# Patient Record
Sex: MALE | Race: WHITE | Employment: OTHER | URBAN - METROPOLITAN AREA
[De-identification: names, ages, dates, MRNs, and addresses within clinical notes are randomized per-mention and may not be internally consistent; named-entity substitution may affect disease eponyms.]

---

## 2017-11-06 ENCOUNTER — OFFICE VISIT (OUTPATIENT)
Dept: PODIATRY | Age: 77
End: 2017-11-06
Payer: COMMERCIAL

## 2017-11-06 VITALS
BODY MASS INDEX: 31.07 KG/M2 | WEIGHT: 205 LBS | HEIGHT: 68 IN | DIASTOLIC BLOOD PRESSURE: 76 MMHG | SYSTOLIC BLOOD PRESSURE: 129 MMHG | HEART RATE: 78 BPM

## 2017-11-06 DIAGNOSIS — L85.3 XEROSIS OF SKIN: ICD-10-CM

## 2017-11-06 DIAGNOSIS — B35.1 DERMATOPHYTOSIS OF NAIL: Primary | ICD-10-CM

## 2017-11-06 PROCEDURE — 11721 DEBRIDE NAIL 6 OR MORE: CPT | Performed by: PODIATRIST

## 2017-11-06 PROCEDURE — 99213 OFFICE O/P EST LOW 20 MIN: CPT | Performed by: PODIATRIST

## 2017-11-06 RX ORDER — MELOXICAM 15 MG/1
TABLET ORAL
COMMUNITY
Start: 2017-11-05 | End: 2019-09-11

## 2017-11-06 RX ORDER — GABAPENTIN 300 MG/1
CAPSULE ORAL
COMMUNITY
Start: 2017-08-17 | End: 2019-07-01

## 2017-11-06 RX ORDER — LEVOCETIRIZINE DIHYDROCHLORIDE 5 MG/1
TABLET, FILM COATED ORAL
COMMUNITY
Start: 2017-09-05 | End: 2019-09-11

## 2017-11-06 RX ORDER — AMMONIUM LACTATE 12 G/100G
CREAM TOPICAL
Qty: 1 BOTTLE | Refills: 3 | Status: SHIPPED | OUTPATIENT
Start: 2017-11-06

## 2017-11-06 NOTE — PROGRESS NOTES
[facility] Podiatry  Return Patient    Chief Complaint   Patient presents with    Nail Problem       Subjective: This Maxine Petersen comes to clinic for foot and nail care. Pt currently has complaint of thickened, painful, elongated nails that he/she cannot manage by themselves. Pt. Relates pain to nails with shoe gear. Pt's primary care physician is Angela Dos Santos MD. Last seen October 31 2017  Past Medical History:   Diagnosis Date    Anxiety     BPH (benign prostatic hypertrophy)     CAD (coronary artery disease)     Cancer (HCC)     face, ear, scalp and arms     Examination of participant in clinical trial 4/27/39    For the life of the medtronic device    H/O heart artery stent 2011    [employer] - 3 stents    Hypertension     Left ventricular dysfunction     Myocardial infarction 11/17/88, 1/2013    Pacemaker [id number]    4x    Vasovagal near syncope        Allergies   Allergen Reactions    Oxycontin [Oxycodone Hcl]     Percocet [Oxycodone-Acetaminophen]      Current Outpatient Prescriptions on File Prior to Visit   Medication Sig Dispense Refill    isosorbide mononitrate (IMDUR) 30 MG extended release tablet Take 0.5 tablets by mouth daily 30 tablet 3    nitroGLYCERIN (NITROSTAT) 0.4 MG SL tablet Place 1 tablet under the tongue every 5 minutes as needed for Chest pain up to max of 3 total doses. If no relief after 1 dose, call 911. 25 tablet 3    pyridostigmine (MESTINON) 60 MG tablet Take 0.5 tablets by mouth every 8 hours. 90 tablet 3    fluticasone (FLONASE) 50 MCG/ACT nasal spray 1 spray by Nasal route daily.  sertraline (ZOLOFT) 25 MG tablet Take 25 mg by mouth daily.  amLODIPine (NORVASC) 10 MG tablet Take 10 mg by mouth daily.  furosemide (LASIX) 40 MG tablet Take 40 mg by mouth daily.  aspirin EC 81 MG EC tablet Take 1 tablet by mouth daily. 30 tablet 6    zolpidem (AMBIEN) 5 MG tablet Take 5 mg by mouth nightly as needed.       simvastatin (ZOCOR) 40 MG Bilateral.  Edema present, Bilateral.  Varicosities absent, Bilateral. Erythema absent, Bilateral      Integument: Warm, dry, supple, Bilateral.  Open lesion absent, Bilateral.  Interdigital maceration absent to web spaces 4, Bilateral.  Nails 1-5 left and 1-5 right thickened > 3.0 mm, dystrophic and crumbly, discolored with subungual debris. Fissures absent, Bilateral.     Neurological:  Sensation present to light touch to level of digits, Bilateral.    Assessment:  65 y.o. male with:   1. Dermatophytosis of nail  36936 - [place] DEBRIDEMENT OF NAILS, 6 OR MORE   2. Xerosis of skin           Plan:   Pt was evaluated and examined. Patient was given personalized discharge instructions. Nails 1-10 were debrided in length and thickness sharply with a nail nipper and  without incident. rx givne for lac hydryin Pt will follow up in 9 weeks or sooner if any problems arise. Diagnosis was discussed with the pt and all of their questions were answered in detail. Proper foot hygiene and care was discussed with the pt. Patient to check feet daily and contact the office with any questions/problems/concerns. Other comorbidity noted and will be managed by PCP. Pain waiver discussed with patient and confirmed.    11/6/2017      Electronically signed by Dr. Leia Pearson DPM

## 2018-01-15 ENCOUNTER — OFFICE VISIT (OUTPATIENT)
Dept: PODIATRY | Age: 78
End: 2018-01-15
Payer: COMMERCIAL

## 2018-01-15 VITALS — HEIGHT: 68 IN | WEIGHT: 205 LBS | BODY MASS INDEX: 31.07 KG/M2

## 2018-01-15 DIAGNOSIS — B35.1 DERMATOPHYTOSIS OF NAIL: Primary | ICD-10-CM

## 2018-01-15 DIAGNOSIS — L85.3 XEROSIS OF SKIN: ICD-10-CM

## 2018-01-15 DIAGNOSIS — D23.71 BENIGN NEOPLASM OF SKIN OF RIGHT LOWER EXTREMITY, INCLUDING HIP: ICD-10-CM

## 2018-01-15 DIAGNOSIS — I73.9 PVD (PERIPHERAL VASCULAR DISEASE) (HCC): ICD-10-CM

## 2018-01-15 DIAGNOSIS — M79.604 BILATERAL LOWER EXTREMITY PAIN: ICD-10-CM

## 2018-01-15 DIAGNOSIS — D23.72 BENIGN NEOPLASM OF SKIN OF LEFT LOWER EXTREMITY, INCLUDING HIP: ICD-10-CM

## 2018-01-15 DIAGNOSIS — M79.605 BILATERAL LOWER EXTREMITY PAIN: ICD-10-CM

## 2018-01-15 PROBLEM — C44.629 SQUAMOUS CELL CARCINOMA OF SKIN OF LEFT UPPER ARM: Status: ACTIVE | Noted: 2018-01-08

## 2018-01-15 PROCEDURE — 17110 DESTRUCTION B9 LES UP TO 14: CPT | Performed by: PODIATRIST

## 2018-01-15 PROCEDURE — 99213 OFFICE O/P EST LOW 20 MIN: CPT | Performed by: PODIATRIST

## 2018-01-15 NOTE — PROGRESS NOTES
1st MPJ ROM decreased, Bilateral.  Muscle strength 5/5, Bilateral.  Pain present upon palpation of lesions on platar feet rupal. In 2 spots. Medial longitudinal arch, Bilateral WNL. Ankle ROM WNL,Bilateral.    Dorsally contracted digits absent digits 1-5 Bilateral.     Vascular: DP and PT pulses palpable 2/4, Bilateral.  CFT <3 seconds, Bilateral.  Hair growth present to the level of the digits, Bilateral.  Edema absent, Bilateral.  Varicosities absent, Bilateral. Erythema absent, Bilateral    Neurological: Sensation intact to light touch to level of digits, Bilateral.  Protective sensation intact 10/10 sites via 5.07/10g [place]-Ana Luisa Monofilament, Bilateral.  negative Tinel's, Bilateral.  negative Valleix sign, Bilateral.      Integument: Warm, dry, supple, Bilateral.  Open lesion absent, Bilateral.  Interdigital maceration absent to web spaces 1-4, Bilateral.  Nails are normal in length, thickness and color 1-5 bilateral.  Fissures absent, Bilateral.   Neurological:  Sensation present to light touch to level of digits, Bilateral.    Assessment:  65 y.o. male with:   1. Dermatophytosis of nail     2. Bilateral lower extremity pain  MN DESTRUCTION BENIGN LESIONS UP TO 14   3. Benign neoplasm of skin of right lower extremity, including hip  MN DESTRUCTION BENIGN LESIONS UP TO 14   4. Benign neoplasm of skin of left lower extremity, including hip  MN DESTRUCTION BENIGN LESIONS UP TO 14   5. PVD (peripheral vascular disease) (HCC)  MN DESTRUCTION BENIGN LESIONS UP TO 14   6. Xerosis of skin  MN DESTRUCTION BENIGN LESIONS UP TO 14         Plan:   Pt was evaluated and examined. Patient was given personalized discharge instructions. The lesion was partially excised and Pyrogallic acid was applied under occlusion. The patient will leave in place for 24-48 hours and than remove. The patient tolerated the procedure well and without complication. Pt will follow up in 9 weeks or sooner if any problems arise.

## 2018-01-25 RX ORDER — GABAPENTIN 300 MG/1
300 CAPSULE ORAL 3 TIMES DAILY
Qty: 90 CAPSULE | Refills: 2 | Status: SHIPPED | OUTPATIENT
Start: 2018-01-25 | End: 2022-08-01

## 2018-03-15 ENCOUNTER — HOSPITAL ENCOUNTER (OUTPATIENT)
Age: 78
Discharge: HOME | End: 2018-03-15
Payer: COMMERCIAL

## 2018-07-10 ENCOUNTER — OFFICE VISIT (OUTPATIENT)
Dept: PODIATRY | Age: 78
End: 2018-07-10
Payer: COMMERCIAL

## 2018-07-10 VITALS — WEIGHT: 205 LBS | HEART RATE: 68 BPM | HEIGHT: 68 IN | BODY MASS INDEX: 31.07 KG/M2 | RESPIRATION RATE: 16 BRPM

## 2018-07-10 DIAGNOSIS — I73.9 PVD (PERIPHERAL VASCULAR DISEASE) (HCC): Primary | ICD-10-CM

## 2018-07-10 DIAGNOSIS — M79.672 PAIN IN BOTH FEET: ICD-10-CM

## 2018-07-10 DIAGNOSIS — M79.671 PAIN IN BOTH FEET: ICD-10-CM

## 2018-07-10 DIAGNOSIS — B35.1 DERMATOPHYTOSIS OF NAIL: ICD-10-CM

## 2018-07-10 PROCEDURE — 11721 DEBRIDE NAIL 6 OR MORE: CPT | Performed by: PODIATRIST

## 2018-07-10 NOTE — PROGRESS NOTES
[facility] PHYSICIANS  [facility] [address]  [address] [facility]   Dept: [phone]  Dept Fax: [phone]    NAIL PAIN PROGRESS NOTE  Date of patient's visit: 7/10/2018  Patient's Name:  Michael Light YOB: 1940            Patient Care Team:  Latanya Pal MD as PCP - 8 Adrian Galeana DPM as Physician (Podiatry)      No chief complaint on file. Subjective: This Michael Light comes to clinic for foot and nail care. Pt currently has complaint of thickened, painful, elongated nails that he/she cannot manage by themselves. Pt. Relates pain to nails with shoe gear. Pt's primary care physician is Latanya Pal MD.  Past Medical History:   Diagnosis Date    Anxiety     BPH (benign prostatic hypertrophy)     CAD (coronary artery disease)     Cancer ([address]     face, ear, scalp and arms     Examination of participant in clinical trial 4/27/39    For the life of the medtronic device    H/O heart artery stent 2011    St[employer]V's - 3 stents    Hypertension     Left ventricular dysfunction     Myocardial infarction 11/17/88, 1/2013    Pacemaker [id number]    4x    Vasovagal near syncope        Allergies   Allergen Reactions    Oxycodone-Acetaminophen     Oxycontin [Oxycodone Hcl]     Percocet [Oxycodone-Acetaminophen]      Current Outpatient Prescriptions on File Prior to Visit   Medication Sig Dispense Refill    gabapentin (NEURONTIN) 300 MG capsule Take 1 capsule by mouth 3 times daily for 90 days. 90 capsule 2    gabapentin (NEURONTIN) 300 MG capsule       meloxicam (MOBIC) 15 MG tablet       levocetirizine (XYZAL) 5 MG tablet       ammonium lactate (LAC-HYDRIN) 12 % cream Apply topically as needed.  1 Bottle 3    isosorbide mononitrate (IMDUR) 30 MG extended release tablet Take 0.5 tablets by mouth daily 30 tablet 3    nitroGLYCERIN (NITROSTAT) 0.4 MG SL tablet Place 1 tablet under the tongue every 5 minutes as needed for Chest pain up Left    Thickness  [x] [x] [x] [x] [x] [x] [x] [x] [x] [x]  5 4 3 2 1 1 2 3 4 5                         Right                                        Left    Dystrophic Changes   [x] [x] [x] [x] [x] [x] [x] [x] [x] [x]  5 4 3 2 1 1 2 3 4 5                         Right                                        Left    Color  [x] [x] [x] [x] [x] [x] [x] [x] [x] [x]  5 4 3 2 1 1 2 3 4 5                          Right                                        Left    Incurvation/Ingrowin   [] [] [] [] [] [] [] [] [] []  5 4 3 2 1 1 2 3 4 5                         Right                                        Left    Inflammation/Pain   [x] [x] [x] [x] [x] [x] [x] [x] [x] [x]  5 4 3  2 1 1 2 3 4 5                         Right                                        Left       Nails are painful 1-10 with direct palpation. Dermatologic Exam:  Skin lesion/ulceration Absent . Skin No rashes or nodules noted. .       Musculoskeletal:     1st MPJ ROM decreased, Bilateral.  Muscle strength 5/5, Bilateral.  Pain present upon palpation of toenails 1-5, Bilateral. decreased medial longitudinal arch, Bilateral.  Ankle ROM decreased,Bilateral.    Dorsally contracted digits present digits 2, Bilateral.     Vascular: DP and PT pulses palpable 1/4, Bilateral.  CFT <5 seconds, Bilateral.  Hair growth absent to the level of the digits, Bilateral.  Edema present, Bilateral.  Varicosities absent, Bilateral. Erythema absent, Bilateral    Integument: Warm, dry, supple, Bilateral.  Open lesion absent, Bilateral.  Interdigital maceration absent to web spaces 4, Bilateral.  Nails 1-5 left and 1-5 right thickened > 3.0 mm, dystrophic and crumbly, discolored with subungual debris. Fissures absent, Bilateral.   Neurological:  Sensation present to light touch to level of digits, Bilateral.      Assessment:  77 y.o. male with:    Diagnosis Orders   1. PVD (peripheral vascular disease) ([facility]  11880 - [place] DEBRIDEMENT OF NAILS, 6 OR MORE   2.  Pain in both feet  69752 - [place] DEBRIDEMENT OF NAILS, 6 OR MORE   3. Dermatophytosis of nail  89640 - [place] DEBRIDEMENT OF NAILS, 6 OR MORE         Plan:   Pt was evaluated and examined. Patient was given personalized discharge instructions. Nails 1-10 were debrided in length and thickness sharply with a nail nipper and  without incident. Pt will follow up in 9 weeks or sooner if any problems arise. Diagnosis was discussed with the pt and all of their questions were answered in detail. Proper foot hygiene and care was discussed with the pt. Patient to check feet daily and contact the office with any questions/problems/concerns. Other comorbidity noted and will be managed by PCP. Pain waiver discussed with patient and confirmed.    7/10/2018      Electronically signed by Eliza Khan DPM on 7/10/2018 at 10:47 AM  7/10/2018

## 2018-10-02 ENCOUNTER — OFFICE VISIT (OUTPATIENT)
Dept: PODIATRY | Age: 78
End: 2018-10-02
Payer: COMMERCIAL

## 2018-10-02 VITALS — HEIGHT: 67 IN | WEIGHT: 196 LBS | BODY MASS INDEX: 30.76 KG/M2

## 2018-10-02 DIAGNOSIS — M79.672 PAIN IN BOTH FEET: ICD-10-CM

## 2018-10-02 DIAGNOSIS — I73.9 PERIPHERAL VASCULAR DISEASE (HCC): ICD-10-CM

## 2018-10-02 DIAGNOSIS — B35.1 DERMATOPHYTOSIS OF NAIL: Primary | ICD-10-CM

## 2018-10-02 DIAGNOSIS — R26.2 TROUBLE WALKING: ICD-10-CM

## 2018-10-02 DIAGNOSIS — M79.671 PAIN IN BOTH FEET: ICD-10-CM

## 2018-10-02 PROCEDURE — 11721 DEBRIDE NAIL 6 OR MORE: CPT | Performed by: PODIATRIST

## 2018-10-02 RX ORDER — FUROSEMIDE 40 MG/1
TABLET ORAL
COMMUNITY
End: 2019-09-11

## 2018-10-02 RX ORDER — FLUTICASONE PROPIONATE 50 MCG
SPRAY, SUSPENSION (ML) NASAL
COMMUNITY
End: 2019-09-11

## 2018-10-02 RX ORDER — GABAPENTIN 250 MG/5ML
SOLUTION ORAL
COMMUNITY
End: 2019-07-01

## 2018-10-02 RX ORDER — ASPIRIN 81 MG/1
TABLET, CHEWABLE ORAL
COMMUNITY
Start: 2013-05-23 | End: 2019-09-11

## 2018-10-02 RX ORDER — LISINOPRIL 40 MG/1
TABLET ORAL
COMMUNITY
Start: 2013-05-23 | End: 2019-09-11

## 2018-10-02 NOTE — PROGRESS NOTES
[facility]YSICIANS  [facility] [address]  [address]  [address]  Dept: [phone]  Dept Fax: [phone]    NAIL PAIN PROGRESS NOTE  Date of patient's visit: 10/2/2018  Patient's Name:  Sondra Marx YOB: 1940            Patient Care Team:  Helen Dillon MD as PCP - General  Rain Patel DPM as Physician (Podiatry)      Chief Complaint   Patient presents with    Peripheral Neuropathy    Nail Problem       Subjective: This Sondra Araseli comes to clinic for foot and nail care. Pt currently has complaint of thickened, painful, elongated nails that he/she cannot manage by themselves. Pt. Relates pain to nails with shoe gear. Pt's primary care physician is Helen Dillon MD last seen about 1 month ago and states he only see's her every  3 months. Past Medical History:   Diagnosis Date    Anxiety     BPH (benign prostatic hypertrophy)     CAD (coronary artery disease)     Cancer (HCC)     face, ear, scalp and arms     Examination of participant in clinical trial 4/27/39    For the life of the medtronic device    H/O heart artery stent 2011    [employer]V's - 3 stents    Hypertension     Left ventricular dysfunction     Myocardial infarction ([facility] 11/17/88, 1/2013    Pacemaker [id number]    4x    Vasovagal near syncope        Allergies   Allergen Reactions    Oxycodone-Acetaminophen     Oxycontin [Oxycodone Hcl]     Percocet [Oxycodone-Acetaminophen]      Current Outpatient Prescriptions on File Prior to Visit   Medication Sig Dispense Refill    gabapentin (NEURONTIN) 300 MG capsule       meloxicam (MOBIC) 15 MG tablet       levocetirizine (XYZAL) 5 MG tablet       ammonium lactate (LAC-HYDRIN) 12 % cream Apply topically as needed.  1 Bottle 3    isosorbide mononitrate (IMDUR) 30 MG extended release tablet Take 0.5 tablets by mouth daily 30 tablet 3    nitroGLYCERIN (NITROSTAT) 0.4 MG SL tablet Place 1 tablet under the tongue every 5 minutes

## 2018-12-04 ENCOUNTER — OFFICE VISIT (OUTPATIENT)
Dept: PODIATRY | Age: 78
End: 2018-12-04
Payer: COMMERCIAL

## 2018-12-04 VITALS — HEIGHT: 67 IN | WEIGHT: 190 LBS | BODY MASS INDEX: 29.82 KG/M2

## 2018-12-04 DIAGNOSIS — D23.72 BENIGN NEOPLASM OF SKIN OF LOWER LIMB, INCLUDING HIP, LEFT: ICD-10-CM

## 2018-12-04 DIAGNOSIS — B35.1 DERMATOPHYTOSIS OF NAIL: ICD-10-CM

## 2018-12-04 DIAGNOSIS — I73.9 PERIPHERAL VASCULAR DISEASE (HCC): ICD-10-CM

## 2018-12-04 DIAGNOSIS — M79.671 PAIN IN BOTH FEET: Primary | ICD-10-CM

## 2018-12-04 DIAGNOSIS — M79.672 PAIN IN BOTH FEET: Primary | ICD-10-CM

## 2018-12-04 DIAGNOSIS — D23.71 BENIGN NEOPLASM OF SKIN OF LOWER LIMB, INCLUDING HIP, RIGHT: ICD-10-CM

## 2018-12-04 DIAGNOSIS — R26.2 TROUBLE WALKING: ICD-10-CM

## 2018-12-04 PROCEDURE — 99213 OFFICE O/P EST LOW 20 MIN: CPT | Performed by: PODIATRIST

## 2018-12-04 PROCEDURE — 17110 DESTRUCTION B9 LES UP TO 14: CPT | Performed by: PODIATRIST

## 2018-12-04 PROCEDURE — 11721 DEBRIDE NAIL 6 OR MORE: CPT | Performed by: PODIATRIST

## 2018-12-04 NOTE — PROGRESS NOTES
[facility] PHYSICIANS  [facility] [address]  [place] [address]  Dept: [phone]  Dept Fax: [phone]     NAIL PAIN & BENIGN NEOPLASM PROGRESS NOTE  Date of patient's visit: 12/4/2018  Patient's Name:  Rashaun Hernandez YOB: 1940            Patient Care Team:  Jazmyn Negro MD as PCP - General Sanam Romo DPM as Physician (Podiatry)          Chief Complaint   Patient presents with    Benign Neoplasm    Nail Problem       Subjective: This Rashaun Hernandez comes to clinic for foot and nail care. Pt currently has complaint of thickened, painful, elongated nails that he/she cannot manage by themselves. Pt. Relates pain to nails with shoe gear. Pt's primary care physician is Jazmyn Negro MDlast seen 3 months ago and will be seen again 11/13/2018 . Past Medical History:   Diagnosis Date    Anxiety     BPH (benign prostatic hypertrophy)     CAD (coronary artery disease)     Cancer (HCC)     face, ear, scalp and arms     Examination of participant in clinical trial 4/27/39    For the life of the medtronic device    H/O heart artery stent 2011    St[employer]V's - 3 stents    Hypertension     Left ventricular dysfunction     Myocardial infarction ([facility] 11/17/88, 1/2013    Pacemaker [id number]    4x    Vasovagal near syncope    Rashaun Hernandez is a 77 y.o. male. Painful lesions his   feet. . They have been present for 2 month(s) duration. The lesions are present on the bilateral foot. The patient has 2 lesion that is deep seated and has a painful core.  Treatment has included padding and using lotion to the feet      Allergies   Allergen Reactions    Oxycodone-Acetaminophen     Oxycontin [Oxycodone Hcl]     Percocet [Oxycodone-Acetaminophen]      Current Outpatient Prescriptions on File Prior to Visit   Medication Sig Dispense Refill    aspirin 81 MG chewable tablet       fluticasone (FLONASE) 50 MCG/ACT nasal spray PLACE TWO SPRAYS IN EACH NOSTRIL DAILY      Gabapentin 300 MG/6ML SOLN 1 tablet      furosemide (LASIX) 40 MG tablet 1 tablet      lisinopril (PRINIVIL;ZESTRIL) 40 MG tablet 1 tablet      gabapentin (NEURONTIN) 300 MG capsule       meloxicam (MOBIC) 15 MG tablet       levocetirizine (XYZAL) 5 MG tablet       ammonium lactate (LAC-HYDRIN) 12 % cream Apply topically as needed. 1 Bottle 3    isosorbide mononitrate (IMDUR) 30 MG extended release tablet Take 0.5 tablets by mouth daily 30 tablet 3    nitroGLYCERIN (NITROSTAT) 0.4 MG SL tablet Place 1 tablet under the tongue every 5 minutes as needed for Chest pain up to max of 3 total doses. If no relief after 1 dose, call 911. 25 tablet 3    pyridostigmine (MESTINON) 60 MG tablet Take 0.5 tablets by mouth every 8 hours. 90 tablet 3    fluticasone (FLONASE) 50 MCG/ACT nasal spray 1 spray by Nasal route daily.  sertraline (ZOLOFT) 25 MG tablet Take 25 mg by mouth daily.  amLODIPine (NORVASC) 10 MG tablet Take 10 mg by mouth daily.  furosemide (LASIX) 40 MG tablet Take 40 mg by mouth daily.  aspirin EC 81 MG EC tablet Take 1 tablet by mouth daily. 30 tablet 6    zolpidem (AMBIEN) 5 MG tablet Take 5 mg by mouth nightly as needed.  simvastatin (ZOCOR) 40 MG tablet Take 20 mg by mouth nightly       alfuzosin (UROXATRAL) 10 MG SR tablet Take 10 mg by mouth daily.  lisinopril (PRINIVIL;ZESTRIL) 40 MG tablet Take 40 mg by mouth daily.  metoprolol (TOPROL-XL) 100 MG XL tablet Take 100 mg by mouth daily.  gabapentin (NEURONTIN) 300 MG capsule Take 1 capsule by mouth 3 times daily for 90 days. 90 capsule 2     No current facility-administered medications on file prior to visit.       Review of Systems    Review of Systems:   History obtained from chart review and the patient  General ROS: negative for - chills, fatigue, fever, night sweats or weight gain  Constitutional: Negative for chills, diaphoresis, fatigue, fever and unexpected weight

## 2019-03-05 ENCOUNTER — OFFICE VISIT (OUTPATIENT)
Dept: PODIATRY | Age: 79
End: 2019-03-05
Payer: COMMERCIAL

## 2019-03-05 VITALS — WEIGHT: 203 LBS | BODY MASS INDEX: 31.86 KG/M2 | HEIGHT: 67 IN

## 2019-03-05 DIAGNOSIS — D23.71 BENIGN NEOPLASM OF SKIN OF RIGHT LOWER EXTREMITY, INCLUDING HIP: ICD-10-CM

## 2019-03-05 DIAGNOSIS — R26.2 TROUBLE WALKING: ICD-10-CM

## 2019-03-05 DIAGNOSIS — M79.672 PAIN IN BOTH FEET: Primary | ICD-10-CM

## 2019-03-05 DIAGNOSIS — M79.671 PAIN IN BOTH FEET: Primary | ICD-10-CM

## 2019-03-05 DIAGNOSIS — I73.9 PVD (PERIPHERAL VASCULAR DISEASE) (HCC): ICD-10-CM

## 2019-03-05 PROCEDURE — 99213 OFFICE O/P EST LOW 20 MIN: CPT | Performed by: PODIATRIST

## 2019-03-05 PROCEDURE — 17110 DESTRUCTION B9 LES UP TO 14: CPT | Performed by: PODIATRIST

## 2019-07-01 ENCOUNTER — HOSPITAL ENCOUNTER (OUTPATIENT)
Dept: CARDIAC CATH/INVASIVE PROCEDURES | Age: 79
Discharge: HOME | End: 2019-07-01
Payer: MEDICARE

## 2019-07-01 VITALS
WEIGHT: 203 LBS | SYSTOLIC BLOOD PRESSURE: 124 MMHG | TEMPERATURE: 97 F | HEIGHT: 67 IN | BODY MASS INDEX: 31.86 KG/M2 | RESPIRATION RATE: 13 BRPM | HEART RATE: 72 BPM | DIASTOLIC BLOOD PRESSURE: 71 MMHG | OXYGEN SATURATION: 98 %

## 2019-07-01 DIAGNOSIS — Z45.02 ENCOUNTER DUE TO AICD AT END OF BATTERY LIFE: ICD-10-CM

## 2019-07-01 LAB
GFR NON-AFRICAN AMERICAN: >60 ML/MIN
GFR SERPL CREATININE-BSD FRML MDRD: >60 ML/MIN
GFR SERPL CREATININE-BSD FRML MDRD: NORMAL ML/MIN/{1.73_M2}
GLUCOSE BLD-MCNC: 86 MG/DL (ref 74–100)
PLATELET # BLD: 208 K/UL (ref 138–453)
POC CHLORIDE: 100 MMOL/L (ref 98–107)
POC CREATININE: 0.97 MG/DL (ref 0.51–1.19)
POC HEMATOCRIT: 44 % (ref 41–53)
POC HEMOGLOBIN: 14.8 G/DL (ref 13.5–17.5)
POC POTASSIUM: 3.7 MMOL/L (ref 3.5–4.5)
POC SODIUM: 138 MMOL/L (ref 138–146)

## 2019-07-01 PROCEDURE — 85014 HEMATOCRIT: CPT

## 2019-07-01 PROCEDURE — 82947 ASSAY GLUCOSE BLOOD QUANT: CPT

## 2019-07-01 PROCEDURE — 82565 ASSAY OF CREATININE: CPT

## 2019-07-01 PROCEDURE — 33263 RMVL & RPLCMT DFB GEN 2 LEAD: CPT | Performed by: INTERNAL MEDICINE

## 2019-07-01 PROCEDURE — 85049 AUTOMATED PLATELET COUNT: CPT

## 2019-07-01 PROCEDURE — 2500000003 HC RX 250 WO HCPCS

## 2019-07-01 PROCEDURE — 2709999900 HC NON-CHARGEABLE SUPPLY

## 2019-07-01 PROCEDURE — 84132 ASSAY OF SERUM POTASSIUM: CPT

## 2019-07-01 PROCEDURE — 84295 ASSAY OF SERUM SODIUM: CPT

## 2019-07-01 PROCEDURE — 6360000002 HC RX W HCPCS

## 2019-07-01 PROCEDURE — 2580000003 HC RX 258

## 2019-07-01 PROCEDURE — 7100000011 HC PHASE II RECOVERY - ADDTL 15 MIN

## 2019-07-01 PROCEDURE — 82435 ASSAY OF BLOOD CHLORIDE: CPT

## 2019-07-01 PROCEDURE — 7100000010 HC PHASE II RECOVERY - FIRST 15 MIN

## 2019-07-01 PROCEDURE — C1721 AICD, DUAL CHAMBER: HCPCS

## 2019-07-01 PROCEDURE — 2720000010 HC SURG SUPPLY STERILE

## 2019-07-01 RX ORDER — CEFAZOLIN SODIUM 1 G/50ML
1 INJECTION, SOLUTION INTRAVENOUS ONCE
Status: DISCONTINUED | OUTPATIENT
Start: 2019-07-01 | End: 2019-07-02 | Stop reason: HOSPADM

## 2019-07-01 RX ORDER — SODIUM CHLORIDE 9 MG/ML
INJECTION, SOLUTION INTRAVENOUS CONTINUOUS
Status: DISCONTINUED | OUTPATIENT
Start: 2019-07-01 | End: 2019-07-02 | Stop reason: HOSPADM

## 2019-07-01 RX ADMIN — SODIUM CHLORIDE: 9 INJECTION, SOLUTION INTRAVENOUS at 08:08

## 2019-07-01 NOTE — PROGRESS NOTES
Received post procedure to [facility] to room 7. Assessment obtained. Restrictions reviewed with patient. Post procedure pathway initiated. Left upper chest site soft ,  dry and intact. No hematoma noted. Family at side. Patient without complaints.

## 2019-07-01 NOTE — H&P
MR  4. Sick sinus syndrome    Plan:  1. Proceed with planned ICD battery change out. 2. Further orders to follow. The risks, benefits, and alternatives of the prcoedure were discussed in detail with the patient. The patient agrees to proceed with the procedure, verbalizes understanding and signed consent.        Swetha Emerson MD  Fellow, [address] [place] [address]

## 2019-07-01 NOTE — OP NOTE
7days  Discharge if patient remains stable        Electronically signed by Shantel Phan MD on 7/1/2019 at 12:02 PM  [facility] Cardiology Consultant  [phone]

## 2019-09-24 ENCOUNTER — HOSPITAL ENCOUNTER (OUTPATIENT)
Age: 79
Setting detail: OUTPATIENT SURGERY
Discharge: HOME | End: 2019-09-24
Attending: UROLOGY | Admitting: UROLOGY
Payer: MEDICARE

## 2019-09-24 ENCOUNTER — ANESTHESIA (OUTPATIENT)
Dept: OPERATING ROOM | Age: 79
End: 2019-09-24

## 2019-09-24 ENCOUNTER — ANESTHESIA EVENT (OUTPATIENT)
Dept: OPERATING ROOM | Age: 79
End: 2019-09-24

## 2019-09-24 ENCOUNTER — HOSPITAL ENCOUNTER (OUTPATIENT)
Dept: ULTRASOUND IMAGING | Age: 79
Setting detail: OUTPATIENT SURGERY
Discharge: HOME | End: 2019-09-26
Attending: UROLOGY
Payer: MEDICARE

## 2019-09-24 VITALS
DIASTOLIC BLOOD PRESSURE: 68 MMHG | TEMPERATURE: 97.3 F | WEIGHT: 200 LBS | HEART RATE: 73 BPM | OXYGEN SATURATION: 96 % | RESPIRATION RATE: 20 BRPM | BODY MASS INDEX: 31.39 KG/M2 | HEIGHT: 67 IN | SYSTOLIC BLOOD PRESSURE: 109 MMHG

## 2019-09-24 PROCEDURE — 2709999900 HC NON-CHARGEABLE SUPPLY: Performed by: UROLOGY

## 2019-09-24 PROCEDURE — 3600000002 HC SURGERY LEVEL 2 BASE: Performed by: UROLOGY

## 2019-09-24 PROCEDURE — 88305 TISSUE EXAM BY PATHOLOGIST: CPT

## 2019-09-24 PROCEDURE — 76942 ECHO GUIDE FOR BIOPSY: CPT

## 2019-09-24 PROCEDURE — 7100000010 HC PHASE II RECOVERY - FIRST 15 MIN: Performed by: UROLOGY

## 2019-09-24 PROCEDURE — 2500000003 HC RX 250 WO HCPCS: Performed by: UROLOGY

## 2019-09-24 PROCEDURE — 7100000011 HC PHASE II RECOVERY - ADDTL 15 MIN: Performed by: UROLOGY

## 2019-09-24 PROCEDURE — 6370000000 HC RX 637 (ALT 250 FOR IP): Performed by: UROLOGY

## 2019-09-24 RX ORDER — CIPROFLOXACIN 500 MG/1
500 TABLET, FILM COATED ORAL 2 TIMES DAILY
Status: ON HOLD | COMMUNITY
End: 2020-09-02 | Stop reason: ALTCHOICE

## 2019-09-24 RX ORDER — M-VIT,TX,IRON,MINS/CALC/FOLIC 27MG-0.4MG
1 TABLET ORAL DAILY
COMMUNITY

## 2019-09-24 RX ORDER — LIDOCAINE HYDROCHLORIDE 10 MG/ML
INJECTION, SOLUTION EPIDURAL; INFILTRATION; INTRACAUDAL; PERINEURAL PRN
Status: DISCONTINUED | OUTPATIENT
Start: 2019-09-24 | End: 2019-09-24 | Stop reason: ALTCHOICE

## 2019-09-24 ASSESSMENT — PAIN DESCRIPTION - LOCATION: LOCATION: RECTUM

## 2019-09-24 ASSESSMENT — LIFESTYLE VARIABLES: SMOKING_STATUS: 0

## 2019-09-24 ASSESSMENT — PAIN SCALES - GENERAL: PAINLEVEL_OUTOF10: 2

## 2019-09-24 ASSESSMENT — PAIN - FUNCTIONAL ASSESSMENT: PAIN_FUNCTIONAL_ASSESSMENT: 0-10

## 2019-09-24 NOTE — PROGRESS NOTES
Discharge instructions given to pt and wife per [facility] RN after patient dressed and sitting up.

## 2019-09-25 LAB — SURGICAL PATHOLOGY REPORT: NORMAL

## 2019-10-11 ENCOUNTER — HOSPITAL ENCOUNTER (OUTPATIENT)
Dept: CT IMAGING | Age: 79
Discharge: HOME | End: 2019-10-13
Payer: MEDICARE

## 2019-10-11 DIAGNOSIS — C61 MALIGNANT NEOPLASM OF PROSTATE (HCC): ICD-10-CM

## 2019-10-11 PROCEDURE — 2580000003 HC RX 258: Performed by: UROLOGY

## 2019-10-11 PROCEDURE — 6360000004 HC RX CONTRAST MEDICATION: Performed by: UROLOGY

## 2019-10-11 PROCEDURE — 74177 CT ABD & PELVIS W/CONTRAST: CPT

## 2019-10-11 RX ORDER — SODIUM CHLORIDE 0.9 % (FLUSH) 0.9 %
10 SYRINGE (ML) INJECTION
Status: COMPLETED | OUTPATIENT
Start: 2019-10-11 | End: 2019-10-11

## 2019-10-11 RX ORDER — 0.9 % SODIUM CHLORIDE 0.9 %
80 INTRAVENOUS SOLUTION INTRAVENOUS ONCE
Status: COMPLETED | OUTPATIENT
Start: 2019-10-11 | End: 2019-10-11

## 2019-10-11 RX ADMIN — IOPAMIDOL 75 ML: 755 INJECTION, SOLUTION INTRAVENOUS at 15:38

## 2019-10-11 RX ADMIN — SODIUM CHLORIDE, PRESERVATIVE FREE 10 ML: 5 INJECTION INTRAVENOUS at 15:38

## 2019-10-11 RX ADMIN — SODIUM CHLORIDE 80 ML: 0.9 INJECTION, SOLUTION INTRAVENOUS at 15:38

## 2019-10-24 ENCOUNTER — HOSPITAL ENCOUNTER (OUTPATIENT)
Dept: NUCLEAR MEDICINE | Age: 79
Discharge: HOME | End: 2019-10-26
Payer: MEDICARE

## 2019-10-24 DIAGNOSIS — C61 PROSTATE CANCER (HCC): ICD-10-CM

## 2019-10-24 PROCEDURE — 78306 BONE IMAGING WHOLE BODY: CPT

## 2019-10-24 PROCEDURE — 3430000000 HC RX DIAGNOSTIC RADIOPHARMACEUTICAL: Performed by: UROLOGY

## 2019-10-24 PROCEDURE — A9503 TC99M MEDRONATE: HCPCS | Performed by: UROLOGY

## 2019-10-24 RX ORDER — TC 99M MEDRONATE 20 MG/10ML
25 INJECTION, POWDER, LYOPHILIZED, FOR SOLUTION INTRAVENOUS
Status: COMPLETED | OUTPATIENT
Start: 2019-10-24 | End: 2019-10-24

## 2019-10-24 RX ADMIN — TC 99M MEDRONATE 25 MILLICURIE: 20 INJECTION, POWDER, LYOPHILIZED, FOR SOLUTION INTRAVENOUS at 11:25

## 2019-11-25 RX ORDER — BICALUTAMIDE 50 MG/1
TABLET, FILM COATED ORAL
Qty: 30 TABLET | Refills: 0 | Status: SHIPPED | OUTPATIENT
Start: 2019-11-25 | End: 2019-12-30

## 2019-11-27 ENCOUNTER — HOSPITAL ENCOUNTER (OUTPATIENT)
Dept: RADIATION ONCOLOGY | Facility: MEDICAL CENTER | Age: 79
Discharge: HOME | End: 2019-11-27
Payer: MEDICARE

## 2019-11-27 VITALS
OXYGEN SATURATION: 95 % | RESPIRATION RATE: 15 BRPM | SYSTOLIC BLOOD PRESSURE: 135 MMHG | TEMPERATURE: 97.3 F | HEIGHT: 67 IN | WEIGHT: 200.25 LBS | DIASTOLIC BLOOD PRESSURE: 84 MMHG | HEART RATE: 89 BPM | BODY MASS INDEX: 31.43 KG/M2

## 2019-11-27 DIAGNOSIS — C61 MALIGNANT NEOPLASM OF PROSTATE (HCC): ICD-10-CM

## 2019-11-27 PROCEDURE — 99213 OFFICE O/P EST LOW 20 MIN: CPT | Performed by: RADIOLOGY

## 2019-11-27 ASSESSMENT — ENCOUNTER SYMPTOMS
BACK PAIN: 1
VOMITING: 0
NAUSEA: 0
DIARRHEA: 0
SHORTNESS OF BREATH: 0
CONSTIPATION: 0
WHEEZING: 0
BLOOD IN STOOL: 0
TROUBLE SWALLOWING: 0
COUGH: 0
SORE THROAT: 0

## 2020-01-16 RX ORDER — BICALUTAMIDE 50 MG/1
TABLET, FILM COATED ORAL
Qty: 30 TABLET | Refills: 3 | Status: SHIPPED | OUTPATIENT
Start: 2020-01-16 | End: 2020-05-18

## 2020-01-21 ENCOUNTER — OFFICE VISIT (OUTPATIENT)
Dept: PODIATRY | Age: 80
End: 2020-01-21
Payer: MEDICARE

## 2020-01-21 VITALS — WEIGHT: 200 LBS | HEIGHT: 67 IN | RESPIRATION RATE: 16 BRPM | BODY MASS INDEX: 31.39 KG/M2

## 2020-01-21 PROCEDURE — 99213 OFFICE O/P EST LOW 20 MIN: CPT | Performed by: PODIATRIST

## 2020-01-21 PROCEDURE — 17110 DESTRUCTION B9 LES UP TO 14: CPT | Performed by: PODIATRIST

## 2020-01-21 PROCEDURE — 11721 DEBRIDE NAIL 6 OR MORE: CPT | Performed by: PODIATRIST

## 2020-01-21 NOTE — PROGRESS NOTES
[facility] PHYSICIANS  [facility]  [address] [place] [address]  Dept: [phone]  Dept Fax: [phone]     PAIN PROGRESS NOTE  Date of patient's visit: 1/21/2020  Patient's Name:  Omid Alvarado YOB: 1940            Patient Care Team:  Jennifer Crouch MD as PCP - [address] Galeana DPM as Physician (Podiatry)  Alyse Frias MD as Surgeon (Radiation Oncology)  Ryan Landon MD as Consulting Physician (Urology)      Chief Complaint   Patient presents with    Foot Pain    Nail Problem    Benign Neoplasm       Subjective: This Omid Alvarado comes to clinic for foot and nail care. Pt currently has complaint of thickened, painful, elongated nails that he/she cannot manage by themselves. Pt. Relates pain to nails with shoe gear. Pt's primary care physician is Jennifer Crouch MD last seen 01/15/2019. Pt also relates to painful skin spots to the bottom of both feet. Pt has tried pads and changing shoes but it has note helped the pain    Pt has a new complaint of dry scaly skin to the bottom of both of the feet. Pt states they have tried OTC cream but it isnt applied regularly.   Pt has tried changing shoes but it has not helped the pain       Past Medical History:   Diagnosis Date    Acid reflux     resolved since s/p shona    Anxiety     Arthritis     back/ fingers    BPH (benign prostatic hypertrophy)     CAD (coronary artery disease)     [facility] Willam hernandez/ [facility]    Cancer [facility])     face, ear, scalp and arms     Carotid stenosis     bilat    Cataracts, both eyes     CHF (congestive heart failure) (HCC)     Dry skin     Elevated PSA     Examination of participant in clinical trial 4/27/39    For the life of the medtronic device    Hyperlipidemia     pt denies 11-27-19    Hypertension     PCP Dr. Aaron Fuentes/ last seen 9-2019    Left ventricular dysfunction     Macular degeneration     left    Myocardial infarction

## 2020-01-29 ENCOUNTER — HOSPITAL ENCOUNTER (OUTPATIENT)
Dept: RADIATION ONCOLOGY | Facility: MEDICAL CENTER | Age: 80
Discharge: HOME | End: 2020-01-29
Payer: MEDICARE

## 2020-01-29 VITALS
OXYGEN SATURATION: 96 % | HEART RATE: 90 BPM | TEMPERATURE: 97.3 F | DIASTOLIC BLOOD PRESSURE: 92 MMHG | RESPIRATION RATE: 16 BRPM | SYSTOLIC BLOOD PRESSURE: 151 MMHG | BODY MASS INDEX: 31.99 KG/M2 | WEIGHT: 204.25 LBS

## 2020-01-29 PROCEDURE — 99212 OFFICE O/P EST SF 10 MIN: CPT | Performed by: RADIOLOGY

## 2020-01-29 ASSESSMENT — ENCOUNTER SYMPTOMS
VOMITING: 0
NAUSEA: 0
COUGH: 0
BACK PAIN: 1
CONSTIPATION: 0
SHORTNESS OF BREATH: 0
SORE THROAT: 0
TROUBLE SWALLOWING: 0
BLOOD IN STOOL: 0
DIARRHEA: 0

## 2020-01-29 ASSESSMENT — PAIN DESCRIPTION - FREQUENCY: FREQUENCY: INTERMITTENT

## 2020-01-29 ASSESSMENT — PAIN DESCRIPTION - ONSET: ONSET: ON-GOING

## 2020-01-29 ASSESSMENT — PAIN DESCRIPTION - LOCATION: LOCATION: BACK

## 2020-01-29 ASSESSMENT — PAIN DESCRIPTION - PAIN TYPE: TYPE: ACUTE PAIN

## 2020-01-29 ASSESSMENT — PAIN SCALES - GENERAL: PAINLEVEL_OUTOF10: 6

## 2020-01-29 ASSESSMENT — PAIN DESCRIPTION - DESCRIPTORS: DESCRIPTORS: THROBBING

## 2020-01-29 NOTE — PROGRESS NOTES
[facility] Radiation Oncology      Date of Service: 2020     Location: [facility] Radiation Oncology,   [address], [place], [address]   [address] [facility] FOLLOW UP    Patient ID:   Jessica Benitez  : 1940   MRN: [id number]    DIAGNOSIS:  Cancer Staging  Malignant neoplasm of prostate [facility])  Staging form: Prostate, AJCC 8th Edition  - Clinical stage from 2019: Stage IIC (cT1c, cN0, cM0, PSA: 8.4, Grade Group: 3) - Signed by Jovana Perez MD on 2019      Patient Active Problem List   Diagnosis    Right upper quadrant abdominal pain    CAD (coronary artery disease)    Hypertension    Skin cancer    BPH (benign prostatic hyperplasia)    Chronic systolic heart failure ([address]    Hyperlipemia    Leukocytosis    Cholelithiasis with acute cholecystitis    Cholelithiasis with acute cholecystitis    Pre-syncope    Claudication in peripheral vascular disease ([address]    Carotid stenosis, asymptomatic    Squamous cell carcinoma of skin of left upper arm    Encounter due to AICD at end of battery life-Change OUT 19    Malignant neoplasm of prostate [facility])         INTERVAL HISTORY:   Mr. Alhaji Horne is a very pleasant 77-year-old gentleman with newly diagnosed prostate cancer. He has an initial PSA of 8.39 and a prostate biopsy demonstrating [place] score +3 equals 7 adenocarcinoma. Bone scan and CT scan were negative, and I saw him for consideration of radiation therapy on 19. At that time, the plan was to treat with definitive external beam radiation therapy in combination with hormonal therapy. He was started on bicalutamide and instructed to have an Eligard injection administered by Dr. Caden Mcdowell. I would then see him back in approximately 2 months to discuss the initiation of the radiation planning process. He presents after that visit. At this time, the patient is feeling very well.   He recently had a skin cancer removed from his right neck, and this demonstrated only a early nonmelanoma tumor. He also has had a flareup of his chronic low back pain, and an x-ray of the lumbar spine performed on 1/28/2020 10 and Strates only degenerative changes with no evidence of metastatic disease. The patient's other symptoms continue unchanged. He continues to have a profoundly weak stream, but denies urinary retention. He is tolerating the hormonal therapy well with no hot flashes. He had his Eligard injection on December 5. MEDICATIONS:    Current Outpatient Medications:     MELOXICAM PO, Take by mouth, Disp: , Rfl:     bicalutamide (CASODEX) 50 MG chemo tablet, TAKE ONE TABLET BY MOUTH DAILY, Disp: 30 tablet, Rfl: 3    Multiple Vitamins-Minerals (THERAPEUTIC MULTIVITAMIN-MINERALS) tablet, Take 1 tablet by mouth daily, Disp: , Rfl:     ciprofloxacin (CIPRO) 500 MG tablet, Take 500 mg by mouth 2 times daily Started 9/23 for prostate biopsy, Disp: , Rfl:     OXYBUTYNIN CHLORIDE PO, Take 1 tablet by mouth nightly , Disp: , Rfl:     gabapentin (NEURONTIN) 300 MG capsule, Take 1 capsule by mouth 3 times daily for 90 days. (Patient taking differently: Take 300 mg by mouth 2 times daily. ), Disp: 90 capsule, Rfl: 2    ammonium lactate (LAC-HYDRIN) 12 % cream, Apply topically as needed. , Disp: 1 Bottle, Rfl: 3    nitroGLYCERIN (NITROSTAT) 0.4 MG SL tablet, Place 1 tablet under the tongue every 5 minutes as needed for Chest pain up to max of 3 total doses. If no relief after 1 dose, call 911., Disp: 25 tablet, Rfl: 3    fluticasone (FLONASE) 50 MCG/ACT nasal spray, 1 spray by Nasal route daily as needed , Disp: , Rfl:     sertraline (ZOLOFT) 25 MG tablet, Take 25 mg by mouth daily. , Disp: , Rfl:     amLODIPine (NORVASC) 10 MG tablet, Take 10 mg by mouth daily. , Disp: , Rfl:     furosemide (LASIX) 40 MG tablet, Take 40 mg by mouth daily. , Disp: , Rfl:     aspirin EC 81 MG EC tablet, Take 1 tablet by mouth daily.  (Patient taking differently: Take 81 mg by mouth daily Pt. Instructed to stop 7-10 days pre-op/ He did not do this), Disp: 30 tablet, Rfl: 6    simvastatin (ZOCOR) 40 MG tablet, Take 20 mg by mouth nightly , Disp: , Rfl:     alfuzosin (UROXATRAL) 10 MG SR tablet, Take 10 mg by mouth daily. , Disp: , Rfl:     lisinopril (PRINIVIL;ZESTRIL) 40 MG tablet, Take 40 mg by mouth daily. , Disp: , Rfl:     metoprolol (TOPROL-XL) 100 MG XL tablet, Take 100 mg by mouth daily. , Disp: , Rfl:     ALLERGIES:  Allergies   Allergen Reactions    Percocet [Oxycodone-Acetaminophen] Other (See Comments)     Panic attack       IMMUNIZATIONS:    There is no immunization history on file for this patient. SMOKING:  Social History     Tobacco Use   Smoking Status Never Smoker   Smokeless Tobacco Never Used     Counseling given: Not Answered      WBC   Date Value Ref Range Status   2017 7.4 3.5 - 11.0 k/uL Final     Hemoglobin   Date Value Ref Range Status   2017 14.0 13.5 - 17.5 g/dL Final     Platelets   Date Value Ref Range Status   2019 208 138 - 453 k/uL Final    No results found for: PSA    REVIEW OF SYSTEMS:    Review of Systems   Constitutional: Negative for chills, fatigue and fever. HENT: Negative for sore throat and trouble swallowing. Respiratory: Negative for cough and shortness of breath. Cardiovascular: Negative for chest pain and palpitations. Gastrointestinal: Negative for blood in stool, constipation, diarrhea, nausea and vomiting. Genitourinary: Positive for difficulty urinating. Negative for dysuria and hematuria. Musculoskeletal: Positive for arthralgias and back pain. Negative for myalgias. Neurological: Negative for dizziness and headaches. Hematological: Negative for adenopathy. Does not bruise/bleed easily. Psychiatric/Behavioral: Negative for behavioral problems and confusion.          PHYSICAL EXAMINATION:    CHAPERONE: Family/friend/companieon Present    ECO Symptomatic, <50% in bed during the day    VITAL SIGNS: BP (!) 151/92   Pulse 90   Temp 97.3 °F (36.3 °C) (Oral)   Resp 16   Wt 204 lb 4 oz (92.6 kg)   SpO2 96%   BMI 31.99 kg/m²   Wt Readings from Last 3 Encounters:   01/29/20 204 lb 4 oz (92.6 kg)   01/21/20 200 lb (90.7 kg)   11/27/19 200 lb 4 oz (90.8 kg)       Patient is a well-developed gentleman in no acute distress. He is awake alert and oriented ×3. Neurologic exam grossly intact. Pupils equally round and reactive to light and accommodation. Extraocular movements intact. Negative oral lesions. Neck supple with trachea midline. Negative cervical, supraclavicular, infraclavicular, or axillary adenopathy bilaterally. Heart regular rate, lungs clear to auscultation bilaterally. Abdomen is soft nontender with no hepatosplenomegaly. Negative inguinal adenopathy. Extremities are without edema. ASSESSMENT AND PLAN:  The patient is doing well at this time and is now ready to begin external beam radiation therapy. I reviewed with him once again the rationale behind radiation therapy in the method with which radiation therapy was delivered. We discussed the potential short-term and long-term side effects of treatment. We discussed the use of goal seed markers to increase the accuracy of treatment and how these would be placed. All of his questions were answered and informed consent was obtained. I plan now is to schedule him for a gold seed fiducial marker placement in interventional radiology. Once these markers are in place, I will have him come in for simulation. After simulation, I plan on treating him to dose of 7740 C to the prostate using intensity modulated radiation therapy with daily image guidance. This technique is both medically appropriate and necessary for the patient given his extent of disease and given the fact that the prostate can move significantly on a daily basis. This treatment is consistent with the NCCN guidelines.     Thank

## 2020-01-29 NOTE — PROGRESS NOTES
Olga Lidia Dunn  1/29/2020  12:54 PM    Pt here for follow up visit. Patient states he had a back x-ray 1-28-20 at [facility]. Patient has to now see a back specialist. Pain states back pain for a long time but worse last 2-3 weeks. Dr. Qiana Chavez to [facility]. Signed consent for radiation. Educated about gold seeds and diet, prep instructions. Given date for gold seeds at [facility] 2-11-20 at 1215pm. Pt given sim 2-17-20 at 915am. Will call antibiotics into [facility] at [facility] per patient request. Patient's pcp will be contacted to get the okay to hold asa 5 days prior to seed implant. Vitals:    01/29/20 1250   BP: (!) 151/92   Pulse: 90   Resp: 16   Temp: 97.3 °F (36.3 °C)   SpO2: 96%    :  Patient Currently in Pain: Yes  Pain Assessment: 0-10  Pain Level: 6       Wt Readings from Last 1 Encounters:   01/29/20 204 lb 4 oz (92.6 kg)                Current Outpatient Medications:     MELOXICAM PO, Take by mouth, Disp: , Rfl:     bicalutamide (CASODEX) 50 MG chemo tablet, TAKE ONE TABLET BY MOUTH DAILY, Disp: 30 tablet, Rfl: 3    Multiple Vitamins-Minerals (THERAPEUTIC MULTIVITAMIN-MINERALS) tablet, Take 1 tablet by mouth daily, Disp: , Rfl:     ciprofloxacin (CIPRO) 500 MG tablet, Take 500 mg by mouth 2 times daily Started 9/23 for prostate biopsy, Disp: , Rfl:     OXYBUTYNIN CHLORIDE PO, Take 1 tablet by mouth nightly , Disp: , Rfl:     gabapentin (NEURONTIN) 300 MG capsule, Take 1 capsule by mouth 3 times daily for 90 days. (Patient taking differently: Take 300 mg by mouth 2 times daily. ), Disp: 90 capsule, Rfl: 2    ammonium lactate (LAC-HYDRIN) 12 % cream, Apply topically as needed. , Disp: 1 Bottle, Rfl: 3    nitroGLYCERIN (NITROSTAT) 0.4 MG SL tablet, Place 1 tablet under the tongue every 5 minutes as needed for Chest pain up to max of 3 total doses.  If no relief after 1 dose, call 911., Disp: 25 tablet, Rfl: 3    fluticasone (FLONASE) 50 MCG/ACT nasal spray, 1 spray by Nasal route daily as needed , at a minimum every 12 weeks or with status change. Assessment   Date  1/29/2020     1. Mental Ability: confusion/cognitively impaired 0     2. Elimination Issues: incontinence, frequency 0       3. Ambulatory: use of assistive devices (walker, cane, off-loading devices),        attached to equipment (IV pole, oxygen) 0     4. Sensory Limitations: dizziness, vertigo, impaired vision 0     5. Age less than 65        0     6. Age 72 or greater 1     7. Medication: diuretics, strong analgesics, hypnotics, sedatives,        antihypertensive agents 0   8. Falls:  recent history of falls within the last 3 months (not to include slipping or        tripping) 0   TOTAL 1    If score of 4 or greater was education given? No           TABLE 2   Risk Score Risk Level Plan of Care   0-3 Little or  No Risk 1. Provide assistance as indicated for ambulation activities  2. Reorient confused/cognitively impaired patient  3. Chair/bed in low position, stretcher/bed with siderails up except when performing patient care activities  5. Educate patient/family/caregiver on falls prevention  6.  Reassess in 12 weeks or with any noted change in patient condition which places them at a risk for a fall   4-6 Moderate Risk 1. Provide assistance as indicated for ambulation activities  2. Reorient confused/cognitively impaired patient  3. Chair/bed in low position, stretcher/bed with siderails up except when performing patient care activities  4. Educate patient/family/caregiver on falls prevention     7 or   Higher High Risk 1. Place patient in easily observable treatment room  2. Patient attended at all times by family member or staff  3. Provide assistance as indicated for ambulation activities  4. Reorient confused/cognitively impaired patient  5. Chair/bed in low position, stretcher/bed with siderails up except when performing patient care activities  6.   Educate patient/family/caregiver on falls prevention PLAN: Patient is seen today in follow up        Helen Herrera

## 2020-02-11 ENCOUNTER — HOSPITAL ENCOUNTER (OUTPATIENT)
Dept: GENERAL RADIOLOGY | Age: 80
Discharge: HOME | End: 2020-02-13
Payer: MEDICARE

## 2020-02-11 ENCOUNTER — HOSPITAL ENCOUNTER (OUTPATIENT)
Dept: ULTRASOUND IMAGING | Age: 80
Discharge: HOME | End: 2020-02-13
Payer: MEDICARE

## 2020-02-11 PROCEDURE — 55876 PLACE RT DEVICE/MARKER PROS: CPT

## 2020-02-11 PROCEDURE — 72170 X-RAY EXAM OF PELVIS: CPT

## 2020-02-17 ENCOUNTER — HOSPITAL ENCOUNTER (OUTPATIENT)
Dept: RADIATION ONCOLOGY | Facility: MEDICAL CENTER | Age: 80
Discharge: HOME | End: 2020-02-17
Attending: RADIOLOGY
Payer: MEDICARE

## 2020-02-17 ENCOUNTER — TELEPHONE (OUTPATIENT)
Dept: ONCOLOGY | Age: 80
End: 2020-02-17

## 2020-02-17 PROCEDURE — 77290 THER RAD SIMULAJ FIELD CPLX: CPT

## 2020-02-17 PROCEDURE — 77334 RADIATION TREATMENT AID(S): CPT

## 2020-02-17 NOTE — PROGRESS NOTES
x-rays of  your teeth or broken bones. 2.  How is radiation therapy given? Radiation therapy can be external beam or internal. External beam involves a  machine outside your body that aims radiaition of cancer cells. Internal radiation  therapy involves placing radiation inside your body, in or near the cancer. Sometimes ppeople get both forms of radiation therapy. To learn more about  external beam radiation therapy. 3.  What does radiation therapy do to cancer cells? Given in high doses, radiation kills or slows the growth of cancer cells. Radiaton  therapy is used to:  · Treat Cancer. Radiation can be used to cure cancer, to prevent it from returning, or to stop or slow its growth  · Reduce symptoms. When a cure is not possible, radiation may be used to treat pain and other problems caused by the cancer tumor. Or, it can prevent problems that may be caused by a growing tumor, such as blindness or loss of bowel and bladder control. 4.  How long does radiation therapy take to work? Radiation therapy does not kill cancer cells right away. It takes days or weeks of  treatments before cancer cells start to die. Then, cancer cells keep dying for  weeks or months after radiation therapy ends. 5.  What does radiation therapy do to healthy cells? Radiation not only dills or slows the growth of cancer cells, it can also affect  nearby healthy cells. The healthy cells almost always recover after treatment is  over. But sometimes people may have side effects that are severe or do not get  better. Other side effects may how up months or years after radiation therapy is  over. These are called late side effects. Doctors try to protect healthy cells during treatment by:  · Using as low a does of radiation as possible. The radiation dose is balanced between being high enough to kill cancer cells, yet low enough to limit damage to healthy cells. · Spreading out treatment over time.  You may get radiation therapy is a warm-water bath taken in a sitting position that covers only the hips and buttocks. Be sure to tell your doctor or nurse if you rectal area gets sore. · Avoid:  · Beer, wine, and other types of alcohol  · Milk and dairy foods, such as ice cream, sour cream, and cheese  · Spicy foods, such as hot sauce, salsa, chili, and contreras dishes  · Foods or dinks with caffeine, such as regular coffee, black tea, soda and chocolate  · Foods or drinks that cause gas, such as cooked dried beans, cabbage, broccoli, soy milk, and other soy products   · Foods that are high in fiber, such as raw fruits and vegetables, cooked dried beans, and whole wheat breads and cereals  · Fried or greasy foods  · Food from [employer]  · Talk with your doctor or nurse. Tell him or her if you are having diarrhea. He or she will suggest ways to manage it. He or she may also suggest taking medicine, such as imodium. Fatigue  How long it lasts  When you will first feel fatigue depends on a few factors, such as your age, health, how active you are, and how you felt before radiation therapy started. Fatigue can last from 6 weeks to a year after your last radiation therapy session. Some people may always feel fatigue and not have as much energy as they did before radiation therapy. Ways to Manage Fatigue  · Try to sleep at least 8 hours each night. This may be more sleep than you needed before radiation therapy. One way to sleep better at night is to be active during the day. Another way is to relax right before going to bed. Do calming activities before bedtime, such as reading, working on a jigsaw puzzle, or listening to music. · Plan time to rest. Take short naps or rest breaks between activities. · Try not to do too much. With fatigue, you ay not have enough energy to do all the things you want to do. Stay active, but choose the activities that are most important to you. Try to let go of things that don't matter as much now.  For treatment area. Use creams that your doctor or nurse suggests. Acquphor / Sherol Blades / Miaderm   · Apply recommended lotion to treatment area 2 times a day. This should begin when you start radiation treatments. · Do not put anything on your skin that is very hot or cold. Do not use heating pads, ice packs or other hot or cold items on the treatment area  · Be gentle when you shower or take a bath. You can take a lukewarm shower every day. If you prefer to take a lukewarm bath, so do only every other day and don't soak  For too long. Whether you take a shower or bath, make sure to use a mild soap. Dry yourself with a soft towel by patting, not rubbing, your skin. Be careful not to wash off the ink markings that you need for radiation therapy. · Use only those lotions and skin products that your doctor or nurse suggests. If you are using a prescribed cream for a skin problem or acne, tell your doctor or nurse before you begin radiation treatment. Check with your doctor or nurse before using any of the following skin products:  · Bubble bath     · Cornstarch  · Cream  · Deodorant  · Hair removers  · Makeup  · Oil   · Ointment  · Perfume  · Powder  · Soap   · Sunscreen  · Cool, humid places. Your skin may feel much better when you are in a cool, humid places. You can make rooms more humid by putting a bowl of water on the radiator or using a humidifier. If you use a humidifier, be sure to follow the directions about cleaning it to prevent bacteria. · Soft fabrics. Wear clothes and use bed sheets that are made of very soft fabrics. · Do not wear clothes in your treatment area that are tight and do not breathe, such as girdles, body shapers, and pantyhose  · Protect your skin from the sun every day. The sun can burn you even on cloudy days or when you are outside for just a few minutes. Do not go to the beach or sunbathe. Wear a broad-brimmed hat, long-sleeved shirt, and long pants when you are outside.  Talk with your doctor or nurse about sunscreen lotions. He or she may suggest that you use a sunscreen with an SPF of 30 or higher. You will need to protect your skin form the sun even after radiation therapy is over. · Do not use tanning beds. Tanning beds expose you to the same harmful effects as the sun. · Adhesive tape. Do not put adhesive bandages or other types of sticky tape on your skin in the treatment area. Talk with your doctor or nurse about ways to bandage without tape. · Shaving. Ask your doctor or nurse if you can shave the treatment area. If you can shave, use an electric razor, but do not use a pre-shave liquid. · Rectal area. If you have radiation therapy to the rectal area, you are likely to have skin problems. These problems are often worse after a bowel movement. Clean yourself with a baby wipe or squirt of water from a spray bottle. Ask your nurse if sitz baths might help you. Sitz baths are warm-water baths taken in a sitting position that covers only the hips and buttocks. · Talk with your doctor or nurse. Some skin changes can be very serious. Your treatment team will check for skin changes each time you have radiation therapy. Make sure to report any skin changes that you notice. · Medicine. Medicines can help with some skin changes. These include lotions for dry or itchy skin, antibiotics to treat infection, and drug to reduce swelling or itching.     Urinary and Bladder Changes  What they are:Radiation therapy can cause urinary and bladder problems, which can include:  · Burning or pain when you begin to urinate or after you urinate (empty your bladder)  · Trouble starting to urinate  · Trouble emptying your bladder completely  · Frequent, urgent need to urinate  · Cystitis, a swelling (inflammation) in your urinary tract  · Incontinence, when you cannot control the flow of urine from your bladder, especially when coughing or sneezing  · Waking frequently to urinate  · Blood in your urine  · Bladder spasms, which are like painful muscle cramps    Ways to Manage Urinary and Bladder Changes  · Drink lots of fluids. Drink 6 to 8 cups of fluids each day, enough so that your urine is clear to light yellow in color  · Avoid coffee, black tea, alcohol, spices, and all tobacco products  · Talk with your doctor or nurse if you think you have urinary or bladder problems. You may need to provide a urine sample to check whether you have an infection  · Talk with your doctor or nurse if you have incontinence. He or she may refer you to a physical therapist who will assess your problem. The therapist can give you exercises to improve bladder control. · Medicine. Your doctor may prescribe antibiotics if your problems are caused by an infection.  Other medicines can help you urinate, reduce burning or pain, and ease bladder spasm  [employer]

## 2020-02-21 ENCOUNTER — TELEPHONE (OUTPATIENT)
Dept: RADIATION ONCOLOGY | Facility: MEDICAL CENTER | Age: 80
End: 2020-02-21

## 2020-02-21 NOTE — TELEPHONE ENCOUNTER
Patient called to see when he is starting radiation? Patient informed that we have to get some things set up for his cardiac device and he should be hearing form us soon.

## 2020-03-02 ENCOUNTER — TELEPHONE (OUTPATIENT)
Dept: RADIATION ONCOLOGY | Facility: MEDICAL CENTER | Age: 80
End: 2020-03-02

## 2020-03-02 NOTE — TELEPHONE ENCOUNTER
Patient called to see what is going on with his treatment plan? I called Ashkan Madera in [place] and plan is not ready yet. I was told it should be done this week. I relayed that to patient and explained the process.

## 2020-03-03 ENCOUNTER — HOSPITAL ENCOUNTER (OUTPATIENT)
Dept: RADIATION ONCOLOGY | Facility: MEDICAL CENTER | Age: 80
Discharge: HOME | End: 2020-03-03
Attending: RADIOLOGY
Payer: MEDICARE

## 2020-03-03 PROCEDURE — 77300 RADIATION THERAPY DOSE PLAN: CPT | Performed by: RADIOLOGY

## 2020-03-03 PROCEDURE — 77338 DESIGN MLC DEVICE FOR IMRT: CPT | Performed by: RADIOLOGY

## 2020-03-03 PROCEDURE — 77301 RADIOTHERAPY DOSE PLAN IMRT: CPT | Performed by: RADIOLOGY

## 2020-03-04 ENCOUNTER — TELEPHONE (OUTPATIENT)
Dept: RADIATION ONCOLOGY | Facility: MEDICAL CENTER | Age: 80
End: 2020-03-04

## 2020-03-04 NOTE — TELEPHONE ENCOUNTER
Informed by Divine Crump [place] tech that plan is ready for patient. I called [employer] and had local rep Akin Burnett paged to call me back to see if we can start tomorrow with treatments.

## 2020-03-06 PROCEDURE — 77336 RADIATION PHYSICS CONSULT: CPT

## 2020-03-09 ENCOUNTER — HOSPITAL ENCOUNTER (OUTPATIENT)
Dept: RADIATION ONCOLOGY | Facility: MEDICAL CENTER | Age: 80
Discharge: HOME | End: 2020-03-09
Attending: RADIOLOGY
Payer: MEDICARE

## 2020-03-09 ENCOUNTER — APPOINTMENT (OUTPATIENT)
Dept: RADIATION ONCOLOGY | Facility: MEDICAL CENTER | Age: 80
End: 2020-03-09
Attending: RADIOLOGY
Payer: MEDICARE

## 2020-03-09 VITALS
DIASTOLIC BLOOD PRESSURE: 82 MMHG | HEART RATE: 77 BPM | OXYGEN SATURATION: 96 % | BODY MASS INDEX: 31.72 KG/M2 | SYSTOLIC BLOOD PRESSURE: 139 MMHG | WEIGHT: 202.5 LBS | TEMPERATURE: 97.3 F | RESPIRATION RATE: 16 BRPM

## 2020-03-09 PROCEDURE — 77385 HC NTSTY MODUL RAD TX DLVR SMPL: CPT

## 2020-03-09 RX ORDER — LEUPROLIDE ACETATE 45 MG
45 KIT SUBCUTANEOUS ONCE
Status: ON HOLD | COMMUNITY
Start: 2019-12-05 | End: 2020-09-02 | Stop reason: ALTCHOICE

## 2020-03-10 ENCOUNTER — HOSPITAL ENCOUNTER (OUTPATIENT)
Dept: RADIATION ONCOLOGY | Facility: MEDICAL CENTER | Age: 80
Discharge: HOME | End: 2020-03-10
Attending: RADIOLOGY
Payer: MEDICARE

## 2020-03-10 PROCEDURE — 77385 HC NTSTY MODUL RAD TX DLVR SMPL: CPT

## 2020-03-11 ENCOUNTER — HOSPITAL ENCOUNTER (OUTPATIENT)
Dept: RADIATION ONCOLOGY | Facility: MEDICAL CENTER | Age: 80
Discharge: HOME | End: 2020-03-11
Attending: RADIOLOGY
Payer: MEDICARE

## 2020-03-11 PROCEDURE — 77385 HC NTSTY MODUL RAD TX DLVR SMPL: CPT

## 2020-03-11 PROCEDURE — 77336 RADIATION PHYSICS CONSULT: CPT | Performed by: RADIOLOGY

## 2020-03-12 ENCOUNTER — HOSPITAL ENCOUNTER (OUTPATIENT)
Dept: RADIATION ONCOLOGY | Facility: MEDICAL CENTER | Age: 80
Discharge: HOME | End: 2020-03-12
Attending: RADIOLOGY
Payer: MEDICARE

## 2020-03-12 PROCEDURE — 77385 HC NTSTY MODUL RAD TX DLVR SMPL: CPT

## 2020-03-12 PROCEDURE — 77370 RADIATION PHYSICS CONSULT: CPT | Performed by: RADIOLOGY

## 2020-03-13 ENCOUNTER — HOSPITAL ENCOUNTER (OUTPATIENT)
Dept: RADIATION ONCOLOGY | Facility: MEDICAL CENTER | Age: 80
Discharge: HOME | End: 2020-03-13
Attending: RADIOLOGY
Payer: MEDICARE

## 2020-03-13 PROCEDURE — 77385 HC NTSTY MODUL RAD TX DLVR SMPL: CPT

## 2020-03-16 ENCOUNTER — HOSPITAL ENCOUNTER (OUTPATIENT)
Dept: RADIATION ONCOLOGY | Facility: MEDICAL CENTER | Age: 80
Discharge: HOME | End: 2020-03-16
Attending: RADIOLOGY
Payer: MEDICARE

## 2020-03-16 VITALS
OXYGEN SATURATION: 97 % | RESPIRATION RATE: 16 BRPM | TEMPERATURE: 97.7 F | BODY MASS INDEX: 31.64 KG/M2 | WEIGHT: 202 LBS | HEART RATE: 71 BPM | SYSTOLIC BLOOD PRESSURE: 129 MMHG | DIASTOLIC BLOOD PRESSURE: 79 MMHG

## 2020-03-16 PROCEDURE — 77385 HC NTSTY MODUL RAD TX DLVR SMPL: CPT | Performed by: RADIOLOGY

## 2020-03-17 ENCOUNTER — HOSPITAL ENCOUNTER (OUTPATIENT)
Dept: RADIATION ONCOLOGY | Facility: MEDICAL CENTER | Age: 80
Discharge: HOME | End: 2020-03-17
Attending: RADIOLOGY
Payer: MEDICARE

## 2020-03-17 PROCEDURE — 77385 HC NTSTY MODUL RAD TX DLVR SMPL: CPT | Performed by: RADIOLOGY

## 2020-03-18 ENCOUNTER — HOSPITAL ENCOUNTER (OUTPATIENT)
Dept: RADIATION ONCOLOGY | Facility: MEDICAL CENTER | Age: 80
Discharge: HOME | End: 2020-03-18
Attending: RADIOLOGY
Payer: MEDICARE

## 2020-03-18 PROCEDURE — 77385 HC NTSTY MODUL RAD TX DLVR SMPL: CPT | Performed by: RADIOLOGY

## 2020-03-18 PROCEDURE — 77336 RADIATION PHYSICS CONSULT: CPT | Performed by: RADIOLOGY

## 2020-03-19 ENCOUNTER — HOSPITAL ENCOUNTER (OUTPATIENT)
Dept: RADIATION ONCOLOGY | Facility: MEDICAL CENTER | Age: 80
Discharge: HOME | End: 2020-03-19
Attending: RADIOLOGY
Payer: MEDICARE

## 2020-03-19 PROCEDURE — 77385 HC NTSTY MODUL RAD TX DLVR SMPL: CPT | Performed by: RADIOLOGY

## 2020-03-20 ENCOUNTER — HOSPITAL ENCOUNTER (OUTPATIENT)
Dept: RADIATION ONCOLOGY | Facility: MEDICAL CENTER | Age: 80
Discharge: HOME | End: 2020-03-20
Attending: RADIOLOGY
Payer: MEDICARE

## 2020-03-20 PROCEDURE — 77385 HC NTSTY MODUL RAD TX DLVR SMPL: CPT | Performed by: RADIOLOGY

## 2020-03-23 ENCOUNTER — HOSPITAL ENCOUNTER (OUTPATIENT)
Dept: RADIATION ONCOLOGY | Facility: MEDICAL CENTER | Age: 80
Discharge: HOME | End: 2020-03-23
Attending: RADIOLOGY
Payer: MEDICARE

## 2020-03-23 VITALS
DIASTOLIC BLOOD PRESSURE: 81 MMHG | SYSTOLIC BLOOD PRESSURE: 140 MMHG | HEART RATE: 89 BPM | OXYGEN SATURATION: 96 % | BODY MASS INDEX: 31.48 KG/M2 | RESPIRATION RATE: 16 BRPM | TEMPERATURE: 97.4 F | WEIGHT: 201 LBS

## 2020-03-23 PROCEDURE — 77385 HC NTSTY MODUL RAD TX DLVR SMPL: CPT | Performed by: RADIOLOGY

## 2020-03-23 NOTE — PROGRESS NOTES
Nancy Foil  3/23/2020  Wt Readings from Last 3 Encounters:   03/23/20 201 lb (91.2 kg)   03/16/20 202 lb (91.6 kg)   03/09/20 202 lb 8 oz (91.9 kg)     Body mass index is 31.48 kg/m². Pt here for OTV. Patient states a couple bouts of diarrhea, but nothing consistent. Dr. Radha Araujo to [facility].    Treatment Area:prostate    Patient was seen today for weekly visit. Comfort Alteration    Fatigue: Mild    Nutritional Alteration  Anorexia: No  Nausea: No  Vomiting: No     Elimination Alterations  Constipation: no  Diarrhea:  yes  Urinary Frequency/Urgency: Yes  Urinary Retention: No  Dysuria: No  Urinary Incontinence: No  Proctitis: No  Nocturia: Yes #/night: 1     Emotional  Coping: effective    Sexuality Alteration  absent    Skin Alteration   Sensation:none    Radiation Dermatitis:  Intact [x]     Erythema  []     Discoloration  []     Rash []     Dry desquamation  []     Moist desquamation []           Injury, potential bleeding or infection: none    Lab Results   Component Value Date    WBC 7.4 01/27/2017     07/01/2019         BP (!) 140/81   Pulse 89   Temp 97.4 °F (36.3 °C) (Oral)   Resp 16   Wt 201 lb (91.2 kg)   SpO2 96%   BMI 31.48 kg/m²   Patient Currently in Pain: No               Assessment/Plan: Patient was seen today for weekly visit.       Karl Marx

## 2020-03-24 ENCOUNTER — HOSPITAL ENCOUNTER (OUTPATIENT)
Dept: RADIATION ONCOLOGY | Facility: MEDICAL CENTER | Age: 80
Discharge: HOME | End: 2020-03-24
Attending: RADIOLOGY
Payer: MEDICARE

## 2020-03-24 PROCEDURE — 77385 HC NTSTY MODUL RAD TX DLVR SMPL: CPT | Performed by: RADIOLOGY

## 2020-03-25 ENCOUNTER — HOSPITAL ENCOUNTER (OUTPATIENT)
Dept: RADIATION ONCOLOGY | Facility: MEDICAL CENTER | Age: 80
Discharge: HOME | End: 2020-03-25
Attending: RADIOLOGY
Payer: MEDICARE

## 2020-03-25 PROCEDURE — 77385 HC NTSTY MODUL RAD TX DLVR SMPL: CPT | Performed by: RADIOLOGY

## 2020-03-25 PROCEDURE — 77336 RADIATION PHYSICS CONSULT: CPT | Performed by: RADIOLOGY

## 2020-03-26 ENCOUNTER — HOSPITAL ENCOUNTER (OUTPATIENT)
Dept: RADIATION ONCOLOGY | Facility: MEDICAL CENTER | Age: 80
Discharge: HOME | End: 2020-03-26
Attending: RADIOLOGY
Payer: MEDICARE

## 2020-03-26 PROCEDURE — 77385 HC NTSTY MODUL RAD TX DLVR SMPL: CPT | Performed by: RADIOLOGY

## 2020-03-27 ENCOUNTER — HOSPITAL ENCOUNTER (OUTPATIENT)
Dept: RADIATION ONCOLOGY | Facility: MEDICAL CENTER | Age: 80
Discharge: HOME | End: 2020-03-27
Attending: RADIOLOGY
Payer: MEDICARE

## 2020-03-27 PROCEDURE — 77385 HC NTSTY MODUL RAD TX DLVR SMPL: CPT | Performed by: RADIOLOGY

## 2020-03-30 ENCOUNTER — HOSPITAL ENCOUNTER (OUTPATIENT)
Dept: RADIATION ONCOLOGY | Facility: MEDICAL CENTER | Age: 80
Discharge: HOME | End: 2020-03-30
Attending: RADIOLOGY
Payer: MEDICARE

## 2020-03-30 VITALS
WEIGHT: 200.13 LBS | HEART RATE: 70 BPM | TEMPERATURE: 97.2 F | RESPIRATION RATE: 16 BRPM | BODY MASS INDEX: 31.34 KG/M2 | SYSTOLIC BLOOD PRESSURE: 116 MMHG | DIASTOLIC BLOOD PRESSURE: 68 MMHG | OXYGEN SATURATION: 95 %

## 2020-03-30 PROCEDURE — 77385 HC NTSTY MODUL RAD TX DLVR SMPL: CPT | Performed by: RADIOLOGY

## 2020-03-30 NOTE — PROGRESS NOTES
Patient: Romain Stevens     : 1940      Date of Service: 3/30/2020    [address] [place] [address] Oncology  On Treatment Visit (OTV) Note    Diagnosis: Cancer Staging  Malignant neoplasm of prostate [facility])  Staging form: Prostate, AJCC 8th Edition  - Clinical stage from 2019: Stage IIC (cT1c, cN0, cM0, PSA: 8.4, Grade Group: 3) - Signed by Cheryl Gaffney MD on 2019      Dose Accrued: 1534/9609 cGy    S: Continues to tolerate treatment delivery and set up without issues. Urinary frequency during daytime is q. 1.5 hours, attributed to Lasix. Nocturia x1-2. Energy level is stable, denies new or unusual symptoms. O: /68   Pulse 70   Temp 97.2 °F (36.2 °C) (Oral)   Resp 16   Wt 200 lb 2 oz (90.8 kg)   SpO2 95%   BMI 31.34 kg/m² . Well-appearing, in no apparent distress, alert and fully oriented, answers questions appropriately. No signs of acute radiation-induced toxicity on physical exam.    IGRT: Set-up images acquired to ensure daily treatment accuracy and precision were reviewed by the physician. A&P: Continue radiotherapy as planned.       Electronically signed by Pauline Starks MD on 3/30/2020 at 2:48 PM

## 2020-03-31 ENCOUNTER — HOSPITAL ENCOUNTER (OUTPATIENT)
Dept: RADIATION ONCOLOGY | Facility: MEDICAL CENTER | Age: 80
Discharge: HOME | End: 2020-03-31
Attending: RADIOLOGY
Payer: MEDICARE

## 2020-03-31 PROCEDURE — 77385 HC NTSTY MODUL RAD TX DLVR SMPL: CPT | Performed by: RADIOLOGY

## 2020-04-01 ENCOUNTER — HOSPITAL ENCOUNTER (OUTPATIENT)
Dept: RADIATION ONCOLOGY | Facility: MEDICAL CENTER | Age: 80
Discharge: HOME | End: 2020-04-01
Attending: RADIOLOGY
Payer: MEDICARE

## 2020-04-01 PROCEDURE — 77385 HC NTSTY MODUL RAD TX DLVR SMPL: CPT | Performed by: RADIOLOGY

## 2020-04-02 ENCOUNTER — HOSPITAL ENCOUNTER (OUTPATIENT)
Dept: RADIATION ONCOLOGY | Facility: MEDICAL CENTER | Age: 80
Discharge: HOME | End: 2020-04-02
Attending: RADIOLOGY
Payer: MEDICARE

## 2020-04-02 PROCEDURE — 77385 HC NTSTY MODUL RAD TX DLVR SMPL: CPT | Performed by: RADIOLOGY

## 2020-04-03 ENCOUNTER — HOSPITAL ENCOUNTER (OUTPATIENT)
Dept: RADIATION ONCOLOGY | Facility: MEDICAL CENTER | Age: 80
Discharge: HOME | End: 2020-04-03
Attending: RADIOLOGY
Payer: MEDICARE

## 2020-04-03 PROCEDURE — 77338 DESIGN MLC DEVICE FOR IMRT: CPT | Performed by: RADIOLOGY

## 2020-04-03 PROCEDURE — 77385 HC NTSTY MODUL RAD TX DLVR SMPL: CPT | Performed by: RADIOLOGY

## 2020-04-03 PROCEDURE — 77300 RADIATION THERAPY DOSE PLAN: CPT | Performed by: RADIOLOGY

## 2020-04-06 ENCOUNTER — HOSPITAL ENCOUNTER (OUTPATIENT)
Dept: RADIATION ONCOLOGY | Facility: MEDICAL CENTER | Age: 80
Discharge: HOME | End: 2020-04-06
Attending: RADIOLOGY
Payer: MEDICARE

## 2020-04-06 VITALS
BODY MASS INDEX: 31.25 KG/M2 | RESPIRATION RATE: 16 BRPM | OXYGEN SATURATION: 94 % | DIASTOLIC BLOOD PRESSURE: 77 MMHG | WEIGHT: 199.5 LBS | HEART RATE: 92 BPM | TEMPERATURE: 97.7 F | SYSTOLIC BLOOD PRESSURE: 135 MMHG

## 2020-04-06 PROCEDURE — 77385 HC NTSTY MODUL RAD TX DLVR SMPL: CPT | Performed by: RADIOLOGY

## 2020-04-07 ENCOUNTER — HOSPITAL ENCOUNTER (OUTPATIENT)
Dept: RADIATION ONCOLOGY | Facility: MEDICAL CENTER | Age: 80
Discharge: HOME | End: 2020-04-07
Attending: RADIOLOGY
Payer: MEDICARE

## 2020-04-07 PROCEDURE — 77385 HC NTSTY MODUL RAD TX DLVR SMPL: CPT | Performed by: RADIOLOGY

## 2020-04-08 ENCOUNTER — HOSPITAL ENCOUNTER (OUTPATIENT)
Dept: RADIATION ONCOLOGY | Facility: MEDICAL CENTER | Age: 80
Discharge: HOME | End: 2020-04-08
Attending: RADIOLOGY
Payer: MEDICARE

## 2020-04-08 PROCEDURE — 77385 HC NTSTY MODUL RAD TX DLVR SMPL: CPT | Performed by: RADIOLOGY

## 2020-04-08 PROCEDURE — 77336 RADIATION PHYSICS CONSULT: CPT | Performed by: RADIOLOGY

## 2020-04-09 ENCOUNTER — HOSPITAL ENCOUNTER (OUTPATIENT)
Dept: RADIATION ONCOLOGY | Facility: MEDICAL CENTER | Age: 80
Discharge: HOME | End: 2020-04-09
Attending: RADIOLOGY
Payer: MEDICARE

## 2020-04-09 PROCEDURE — 77385 HC NTSTY MODUL RAD TX DLVR SMPL: CPT | Performed by: RADIOLOGY

## 2020-04-10 ENCOUNTER — HOSPITAL ENCOUNTER (OUTPATIENT)
Dept: RADIATION ONCOLOGY | Facility: MEDICAL CENTER | Age: 80
Discharge: HOME | End: 2020-04-10
Attending: RADIOLOGY
Payer: MEDICARE

## 2020-04-10 PROCEDURE — 77385 HC NTSTY MODUL RAD TX DLVR SMPL: CPT | Performed by: RADIOLOGY

## 2020-04-13 ENCOUNTER — HOSPITAL ENCOUNTER (OUTPATIENT)
Dept: RADIATION ONCOLOGY | Facility: MEDICAL CENTER | Age: 80
Discharge: HOME | End: 2020-04-13
Attending: RADIOLOGY
Payer: MEDICARE

## 2020-04-13 ENCOUNTER — APPOINTMENT (OUTPATIENT)
Dept: RADIATION ONCOLOGY | Facility: MEDICAL CENTER | Age: 80
End: 2020-04-13
Attending: RADIOLOGY
Payer: MEDICARE

## 2020-04-13 VITALS
HEART RATE: 85 BPM | WEIGHT: 196.5 LBS | TEMPERATURE: 97.2 F | DIASTOLIC BLOOD PRESSURE: 73 MMHG | OXYGEN SATURATION: 96 % | SYSTOLIC BLOOD PRESSURE: 158 MMHG | RESPIRATION RATE: 16 BRPM | BODY MASS INDEX: 30.78 KG/M2

## 2020-04-13 PROCEDURE — 77385 HC NTSTY MODUL RAD TX DLVR SMPL: CPT | Performed by: RADIOLOGY

## 2020-04-13 NOTE — PROGRESS NOTES
Patient: Galo ENAMORADO LXMAI                                  : 1940                                                Date of [address] [address] Radiation Oncology  On Treatment Visit (OTV) Note     Diagnosis: Cancer Staging  Malignant neoplasm of prostate [facility])  Staging form: Prostate, AJCC 8th Edition  - Clinical stage from 2019: Stage IIC (cT1c, cN0, cM0, PSA: 8.4, Grade Group: 3) - Signed by Anat Gracia MD on 2019     Dose Accrued: 4680/7740 cGy     S: Same as last week for the most part. Continues to tolerate treatment delivery and set up without issues.  Urinary frequency during daytime is now q.1-1.5 hours, mostly attributed to Lasix.  Nocturia x1-2.  Energy level is stable, he doesn't do much, especially nowadays with social distancing and not going places. Bowel movements formed and regular. Appetite normal.     O: Well-appearing, in no apparent distress, alert and fully oriented, answers questions appropriately.  No signs of acute radiation-induced toxicity on physical exam.     IGRT: Set-up images acquired to ensure daily treatment accuracy and precision were reviewed by the physician.     A&P: Continue radiotherapy as planned.     Electronically signed by Danii Kidd MD on 2020 at 2:52 PM

## 2020-04-13 NOTE — PROGRESS NOTES
Bob Franz  4/13/2020  Wt Readings from Last 3 Encounters:   04/13/20 196 lb 8 oz (89.1 kg)   04/06/20 199 lb 8 oz (90.5 kg)   03/30/20 200 lb 2 oz (90.8 kg)     Body mass index is 30.78 kg/m². Pt here for OTV. Patient states urinating more often, but no dysuria. Patient is holding urine at times. Patient denies any more dizziness episodes. Dr. Kirk Lenz to [facility].    Treatment Area:prostate    Patient was seen today for weekly visit. Comfort Alteration    Fatigue: Mild    Nutritional Alteration  Anorexia: No  Nausea: No  Vomiting: No     Elimination Alterations  Constipation: no  Diarrhea:  no  Urinary Frequency/Urgency: Yes  Urinary Retention: Yes  Dysuria: No  Urinary Incontinence: No  Proctitis: No  Nocturia: Yes #/night: 2     Emotional  Coping: effective    Sexuality Alteration  absent    Skin Alteration   Sensation:none    Radiation Dermatitis:  Intact [x]     Erythema  []     Discoloration  []     Rash []     Dry desquamation  []     Moist desquamation []           Injury, potential bleeding or infection: none    Lab Results   Component Value Date    WBC 7.4 01/27/2017     07/01/2019         BP (!) 158/73   Pulse 85   Temp 97.2 °F (36.2 °C) (Oral)   Resp 16   Wt 196 lb 8 oz (89.1 kg)   SpO2 96%   BMI 30.78 kg/m²   Patient Currently in Pain: No               Assessment/Plan: Patient was seen today for weekly visit.       Helen Herrera

## 2020-04-14 ENCOUNTER — HOSPITAL ENCOUNTER (OUTPATIENT)
Dept: RADIATION ONCOLOGY | Facility: MEDICAL CENTER | Age: 80
Discharge: HOME | End: 2020-04-14
Attending: RADIOLOGY
Payer: MEDICARE

## 2020-04-14 PROCEDURE — 77385 HC NTSTY MODUL RAD TX DLVR SMPL: CPT | Performed by: RADIOLOGY

## 2020-04-15 ENCOUNTER — HOSPITAL ENCOUNTER (OUTPATIENT)
Dept: RADIATION ONCOLOGY | Facility: MEDICAL CENTER | Age: 80
Discharge: HOME | End: 2020-04-15
Attending: RADIOLOGY
Payer: MEDICARE

## 2020-04-15 PROCEDURE — 77336 RADIATION PHYSICS CONSULT: CPT | Performed by: RADIOLOGY

## 2020-04-15 PROCEDURE — 77385 HC NTSTY MODUL RAD TX DLVR SMPL: CPT | Performed by: RADIOLOGY

## 2020-04-16 ENCOUNTER — HOSPITAL ENCOUNTER (OUTPATIENT)
Dept: RADIATION ONCOLOGY | Facility: MEDICAL CENTER | Age: 80
Discharge: HOME | End: 2020-04-16
Attending: RADIOLOGY
Payer: MEDICARE

## 2020-04-16 PROCEDURE — 77385 HC NTSTY MODUL RAD TX DLVR SMPL: CPT | Performed by: RADIOLOGY

## 2020-04-16 NOTE — PROGRESS NOTES
Nutrition Brief: Wt loss trigger    Wt Readings from Last 5 Encounters:   04/13/20 196 lb 8 oz (89.1 kg)   04/06/20 199 lb 8 oz (90.5 kg)   03/30/20 200 lb 2 oz (90.8 kg)   03/23/20 201 lb (91.2 kg)   03/16/20 202 lb (91.6 kg)        Evaluation:   +wt loss of 1.4 kg (1.5%) x past week. This may be r/t fluctuation in fluid retention. Patient reports normal appetite and PO intakes. Recommendations:  1.  Will continue to monitor weight change and side effects to treatment or the CA itself      Tara Villavicencio RDN, TERESA  RD Office Phone: [phone]

## 2020-04-17 ENCOUNTER — HOSPITAL ENCOUNTER (OUTPATIENT)
Dept: RADIATION ONCOLOGY | Facility: MEDICAL CENTER | Age: 80
Discharge: HOME | End: 2020-04-17
Attending: RADIOLOGY
Payer: MEDICARE

## 2020-04-17 PROCEDURE — 77385 HC NTSTY MODUL RAD TX DLVR SMPL: CPT | Performed by: RADIOLOGY

## 2020-04-20 ENCOUNTER — HOSPITAL ENCOUNTER (OUTPATIENT)
Dept: RADIATION ONCOLOGY | Facility: MEDICAL CENTER | Age: 80
Discharge: HOME | End: 2020-04-20
Attending: RADIOLOGY
Payer: MEDICARE

## 2020-04-20 VITALS
SYSTOLIC BLOOD PRESSURE: 107 MMHG | WEIGHT: 197.5 LBS | BODY MASS INDEX: 30.93 KG/M2 | OXYGEN SATURATION: 96 % | DIASTOLIC BLOOD PRESSURE: 63 MMHG | TEMPERATURE: 97.5 F | RESPIRATION RATE: 16 BRPM | HEART RATE: 72 BPM

## 2020-04-20 PROCEDURE — 77385 HC NTSTY MODUL RAD TX DLVR SMPL: CPT | Performed by: RADIOLOGY

## 2020-04-20 ASSESSMENT — PAIN DESCRIPTION - PAIN TYPE: TYPE: ACUTE PAIN

## 2020-04-20 ASSESSMENT — PAIN DESCRIPTION - FREQUENCY: FREQUENCY: INTERMITTENT

## 2020-04-20 ASSESSMENT — PAIN DESCRIPTION - ONSET: ONSET: ON-GOING

## 2020-04-21 ENCOUNTER — HOSPITAL ENCOUNTER (OUTPATIENT)
Dept: RADIATION ONCOLOGY | Facility: MEDICAL CENTER | Age: 80
Discharge: HOME | End: 2020-04-21
Attending: RADIOLOGY
Payer: MEDICARE

## 2020-04-21 PROCEDURE — 77385 HC NTSTY MODUL RAD TX DLVR SMPL: CPT | Performed by: RADIOLOGY

## 2020-04-22 ENCOUNTER — HOSPITAL ENCOUNTER (OUTPATIENT)
Dept: RADIATION ONCOLOGY | Facility: MEDICAL CENTER | Age: 80
Discharge: HOME | End: 2020-04-22
Attending: RADIOLOGY
Payer: MEDICARE

## 2020-04-22 PROCEDURE — 77385 HC NTSTY MODUL RAD TX DLVR SMPL: CPT | Performed by: RADIOLOGY

## 2020-04-22 PROCEDURE — 77336 RADIATION PHYSICS CONSULT: CPT | Performed by: RADIOLOGY

## 2020-04-23 ENCOUNTER — HOSPITAL ENCOUNTER (OUTPATIENT)
Dept: RADIATION ONCOLOGY | Facility: MEDICAL CENTER | Age: 80
Discharge: HOME | End: 2020-04-23
Attending: RADIOLOGY
Payer: MEDICARE

## 2020-04-23 PROCEDURE — 77385 HC NTSTY MODUL RAD TX DLVR SMPL: CPT | Performed by: RADIOLOGY

## 2020-04-24 ENCOUNTER — HOSPITAL ENCOUNTER (OUTPATIENT)
Dept: RADIATION ONCOLOGY | Facility: MEDICAL CENTER | Age: 80
Discharge: HOME | End: 2020-04-24
Attending: RADIOLOGY
Payer: MEDICARE

## 2020-04-24 PROCEDURE — 77427 RADIATION TX MANAGEMENT X5: CPT | Performed by: RADIOLOGY

## 2020-04-24 PROCEDURE — 77385 HC NTSTY MODUL RAD TX DLVR SMPL: CPT | Performed by: RADIOLOGY

## 2020-04-27 ENCOUNTER — HOSPITAL ENCOUNTER (OUTPATIENT)
Dept: RADIATION ONCOLOGY | Facility: MEDICAL CENTER | Age: 80
Discharge: HOME | End: 2020-04-27
Attending: RADIOLOGY
Payer: MEDICARE

## 2020-04-27 VITALS
DIASTOLIC BLOOD PRESSURE: 57 MMHG | SYSTOLIC BLOOD PRESSURE: 148 MMHG | OXYGEN SATURATION: 98 % | HEART RATE: 85 BPM | TEMPERATURE: 98 F | WEIGHT: 198 LBS | RESPIRATION RATE: 14 BRPM | BODY MASS INDEX: 31.01 KG/M2

## 2020-04-27 PROCEDURE — G6002 STEREOSCOPIC X-RAY GUIDANCE: HCPCS | Performed by: RADIOLOGY

## 2020-04-27 PROCEDURE — 77385 HC NTSTY MODUL RAD TX DLVR SMPL: CPT | Performed by: RADIOLOGY

## 2020-04-27 NOTE — PROGRESS NOTES
Jose Lenz  4/27/2020  Wt Readings from Last 3 Encounters:   04/20/20 197 lb 8 oz (89.6 kg)   04/13/20 196 lb 8 oz (89.1 kg)   04/06/20 199 lb 8 oz (90.5 kg)     There is no height or weight on file to calculate BMI. Treatment Area:prostate     Patient was seen today for weekly visit. Comfort Alteration    Fatigue: Mild    Nutritional Alteration  Anorexia: No  Nausea: No  Vomiting: No     Elimination Alterations  Constipation: no  Diarrhea:  no  Urinary Frequency/Urgency: No  Urinary Retention: No  Dysuria: No  Urinary Incontinence: No  Proctitis: No  Nocturia: Yes #/night: 2     Emotional  Coping: effective    Sexuality Alteration  absent    Skin Alteration   Sensation:intact     Radiation Dermatitis:  Intact [x]     Erythema  []     Discoloration  []     Rash []     Dry desquamation  []     Moist desquamation []           Injury, potential bleeding or infection: n/a     Lab Results   Component Value Date    WBC 7.4 01/27/2017     07/01/2019         There were no vitals taken for this visit. Assessment/Plan: Patient was seen today for weekly visit. Dr Kristopher Soulier notified. No new orders received.        Julián Horowitz

## 2020-04-28 ENCOUNTER — HOSPITAL ENCOUNTER (OUTPATIENT)
Dept: RADIATION ONCOLOGY | Facility: MEDICAL CENTER | Age: 80
Discharge: HOME | End: 2020-04-28
Attending: RADIOLOGY
Payer: MEDICARE

## 2020-04-28 PROCEDURE — 77385 HC NTSTY MODUL RAD TX DLVR SMPL: CPT | Performed by: RADIOLOGY

## 2020-04-28 PROCEDURE — G6002 STEREOSCOPIC X-RAY GUIDANCE: HCPCS | Performed by: RADIOLOGY

## 2020-04-29 ENCOUNTER — HOSPITAL ENCOUNTER (OUTPATIENT)
Dept: RADIATION ONCOLOGY | Facility: MEDICAL CENTER | Age: 80
Discharge: HOME | End: 2020-04-29
Attending: RADIOLOGY
Payer: MEDICARE

## 2020-04-29 PROCEDURE — 77336 RADIATION PHYSICS CONSULT: CPT | Performed by: RADIOLOGY

## 2020-04-29 PROCEDURE — 77385 HC NTSTY MODUL RAD TX DLVR SMPL: CPT | Performed by: RADIOLOGY

## 2020-04-29 PROCEDURE — G6002 STEREOSCOPIC X-RAY GUIDANCE: HCPCS | Performed by: RADIOLOGY

## 2020-04-30 ENCOUNTER — HOSPITAL ENCOUNTER (OUTPATIENT)
Dept: RADIATION ONCOLOGY | Facility: MEDICAL CENTER | Age: 80
Discharge: HOME | End: 2020-04-30
Attending: RADIOLOGY
Payer: MEDICARE

## 2020-04-30 PROCEDURE — G6002 STEREOSCOPIC X-RAY GUIDANCE: HCPCS | Performed by: RADIOLOGY

## 2020-04-30 PROCEDURE — 77385 HC NTSTY MODUL RAD TX DLVR SMPL: CPT | Performed by: RADIOLOGY

## 2020-05-01 ENCOUNTER — HOSPITAL ENCOUNTER (OUTPATIENT)
Dept: RADIATION ONCOLOGY | Facility: MEDICAL CENTER | Age: 80
Discharge: HOME | End: 2020-05-01
Attending: RADIOLOGY
Payer: MEDICARE

## 2020-05-01 PROCEDURE — 77427 RADIATION TX MANAGEMENT X5: CPT | Performed by: RADIOLOGY

## 2020-05-01 PROCEDURE — 77385 HC NTSTY MODUL RAD TX DLVR SMPL: CPT | Performed by: RADIOLOGY

## 2020-05-01 PROCEDURE — G6002 STEREOSCOPIC X-RAY GUIDANCE: HCPCS | Performed by: RADIOLOGY

## 2020-05-04 ENCOUNTER — HOSPITAL ENCOUNTER (OUTPATIENT)
Dept: RADIATION ONCOLOGY | Facility: MEDICAL CENTER | Age: 80
Discharge: HOME | End: 2020-05-04
Attending: RADIOLOGY
Payer: MEDICARE

## 2020-05-04 VITALS
WEIGHT: 195.8 LBS | DIASTOLIC BLOOD PRESSURE: 85 MMHG | HEART RATE: 84 BPM | BODY MASS INDEX: 30.67 KG/M2 | TEMPERATURE: 97.3 F | SYSTOLIC BLOOD PRESSURE: 147 MMHG

## 2020-05-04 PROCEDURE — G6002 STEREOSCOPIC X-RAY GUIDANCE: HCPCS | Performed by: RADIOLOGY

## 2020-05-04 PROCEDURE — 77385 HC NTSTY MODUL RAD TX DLVR SMPL: CPT | Performed by: RADIOLOGY

## 2020-05-05 ENCOUNTER — HOSPITAL ENCOUNTER (OUTPATIENT)
Dept: RADIATION ONCOLOGY | Facility: MEDICAL CENTER | Age: 80
Discharge: HOME | End: 2020-05-05
Attending: RADIOLOGY
Payer: MEDICARE

## 2020-05-05 PROCEDURE — G6002 STEREOSCOPIC X-RAY GUIDANCE: HCPCS | Performed by: RADIOLOGY

## 2020-05-05 PROCEDURE — 77385 HC NTSTY MODUL RAD TX DLVR SMPL: CPT | Performed by: RADIOLOGY

## 2020-05-06 ENCOUNTER — HOSPITAL ENCOUNTER (OUTPATIENT)
Dept: RADIATION ONCOLOGY | Facility: MEDICAL CENTER | Age: 80
Discharge: HOME | End: 2020-05-06
Attending: RADIOLOGY
Payer: MEDICARE

## 2020-05-06 PROCEDURE — G6002 STEREOSCOPIC X-RAY GUIDANCE: HCPCS | Performed by: RADIOLOGY

## 2020-05-06 PROCEDURE — 77385 HC NTSTY MODUL RAD TX DLVR SMPL: CPT | Performed by: RADIOLOGY

## 2020-05-06 PROCEDURE — 77336 RADIATION PHYSICS CONSULT: CPT | Performed by: RADIOLOGY

## 2020-05-18 RX ORDER — BICALUTAMIDE 50 MG/1
TABLET, FILM COATED ORAL
Qty: 30 TABLET | Refills: 2 | Status: SHIPPED | OUTPATIENT
Start: 2020-05-18 | End: 2020-08-17

## 2020-05-27 ENCOUNTER — OFFICE VISIT (OUTPATIENT)
Dept: PODIATRY | Age: 80
End: 2020-05-27
Payer: MEDICARE

## 2020-05-27 VITALS — TEMPERATURE: 97.7 F | WEIGHT: 195 LBS | BODY MASS INDEX: 30.61 KG/M2 | RESPIRATION RATE: 16 BRPM | HEIGHT: 67 IN

## 2020-05-27 PROCEDURE — 11721 DEBRIDE NAIL 6 OR MORE: CPT | Performed by: PODIATRIST

## 2020-05-27 PROCEDURE — 99213 OFFICE O/P EST LOW 20 MIN: CPT | Performed by: PODIATRIST

## 2020-05-27 NOTE — PROGRESS NOTES
[facility] PHYSICIANS  [facility]  [address] [place] [address]  Dept: [phone]  Dept Fax: [phone]     PAIN PROGRESS NOTE  Date of patient's visit: 5/27/2020  Patient's Name:  Alfa Loo YOB: 1940            Patient Care Team:  Saranya Robledo MD as PCP - [address] DPM as Physician (Podiatry)  Ale Santiago MD as Surgeon (Radiation Oncology)  Bessie Combs MD as Consulting Physician (Urology)  Tonio Chavez DPM as Physician (Podiatry)      Chief Complaint   Patient presents with    Foot Pain    Nail Problem    Benign Neoplasm        Subjective: This Alfa Loo comes to clinic for foot and nail care. Pt currently has complaint of thickened, painful, elongated nails that he/she cannot manage by themselves. Pt. Relates pain to nails with shoe gear. Pt's primary care physician is Saranya Robledo MD last seen 11/14/19. Pt has  new complaint of increased numbness to rupal feet.      Past Medical History:   Diagnosis Date    Acid reflux     resolved since s/p shona    Anxiety     Arthritis     back/ fingers    BPH (benign prostatic hypertrophy)     CAD (coronary artery disease)     Dr. Arndt Och Dr. hernandez/ [facility]    Cancer [facility])     face, ear, scalp and arms     Carotid stenosis     bilat    Cataracts, both eyes     CHF (congestive heart failure) (HCC)     Dry skin     Elevated PSA     Examination of participant in clinical trial 4/27/39    For the life of the medtronic device    Hyperlipidemia     pt denies 11-27-19    Hypertension     PCP Dr. Riley Fuentes/ last seen 9-2019    Left ventricular dysfunction     Macular degeneration     left    Myocardial infarction ([address] 11/17/88, 1/2013    Neuropathy     Pacemaker 1191-9259    X 5    Panic attacks     PVD (peripheral vascular disease) ([address]     Sinus problem     Snores     Vasovagal near syncope     Wears glasses        Allergies   Allergen Reactions    Review of Systems. Review of Systems:   History obtained from chart review and the patient  General ROS: negative for - chills, fatigue, fever, night sweats or weight gain  Constitutional: Negative for chills, diaphoresis, fatigue, fever and unexpected weight change. Musculoskeletal: Positive for arthralgias, gait problem and joint swelling. Neurological ROS: negative for - behavioral changes, confusion, headaches or seizures. Negative for weakness and numbness. Dermatological ROS: negative for - mole changes, rash  Cardiovascular: Negative for leg swelling. Gastrointestinal: Negative for constipation, diarrhea, nausea and vomiting. Objective:  Dermatologic Exam:  Skin lesion/ulceration Absent . Skin No rashes or nodules noted. .   Skin is thin, with flaky sloughing skin as well as decreased hair growth to the lower leg  Small red hemosiderin deposits seen dorsal foot   Musculoskeletal:     1st MPJ ROM decreased, Bilateral.  Muscle strength 5/5, Bilateral.  Pain present upon palpation of toenails 1-5, Bilateral. decreased medial longitudinal arch, Bilateral.  Ankle ROM decreased,Bilateral.    Dorsally contracted digits present digits 2, Bilateral.     Vascular: DP pulses 1/4 bilateral.  PT pulses 0/4 bilateral.   CFT <5 seconds, Bilateral.  Hair growth absent to the level of the digits, Bilateral.  Edema present, Bilateral.  Varicosities absent, Bilateral. Erythema absent, Bilateral    Neurological: Sensation diminshed to light touch to level of digits, Bilateral.  Protective sensation intact 6/10 sites via 5.07/10g [place]-Ana Luisa Monofilament, Bilateral.  negative Tinel's, Bilateral.  negative Valleix sign, Bilateral.      Integument: Warm, dry, supple, Bilateral.  Open lesion absent, Bilateral.  Interdigital maceration absent to web spaces 4, Bilateral.  Nails 1-5 left and 1-5 right thickened > 3.0 mm, dystrophic and crumbly, discolored with subungual debris.   Fissures absent, Bilateral.

## 2020-06-03 ENCOUNTER — TELEPHONE (OUTPATIENT)
Dept: RADIATION ONCOLOGY | Facility: MEDICAL CENTER | Age: 80
End: 2020-06-03

## 2020-08-06 ENCOUNTER — OFFICE VISIT (OUTPATIENT)
Dept: PODIATRY | Age: 80
End: 2020-08-06
Payer: MEDICARE

## 2020-08-06 VITALS — HEIGHT: 67 IN | BODY MASS INDEX: 30.45 KG/M2 | RESPIRATION RATE: 16 BRPM | TEMPERATURE: 97.3 F | WEIGHT: 194 LBS

## 2020-08-06 PROCEDURE — 99213 OFFICE O/P EST LOW 20 MIN: CPT | Performed by: PODIATRIST

## 2020-08-06 PROCEDURE — 17110 DESTRUCTION B9 LES UP TO 14: CPT | Performed by: PODIATRIST

## 2020-08-06 PROCEDURE — 11721 DEBRIDE NAIL 6 OR MORE: CPT | Performed by: PODIATRIST

## 2020-08-06 NOTE — PROGRESS NOTES
[facility] PHYSICIANS  [facility]  [address] [place] [address]  Dept: [phone]  Dept Fax: [phone]     PAIN PROGRESS NOTE  Date of patient's visit: 8/6/2020  Patient's Name:  Madhavi Berry YOB: 1940            Patient Care Team:  Ariel Kolb MD as PCP - [address] DPM as Physician (Podiatry)  Maurizio Church MD as Surgeon (Radiation Oncology)  Tiesha Wall MD as Consulting Physician (Urology)  Martin Ritchie DPM as Physician (Podiatry)      Chief Complaint   Patient presents with    Foot Pain    Nail Problem       Subjective: This Madhavi Berry comes to clinic for foot and nail care. Pt currently has complaint of thickened, painful, elongated nails that he/she cannot manage by themselves. Pt. Relates pain to nails with shoe gear. Pt's primary care physician is Ariel Kolb MD last seen 7/18/20. Pt has a new complaint of  Lesions. to right  And left great toe. Pt has tried changing shoes but it has not helped the pain  Patient is taking over the counter medications with no relief.    Past Medical History:   Diagnosis Date    Acid reflux     resolved since s/p shona    Anxiety     Arthritis     back/ fingers    BPH (benign prostatic hypertrophy)     CAD (coronary artery disease)     Dr. Erasmo hernandez/ [facility]    Cancer [facility])     face, ear, scalp and arms     Carotid stenosis     bilat    Cataracts, both eyes     CHF (congestive heart failure) (HCC)     Dry skin     Elevated PSA     Examination of participant in clinical trial 4/27/39    For the life of the medtronic device    Hyperlipidemia     pt denies 11-27-19    Hypertension     PCP Dr. Miguelangel Fuetnes/ last seen 9-2019    Left ventricular dysfunction     Macular degeneration     left    Myocardial infarction [address] 11/17/88, 1/2013    Neuropathy     Pacemaker [id number]    X 5    Panic attacks     PVD (peripheral vascular disease) ([address]     Sinus problem     Snores     Vasovagal near syncope     Wears glasses        Allergies   Allergen Reactions    Percocet [Oxycodone-Acetaminophen] Other (See Comments)     Panic attack     Current Outpatient Medications on File Prior to Visit   Medication Sig Dispense Refill    bicalutamide (CASODEX) 50 MG chemo tablet TAKE ONE TABLET BY MOUTH DAILY 30 tablet 2    leuprolide acetate, 6 Month, (ELIGARD) 45 MG injection Inject 45 mg into the skin once      MELOXICAM PO Take by mouth      Multiple Vitamins-Minerals (THERAPEUTIC MULTIVITAMIN-MINERALS) tablet Take 1 tablet by mouth daily      ciprofloxacin (CIPRO) 500 MG tablet Take 500 mg by mouth 2 times daily Started 9/23 for prostate biopsy      OXYBUTYNIN CHLORIDE PO Take 1 tablet by mouth nightly       gabapentin (NEURONTIN) 300 MG capsule Take 1 capsule by mouth 3 times daily for 90 days. (Patient taking differently: Take 300 mg by mouth 2 times daily. ) 90 capsule 2    ammonium lactate (LAC-HYDRIN) 12 % cream Apply topically as needed. 1 Bottle 3    nitroGLYCERIN (NITROSTAT) 0.4 MG SL tablet Place 1 tablet under the tongue every 5 minutes as needed for Chest pain up to max of 3 total doses. If no relief after 1 dose, call 911. 25 tablet 3    fluticasone (FLONASE) 50 MCG/ACT nasal spray 1 spray by Nasal route daily as needed       sertraline (ZOLOFT) 25 MG tablet Take 25 mg by mouth daily.  amLODIPine (NORVASC) 10 MG tablet Take 10 mg by mouth daily.  furosemide (LASIX) 40 MG tablet Take 40 mg by mouth daily.  aspirin EC 81 MG EC tablet Take 1 tablet by mouth daily. (Patient taking differently: Take 81 mg by mouth daily Pt. Instructed to stop 7-10 days pre-op/ He did not do this) 30 tablet 6    simvastatin (ZOCOR) 40 MG tablet Take 20 mg by mouth nightly       alfuzosin (UROXATRAL) 10 MG SR tablet Take 10 mg by mouth daily.  lisinopril (PRINIVIL;ZESTRIL) 40 MG tablet Take 40 mg by mouth daily.       metoprolol (TOPROL-XL) 100 MG XL tablet Take 100 mg by mouth daily. No current facility-administered medications on file prior to visit. Review of Systems. Review of Systems:   History obtained from chart review and the patient  General ROS: negative for - chills, fatigue, fever, night sweats or weight gain  Constitutional: Negative for chills, diaphoresis, fatigue, fever and unexpected weight change. Musculoskeletal: Positive for arthralgias, gait problem and joint swelling. Neurological ROS: negative for - behavioral changes, confusion, headaches or seizures. Negative for weakness and numbness. Dermatological ROS: negative for - mole changes, rash  Cardiovascular: Negative for leg swelling. Gastrointestinal: Negative for constipation, diarrhea, nausea and vomiting. Objective:  Dermatologic Exam:  Skin lesion present to the right and left great toes with a central core and petchaie noted to the lesions periphery.   Pain on palpation of the lesion    Skin is thin, with flaky sloughing skin as well as decreased hair growth to the lower leg  Small red hemosiderin deposits seen dorsal foot   Musculoskeletal:     1st MPJ ROM decreased, Bilateral.  Muscle strength 5/5, Bilateral.  Pain present upon palpation of toenails 1-5, Bilateral. decreased medial longitudinal arch, Bilateral.  Ankle ROM decreased,Bilateral.    Dorsally contracted digits present digits 2, Bilateral.     Vascular: DP pulses 1/4 bilateral.  PT pulses 0/4 bilateral.   CFT <5 seconds, Bilateral.  Hair growth absent to the level of the digits, Bilateral.  Edema present, Bilateral.  Varicosities absent, Bilateral. Erythema absent, Bilateral    Neurological: Sensation diminshed to light touch to level of digits, Bilateral.  Protective sensation intact 6/10 sites via 5.07/10g [place]-Ana Luisa Monofilament, Bilateral.  negative Tinel's, Bilateral.  negative Valleix sign, Bilateral.      Integument: Warm, dry, supple, Bilateral.  Open lesion absent, Bilateral. Interdigital maceration absent to web spaces 4, Bilateral.  Nails 1-5 left and 1-5 right thickened > 3.0 mm, dystrophic and crumbly, discolored with subungual debris. Fissures absent, Bilateral.   General: AAO x 3 in NAD. Derm  Toenail Description  Sites of Onychomycosis Involvement (Check affected area)  [x] [x] [x] [x] [x] [x] [x] [x] [x] [x]  5 4 3 2 1 1 2 3 4 5                          Right                                        Left    Thickness  [x] [x] [x] [x] [x] [x] [x] [x] [x] [x]  5 4 3 2 1 1 2 3 4 5                         Right                                        Left    Dystrophic Changes   [x] [x] [x] [x] [x] [x] [x] [x] [x] [x]  5 4 3 2 1 1 2 3 4 5                         Right                                        Left    Color  [x] [x] [x] [x] [x] [x] [x] [x] [x] [x]  5 4 3 2 1 1 2 3 4 5                          Right                                        Left    Incurvation/Ingrowin   [] [] [] [] [] [] [] [] [] []  5 4 3 2 1 1 2 3 4 5                         Right                                        Left    Inflammation/Pain   [x] [x] [x] [x] [x] [x] [x] [x] [x] [x]  5 4 3  2 1 1 2 3 4 5                         Right                                        Left       Nails are painful 1-10 with direct palpation. Q7   []Yes  []No                Q8   [x]Yes  []No                     Q9   []Yes    []No  Assessment:  79 y.o. male with:    Diagnosis Orders   1. Dermatophytosis of nail  01814 - MI DEBRIDEMENT OF NAILS, 6 OR MORE   2. Peripheral vascular disorder (HCC)  26198 - [place] DEBRIDEMENT OF NAILS, 6 OR MORE    65525 - MI DESTRUCTION BENIGN LESIONS UP TO 14   3. Hammer toes of both feet  98390 - [place] DEBRIDEMENT OF NAILS, 6 OR MORE   4. Pain in both feet  10951 - MI DEBRIDEMENT OF NAILS, 6 OR MORE    66323 - [place] DESTRUCTION BENIGN LESIONS UP TO 14   5. Benign neoplasm of skin of right lower extremity, including hip  18349 - [place] DESTRUCTION BENIGN LESIONS UP TO 14   6.  Benign neoplasm of skin of lower limb, including hip, left  65407 - [place] DESTRUCTION BENIGN LESIONS UP TO 14       Plan:   Pt was evaluated and examined. Patient was given personalized discharge instructions. For the NEW lesions   The lesions was partially excised and Pyrogallic acid was applied under occlusion. The patient will leave in place for 24-48 hours and than remove. The patient tolerated the procedure well and without complication. Nails 1-10 were debrided in length and thickness sharply with a nail nipper and  without incident. Pt will follow up in 9 weeks or sooner if any problems arise. Diagnosis was discussed with the pt and all of their questions were answered in detail. Proper foot hygiene and care was discussed with the pt. Patient to check feet daily and contact the office with any questions/problems/concerns. Other comorbidity noted and will be managed by PCP. Pain waiver discussed with patient and confirmed.    8/6/2020      Electronically signed by Melissa Sow DPM on 8/6/2020 at 9:36 AM  8/6/2020

## 2020-08-07 ENCOUNTER — HOSPITAL ENCOUNTER (OUTPATIENT)
Dept: RADIATION ONCOLOGY | Facility: MEDICAL CENTER | Age: 80
Discharge: HOME | End: 2020-08-07
Attending: RADIOLOGY
Payer: MEDICARE

## 2020-08-07 VITALS
BODY MASS INDEX: 30.7 KG/M2 | WEIGHT: 196 LBS | SYSTOLIC BLOOD PRESSURE: 146 MMHG | TEMPERATURE: 97.2 F | DIASTOLIC BLOOD PRESSURE: 84 MMHG | OXYGEN SATURATION: 97 % | HEART RATE: 88 BPM | RESPIRATION RATE: 16 BRPM

## 2020-08-07 PROCEDURE — 99212 OFFICE O/P EST SF 10 MIN: CPT | Performed by: RADIOLOGY

## 2020-08-07 PROCEDURE — 99212 OFFICE O/P EST SF 10 MIN: CPT

## 2020-08-07 NOTE — PROGRESS NOTES
[facility] Radiation Oncology  Follow-Up note    Date of Service: 2020    Location: [facility]      Patient ID:   Kathleen Mejia  : 1940   MRN: [id number]    DIAGNOSIS:   Cancer Staging  Malignant neoplasm of prostate [facility])  Staging form: Prostate, AJCC 8th Edition  - Clinical stage from 2019: Stage IIC (cT1c, cN0, cM0, PSA: 8.4, Grade Group: 3) - Signed by Aamir Massey MD on 2019    Treatment:  77.4 Gy completed 2020. He was on Cassodex and was on Eligard, last injection , since then, discontinued. Chief Complaint: Jaylene Maldonado stopped giving me the injection. \"    INTERVAL HISTORY:   Kathleen Mejia is an 69-year-old  male patient who returns for follow-up with the above diagnosis and treatment history. May 26, 2020 PSA was undetectable, being less than 0.06. He was recently seen by podiatry because of thickened painful nails. With respect to his prostate cancer treatment, he has no urinary symptoms and is taking alfuzocin. I closely reviewed the medical, surgical, social and family history as per the detailed physician notes from our department. Interim changes as noted above. MEDICATIONS:    Current Outpatient Medications:     bicalutamide (CASODEX) 50 MG chemo tablet, TAKE ONE TABLET BY MOUTH DAILY, Disp: 30 tablet, Rfl: 2    leuprolide acetate, 6 Month, (ELIGARD) 45 MG injection, Inject 45 mg into the skin once, Disp: , Rfl:     MELOXICAM PO, Take by mouth, Disp: , Rfl:     Multiple Vitamins-Minerals (THERAPEUTIC MULTIVITAMIN-MINERALS) tablet, Take 1 tablet by mouth daily, Disp: , Rfl:     gabapentin (NEURONTIN) 300 MG capsule, Take 1 capsule by mouth 3 times daily for 90 days. (Patient taking differently: Take 300 mg by mouth 2 times daily. ), Disp: 90 capsule, Rfl: 2    ammonium lactate (LAC-HYDRIN) 12 % cream, Apply topically as needed. , Disp: 1 Bottle, Rfl: 3    nitroGLYCERIN (NITROSTAT) 0.4 MG SL tablet, Place 1 tablet under the tongue every 5 minutes as needed for Chest pain up to max of 3 total doses. If no relief after 1 dose, call 911., Disp: 25 tablet, Rfl: 3    fluticasone (FLONASE) 50 MCG/ACT nasal spray, 1 spray by Nasal route daily as needed , Disp: , Rfl:     sertraline (ZOLOFT) 25 MG tablet, Take 25 mg by mouth daily. , Disp: , Rfl:     amLODIPine (NORVASC) 10 MG tablet, Take 10 mg by mouth daily. , Disp: , Rfl:     furosemide (LASIX) 40 MG tablet, Take 40 mg by mouth daily. , Disp: , Rfl:     aspirin EC 81 MG EC tablet, Take 1 tablet by mouth daily. (Patient taking differently: Take 81 mg by mouth daily Pt. Instructed to stop 7-10 days pre-op/ He did not do this), Disp: 30 tablet, Rfl: 6    simvastatin (ZOCOR) 40 MG tablet, Take 20 mg by mouth nightly , Disp: , Rfl:     alfuzosin (UROXATRAL) 10 MG SR tablet, Take 10 mg by mouth daily. , Disp: , Rfl:     lisinopril (PRINIVIL;ZESTRIL) 40 MG tablet, Take 40 mg by mouth daily. , Disp: , Rfl:     metoprolol (TOPROL-XL) 100 MG XL tablet, Take 100 mg by mouth daily. , Disp: , Rfl:     ciprofloxacin (CIPRO) 500 MG tablet, Take 500 mg by mouth 2 times daily Started 9/23 for prostate biopsy, Disp: , Rfl:     OXYBUTYNIN CHLORIDE PO, Take 1 tablet by mouth nightly , Disp: , Rfl:     REVIEW OF SYSTEMS:Constitutional:  Good appetite, no weight loss  ECOG performance status  HEENT:  No headache or visual symptoms  Respiratory:  No cough or hemoptysis  Cardiovascular:  No chest pain, palpitation, orthopnea, or paroxysmal nocturnal dyspnea  GI:  No incontinence, diarrhea, or hematochezia  :  No incontinence, dysuria, or hematuria  Lymph:  No edema  Neuro: Arthralgias but no paresis or paresthesia  Psychiatric:  No psychiatric illness  All other categories of review of systems are negative. PHYSICAL EXAMINATION:  BP (!) 146/84   Pulse 88   Temp 97.2 °F (36.2 °C) (Oral)   Resp 16   Wt 196 lb (88.9 kg)   SpO2 97%   BMI 30.70 kg/m²   Pain 0/10, KPS  General:  The patient looks well.   HEENT:  No jaundice or icterus. Pupils reactive to light. Cranial Nerves II-XII grossly intact. Lymphatics:  No palpable adenopathy in the neck, supraclavicular, infraclavicular, axillary, or inguinal regions. Heart:  Regular rate and rhythm without murmur, rub, or gallop. Lungs:  Clear to auscultation. Abdomen:  Non-distended non-tender abdomen with positive bowel sounds, no palpable masses. Extremities:  No edema, clubbing, or cyanosis. Neuro:  Strength 5/5 proximally and distally in all four extremities. Sensory is grossly normal for light touch and deep pressure. Prostate Exam:  No external hemorrhoids, normal sphincter tone, a smooth, firm, small, non-tender prostate with no discretely palpable nodules. Labs: May 26, 2020 PSA less than 0.06. RADS: No recent diagnostic imaging    PATHOLOGY: No recent studies. ASSESSMENT: The patient is doing well approximately 3 months after completing radiation with no significantly adverse sequela from treatments. PSA is undetectable. PLAN: Follow-up in 3 to 4 months with . Time spent with patient 25 minutes. >50% of time allotted to education, answering questions, and coordinating care.     Electronically signed by Sarath Case MD on 8/7/2020 at 4:07 PM    Patient Care Team:  Sofi Ramirez MD as PCP - [address]ale[address] DPM as Physician (Podiatry)  Zoe Jordan MD as Surgeon (Radiation Oncology)  Viviane Vaughan MD as Consulting Physician (Urology)  Avila Schafer DPM as Physician (Podiatry)

## 2020-08-07 NOTE — PROGRESS NOTES
Beverly Preciado  8/7/2020  2:13 PM    Pt here for follow up visit. PSA went down. Patient not having any urinary issues, pt on eligard, but will stop eligard. Patient taking casodex and alfuzosin. Dr. Thuy Morocho to [facility]. Follow up in 3-4months. Vitals:    08/07/20 1410   BP: (!) 146/84   Pulse: 88   Resp: 16   Temp: 97.2 °F (36.2 °C)   SpO2: 97%    :  Patient Currently in Pain: No             Wt Readings from Last 1 Encounters:   08/07/20 196 lb (88.9 kg)                Current Outpatient Medications:     bicalutamide (CASODEX) 50 MG chemo tablet, TAKE ONE TABLET BY MOUTH DAILY, Disp: 30 tablet, Rfl: 2    leuprolide acetate, 6 Month, (ELIGARD) 45 MG injection, Inject 45 mg into the skin once, Disp: , Rfl:     MELOXICAM PO, Take by mouth, Disp: , Rfl:     Multiple Vitamins-Minerals (THERAPEUTIC MULTIVITAMIN-MINERALS) tablet, Take 1 tablet by mouth daily, Disp: , Rfl:     gabapentin (NEURONTIN) 300 MG capsule, Take 1 capsule by mouth 3 times daily for 90 days. (Patient taking differently: Take 300 mg by mouth 2 times daily. ), Disp: 90 capsule, Rfl: 2    ammonium lactate (LAC-HYDRIN) 12 % cream, Apply topically as needed. , Disp: 1 Bottle, Rfl: 3    nitroGLYCERIN (NITROSTAT) 0.4 MG SL tablet, Place 1 tablet under the tongue every 5 minutes as needed for Chest pain up to max of 3 total doses. If no relief after 1 dose, call 911., Disp: 25 tablet, Rfl: 3    fluticasone (FLONASE) 50 MCG/ACT nasal spray, 1 spray by Nasal route daily as needed , Disp: , Rfl:     sertraline (ZOLOFT) 25 MG tablet, Take 25 mg by mouth daily. , Disp: , Rfl:     amLODIPine (NORVASC) 10 MG tablet, Take 10 mg by mouth daily. , Disp: , Rfl:     furosemide (LASIX) 40 MG tablet, Take 40 mg by mouth daily. , Disp: , Rfl:     aspirin EC 81 MG EC tablet, Take 1 tablet by mouth daily. (Patient taking differently: Take 81 mg by mouth daily Pt.  Instructed to stop 7-10 days pre-op/ He did not do this), Disp: 30 tablet, Rfl: 6    simvastatin (ZOCOR) Elimination Issues: incontinence, frequency 0       3. Ambulatory: use of assistive devices (walker, cane, off-loading devices),        attached to equipment (IV pole, oxygen) 0     4. Sensory Limitations: dizziness, vertigo, impaired vision 0     5. Age less than 65        0     6. Age 72 or greater 1     7. Medication: diuretics, strong analgesics, hypnotics, sedatives,        antihypertensive agents 0   8. Falls:  recent history of falls within the last 3 months (not to include slipping or        tripping) 0   TOTAL 1    If score of 4 or greater was education given? No           TABLE 2   Risk Score Risk Level Plan of Care   0-3 Little or  No Risk 1. Provide assistance as indicated for ambulation activities  2. Reorient confused/cognitively impaired patient  3. Chair/bed in low position, stretcher/bed with siderails up except when performing patient care activities  5. Educate patient/family/caregiver on falls prevention  6.  Reassess in 12 weeks or with any noted change in patient condition which places them at a risk for a fall   4-6 Moderate Risk 1. Provide assistance as indicated for ambulation activities  2. Reorient confused/cognitively impaired patient  3. Chair/bed in low position, stretcher/bed with siderails up except when performing patient care activities  4. Educate patient/family/caregiver on falls prevention     7 or   Higher High Risk 1. Place patient in easily observable treatment room  2. Patient attended at all times by family member or staff  3. Provide assistance as indicated for ambulation activities  4. Reorient confused/cognitively impaired patient  5. Chair/bed in low position, stretcher/bed with siderails up except when performing patient care activities  6.   Educate patient/family/caregiver on falls prevention         PLAN: Patient is seen today in follow up        Helen Herrera

## 2020-08-17 RX ORDER — BICALUTAMIDE 50 MG/1
TABLET, FILM COATED ORAL
Qty: 90 TABLET | Refills: 1 | Status: SHIPPED | OUTPATIENT
Start: 2020-08-17 | End: 2021-02-26

## 2020-09-01 ENCOUNTER — APPOINTMENT (OUTPATIENT)
Dept: NUCLEAR MEDICINE | Age: 80
DRG: 247 | End: 2020-09-01
Payer: MEDICARE

## 2020-09-01 ENCOUNTER — HOSPITAL ENCOUNTER (INPATIENT)
Age: 80
LOS: 1 days | Discharge: HOME | DRG: 247 | End: 2020-09-03
Attending: EMERGENCY MEDICINE | Admitting: EMERGENCY MEDICINE
Payer: MEDICARE

## 2020-09-01 ENCOUNTER — APPOINTMENT (OUTPATIENT)
Dept: GENERAL RADIOLOGY | Age: 80
DRG: 247 | End: 2020-09-01
Payer: MEDICARE

## 2020-09-01 PROBLEM — R07.9 CHEST PAIN: Status: ACTIVE | Noted: 2020-09-01

## 2020-09-01 LAB
ABSOLUTE EOS #: 0.23 K/UL (ref 0–0.44)
ABSOLUTE IMMATURE GRANULOCYTE: 0.04 K/UL (ref 0–0.3)
ABSOLUTE LYMPH #: 0.74 K/UL (ref 1.1–3.7)
ABSOLUTE MONO #: 0.6 K/UL (ref 0.1–1.2)
ANION GAP SERPL CALCULATED.3IONS-SCNC: 10 MMOL/L (ref 9–17)
BASOPHILS # BLD: 1 % (ref 0–2)
BASOPHILS ABSOLUTE: 0.03 K/UL (ref 0–0.2)
BNP INTERPRETATION: ABNORMAL
BUN BLDV-MCNC: 17 MG/DL (ref 8–23)
BUN/CREAT BLD: ABNORMAL (ref 9–20)
CALCIUM SERPL-MCNC: 8.4 MG/DL (ref 8.6–10.4)
CHLORIDE BLD-SCNC: 102 MMOL/L (ref 98–107)
CO2: 27 MMOL/L (ref 20–31)
CREAT SERPL-MCNC: 0.66 MG/DL (ref 0.7–1.2)
DIFFERENTIAL TYPE: ABNORMAL
EOSINOPHILS RELATIVE PERCENT: 4 % (ref 1–4)
GFR AFRICAN AMERICAN: >60 ML/MIN
GFR NON-AFRICAN AMERICAN: >60 ML/MIN
GFR SERPL CREATININE-BSD FRML MDRD: ABNORMAL ML/MIN/{1.73_M2}
GFR SERPL CREATININE-BSD FRML MDRD: ABNORMAL ML/MIN/{1.73_M2}
GLUCOSE BLD-MCNC: 104 MG/DL (ref 70–99)
HCT VFR BLD CALC: 38.6 % (ref 40.7–50.3)
HEMOGLOBIN: 13.4 G/DL (ref 13–17)
IMMATURE GRANULOCYTES: 1 %
LYMPHOCYTES # BLD: 12 % (ref 24–43)
MCH RBC QN AUTO: 29.8 PG (ref 25.2–33.5)
MCHC RBC AUTO-ENTMCNC: 34.7 G/DL (ref 28.4–34.8)
MCV RBC AUTO: 85.8 FL (ref 82.6–102.9)
MONOCYTES # BLD: 10 % (ref 3–12)
NRBC AUTOMATED: 0 PER 100 WBC
PDW BLD-RTO: 13.1 % (ref 11.8–14.4)
PLATELET # BLD: 194 K/UL (ref 138–453)
PLATELET ESTIMATE: ABNORMAL
PMV BLD AUTO: 10.4 FL (ref 8.1–13.5)
POTASSIUM SERPL-SCNC: 3.8 MMOL/L (ref 3.7–5.3)
PRO-BNP: 351 PG/ML
RBC # BLD: 4.5 M/UL (ref 4.21–5.77)
RBC # BLD: ABNORMAL 10*6/UL
SEG NEUTROPHILS: 72 % (ref 36–65)
SEGMENTED NEUTROPHILS ABSOLUTE COUNT: 4.7 K/UL (ref 1.5–8.1)
SODIUM BLD-SCNC: 139 MMOL/L (ref 135–144)
TROPONIN INTERP: NORMAL
TROPONIN INTERP: NORMAL
TROPONIN T: NORMAL NG/ML
TROPONIN T: NORMAL NG/ML
TROPONIN, HIGH SENSITIVITY: 12 NG/L (ref 0–22)
TROPONIN, HIGH SENSITIVITY: 13 NG/L (ref 0–22)
WBC # BLD: 6.3 K/UL (ref 3.5–11.3)
WBC # BLD: ABNORMAL 10*3/UL

## 2020-09-01 PROCEDURE — 85025 COMPLETE CBC W/AUTO DIFF WBC: CPT

## 2020-09-01 PROCEDURE — 6370000000 HC RX 637 (ALT 250 FOR IP): Performed by: STUDENT IN AN ORGANIZED HEALTH CARE EDUCATION/TRAINING PROGRAM

## 2020-09-01 PROCEDURE — G0378 HOSPITAL OBSERVATION PER HR: HCPCS

## 2020-09-01 PROCEDURE — 93005 ELECTROCARDIOGRAM TRACING: CPT | Performed by: STUDENT IN AN ORGANIZED HEALTH CARE EDUCATION/TRAINING PROGRAM

## 2020-09-01 PROCEDURE — 2580000003 HC RX 258: Performed by: INTERNAL MEDICINE

## 2020-09-01 PROCEDURE — 80048 BASIC METABOLIC PNL TOTAL CA: CPT

## 2020-09-01 PROCEDURE — 99285 EMERGENCY DEPT VISIT HI MDM: CPT

## 2020-09-01 PROCEDURE — 78452 HT MUSCLE IMAGE SPECT MULT: CPT

## 2020-09-01 PROCEDURE — 3430000000 HC RX DIAGNOSTIC RADIOPHARMACEUTICAL: Performed by: INTERNAL MEDICINE

## 2020-09-01 PROCEDURE — 84484 ASSAY OF TROPONIN QUANT: CPT

## 2020-09-01 PROCEDURE — A9500 TC99M SESTAMIBI: HCPCS | Performed by: INTERNAL MEDICINE

## 2020-09-01 PROCEDURE — 83880 ASSAY OF NATRIURETIC PEPTIDE: CPT

## 2020-09-01 PROCEDURE — 71045 X-RAY EXAM CHEST 1 VIEW: CPT

## 2020-09-01 RX ORDER — UREA 10 %
3 LOTION (ML) TOPICAL ONCE
Status: COMPLETED | OUTPATIENT
Start: 2020-09-01 | End: 2020-09-02

## 2020-09-01 RX ORDER — BICALUTAMIDE 50 MG/1
50 TABLET, FILM COATED ORAL DAILY
Status: DISCONTINUED | OUTPATIENT
Start: 2020-09-01 | End: 2020-09-03 | Stop reason: HOSPADM

## 2020-09-01 RX ORDER — NITROGLYCERIN 0.4 MG/1
0.4 TABLET SUBLINGUAL EVERY 5 MIN PRN
Status: DISCONTINUED | OUTPATIENT
Start: 2020-09-01 | End: 2020-09-01

## 2020-09-01 RX ORDER — LISINOPRIL 10 MG/1
40 TABLET ORAL DAILY
Status: DISCONTINUED | OUTPATIENT
Start: 2020-09-01 | End: 2020-09-03 | Stop reason: HOSPADM

## 2020-09-01 RX ORDER — TAMSULOSIN HYDROCHLORIDE 0.4 MG/1
0.4 CAPSULE ORAL DAILY
Status: DISCONTINUED | OUTPATIENT
Start: 2020-09-01 | End: 2020-09-03 | Stop reason: HOSPADM

## 2020-09-01 RX ORDER — SERTRALINE HYDROCHLORIDE 25 MG/1
25 TABLET, FILM COATED ORAL DAILY
Status: DISCONTINUED | OUTPATIENT
Start: 2020-09-01 | End: 2020-09-03 | Stop reason: HOSPADM

## 2020-09-01 RX ORDER — ASPIRIN 81 MG/1
81 TABLET ORAL DAILY
Status: DISCONTINUED | OUTPATIENT
Start: 2020-09-01 | End: 2020-09-03 | Stop reason: HOSPADM

## 2020-09-01 RX ORDER — FUROSEMIDE 20 MG/1
40 TABLET ORAL DAILY
Status: DISCONTINUED | OUTPATIENT
Start: 2020-09-01 | End: 2020-09-03 | Stop reason: HOSPADM

## 2020-09-01 RX ORDER — AMLODIPINE BESYLATE 10 MG/1
10 TABLET ORAL DAILY
Status: DISCONTINUED | OUTPATIENT
Start: 2020-09-01 | End: 2020-09-03 | Stop reason: HOSPADM

## 2020-09-01 RX ORDER — NITROGLYCERIN 0.4 MG/1
0.4 TABLET SUBLINGUAL EVERY 5 MIN PRN
Status: DISCONTINUED | OUTPATIENT
Start: 2020-09-01 | End: 2020-09-03 | Stop reason: HOSPADM

## 2020-09-01 RX ORDER — GABAPENTIN 300 MG/1
300 CAPSULE ORAL 2 TIMES DAILY
Status: DISCONTINUED | OUTPATIENT
Start: 2020-09-01 | End: 2020-09-03 | Stop reason: HOSPADM

## 2020-09-01 RX ORDER — ATORVASTATIN CALCIUM 80 MG/1
40 TABLET, FILM COATED ORAL NIGHTLY
Status: DISCONTINUED | OUTPATIENT
Start: 2020-09-01 | End: 2020-09-03 | Stop reason: HOSPADM

## 2020-09-01 RX ORDER — METOPROLOL SUCCINATE 100 MG/1
100 TABLET, EXTENDED RELEASE ORAL DAILY
Status: DISCONTINUED | OUTPATIENT
Start: 2020-09-01 | End: 2020-09-03 | Stop reason: HOSPADM

## 2020-09-01 RX ADMIN — METOPROLOL SUCCINATE 100 MG: 100 TABLET, FILM COATED, EXTENDED RELEASE ORAL at 17:47

## 2020-09-01 RX ADMIN — NITROGLYCERIN 0.4 MG: 0.4 TABLET, ORALLY DISINTEGRATING SUBLINGUAL at 10:50

## 2020-09-01 RX ADMIN — BICALUTAMIDE 50 MG: 50 TABLET ORAL at 17:47

## 2020-09-01 RX ADMIN — SODIUM CHLORIDE, PRESERVATIVE FREE 10 ML: 5 INJECTION INTRAVENOUS at 14:15

## 2020-09-01 RX ADMIN — AMLODIPINE BESYLATE 10 MG: 10 TABLET ORAL at 17:47

## 2020-09-01 RX ADMIN — GABAPENTIN 300 MG: 300 CAPSULE ORAL at 17:47

## 2020-09-01 RX ADMIN — SERTRALINE 25 MG: 25 TABLET, FILM COATED ORAL at 17:48

## 2020-09-01 RX ADMIN — TAMSULOSIN HYDROCHLORIDE 0.4 MG: 0.4 CAPSULE ORAL at 17:48

## 2020-09-01 RX ADMIN — LISINOPRIL 40 MG: 10 TABLET ORAL at 17:47

## 2020-09-01 RX ADMIN — TETRAKIS(2-METHOXYISOBUTYLISOCYANIDE)COPPER(I) TETRAFLUOROBORATE 30 MILLICURIE: 1 INJECTION, POWDER, LYOPHILIZED, FOR SOLUTION INTRAVENOUS at 14:15

## 2020-09-01 ASSESSMENT — ENCOUNTER SYMPTOMS
SHORTNESS OF BREATH: 0
CONSTIPATION: 0
ABDOMINAL PAIN: 0
BACK PAIN: 0
NAUSEA: 0
CHEST TIGHTNESS: 1
COUGH: 0
DIARRHEA: 1
VOMITING: 0

## 2020-09-01 ASSESSMENT — PAIN DESCRIPTION - LOCATION: LOCATION: CHEST

## 2020-09-01 ASSESSMENT — PAIN SCALES - GENERAL: PAINLEVEL_OUTOF10: 5

## 2020-09-01 ASSESSMENT — PAIN DESCRIPTION - DESCRIPTORS: DESCRIPTORS: TIGHTNESS

## 2020-09-01 NOTE — PROGRESS NOTES
[facility]  CDU / OBSERVATION eNCOUnter  Attending NOte       I performed a history and physical examination of the patient and discussed management with the resident. I reviewed the residents note and agree with the documented findings and plan of care. Any areas of disagreement are noted on the chart. I was personally present for the key portions of any procedures. I have documented in the chart those procedures where I was not present during the key portions. I have reviewed the nurses notes. I agree with the chief complaint, past medical history, past surgical history, allergies, medications, social and family history as documented unless otherwise noted below. The Family history, social history, and ROS are effectively unchanged since admission unless noted elsewhere in the chart. Patient presents for chest pain evaluation. On evaluation in the ED patient was pain-free but patient had anginal type symptoms consistent with cardiac issues. Patient had clear enough symptomatology that I discussed with cardiology the potential for catheterization today. Cardiology has evaluated the patient directly and in decision-making the patient elected for stress testing tomorrow. Patient currently well. Awaiting bed upstairs.     Russ Hoffman MD  Attending Emergency  Physician

## 2020-09-01 NOTE — ED NOTES
Patient resting in bed with eyes closed, RR even and unlabored. Patient denies any needs at this time.      Robb Bergeron RN  09/01/20 9286

## 2020-09-01 NOTE — ED PROVIDER NOTES
[facility]  FACULTY HANDOFF       Handoff taken on the following patient from prior Attending Physician:  Pt Name: Cathy Castellanos  PCP:  Rylee Gillespie MD    Attestation  I was available and discussed any additional care issues that arose and coordinated the management plans with the resident(s) caring for the patient during my duty period. Any areas of disagreement with resident's documentation of care or procedures are noted on the chart. I was personally present for the key portions of any/all procedures during my duty period. I have documented in the chart those procedures where I was not present during the key portions.              Brittany Rendon MD  09/01/20 5906

## 2020-09-01 NOTE — CARE COORDINATION
Case Management Initial Discharge Plan  Veronica Rocha,             Met with:patient to discuss discharge plans. Information verified: address, contacts, phone number, , insurance Yes    Emergency Contact/Next of Kin name & number: Jonnathan Dumas (wife([phone])    PCP: Beck Gayle MD  Date of last visit: Aleisha  Net appointment is 20-  Insurance Provider: [employer]    Discharge Planning    Living Arrangements:  Spouse/Significant Other   Support Systems:  Children, Spouse/Significant Other    Home has 2 stories  4 stairs to climb to get into front door, 1 flight stairs to climb to reach second floor  Location of bedroom/bathroom in home 1/2 bath on the 1st floor, full bath and bedroom on the 2nd floor,stair lift    Patient able to perform ADL's:Independent    Current Services (outpatient & in home) none  DME equipment: none  DME provider:     Receiving oral anticoagulation therapy? Yes 81 mg asa    If indicated:   Physician managing anticoagulation treatment:   Where does patient obtain lab work for ATC treatment? Potential Assistance Needed:  N/A    Patient agreeable to home care: No  [place] of choice provided:  n/a    Prior SNF/Rehab Placement and Facility:   Agreeable to SNF/Rehab: No  [place] of choice provided: n/a     Evaluation: no    Expected Discharge date:  20    Patient expects to be discharged to:  retirm to  home  Follow Up Appointment: Best Day/ Time:      Transportation provider: self   Transportation arrangements needed for discharge: No    Readmission Risk              Risk of Unplanned Readmission:        0             Does patient have a readmission risk score greater than 14?: not calculated. If yes, follow-up appointment must be made within 7 days of discharge.      Goals of Care: decreased chest pain      Discharge Plan: return to home          Electronically signed by Monica Moraes RN on 20 at 1:50 PM EDT

## 2020-09-01 NOTE — ED NOTES
Patient returned from [facility] and placed back on continuous monitor. Patient given additional warm blankets. Await result of NM stress test, patient does not want to take his home meds until he has something to eat.      Katie Ramos RN  09/01/20 5107

## 2020-09-01 NOTE — ED PROVIDER NOTES
[facility]  Emergency Department  Emergency Medicine Resident Sign-out     Care of Mala Belcher was assumed from Dr. Hayden Sandhoff and is being seen for Chest Pain  . The patient's initial evaluation and plan have been discussed with the prior provider who initially evaluated the patient.      EMERGENCY DEPARTMENT COURSE / MEDICAL DECISION MAKING:       MEDICATIONS GIVEN:  Orders Placed This Encounter   Medications    tamsulosin (FLOMAX) capsule 0.4 mg    amLODIPine (NORVASC) tablet 10 mg    aspirin EC tablet 81 mg    bicalutamide (CASODEX) chemo tablet 50 mg    furosemide (LASIX) tablet 40 mg    gabapentin (NEURONTIN) capsule 300 mg    lisinopril (PRINIVIL;ZESTRIL) tablet 40 mg    metoprolol succinate (TOPROL XL) extended release tablet 100 mg    nitroGLYCERIN (NITROSTAT) SL tablet 0.4 mg    sertraline (ZOLOFT) tablet 25 mg    atorvastatin (LIPITOR) tablet 40 mg    DISCONTD: nitroGLYCERIN (NITROSTAT) SL tablet 0.4 mg    melatonin tablet 3 mg       LABS / RADIOLOGY:     Labs Reviewed   CBC WITH AUTO DIFFERENTIAL - Abnormal; Notable for the following components:       Result Value    Hematocrit 38.6 (*)     Seg Neutrophils 72 (*)     Lymphocytes 12 (*)     Immature Granulocytes 1 (*)     Absolute Lymph # 0.74 (*)     All other components within normal limits   BASIC METABOLIC PANEL W/ REFLEX TO MG FOR LOW K - Abnormal; Notable for the following components:    Glucose 104 (*)     CREATININE 0.66 (*)     Calcium 8.4 (*)     All other components within normal limits   BRAIN NATRIURETIC PEPTIDE - Abnormal; Notable for the following components:    Pro- (*)     All other components within normal limits   TROPONIN   TROPONIN   TROPONIN       Xr Chest Portable    Result Date: 9/1/2020  EXAMINATION: ONE XRAY VIEW OF THE CHEST 9/1/2020 9:25 am COMPARISON: January 27, 2017, chest examination HISTORY: ORDERING SYSTEM PROVIDED HISTORY: chest pain TECHNOLOGIST PROVIDED HISTORY: chest pain Reason for Exam: upright FINDINGS: Median sternotomy. Stable left subclavian pacemaker leads Stable mild cardiomegaly Expiratory exam with persistent mild streaky bibasilar density and mild chronic blunting of the left costophrenic angle     Stable chest Stable lead placement Expiratory exam with mild streaky bibasilar atelectasis versus fibrosis and blunting of the left costophrenic angle       RECENT VITALS:     Temp: 98.2 °F (36.8 °C),  Pulse: 72, Resp: 15, BP: (!) 160/77, SpO2: 96 %    This patient is a 79 y.o. Male with present with chest pain that began last night. Patient has a significant cardiac history including double bypass and history of 3 stents. Elevated heart score, troponins 12 and 13. Patient admitted to observation service for cardiac work-up. Patient has already received stress test which has been completed, and cardiology has already been consulted and seen patient since patient has been here for so long. Awaiting results of stress test.  If negative, patient may be able to be discharged by observation service. ED Course as of Sep 02 0431   Tue Sep 01, 2020   0918 Pacemaker interrogation was unremarkable. Spoke to [employer]. No events since earlier last month. Pacemaker functioning properly. [ZT]   0930 WBC: 6.3 [ZT]   0930 Hemoglobin Quant: 13.4 [ZT]   0940 CBC unremarkable. [ZT]   1018 Troponin, High Sensitivity: 12 [ZT]   1018 Pro-BNP(!): 351 [ZT]   1018 BNP slightly elevated but down from baseline. BMP unremarkable    [ZT]      ED Course User Index  [ZT] Henri Andrade, DO       OUTSTANDING TASKS / RECOMMENDATIONS:    1. Stress test results  2. FINAL IMPRESSION:     1.  Acute chest pain        DISPOSITION:         DISPOSITION:  []  Discharge   []  Transfer -    [x]  Admission -     []  Against Medical Advice   []  Eloped   FOLLOW-UP: Tu Duran MD  [address] [place] [address]  [phone]           DISCHARGE MEDICATIONS: Current Discharge Medication List

## 2020-09-01 NOTE — H&P
[facility]  CDU / OBSERVATION eNCOUnter  Resident Note     Pt Name: Rebeca Valdez  MRN: [id number]  [place] 1940  Date of evaluation: 9/1/20  Patient's PCP is :  Tu Duran MD    CHIEF COMPLAINT       Chief Complaint   Patient presents with    Chest Pain         HISTORY OF PRESENT ILLNESS    Rebeca Valdez is a 79 y.o. male who presents with chest pain. The patient states he awoke this morning at about 8 AM and after walking around his apartment he felt an onset of chest tightness with associated diaphoresis and some shortness of breath. The pain did not radiate, was located in the center of his lower chest region. The pain did not go away with rest.  He presented by ambulance and was given 1 tab of nitro and 4 baby aspirin and this did relieve the pain. Patient's EKG and troponin were normal in the ED. Patient does have significant cardiac history of AICD placement, CABG, bypass, cardiac stents. Patient's cardiologist is Dr. Ronit Anderson     Location/Symptom: Center of chest  Timing/Onset: This morning  Provocation: Walking  Quality: Squeezing  Radiation: None  Severity: 7/10, improved to 4/10 with nitro and aspirin  Timing/Duration: Constant, with intermittent worsening  Modifying Factors:  Worse with ambulation    REVIEW OF SYSTEMS       General ROS - No fevers, No malaise   Ophthalmic ROS - No discharge, No changes in vision  ENT ROS -  No sore throat, No rhinorrhea,   Respiratory ROS - SOB with chest pain but now improved, no cough, no  wheezing  Cardiovascular ROS - Chest pain as described above, no dyspnea on exertion  Gastrointestinal ROS - No abdominal pain, no nausea or vomiting, no change in bowel habits, no black or bloody stools  Genito-Urinary ROS - No dysuria, trouble voiding, or hematuria  Musculoskeletal ROS - No myalgias, No arthalgias  Neurological ROS - No headache, no dizziness/lightheadedness, No focal weakness, no loss of sensation  Dermatological ROS - No lesions, No rash     (PQRS) Advance directives on face sheet per hospital policy. No change unless specifically mentioned in chart    [address]    has a past medical history of Acid reflux, Anxiety, Arthritis, BPH (benign prostatic hypertrophy), CAD (coronary artery disease), Cancer ([address] Carotid stenosis, Cataracts, both eyes, CHF (congestive heart failure) ([address] Dry skin, Elevated PSA, Examination of participant in clinical trial, Hyperlipidemia, Hypertension, Left ventricular dysfunction, Macular degeneration, Myocardial infarction ([address] Neuropathy, Pacemaker, Panic attacks, PVD (peripheral vascular disease) ([address] Sinus problem, Snores, Vasovagal near syncope, and Wears glasses. I have reviewed the past medical history with the patient and it is pertinent to this complaint. SURGICAL HISTORY      has a past surgical history that includes Pacemaker insertion (, , , , 2019); Cholecystectomy, laparoscopic (2014); eye surgery (Bilateral); Cardiac defibrillator placement (); Cardiac surgery (); Cardiac catheterization; Colonoscopy; Prostate Biopsy (2019); Prostate biopsy (N/A, 2019); pacemaker placement; and Cataract removal.  I have reviewed and agree with Surgical History entered and it is pertinent to this complaint. CURRENT MEDICATIONS     nitroGLYCERIN (NITROSTAT) SL tablet 0.4 mg, Q5 Min PRN        All medication charted and reviewed. ALLERGIES     is allergic to percocet [oxycodone-acetaminophen] and acetaminophen. FAMILY HISTORY     He indicated that his mother is . He indicated that his father is . He indicated that his sister is . He indicated that the status of his brother is unknown.     family history includes Cancer in his mother and sister; Diabetes in his sister; Heart Disease in his brother; High Blood Pressure in his brother, father, mother, and sister.   The patient denies any pertinent family history. I have reviewed and agree with the family history entered. I have reviewed the Family History and it is not significant to the case    SOCIAL HISTORY      reports that he has never smoked. He has never used smokeless tobacco. He reports current alcohol use of about 2.0 standard drinks of alcohol per week. He reports that he does not use drugs. I have reviewed and agree with all Social.  There are no concerns for substance abuse/use. PHYSICAL EXAM     INITIAL VITALS:  temperature is 98.2 °F (36.8 °C). His blood pressure is 119/74 and his pulse is 70. His respiration is 17 and oxygen saturation is 94%.       CONSTITUTIONAL: AOx4, no apparent distress, appears stated age    HEAD: normocephalic, atraumatic   EYES: PERRLA, EOMI    ENT: moist mucous membranes, uvula midline   NECK: supple, symmetric   BACK: symmetric   LUNGS: clear to auscultation bilaterally   CARDIOVASCULAR: regular rate and rhythm, no murmurs, rubs or gallops   ABDOMEN: soft, non-tender, non-distended with normal active bowel sounds   NEUROLOGIC:  MAEx4, no focal sensory or motor deficits   MUSCULOSKELETAL: no clubbing, cyanosis or edema   SKIN: no rash or wounds         DIAGNOSTIC RESULTS     EKG: All EKG's are interpreted by the Observation Physician who either signs or Co-signs this chart in the absence of a cardiologist.    EKG Interpretation    Interpreted by observation physician    Rhythm: atrial paced rhythm with prolonged DE interval  Rate: normal  Axis: normal  Ectopy: none  Conduction: left bundle branch block (incomplete)  ST Segments: no acute change  T Waves: no acute change  Q Waves: none    Clinical Impression: no acute changes    Alfonso Oropeza DO        RADIOLOGY:   I directly visualized the following  images and reviewed the radiologist interpretations:    Xr Chest Portable    Result Date: 9/1/2020  EXAMINATION: ONE XRAY VIEW OF THE CHEST 9/1/2020 9:25 am COMPARISON: January 27, 2017, chest examination HISTORY: ORDERING SYSTEM PROVIDED HISTORY: chest pain TECHNOLOGIST PROVIDED HISTORY: chest pain Reason for Exam: upright FINDINGS: Median sternotomy. Stable left subclavian pacemaker leads Stable mild cardiomegaly Expiratory exam with persistent mild streaky bibasilar density and mild chronic blunting of the left costophrenic angle     Stable chest Stable lead placement Expiratory exam with mild streaky bibasilar atelectasis versus fibrosis and blunting of the left costophrenic angle       LABS:  I have reviewed and interpreted all available lab results.   Labs Reviewed   CBC WITH AUTO DIFFERENTIAL - Abnormal; Notable for the following components:       Result Value    Hematocrit 38.6 (*)     Seg Neutrophils 72 (*)     Lymphocytes 12 (*)     Immature Granulocytes 1 (*)     Absolute Lymph # 0.74 (*)     All other components within normal limits   BASIC METABOLIC PANEL W/ REFLEX TO MG FOR LOW K - Abnormal; Notable for the following components:    Glucose 104 (*)     CREATININE 0.66 (*)     Calcium 8.4 (*)     All other components within normal limits   BRAIN NATRIURETIC PEPTIDE - Abnormal; Notable for the following components:    Pro- (*)     All other components within normal limits   TROPONIN   TROPONIN       SCREENING TOOLS:    HEART Risk Score for Chest Pain Patients   History and Physical Exam Suspicion Level  (Nausea, Vomiting, Diaphoresis, Radiation, Exertion)   Slightly Suspicious (0 pts)   Moderately Suspicious (1 pt)   Highly Suspicious (2 pts)   EKG Interpretation   Normal (0 pts)   Non-Specific Repolarization Disturbance (1 pt)   Significant ST-Depression (2 pts)   Age of Patient (in years)   = 39 (0 pts)   46-64 (1 pt)   = 65 (2 pts)   Risk Factors   No Risk Factors (0 pts)   1-2 Risk Factors (1 pt)   = 3 Risk Factors (2 pts)   Risk Factors Include:   Hypercholesterolemia   Hypertension   Diabetes Mellitus   Cigarette smoking   Positive family history   Obesity   CAD   (SLE, CKDz, HIV, Cocaine

## 2020-09-01 NOTE — ED PROVIDER NOTES
[facility] ED  Emergency Department Encounter  EmergencyMedicine Resident     Pt Name:Galo Farris  MRN: [id number]  Rishigfhumphrey 1940  Date of evaluation: 9/1/20  PCP:  Yessica Trujillo MD    [address] [place] [address]       Chief Complaint   Patient presents with    Chest Pain       HISTORY OF PRESENT ILLNESS  (Location/Symptom, Timing/Onset, Context/Setting, Quality, Duration, Modifying Factors, Severity.)    This patient was evaluated in the Emergency Department for symptoms described in the history of present illness. He/she was evaluated in the context of the global COVID-19 pandemic, which necessitated consideration that the patient might be at risk for infection with the SARS-CoV-2 virus that causes COVID-19. Institutional protocols and algorithms that pertain to the evaluation of patients at risk for COVID-19 are in a state of rapid change based on information released by regulatory bodies including the CDC and federal and state organizations. These policies and algorithms were followed during the patient's care in the ED. Lizzette Hardin is a 79 y.o. male with a past medical history of double bypass, 3 stents, pacemaker, hypertension, hyperlipidemia and peripheral vascular disease present emergency department with complaints of chest tightness. Over the last night patient was restless. Got up multiple times because he could not sleep. Slept for the night in recliner. Early this morning just did not feel right and developed midsternal chest tightness that was nonradiating. Moderate in severity. Mild associated diaphoresis and generalized weakness but no nausea, vomiting, back pain, arm pain, shortness of breath, lower extremity swelling. Patient also denies any recent illness, fever, chills, cough. Pain is constant. Patient called for EMS. Was given 324 of aspirin and nitro in route. Mild improvement of pain after nitro to a 5/10 severity at this time.   Patient follows with  Ever's team.    PAST MEDICAL / SURGICAL / SOCIAL / FAMILY HISTORY      has a past medical history of Acid reflux, Anxiety, Arthritis, BPH (benign prostatic hypertrophy), CAD (coronary artery disease), Cancer ([facility] [address] Carotid stenosis, Cataracts, both eyes, CHF (congestive heart failure) ([address] Dry skin, Elevated PSA, Examination of participant in clinical trial, Hyperlipidemia, Hypertension, Left ventricular dysfunction, Macular degeneration, Myocardial infarction ([facility] [address] Neuropathy, Pacemaker, Panic attacks, PVD (peripheral vascular disease) ([facility] [address] Sinus problem, Snores, Vasovagal near syncope, and Wears glasses. has a past surgical history that includes Pacemaker insertion (1995, 2001, 2008, 2013, 7/1/2019); Cholecystectomy, laparoscopic (03/03/2014); eye surgery (Bilateral); Cardiac defibrillator placement (2013); Cardiac surgery (1997); Cardiac catheterization; Colonoscopy; Prostate Biopsy (09/24/2019); Prostate biopsy (N/A, 9/24/2019); pacemaker placement; and Cataract removal.    Social History     Socioeconomic History    Marital status:      Spouse name: Not on file    Number of children: Not on file    Years of education: Not on file    Highest education level: Not on file   Occupational History    Not on file   Social Needs    Financial resource strain: Not on file    Food insecurity     Worry: Not on file     Inability: Not on file    Transportation needs     Medical: Not on file     Non-medical: Not on file   Tobacco Use    Smoking status: Never Smoker    Smokeless tobacco: Never Used   Substance and Sexual Activity    Alcohol use:  Yes     Alcohol/week: 2.0 standard drinks     Types: 2 Cans of beer per week     Comment:  weekly    Drug use: No    Sexual activity: Not on file   Lifestyle    Physical activity     Days per week: Not on file     Minutes per session: Not on file    Stress: Not on file   Relationships    Social connections     Talks on phone: Not on file     Gets together: Not on file     Attends Congregational service: Not on file     Active member of club or organization: Not on file     Attends meetings of clubs or organizations: Not on file     Relationship status: Not on file    Intimate partner violence     Fear of current or ex partner: Not on file     Emotionally abused: Not on file     Physically abused: Not on file     Forced sexual activity: Not on file   Other Topics Concern    Not on file   Social History Narrative    Not on file       Family History   Problem Relation Age of Onset    High Blood Pressure Mother     Cancer Mother     High Blood Pressure Father     Diabetes Sister     Cancer Sister     High Blood Pressure Sister     Heart Disease Brother     High Blood Pressure Brother        Allergies:  Percocet [oxycodone-acetaminophen] and Acetaminophen    Home Medications:  Prior to Admission medications    Medication Sig Start Date End Date Taking? Authorizing Provider   aspirin EC 81 MG EC tablet Take 1 tablet by mouth daily. Patient taking differently: Take 81 mg by mouth daily Pt. Instructed to stop 7-10 days pre-op/ He did not do this 7/20/13  Yes Gayathri Rowe MD   bicalutamide (CASODEX) 50 MG chemo tablet TAKE ONE TABLET BY MOUTH DAILY 8/17/20   Shauna Cuba MD   leuprolide acetate, 6 Month, (ELIGARD) 45 MG injection Inject 45 mg into the skin once 12/5/19   Historical Provider, MD   MELOXICAM PO Take by mouth    Historical Provider, MD   Multiple Vitamins-Minerals (THERAPEUTIC MULTIVITAMIN-MINERALS) tablet Take 1 tablet by mouth daily    Historical Provider, MD   ciprofloxacin (CIPRO) 500 MG tablet Take 500 mg by mouth 2 times daily Started 9/23 for prostate biopsy    Historical Provider, MD   OXYBUTYNIN CHLORIDE PO Take 1 tablet by mouth nightly     Historical Provider, MD   gabapentin (NEURONTIN) 300 MG capsule Take 1 capsule by mouth 3 times daily for 90 days. Patient taking differently: Take 300 mg by mouth 2 times daily.   1/25/18 8/7/20 Tu Alemna DPM   ammonium lactate (LAC-HYDRIN) 12 % cream Apply topically as needed. 11/6/17   Tu Aleman DPM   nitroGLYCERIN (NITROSTAT) 0.4 MG SL tablet Place 1 tablet under the tongue every 5 minutes as needed for Chest pain up to max of 3 total doses. If no relief after 1 dose, call 911. 1/27/17   Sigrid Spurling, MD   fluticasone [facility]) 50 MCG/ACT nasal spray 1 spray by Nasal route daily as needed     Historical Provider, MD   sertraline (ZOLOFT) 25 MG tablet Take 25 mg by mouth daily. Historical Provider, MD   amLODIPine (NORVASC) 10 MG tablet Take 10 mg by mouth daily. Historical Provider, MD   furosemide (LASIX) 40 MG tablet Take 40 mg by mouth daily. Historical Provider, MD   simvastatin (ZOCOR) 40 MG tablet Take 20 mg by mouth nightly     Historical Provider, MD   alfuzosin (UROXATRAL) 10 MG SR tablet Take 10 mg by mouth daily. Historical Provider, MD   lisinopril (PRINIVIL;ZESTRIL) 40 MG tablet Take 40 mg by mouth daily. Historical Provider, MD   metoprolol (TOPROL-XL) 100 MG XL tablet Take 100 mg by mouth daily. Historical Provider, MD       REVIEW OF SYSTEMS    (2-9 systems for level 4, 10 or more for level 5)      Review of Systems   Constitutional: Positive for diaphoresis. Negative for chills and fever. HENT: Negative for congestion. Eyes: Negative for visual disturbance. Respiratory: Positive for chest tightness. Negative for cough and shortness of breath. Cardiovascular: Positive for chest pain. Negative for palpitations and leg swelling. Gastrointestinal: Positive for diarrhea (2 episodes last week). Negative for abdominal pain, constipation, nausea and vomiting. Genitourinary: Negative for dysuria and hematuria. Musculoskeletal: Negative for back pain and myalgias. Skin: Negative for rash. Neurological: Negative for dizziness, weakness, numbness and headaches. Hematological: Does not bruise/bleed easily.        PHYSICAL EXAM   (up to 7 for Absolute Immature Granulocyte 0.04 0.00 - 0.30 k/uL    WBC Morphology NOT REPORTED     RBC Morphology NOT REPORTED     Platelet Estimate NOT REPORTED    Basic Metabolic Panel w/ Reflex to MG   Result Value Ref Range    Glucose 104 (H) 70 - 99 mg/dL    BUN 17 8 - 23 mg/dL    CREATININE 0.66 (L) 0.70 - 1.20 mg/dL    Bun/Cre Ratio NOT REPORTED 9 - 20    Calcium 8.4 (L) 8.6 - 10.4 mg/dL    Sodium 139 135 - 144 mmol/L    Potassium 3.8 3.7 - 5.3 mmol/L    Chloride 102 98 - 107 mmol/L    CO2 27 20 - 31 mmol/L    Anion Gap 10 9 - 17 mmol/L    GFR Non-African American >60 >60 mL/min    GFR African American >60 >60 mL/min    GFR Comment          GFR Staging NOT REPORTED    Brain Natriuretic Peptide   Result Value Ref Range    Pro- (H) <300 pg/mL    BNP Interpretation Pro-BNP Reference Range:    EKG 12 Lead   Result Value Ref Range    Ventricular Rate 73 BPM    Atrial Rate 73 BPM    P-R Interval 232 ms    QRS Duration 106 ms    Q-T Interval 398 ms    QTc Calculation (Bazett) 438 ms    P Axis 3 degrees    R Axis -10 degrees    T Axis 117 degrees         RADIOLOGY:  XR CHEST PORTABLE   Final Result   Stable chest      Stable lead placement      Expiratory exam with mild streaky bibasilar atelectasis versus fibrosis and   blunting of the left costophrenic angle         NM Cardiac Stress Test Nuclear Imaging    (Results Pending)        EKG  Atrially paced rhythm, incomplete left bundle branch block, nonspecific ST changes in anterior leads, T wave inversion aVL,. Interpretation nonspecific EKG changes    All EKG's are interpreted by the Emergency Department Physician who either signs or Co-signs this chart in the absence of a cardiologist.    IMPRESSION/MDM/EMERGENCY DEPARTMENT COURSE:  Patient came to emergency department, HPI and physical exam were conducted. All nursing notes were reviewed.       24-year-old male with past medication includes double bypass, pacemaker, 3 cardiac stents presented emergency department emergency department. CONSULTS:  IP CONSULT TO CARDIOLOGY    FINAL IMPRESSION      1.  Acute chest pain          DISPOSITION / PLAN     DISPOSITION Admitted 09/01/2020 10:25:50 AM      PATIENT REFERRED TO:  Beck Gayle MD  [address] [place] [address]  [phone]            DISCHARGE MEDICATIONS:  New Prescriptions    No medications on file       Lory Hendricks DO  Emergency Medicine Resident    (Please note that portions of thisnote were completed with a voice recognition program.  Efforts were made to edit the dictations but occasionally words are mis-transcribed.)  carmenza Hendricks DO  Resident  09/01/20 Diomedes Travis

## 2020-09-01 NOTE — CONSULTS
Cardiology consulted for chest pressure with cardiac history. Past Medical History:   has a past medical history of Acid reflux, Anxiety, Arthritis, BPH (benign prostatic hypertrophy), CAD (coronary artery disease), Cancer ([facility] [address] Carotid stenosis, Cataracts, both eyes, CHF (congestive heart failure) ([address] Dry skin, Elevated PSA, Examination of participant in clinical trial, Hyperlipidemia, Hypertension, Left ventricular dysfunction, Macular degeneration, Myocardial infarction ([address] Neuropathy, Pacemaker, Panic attacks, PVD (peripheral vascular disease) ([facility] [address] Sinus problem, Snores, Vasovagal near syncope, and Wears glasses. Past Surgical History:   has a past surgical history that includes Pacemaker insertion (1995, 2001, 2008, 2013, 7/1/2019); Cholecystectomy, laparoscopic (03/03/2014); eye surgery (Bilateral); Cardiac defibrillator placement (2013); Cardiac surgery (1997); Cardiac catheterization; Colonoscopy; Prostate Biopsy (09/24/2019); Prostate biopsy (N/A, 9/24/2019); pacemaker placement; and Cataract removal.     Home Medications:    Prior to Admission medications    Medication Sig Start Date End Date Taking? Authorizing Provider   aspirin EC 81 MG EC tablet Take 1 tablet by mouth daily. Patient taking differently: Take 81 mg by mouth daily Pt.  Instructed to stop 7-10 days pre-op/ He did not do this 7/20/13  Yes Dave Ramirez MD   bicalutamide (CASODEX) 50 MG chemo tablet TAKE ONE TABLET BY MOUTH DAILY 8/17/20   Mychal Guevara MD   leuprolide acetate, 6 Month, (ELIGARD) 45 MG injection Inject 45 mg into the skin once 12/5/19   Historical Provider, MD   MELOXICAM PO Take by mouth    Historical Provider, MD   Multiple Vitamins-Minerals (THERAPEUTIC MULTIVITAMIN-MINERALS) tablet Take 1 tablet by mouth daily    Historical Provider, MD   ciprofloxacin (CIPRO) 500 MG tablet Take 500 mg by mouth 2 times daily Started 9/23 for prostate biopsy    Historical Provider, MD   OXYBUTYNIN CHLORIDE PO Take 1 tablet by mouth nightly     Historical Provider, MD   gabapentin (NEURONTIN) 300 MG capsule Take 1 capsule by mouth 3 times daily for 90 days. Patient taking differently: Take 300 mg by mouth 2 times daily. 1/25/18 8/7/20  Anju Hernadez DPM   ammonium lactate (LAC-HYDRIN) 12 % cream Apply topically as needed. 11/6/17   Anju Hernadez DPM   nitroGLYCERIN (NITROSTAT) 0.4 MG SL tablet Place 1 tablet under the tongue every 5 minutes as needed for Chest pain up to max of 3 total doses. If no relief after 1 dose, call 911. 1/27/17   Sarbjit Jesus MD   fluticasone [facility]) 50 MCG/ACT nasal spray 1 spray by Nasal route daily as needed     Historical Provider, MD   sertraline (ZOLOFT) 25 MG tablet Take 25 mg by mouth daily. Historical Provider, MD   amLODIPine (NORVASC) 10 MG tablet Take 10 mg by mouth daily. Historical Provider, MD   furosemide (LASIX) 40 MG tablet Take 40 mg by mouth daily. Historical Provider, MD   simvastatin (ZOCOR) 40 MG tablet Take 20 mg by mouth nightly     Historical Provider, MD   alfuzosin (UROXATRAL) 10 MG SR tablet Take 10 mg by mouth daily. Historical Provider, MD   lisinopril (PRINIVIL;ZESTRIL) 40 MG tablet Take 40 mg by mouth daily. Historical Provider, MD   metoprolol (TOPROL-XL) 100 MG XL tablet Take 100 mg by mouth daily.     Historical Provider, MD      Current Facility-Administered Medications: tamsulosin (FLOMAX) capsule 0.4 mg, 0.4 mg, Oral, Daily  amLODIPine (NORVASC) tablet 10 mg, 10 mg, Oral, Daily  aspirin EC tablet 81 mg, 81 mg, Oral, Daily  bicalutamide (CASODEX) chemo tablet 50 mg, 50 mg, Oral, Daily  furosemide (LASIX) tablet 40 mg, 40 mg, Oral, Daily  gabapentin (NEURONTIN) capsule 300 mg, 300 mg, Oral, BID  lisinopril (PRINIVIL;ZESTRIL) tablet 40 mg, 40 mg, Oral, Daily  metoprolol succinate (TOPROL XL) extended release tablet 100 mg, 100 mg, Oral, Daily  nitroGLYCERIN (NITROSTAT) SL tablet 0.4 mg, 0.4 mg, Sublingual, Q5 Min PRN  sertraline (ZOLOFT) tablet 25 mg, 25 mg, Oral, Daily  atorvastatin (LIPITOR) tablet 40 mg, 40 mg, Oral, Nightly    Allergies:  Percocet [oxycodone-acetaminophen] and Acetaminophen    Social History:   reports that he has never smoked. He has never used smokeless tobacco. He reports current alcohol use of about 2.0 standard drinks of alcohol per week. He reports that he does not use drugs. Family History: family history includes Cancer in his mother and sister; Diabetes in his sister; Heart Disease in his brother; High Blood Pressure in his brother, father, mother, and sister. No h/o sudden cardiac death. No for premature CAD    REVIEW OF SYSTEMS:    · Constitutional: there has been no unanticipated weight loss. There's been No change in energy level, No change in activity level. · Eyes: No visual changes or diplopia. No scleral icterus. · ENT: No Headaches  · Cardiovascular: No cardiac history  · Respiratory: No previous pulmonary problems, No cough  · Gastrointestinal: No abdominal pain. No change in bowel or bladder habits. · Genitourinary: No dysuria, trouble voiding, or hematuria. · Musculoskeletal:  No gait disturbance, No weakness or joint complaints. · Integumentary: No rash or pruritis. · Neurological: No headache, diplopia, change in muscle strength, numbness or tingling. No change in gait, balance, coordination, mood, affect, memory, mentation, behavior. · Psychiatric: No anxiety, or depression. · Endocrine: No temperature intolerance. No excessive thirst, fluid intake, or urination. No tremor. · Hematologic/Lymphatic: No abnormal bruising or bleeding, blood clots or swollen lymph nodes. · Allergic/Immunologic: No nasal congestion or hives. PHYSICAL EXAM:      /77   Pulse 91   Temp 98.2 °F (36.8 °C)   Resp 22   SpO2 97%    Constitutional and General Appearance: alert, cooperative, no distress and appears stated age  HEENT: PERRL, no cervical lymphadenopathy. No masses palpable.  Normal oral Hyperlipemia    Leukocytosis    Cholelithiasis with acute cholecystitis    Pre-syncope    Claudication in peripheral vascular disease (HCC)    Carotid stenosis, asymptomatic    Squamous cell carcinoma of skin of left upper arm    Encounter due to AICD at end of battery life-Change OUT 7-1-19    Malignant neoplasm of prostate ([facility] [address]    Chest pain     Patient had typical features of cardiac pain relieved with nitro and significant cardiac hx. Stress test done normally reports no abnormlaity per patient. Troponins negative, EKG only showing atrial paced T wave abnormalities making dx of MI highly unlikely. However, given anginal symptoms and last cath done far back, options for evaluation discussed with patient. Risk stratification with invasive and non-invasive methods discussed, patient opted for non invasive test with stress test to be done. RECOMMENDATIONS:  1. Will obtain stress test - if results are normal can discharge and follow up outpatient. If negative or inconclusive will require cardiac cath. Discussed with patient. 2. Continue aspirin, lasix, toprol XL, lisinopril when able      Discussed with patient and Nurse.     Electronically signed by Emily Joyner MD on 9/1/2020 at 2:30 PM    [facility]      [phone]

## 2020-09-01 NOTE — ED NOTES
Patient to ED with c/o chest pain since 0800 today. The patient states he felt restless all night, did not sleep well and when he woke up and walked downstairs he began feeling chest tightness 6/10. Patient had double bypass in 1990s and 3 subsequent cardiac stents, he also has a pacemaker/defibrillator. The patient received full dose of ASA and one nitro spray en route per EMS. He arrives alert and oriented x4, placed in gown and on full cardiac monitor upon arrival.   Patients pacemaker interrogated and Dr Marielos Bryant at bedside.      Jose Alcaraz RN  09/01/20 0156

## 2020-09-01 NOTE — ED NOTES
Bed: 27  Expected date:   Expected time:   Means of arrival:   Comments:  BARRIE Gama 430, RN  09/01/20 1165

## 2020-09-01 NOTE — ED PROVIDER NOTES
[facility]     Emergency Department     Faculty Note/ Attestation      Pt Name: Madhavi Berry                                       MRN: [id number]  Rishigfhumphrey 1940  Date of evaluation: 9/1/2020    Patients PCP:    Ariel Kolb MD      Attestation  I performed a history and physical examination of the patient and discussed management with the resident. I reviewed the residents note and agree with the documented findings and plan of care. Any areas of disagreement are noted on the chart. I was personally present for the key portions of any procedures. I have documented in the chart those procedures where I was not present during the key portions. I have reviewed the emergency nurses triage note. I agree with the chief complaint, past medical history, past surgical history, allergies, medications, social and family history as documented unless otherwise noted below. For Physician Assistant/ Nurse Practitioner cases/documentation I have personally evaluated this patient and have completed at least one if not all key elements of the E/M (history, physical exam, and MDM). Additional findings are as noted. Initial Screens:        [place] Coma Scale  Eye Opening: Spontaneous  Best Verbal Response: Oriented  Best Motor Response: Obeys commands  [place] Coma Scale Score: 15    Vitals: There were no vitals filed for this visit.     CHIEF COMPLAINT       Chief Complaint   Patient presents with    Chest Pain             DIAGNOSTIC RESULTS             RADIOLOGY:   XR CHEST PORTABLE    (Results Pending)         LABS:  Labs Reviewed   TROPONIN   TROPONIN   CBC WITH AUTO DIFFERENTIAL   BASIC METABOLIC PANEL W/ REFLEX TO MG FOR LOW K   BRAIN NATRIURETIC PEPTIDE         EMERGENCY DEPARTMENT COURSE:     -------------------------   ,  ,  ,        Comments    CP/tightness  follows with cards  Midsternal, nonradiating  BIBEMS  ASA/NTG PTA, some improvement    Cardiac w/u, likely obs for cards and serial trops, EKGs    (Please note that portions of this note were completed with a voice recognition program.  Efforts were made to edit the dictations but occasionally words are mis-transcribed.)      Lange MD  Attending Emergency Physician          Kimberly Lino MD  09/01/20 [id number]

## 2020-09-01 NOTE — ED NOTES
Patient ambulates to and from restroom with steady gait. Patient awaits cardiology consult at this time to evaluate for heart cath.      Humberto Gutiérrez RN  09/01/20 [id number]

## 2020-09-02 ENCOUNTER — APPOINTMENT (OUTPATIENT)
Dept: CARDIAC CATH/INVASIVE PROCEDURES | Age: 80
DRG: 247 | End: 2020-09-02
Payer: MEDICARE

## 2020-09-02 LAB
EKG ATRIAL RATE: 73 BPM
EKG ATRIAL RATE: 73 BPM
EKG ATRIAL RATE: 76 BPM
EKG P AXIS: 3 DEGREES
EKG P AXIS: 34 DEGREES
EKG P AXIS: 8 DEGREES
EKG P-R INTERVAL: 214 MS
EKG P-R INTERVAL: 232 MS
EKG P-R INTERVAL: 246 MS
EKG Q-T INTERVAL: 396 MS
EKG Q-T INTERVAL: 398 MS
EKG Q-T INTERVAL: 402 MS
EKG QRS DURATION: 106 MS
EKG QRS DURATION: 106 MS
EKG QRS DURATION: 108 MS
EKG QTC CALCULATION (BAZETT): 438 MS
EKG QTC CALCULATION (BAZETT): 442 MS
EKG QTC CALCULATION (BAZETT): 445 MS
EKG R AXIS: -10 DEGREES
EKG R AXIS: 13 DEGREES
EKG R AXIS: 15 DEGREES
EKG T AXIS: 112 DEGREES
EKG T AXIS: 117 DEGREES
EKG T AXIS: 132 DEGREES
EKG VENTRICULAR RATE: 73 BPM
EKG VENTRICULAR RATE: 73 BPM
EKG VENTRICULAR RATE: 76 BPM
LV EF: 49 %
LVEF MODALITY: NORMAL
TROPONIN INTERP: NORMAL
TROPONIN T: NORMAL NG/ML
TROPONIN, HIGH SENSITIVITY: 13 NG/L (ref 0–22)

## 2020-09-02 PROCEDURE — 36415 COLL VENOUS BLD VENIPUNCTURE: CPT

## 2020-09-02 PROCEDURE — 6370000000 HC RX 637 (ALT 250 FOR IP): Performed by: STUDENT IN AN ORGANIZED HEALTH CARE EDUCATION/TRAINING PROGRAM

## 2020-09-02 PROCEDURE — C1887 CATHETER, GUIDING: HCPCS

## 2020-09-02 PROCEDURE — 027135Z DILATION OF CORONARY ARTERY, TWO ARTERIES WITH TWO DRUG-ELUTING INTRALUMINAL DEVICES, PERCUTANEOUS APPROACH: ICD-10-PCS | Performed by: INTERNAL MEDICINE

## 2020-09-02 PROCEDURE — 93010 ELECTROCARDIOGRAM REPORT: CPT | Performed by: INTERNAL MEDICINE

## 2020-09-02 PROCEDURE — 7100000000 HC PACU RECOVERY - FIRST 15 MIN

## 2020-09-02 PROCEDURE — C1769 GUIDE WIRE: HCPCS

## 2020-09-02 PROCEDURE — C1725 CATH, TRANSLUMIN NON-LASER: HCPCS

## 2020-09-02 PROCEDURE — 2580000003 HC RX 258: Performed by: STUDENT IN AN ORGANIZED HEALTH CARE EDUCATION/TRAINING PROGRAM

## 2020-09-02 PROCEDURE — A9500 TC99M SESTAMIBI: HCPCS | Performed by: INTERNAL MEDICINE

## 2020-09-02 PROCEDURE — C1874 STENT, COATED/COV W/DEL SYS: HCPCS

## 2020-09-02 PROCEDURE — 2580000003 HC RX 258: Performed by: INTERNAL MEDICINE

## 2020-09-02 PROCEDURE — 6360000004 HC RX CONTRAST MEDICATION

## 2020-09-02 PROCEDURE — 2500000003 HC RX 250 WO HCPCS

## 2020-09-02 PROCEDURE — 84484 ASSAY OF TROPONIN QUANT: CPT

## 2020-09-02 PROCEDURE — B2181ZZ FLUOROSCOPY OF LEFT INTERNAL MAMMARY BYPASS GRAFT USING LOW OSMOLAR CONTRAST: ICD-10-PCS | Performed by: INTERNAL MEDICINE

## 2020-09-02 PROCEDURE — 2709999900 HC NON-CHARGEABLE SUPPLY

## 2020-09-02 PROCEDURE — 6360000002 HC RX W HCPCS

## 2020-09-02 PROCEDURE — C9600 PERC DRUG-EL COR STENT SING: HCPCS | Performed by: INTERNAL MEDICINE

## 2020-09-02 PROCEDURE — 7100000001 HC PACU RECOVERY - ADDTL 15 MIN

## 2020-09-02 PROCEDURE — 93017 CV STRESS TEST TRACING ONLY: CPT

## 2020-09-02 PROCEDURE — C1894 INTRO/SHEATH, NON-LASER: HCPCS

## 2020-09-02 PROCEDURE — 3430000000 HC RX DIAGNOSTIC RADIOPHARMACEUTICAL: Performed by: INTERNAL MEDICINE

## 2020-09-02 PROCEDURE — 6370000000 HC RX 637 (ALT 250 FOR IP)

## 2020-09-02 PROCEDURE — 93005 ELECTROCARDIOGRAM TRACING: CPT | Performed by: STUDENT IN AN ORGANIZED HEALTH CARE EDUCATION/TRAINING PROGRAM

## 2020-09-02 PROCEDURE — 93455 CORONARY ART/GRFT ANGIO S&I: CPT | Performed by: INTERNAL MEDICINE

## 2020-09-02 PROCEDURE — 1200000000 HC SEMI PRIVATE

## 2020-09-02 PROCEDURE — 6360000002 HC RX W HCPCS: Performed by: INTERNAL MEDICINE

## 2020-09-02 PROCEDURE — 4A023N7 MEASUREMENT OF CARDIAC SAMPLING AND PRESSURE, LEFT HEART, PERCUTANEOUS APPROACH: ICD-10-PCS | Performed by: INTERNAL MEDICINE

## 2020-09-02 PROCEDURE — B2111ZZ FLUOROSCOPY OF MULTIPLE CORONARY ARTERIES USING LOW OSMOLAR CONTRAST: ICD-10-PCS | Performed by: INTERNAL MEDICINE

## 2020-09-02 RX ORDER — SODIUM CHLORIDE 0.9 % (FLUSH) 0.9 %
10 SYRINGE (ML) INJECTION PRN
Status: DISCONTINUED | OUTPATIENT
Start: 2020-09-02 | End: 2020-09-03 | Stop reason: HOSPADM

## 2020-09-02 RX ORDER — NITROGLYCERIN 0.4 MG/1
0.4 TABLET SUBLINGUAL EVERY 5 MIN PRN
Status: DISCONTINUED | OUTPATIENT
Start: 2020-09-02 | End: 2020-09-02

## 2020-09-02 RX ORDER — ATROPINE SULFATE 0.1 MG/ML
0.5 INJECTION INTRAVENOUS EVERY 5 MIN PRN
Status: DISCONTINUED | OUTPATIENT
Start: 2020-09-02 | End: 2020-09-02

## 2020-09-02 RX ORDER — HYDRALAZINE HYDROCHLORIDE 20 MG/ML
10 INJECTION INTRAMUSCULAR; INTRAVENOUS EVERY 10 MIN PRN
Status: DISCONTINUED | OUTPATIENT
Start: 2020-09-02 | End: 2020-09-03

## 2020-09-02 RX ORDER — LABETALOL 20 MG/4 ML (5 MG/ML) INTRAVENOUS SYRINGE
10 EVERY 30 MIN PRN
Status: DISCONTINUED | OUTPATIENT
Start: 2020-09-02 | End: 2020-09-03 | Stop reason: HOSPADM

## 2020-09-02 RX ORDER — ECHINACEA PURPUREA EXTRACT 125 MG
1 TABLET ORAL PRN
Status: DISCONTINUED | OUTPATIENT
Start: 2020-09-02 | End: 2020-09-03 | Stop reason: HOSPADM

## 2020-09-02 RX ORDER — SODIUM CHLORIDE 9 MG/ML
500 INJECTION, SOLUTION INTRAVENOUS CONTINUOUS PRN
Status: DISCONTINUED | OUTPATIENT
Start: 2020-09-02 | End: 2020-09-02

## 2020-09-02 RX ORDER — AMINOPHYLLINE DIHYDRATE 25 MG/ML
50 INJECTION, SOLUTION INTRAVENOUS PRN
Status: DISCONTINUED | OUTPATIENT
Start: 2020-09-02 | End: 2020-09-02

## 2020-09-02 RX ORDER — DIPHENHYDRAMINE HCL 25 MG
25 TABLET ORAL ONCE
Status: COMPLETED | OUTPATIENT
Start: 2020-09-03 | End: 2020-09-03

## 2020-09-02 RX ORDER — SODIUM CHLORIDE 0.9 % (FLUSH) 0.9 %
10 SYRINGE (ML) INJECTION EVERY 12 HOURS SCHEDULED
Status: DISCONTINUED | OUTPATIENT
Start: 2020-09-02 | End: 2020-09-03 | Stop reason: HOSPADM

## 2020-09-02 RX ORDER — ONDANSETRON 2 MG/ML
4 INJECTION INTRAMUSCULAR; INTRAVENOUS EVERY 6 HOURS PRN
Status: DISCONTINUED | OUTPATIENT
Start: 2020-09-02 | End: 2020-09-03 | Stop reason: HOSPADM

## 2020-09-02 RX ORDER — METOPROLOL TARTRATE 5 MG/5ML
5 INJECTION INTRAVENOUS EVERY 5 MIN PRN
Status: DISCONTINUED | OUTPATIENT
Start: 2020-09-02 | End: 2020-09-02

## 2020-09-02 RX ORDER — SODIUM CHLORIDE 9 MG/ML
INJECTION, SOLUTION INTRAVENOUS CONTINUOUS
Status: CANCELLED | OUTPATIENT
Start: 2020-09-02

## 2020-09-02 RX ORDER — SODIUM CHLORIDE 0.9 % (FLUSH) 0.9 %
10 SYRINGE (ML) INJECTION PRN
Status: DISCONTINUED | OUTPATIENT
Start: 2020-09-02 | End: 2020-09-02

## 2020-09-02 RX ADMIN — GABAPENTIN 300 MG: 300 CAPSULE ORAL at 00:38

## 2020-09-02 RX ADMIN — REGADENOSON 0.4 MG: 0.08 INJECTION, SOLUTION INTRAVENOUS at 09:49

## 2020-09-02 RX ADMIN — ATORVASTATIN CALCIUM 40 MG: 80 TABLET, FILM COATED ORAL at 00:38

## 2020-09-02 RX ADMIN — Medication 10 ML: at 22:00

## 2020-09-02 RX ADMIN — SODIUM CHLORIDE, PRESERVATIVE FREE 10 ML: 5 INJECTION INTRAVENOUS at 21:46

## 2020-09-02 RX ADMIN — TETRAKIS(2-METHOXYISOBUTYLISOCYANIDE)COPPER(I) TETRAFLUOROBORATE 34 MILLICURIE: 1 INJECTION, POWDER, LYOPHILIZED, FOR SOLUTION INTRAVENOUS at 09:50

## 2020-09-02 RX ADMIN — Medication 3 MG: at 00:38

## 2020-09-02 RX ADMIN — Medication 10 ML: at 09:34

## 2020-09-02 RX ADMIN — SODIUM CHLORIDE, PRESERVATIVE FREE 10 ML: 5 INJECTION INTRAVENOUS at 09:50

## 2020-09-02 ASSESSMENT — PAIN DESCRIPTION - DESCRIPTORS: DESCRIPTORS: TIGHTNESS

## 2020-09-02 ASSESSMENT — PAIN SCALES - GENERAL
PAINLEVEL_OUTOF10: 0

## 2020-09-02 ASSESSMENT — PAIN DESCRIPTION - LOCATION: LOCATION: CHEST

## 2020-09-02 NOTE — PROCEDURES
[address] [facility] [address]                              CARDIAC STRESS TEST    PATIENT NAME: Anika Kramer                      :        1940  MED REC NO:   [id number]                             ROOM:       [id number]  ACCOUNT NO:   [de-identified]                           ADMIT DATE: 2020  PROVIDER:     Raphael Kwon    DATE OF STUDY:  2020    ORDERING PROVIDER:  Néstor Erickson MD  INTERPRETING PHYSICIAN:  Sherri Liang MD    LEXISCAN STRESS STUDY:  Indication:  chest pain    Medications:  Lexiscan, 0.4 mg  Resting heart rate:  70  Resting blood pressure:  164/79  Infusion heart rate:  73  Infusion blood pressure:  164/79  Resting EKG:  Normal  Stress heart response:  Normal response  Stress BP response:  Appropriate  Stress EKG(S):  No changes seen  Chest discomfort:  Chest pressure (4/10), which resolved in recovery  Ischemic EKG changes:  None    IMPRESSION:  Electrocardiographically negative Lexiscan stress study. Radio-isotope  results to follow from the department of Nuclear Medicine.           Jason Kay    D: 2020 13:18:16       T: 2020 13:20:23     MT/AMIRA  Job#: [id number]     Doc#: Unknown    CC:    ()

## 2020-09-02 NOTE — OP NOTE
Moderate Risk (B).       Devices used         - Runthrough NS. Number of passes: 1.         - Trek Balloon 2.5mm x 12mm. 1 inflation(s) to a max pressure of: 10     navarro.         - Balloon of Xience Angela 3.25 x 18 TED. 1 inflation(s) to a max     pressure of: 14 navarro.         Lesion on 2nd Ob Aracelis: Proximal subsection. 99% stenosis 15 mm length     reduced to 0%. Pre procedure VAN II flow was noted. Post Procedure VAN     III flow was present. Good runoff was present. The lesion was diagnosed     as Moderate Risk (B).       Devices used         - Mini Trek Balloon 2.0mm x 12mm. 1 inflation(s) to a max pressure of:     10 navarro.         - Mini Trek Balloon 2.0mm x 12mm. 1 inflation(s) to a max pressure of:     10 navarro.         - Balloon of Xience Angela 2.25 x 28 TED. 1 inflation(s) to a max     pressure of: 9 navarro.       RCA: 80% proximal stenosis, small nondominant       Conclusions:         OM2 99% proximal stenosis reduced to 0% using 2.25x28 mm Xience stent. OM3    large vessel with 80% stenosis reduced to 0% 3.25x18 mm Xience stent. Recommendations:  1. Post-cath/stent protocol  2. Continue optimal medical therapy  3. Risk factor modification      ____________________________________________________________________    History and Risk Factors    [x] Hypertension     [] Family history of CAD  [x] Hyperlipidemia     [] Cerebrovascular Disease   [] Prior MI       [] Peripheral Vascular disease   [] Prior PCI              [] Diabetes Mellitus    [] Left Main PCI. [] Currently on Dialysis. [x] Prior CABG. [] Currently smoker. [] Cardiac Arrest outside of healthcare facility. [] Yes    [x] No        Witnessed     [] Yes   [] No     Arrest after arrival of EMS  [] Yes   [] No     [] Cardiac Arrest at other Facility. [] Yes   [x] No    Pre-Procedure Information. Heart Failure       [x] Yes    [] No        Class  [] I      [] II  [x] III    [] IV.      New Diagnosis    [] Yes  [x] No    HF Type [x] Systolic   [] Diastolic          [] Unknown. Diagnostic Test:   EKG       [] Normal   [x] Abnormal    New antiarrhythmia medications:    [] Yes   [] No   New onset atrial fibrillation / Flutter     [] Yes   [] No   ECG Abnormalities:      [] V. Fib   [] Taylor V. Tach           [] NS V. T   [] New LBBB           [] T. Inv  []  ST dev > 0.5 mm         [] PVC's freq  [] PVC's infrequent    Stress Test Performed:      [x] Yes    [] No     Type:     [] Stress Echo   [] Exercise Stress Test (no imaging)      [] Stress Nuclear  [] Stress Imaging     Results   [] Negative   [] Positive        [] Indeterminate  [] Unavailable     If Positive/ Risk / Extent of Ischemia:       [] Low  [] Intermediate         [] High  [] Unavailable      Cardiac CTA Performed:     [] Yes    [x] No      Results   [] CAD   [] Non obstructive CAD      [] No CAD   [] Uncertain      [] Unknown   [] Structural Disease. Pre Procedure Medications:   [x] Yes    [] No         [x] ASA   [] Beta Blockers      [] Nitrate   [] Ca Channel Blockers      [] Ranolazine   [x] Statin       [] Plavix/Others antiplatelets      Electronically signed on 9/2/2020 at 3:15 PM by:    Arun Saucedo MD  Fellow, [address]  Libra Orellana MD. Attending physician  [facility]

## 2020-09-02 NOTE — PROGRESS NOTES
OBS/CDU   RESIDENT NOTE      Patients PCP is:  Judge Mandeep MD        SUBJECTIVE      Patient was n.p.o. at midnight due to upcoming stress test this morning. No acute events overnight. The patient is urinating on his own and is passing flatus. Denies fever, chills, nausea, vomiting, chest pain, shortness of breath, abdominal pain, focal weakness, numbness, tingling, urinary/bowel symptoms, vision changes, visual hallucinations, or headache. PHYSICAL EXAM      General: NAD, AO X 3  Heent: EMOI, PERRL  Neck: SUPPLE, NO JVD  Cardiovascular: RRR, S1S2  Pulmonary: CTAB, NO SOB  Abdomen: SOFT, NTTP, ND, +BS  Extremities: +2/4 PULSES DISTAL, NO SWELLING  Neuro / Psych: NO NUMBNESS OR TINGLING, MENTATION AT BASELINE    PERTINENT TEST /EXAMS      I have reviewed all available laboratory results. Troponin 13, (12 -> 13 -> 13)    MEDICATIONS CURRENT   sodium chloride flush 0.9 % injection 10 mL, PRN  sodium chloride flush 0.9 % injection 10 mL, PRN  tamsulosin (FLOMAX) capsule 0.4 mg, Daily  amLODIPine (NORVASC) tablet 10 mg, Daily  aspirin EC tablet 81 mg, Daily  bicalutamide (CASODEX) chemo tablet 50 mg, Daily  furosemide (LASIX) tablet 40 mg, Daily  gabapentin (NEURONTIN) capsule 300 mg, BID  lisinopril (PRINIVIL;ZESTRIL) tablet 40 mg, Daily  metoprolol succinate (TOPROL XL) extended release tablet 100 mg, Daily  nitroGLYCERIN (NITROSTAT) SL tablet 0.4 mg, Q5 Min PRN  sertraline (ZOLOFT) tablet 25 mg, Daily  atorvastatin (LIPITOR) tablet 40 mg, Nightly        All medication charted and reviewed. CONSULTS      IP CONSULT TO CARDIOLOGY    ASSESSMENT/PLAN       Mala Belcher is a 79 y.o. male who presents with substernal chest pain that began upon awakening this morning at 8 AM.  Pain worsened with exertion. Also had diaphoresis and dyspnea. Pain was nonradiating. Pain did not improve with rest.  Work-up in the emergency department was unremarkable.   Patient's past medical history significant for AICD placement, CABG, bypass, cardiac stents. Cardiology was consulted    · Stress test today-mild reversible ischemia of the lateral wall, left ventricular ejection fraction 49%. Infarct of anterior lateral apical wall. Hypokinesis of the anterior, apical, septal walls. · Cardiac diet after stress  · Due to positive stress test, cath tomorrow. · N.p.o. at midnight  · Continue home medications and pain control  · Monitor vitals, labs, and imaging  · DISPO: pending consults and clinical improvement    --  [facility]  Emergency Medicine Resident Physician     This dictation was generated by voice recognition computer software. Although all attempts are made to edit the dictation for accuracy, there may be errors in the transcription that are not intended.

## 2020-09-02 NOTE — PROGRESS NOTES
[facility]   Progress Note                   Date:   9/2/2020  Patient name: Rebeca Valdez  Date of admission:  9/1/2020  8:53 AM  MRN:   [id number]  YOB: 1940  PCP: Tu Duran MD    Reason for Admission: Chest pain [R07.9]    Subjective:       Clinical Changes / Abnormalities: Pt seen and examined in the stress lab. Pt denies any CP or SOB. Pt states he is feeling much better today. Medications:   Scheduled Meds:   tamsulosin  0.4 mg Oral Daily    amLODIPine  10 mg Oral Daily    aspirin EC  81 mg Oral Daily    bicalutamide  50 mg Oral Daily    furosemide  40 mg Oral Daily    gabapentin  300 mg Oral BID    lisinopril  40 mg Oral Daily    metoprolol succinate  100 mg Oral Daily    sertraline  25 mg Oral Daily    atorvastatin  40 mg Oral Nightly     Continuous Infusions:   sodium chloride       CBC:   Recent Labs     09/01/20 0912   WBC 6.3   HGB 13.4        BMP:    Recent Labs     09/01/20  0912      K 3.8      CO2 27   BUN 17   CREATININE 0.66*   GLUCOSE 104*     Hepatic: No results for input(s): AST, ALT, ALB, BILITOT, ALKPHOS in the last 72 hours. Troponin:   Recent Labs     09/01/20  0912 09/01/20  1007 09/02/20  0537   TROPHS 12 13 13     BNP: No results for input(s): BNP in the last 72 hours. Lipids: No results for input(s): CHOL, HDL in the last 72 hours. Invalid input(s): LDLCALCU  INR: No results for input(s): INR in the last 72 hours. EKG: atrially paced T wave abnormalities   ECHO: not obtained. Ejection fraction: 20% in 2014  Stress Test: ordered, but not yet obtained. Cardiac Angiography: previous in 2013  Objective:   Vitals: /78   Pulse 73   Temp 97.4 °F (36.3 °C) (Oral)   Resp 12   Ht 5' 7\" (1.702 m)   Wt 197 lb 6 oz (89.5 kg)   SpO2 92%   BMI 30.91 kg/m²   General appearance: alert and cooperative with exam  HEENT: Head: Normocephalic, no lesions, without obvious abnormality.   Neck: no JVD, trachea

## 2020-09-02 NOTE — PROGRESS NOTES
Verbally reviewed medication list with patient; patient verbalized understanding. Discussed 2000mg/day sodium restricted diet; patient verbalized understanding. Moderate daily exercise encouraged as tolerated. Discussed rest breaks as needed; patient verbalized understanding. Patient instructed to weigh self at the same time of each day, using same clothes and same scale; reinforced teaching to monitor for 3-5 lb weight increase over 1-2 days, and to notify the CHF clinic at [phone] or physician office if weight change noted. Patient verbalized understanding. Risks of smoking discussed with the patient if applicable; patient strongly discouraged to smoke. Patient verbalized understanding. Signs and symptoms of CHF discussed with patient, such as feeling more tired than normal, feeling short of breath, coughing that increases when you lie down, sudden weight gain, swelling of your feet, legs or belly. Patient verbalized understanding to notify the CHF clinic at [phone] or physician office if these symptoms occur. Compliance with plan of care and further disease process causes discussed with patient, patient encouraged to keep all follow up appointments. Patient verbalized understanding.

## 2020-09-02 NOTE — PROGRESS NOTES
Received post cath procedure to [facility] room 11. Assessment obtained. Restrictions reviewed with patient. Post procedure pathway initiated. groin site soft , clean dry and intact. No hematoma noted. Family at side. Patient without complaints. Head of bed flat with right leg straight.

## 2020-09-02 NOTE — ED NOTES
Pt resting comfortably, Denies any needs. Call light in reach.      Marzena Puentes RN  09/01/20 [id number]

## 2020-09-02 NOTE — PROGRESS NOTES
Patient admitted, consent signed and questions answered. Patient ready for procedure. Call light to reach with side rails up 2 of 2. groin clipped. wife at bedside with patient. History and physical needs updated.

## 2020-09-03 VITALS
DIASTOLIC BLOOD PRESSURE: 68 MMHG | RESPIRATION RATE: 18 BRPM | HEART RATE: 80 BPM | TEMPERATURE: 99 F | HEIGHT: 67 IN | WEIGHT: 197.38 LBS | BODY MASS INDEX: 30.98 KG/M2 | OXYGEN SATURATION: 95 % | SYSTOLIC BLOOD PRESSURE: 125 MMHG

## 2020-09-03 LAB
ALBUMIN SERPL-MCNC: 3.6 G/DL (ref 3.5–5.2)
ALBUMIN/GLOBULIN RATIO: 1.5 (ref 1–2.5)
ALP BLD-CCNC: 67 U/L (ref 40–129)
ALT SERPL-CCNC: 19 U/L (ref 5–41)
ANION GAP SERPL CALCULATED.3IONS-SCNC: 12 MMOL/L (ref 9–17)
AST SERPL-CCNC: 17 U/L
BILIRUB SERPL-MCNC: 0.56 MG/DL (ref 0.3–1.2)
BUN BLDV-MCNC: 17 MG/DL (ref 8–23)
BUN/CREAT BLD: ABNORMAL (ref 9–20)
CALCIUM SERPL-MCNC: 8.3 MG/DL (ref 8.6–10.4)
CHLORIDE BLD-SCNC: 100 MMOL/L (ref 98–107)
CO2: 24 MMOL/L (ref 20–31)
CREAT SERPL-MCNC: 0.83 MG/DL (ref 0.7–1.2)
GFR AFRICAN AMERICAN: >60 ML/MIN
GFR NON-AFRICAN AMERICAN: >60 ML/MIN
GFR SERPL CREATININE-BSD FRML MDRD: ABNORMAL ML/MIN/{1.73_M2}
GFR SERPL CREATININE-BSD FRML MDRD: ABNORMAL ML/MIN/{1.73_M2}
GLUCOSE BLD-MCNC: 122 MG/DL (ref 70–99)
HCT VFR BLD CALC: 35.8 % (ref 40.7–50.3)
HEMOGLOBIN: 12.5 G/DL (ref 13–17)
MCH RBC QN AUTO: 30 PG (ref 25.2–33.5)
MCHC RBC AUTO-ENTMCNC: 34.9 G/DL (ref 28.4–34.8)
MCV RBC AUTO: 85.9 FL (ref 82.6–102.9)
NRBC AUTOMATED: 0 PER 100 WBC
PDW BLD-RTO: 12.7 % (ref 11.8–14.4)
PLATELET # BLD: 208 K/UL (ref 138–453)
PMV BLD AUTO: 10.5 FL (ref 8.1–13.5)
POTASSIUM SERPL-SCNC: 4.1 MMOL/L (ref 3.7–5.3)
RBC # BLD: 4.17 M/UL (ref 4.21–5.77)
SODIUM BLD-SCNC: 136 MMOL/L (ref 135–144)
TOTAL PROTEIN: 6 G/DL (ref 6.4–8.3)
WBC # BLD: 10.3 K/UL (ref 3.5–11.3)

## 2020-09-03 PROCEDURE — 6370000000 HC RX 637 (ALT 250 FOR IP): Performed by: STUDENT IN AN ORGANIZED HEALTH CARE EDUCATION/TRAINING PROGRAM

## 2020-09-03 PROCEDURE — 85027 COMPLETE CBC AUTOMATED: CPT

## 2020-09-03 PROCEDURE — 36415 COLL VENOUS BLD VENIPUNCTURE: CPT

## 2020-09-03 PROCEDURE — 80053 COMPREHEN METABOLIC PANEL: CPT

## 2020-09-03 PROCEDURE — 2580000003 HC RX 258: Performed by: STUDENT IN AN ORGANIZED HEALTH CARE EDUCATION/TRAINING PROGRAM

## 2020-09-03 RX ORDER — LOPERAMIDE HYDROCHLORIDE 2 MG/1
2 CAPSULE ORAL 4 TIMES DAILY PRN
Qty: 30 CAPSULE | Refills: 0 | Status: SHIPPED | OUTPATIENT
Start: 2020-09-03 | End: 2020-09-13

## 2020-09-03 RX ADMIN — TICAGRELOR 90 MG: 90 TABLET ORAL at 10:54

## 2020-09-03 RX ADMIN — AMLODIPINE BESYLATE 10 MG: 10 TABLET ORAL at 10:55

## 2020-09-03 RX ADMIN — TAMSULOSIN HYDROCHLORIDE 0.4 MG: 0.4 CAPSULE ORAL at 10:54

## 2020-09-03 RX ADMIN — Medication 81 MG: at 10:55

## 2020-09-03 RX ADMIN — DIPHENHYDRAMINE HCL 25 MG: 25 TABLET ORAL at 01:37

## 2020-09-03 RX ADMIN — GABAPENTIN 300 MG: 300 CAPSULE ORAL at 10:55

## 2020-09-03 RX ADMIN — LISINOPRIL 40 MG: 10 TABLET ORAL at 10:55

## 2020-09-03 RX ADMIN — BICALUTAMIDE 50 MG: 50 TABLET ORAL at 10:55

## 2020-09-03 RX ADMIN — SERTRALINE 25 MG: 25 TABLET, FILM COATED ORAL at 10:54

## 2020-09-03 RX ADMIN — FUROSEMIDE 40 MG: 20 TABLET ORAL at 10:55

## 2020-09-03 RX ADMIN — Medication 10 ML: at 11:06

## 2020-09-03 RX ADMIN — METOPROLOL SUCCINATE 100 MG: 100 TABLET, FILM COATED, EXTENDED RELEASE ORAL at 10:55

## 2020-09-03 RX ADMIN — GABAPENTIN 300 MG: 300 CAPSULE ORAL at 01:36

## 2020-09-03 NOTE — PROGRESS NOTES
Patient on bed pan trying to move his bowels and pressure dropped. Groin firm fist size hematoma noted. 200 ns and pressure for 20 min.  Groin softens and vitals back to baseline

## 2020-09-03 NOTE — DISCHARGE INSTR - DIET
 Good nutrition is important when healing from an illness, injury, or surgery. Follow any nutrition recommendations given to you during your hospital stay.  If you were given an oral nutrition supplement while in the hospital, continue to take this supplement at home. You can take it with meals, in-between meals, and/or before bedtime. These supplements can be purchased at most local grocery stores, pharmacies, and chain super-stores.  If you have any questions about your diet or nutrition, call the hospital and ask for the dietitian. Heart-Healthy Diet: Care Instructions  Your Care Instructions     A heart-healthy diet has lots of vegetables, fruits, nuts, beans, and whole grains, and is low in salt. It limits foods that are high in saturated fat, such as meats, cheeses, and fried foods. It may be hard to change your diet, but even small changes can lower your risk of heart attack and heart disease. Follow-up care is a key part of your treatment and safety. Be sure to make and go to all appointments, and call your doctor if you are having problems. It's also a good idea to know your test results and keep a list of the medicines you take. How can you care for yourself at home? Watch your portions  · Learn what a serving is. A \"serving\" and a \"portion\" are not always the same thing. Make sure that you are not eating larger portions than are recommended. For example, a serving of pasta is ½ cup. A serving size of meat is 2 to 3 ounces. A 3-ounce serving is about the size of a deck of cards. Measure serving sizes until you are good at Hardy" them. Keep in mind that restaurants often serve portions that are 2 or 3 times the size of one serving. · To keep your energy level up and keep you from feeling hungry, eat often but in smaller portions. · Eat only the number of calories you need to stay at a healthy weight.  If you need to lose weight, eat fewer calories than your body burns (through exercise and other physical activity). Eat more fruits and vegetables  · Eat a variety of fruit and vegetables every day. Dark green, deep orange, red, or yellow fruits and vegetables are especially good for you. Examples include spinach, carrots, peaches, and berries. · Keep carrots, celery, and other veggies handy for snacks. Buy fruit that is in season and store it where you can see it so that you will be tempted to eat it. · Cook dishes that have a lot of veggies in them, such as stir-fries and soups. Limit saturated and trans fat  · Read food labels, and try to avoid saturated and trans fats. They increase your risk of heart disease. · Use olive or canola oil when you cook. · Bake, broil, grill, or steam foods instead of frying them. · Choose lean meats instead of high-fat meats such as hot dogs and sausages. Cut off all visible fat when you prepare meat. · Eat fish, skinless poultry, and meat alternatives such as soy products instead of high-fat meats. Soy products, such as tofu, may be especially good for your heart. · Choose low-fat or fat-free milk and dairy products. Eat foods high in fiber  · Eat a variety of grain products every day. Include whole-grain foods that have lots of fiber and nutrients. Examples of whole-grain foods include oats, whole wheat bread, and brown rice. · Buy whole-grain breads and cereals, instead of white bread or pastries. Limit salt and sodium  · Limit how much salt and sodium you eat to help lower your blood pressure. · Taste food before you salt it. Add only a little salt when you think you need it. With time, your taste buds will adjust to less salt. · Eat fewer snack items, fast foods, and other high-salt, processed foods. Check food labels for the amount of sodium in packaged foods. · Choose low-sodium versions of canned goods (such as soups, vegetables, and beans). Limit sugar  · Limit drinks and foods with added sugar.  These include candy, desserts, and soda pop. Limit alcohol  · Limit alcohol to no more than 2 drinks a day for men and 1 drink a day for women. Too much alcohol can cause health problems. When should you call for help? Watch closely for changes in your health, and be sure to contact your doctor if:  · You would like help planning heart-healthy meals. Where can you learn more? Go to [email or web address] and sign in to your [employer] account. Enter V137 in the [employer] box to learn more about \"Heart-Healthy Diet: Care Instructions. \"     If you do not have an account, please click on the \"Sign Up Now\" link. Current as of: August 22, 2019               Content Version: 12.5  © 6057-3484 Healthwise, Incorporated. Care instructions adapted under license by [facility] ([facility]). If you have questions about a medical condition or this instruction, always ask your healthcare professional. [address] any warranty or liability for your use of this information.

## 2020-09-03 NOTE — DISCHARGE SUMMARY
nitroGLYCERIN (NITROSTAT) 0.4 MG SL tablet  Place 1 tablet under the tongue every 5 minutes as needed for Chest pain up to max of 3 total doses. If no relief after 1 dose, call 911.             sertraline (ZOLOFT) 25 MG tablet  Take 25 mg by mouth daily.              simvastatin (ZOCOR) 40 MG tablet  Take 20 mg by mouth nightly              ticagrelor (BRILINTA) 90 MG TABS tablet  Take 1 tablet by mouth 2 times daily                 Diet:  DIET CARDIAC; , Advance as tolerated     Activity:  As tolerated    Consultants: IP CONSULT TO CARDIOLOGY  IP CONSULT TO CARDIAC REHAB  IP CONSULT TO SOCIAL WORK    Procedures:  Not indicated     Diagnostic Test:   Results for orders placed or performed during the hospital encounter of 09/01/20   Troponin   Result Value Ref Range    Troponin, High Sensitivity 12 0 - 22 ng/L    Troponin T NOT REPORTED <0.03 ng/mL    Troponin Interp NOT REPORTED    Troponin   Result Value Ref Range    Troponin, High Sensitivity 13 0 - 22 ng/L    Troponin T NOT REPORTED <0.03 ng/mL    Troponin Interp NOT REPORTED    CBC WITH AUTO DIFFERENTIAL   Result Value Ref Range    WBC 6.3 3.5 - 11.3 k/uL    RBC 4.50 4.21 - 5.77 m/uL    Hemoglobin 13.4 13.0 - 17.0 g/dL    Hematocrit 38.6 (L) 40.7 - 50.3 %    MCV 85.8 82.6 - 102.9 fL    MCH 29.8 25.2 - 33.5 pg    MCHC 34.7 28.4 - 34.8 g/dL    RDW 13.1 11.8 - 14.4 %    Platelets 439 664 - 428 k/uL    MPV 10.4 8.1 - 13.5 fL    NRBC Automated 0.0 0.0 per 100 WBC    Differential Type NOT REPORTED     Seg Neutrophils 72 (H) 36 - 65 %    Lymphocytes 12 (L) 24 - 43 %    Monocytes 10 3 - 12 %    Eosinophils % 4 1 - 4 %    Basophils 1 0 - 2 %    Immature Granulocytes 1 (H) 0 %    Segs Absolute 4.70 1.50 - 8.10 k/uL    Absolute Lymph # 0.74 (L) 1.10 - 3.70 k/uL    Absolute Mono # 0.60 0.10 - 1.20 k/uL    Absolute Eos # 0.23 0.00 - 0.44 k/uL    Basophils Absolute 0.03 0.00 - 0.20 k/uL    Absolute Immature Granulocyte 0.04 0.00 - 0.30 k/uL    WBC Morphology NOT REPORTED 12/18/2009 HISTORY: ORDERING SYSTEM PROVIDED HISTORY: Chest pain TECHNOLOGIST PROVIDED HISTORY: Reason for Exam: Chest pain Procedure Type->Rx chest pain Reason for Exam: Chest pain, CHF, CAD, abnormal EKG, echo LV EF 20% (2014), hyperlipedemia. 69-year-old male with chest pain FINDINGS: The patient achieved a maximum heart rate of 73 beats per minute, 52 % of the maximum age predicted heart rate of 139 beats per minute. Perfusion: Stable photopenia of the anterior, a portion of the lateral wall, and apical wall. Small area of photopenia of the lateral wall which improves on rest as compared with stress imaging. Function: The gated SPECT data demonstrates normal left ventricular size. Hypokinesis of the apical, anterior, and septal wall. Left ventricular ejection fraction:  49% TID score:  1.25 (threshold value of 1.39 is used for Lexiscan stress with Tc-99m). There is no stress-induced cavitary dilatation to suggest compensated triple vessel disease. End diastolic volume:  844IG Scores are visually adjusted to account for potential artifact. Summed stress score:  24 Summed rest score:  21 Summed reversibility score:  3     1. Findings raise concern for mild reversible ischemia of the lateral wall. 2. Infarct of the anterior, lateral, and apical wall. Hypokinesis of the apical, anterior, and septal walls. Left ventricular ejection fraction measuring 49%. 3.  Please see report for EKG portion of the examination which will be performed separately by physician from cardiology. Risk stratification:  Intermediate risk Note:  Risk stratification incorporates both clinical history and test results. Final risk determination is the responsibility of the ordering provider as history and other test results may increase or decrease the risk stratification reported for this examination. Risk stratification criteria are adapted from \"Noninvasive Risk Stratification\" criteria from [employer].   [place], ACC/AATS/AHA/ASE/ASNC/SCAI/SCCT/STS 2017 Appropriate Use Criteria For Coronary Revascularization in Patients With Stable Ischemic Heart Disease [facility] Volume 69, Issue 17, May 2017 High risk (>3% annual death or MI) 1. Severe resting LV dysfunction (LVEF >35%) not readily explained by non coronary causes 2. Resting perfusion abnormalities greater than 10% of the myocardium in patients without prior history or evidence of MI 3. Stress-induced perfusion abnormalities encumbering greater than or equal to 10% myocardium or stress segmental scores indicating multiple vascular territories with abnormalities 4. Stress-induced LV dilatation (TID ratio greater than 1.19 for exercise and greater than 1.39 for regadenoson) Intermediate risk (1% to 3% annual death or MI) 1. Mild/moderate resting LV dysfunction (LVEF 35% to 49%) not readily explained by non coronary causes. 2.  Resting perfusion abnormalities in 5%-9.9% of the myocardium in patients without a history or prior evidence of MI 3. Stress-induced perfusion abnormality encumbering 5%-9.9% of the myocardium or stress segmental scores indicating 1 vascular territory with abnormalities but without LV dilation 4. Small wall motion abnormality involving 1-2 segments and only 1 coronary bed. Low Risk (Less than 1% annual death or MI) 1. Normal or small myocardial perfusion defect at rest or with stress encumbering less than 5% of the myocardium. The findings were sent to the Radiology Results [address] at 12:32 pm on 9/2/2020to be communicated to a licensed caregiver.            Physical Exam:    General appearance - NAD, AOx 3   Lungs -CTAB, no R/R/R  Heart - RRR, no M/R/G  Abdomen - Soft, NT/ND  Neurological:  MAEx4, No focal motor deficit, sensory loss  Extremities - Cap refil <2 sec in all ext., no edema; mild hematoma to the site of the catheterization, however the incision is clean dry and intact with no discharge or bleeding  Skin -warm, dry      Hospital Course:  Clinical course has improved, labs and imaging reviewed. Kathleen Mejia originally presented to the hospital on 9/1/2020  8:53 AM. with acute chest pain. At that time it was determined that He required further observation and cardiology evaluation. He was admitted and labs and imaging were followed daily. Imaging results as above. He underwent stress test which was positive. Then underwent cardiac catheterization, which was positive for 99% stenosis of the OM2 left circumflex artery and 80% stenosis of the OM3 large vessel. Stents were placed, reducing the stenosis to 0% on both. He tolerated the procedure well, denies chest pain, dyspnea, nausea, vomiting at this time. He only endorses mild discomfort at the catheterization site in his right groin. He is medically stable to be discharged. Disposition: Home    Patient stated that they will not drive themselves home from the hospital if they have gotten pain killers/ narcotics earlier that day and that they will arrange for transportation on their own or work with the  for a ride. Patient counseled NOT to drive while under the influence of narcotics/ pain killers. Condition: Good    Patient stable and ready for discharge home. I have discussed plan of care with patient and they are in understanding. They were instructed to read discharge paperwork. All of their questions and concerns were addressed. Time Spent: 1 day      --  Marbella Younger MD  Emergency Medicine Resident Physician    This dictation was generated by voice recognition computer software. Although all attempts are made to edit the dictation for accuracy, there may be errors in the transcription that are not intended.

## 2020-09-03 NOTE — PROGRESS NOTES
[facility]   Progress Note                   Date:   9/3/2020  Patient name: Jasmin Yao  Date of admission:  9/1/2020  8:53 AM  MRN:   [id number]  YOB: 1940  PCP: Blaze Cruz MD    Reason for Admission: Chest pain [R07.9]  Chest pain [R07.9]    Subjective:       Clinical Changes / Abnormalities: Pt seen and examined in room after discussion with RN. Patient is s/p positive stress and cardiac cath with PCI. Denies chest pain or SOB. Paced rhythm on tele HR 73        Medications:   Scheduled Meds:   sodium chloride flush  10 mL Intravenous 2 times per day    ticagrelor  90 mg Oral BID    tamsulosin  0.4 mg Oral Daily    amLODIPine  10 mg Oral Daily    aspirin EC  81 mg Oral Daily    bicalutamide  50 mg Oral Daily    furosemide  40 mg Oral Daily    gabapentin  300 mg Oral BID    lisinopril  40 mg Oral Daily    metoprolol succinate  100 mg Oral Daily    sertraline  25 mg Oral Daily    atorvastatin  40 mg Oral Nightly     Continuous Infusions:    CBC:   Recent Labs     09/01/20  0912 09/03/20  0500   WBC 6.3 10.3   HGB 13.4 12.5*    208     BMP:    Recent Labs     09/01/20  0912 09/03/20  0500    136   K 3.8 4.1    100   CO2 27 24   BUN 17 17   CREATININE 0.66* 0.83   GLUCOSE 104* 122*     Hepatic:   Recent Labs     09/03/20  0500   AST 17   ALT 19   BILITOT 0.56   ALKPHOS 67     Troponin:   Recent Labs     09/01/20  0912 09/01/20  1007 09/02/20  0537   TROPHS 12 13 13     BNP: No results for input(s): BNP in the last 72 hours. Lipids: No results for input(s): CHOL, HDL in the last 72 hours. Invalid input(s): LDLCALCU  INR: No results for input(s): INR in the last 72 hours. EKG: atrially paced T wave abnormalities   ECHO: not obtained. Ejection fraction: 20% in 2014  Stress Test: ordered, but not yet obtained. Cardiac Angiography: previous in 2013  CATH 9/2/2020  Findings:     LMCA: Mild irregularities 10-20%.      LAD: 90% proximal stenosis LCx: OM2 99% proximal stenosis reduced to 0% using 2.25x28 mm Xience stent. OM3 large vessel with 80% stenosis reduced to 0% 3.25x18 mm Xience stent.         Lesion on 3rd Ob Aracelis: Distal subsection. 80% stenosis 12 mm length     reduced to 0%. Pre procedure VAN III flow was noted. Post Procedure VAN     III flow was present. Good runoff was present. The lesion was diagnosed     as Moderate Risk (B).       Devices used         - Runthrough NS. Number of passes: 1.         - Trek Balloon 2.5mm x 12mm. 1 inflation(s) to a max pressure of: 10     navarro.         - Balloon of Xience Angela 3.25 x 18 TED. 1 inflation(s) to a max     pressure of: 14 navarro.         Lesion on 2nd Ob Aracelis: Proximal subsection. 99% stenosis 15 mm length     reduced to 0%. Pre procedure VAN II flow was noted. Post Procedure VAN     III flow was present. Good runoff was present. The lesion was diagnosed     as Moderate Risk (B).       Devices used         - Mini Trek Balloon 2.0mm x 12mm. 1 inflation(s) to a max pressure of:     10 navarro.         - Mini Trek Balloon 2.0mm x 12mm. 1 inflation(s) to a max pressure of:     10 navarro.         - Balloon of Xience Angela 2.25 x 28 TED. 1 inflation(s) to a max     pressure of: 9 navarro.       RCA: 80% proximal stenosis, small nondominant         Conclusions:          OM2 99% proximal stenosis reduced to 0% using 2.25x28 mm Xience stent. OM3    large vessel with 80% stenosis reduced to 0% 3.25x18 mm Xience stent.         Recommendations:  1. Post-cath/stent protocol  2. Continue optimal medical therapy  3. Risk factor modification  Objective:   Vitals: /63   Pulse 72   Temp 98.3 °F (36.8 °C) (Oral)   Resp 15   Ht 5' 7\" (1.702 m)   Wt 197 lb 6 oz (89.5 kg)   SpO2 95%   BMI 30.91 kg/m²     General appearance: alert and cooperative with exam  HEENT: Head: Normocephalic, no lesions, without obvious abnormality.   Neck: no JVD, trachea midline, no adenopathy  Lungs: Clear to auscultation  Heart: Regular rate and rhythm, s1/s2 auscultated, no murmurs  Abdomen: soft, non-tender, bowel sounds active  Extremities: no edema  Neurologic: not done  Right Femoral Artery site:   CDI soft +pulse. no hematoma. Mild ecchymosis. Coronary Discharge Core Measure: Please indicate the medication given by X, and if not the reasons not given:    Not Given Reason  Given      Beta Blockers X      ACE-I X      Statins X      ASA X     Brilinta samples, Rx card, and prescriptions given. escript to pharmacy OAP (Plavix/Effient/Brilinta) X    SL Nitro X         Assessment / Acute Cardiac Problems:   1.  CP   2. HTN     Patient Active Problem List:     Right upper quadrant abdominal pain     CAD (coronary artery disease)     Hypertension     Skin cancer     BPH (benign prostatic hyperplasia)     Chronic systolic heart failure (HCC)     Hyperlipemia     Leukocytosis     Cholelithiasis with acute cholecystitis     Pre-syncope     Claudication in peripheral vascular disease (HCC)     Carotid stenosis, asymptomatic     Squamous cell carcinoma of skin of left upper arm     Encounter due to AICD at end of battery life-Change OUT 7-1-19     Malignant neoplasm of prostate ([facility] [address]     Chest pain      Plan of Treatment:   1. CP- s/p positive stress and Cath with PCI. Continue ASA, statin, BB, ACE, Brilinta, CCB, and SL NTG PRN. Discussed in detail with patient post cath POC including but not limited to medications, diet, exercise, right femoral artery site care, and follow-up. Questions and concerns addressed. 2. HTN- Stable. Continue CCB, ACE and BB     3. Okay to discharge per cardiology. F/u in office in 2 weeks as scheduled.      Electronically signed by RAVI Aaron NP on 9/3/2020 at 8:24 [facility].  [phone]

## 2020-09-03 NOTE — PROGRESS NOTES
Physician Progress Note      PATIENT:               Romain Jhaveri  [facility] #:                  [id number]  :                       1940  ADMIT DATE:       2020 8:53 AM  DISCH DATE:  RESPONDING  PROVIDER #:        Mónica Viera MD          QUERY TEXT:    Patient admitted with chest pain. If possible, please document in the   progress notes and discharge summary if you are evaluating and/or treating any   of the following: The medical record reflects the following:  Risk Factors: hx of cad . Clinical Indicators: documented chest pain concerning for cardiac etiology. also documented concerns for angina. noted stress test positive for mild   reversible ischemia and infarct of anterior lateral wall . noted cardiac cath   with stent insertion  Treatment: monitoring    Call if any questions Thank you Sol Wilber ELBERT  [facility] [phone]  Options provided:  -- NSTEMI  -- Type 2 MI  -- Demand Ischemia with MI  -- Demand Ischemia only, no MI  -- Unstable Angina  -- Other - I will add my own diagnosis  -- Disagree - Not applicable / Not valid  -- Disagree - Clinically unable to determine / Unknown  -- Refer to Clinical Documentation Reviewer    PROVIDER RESPONSE TEXT:    This patient has unstable angina. Query created by:  Italia Orozco on 9/3/2020 10:29 AM      Electronically signed by:  Mónica Viera MD 9/3/2020 11:12 AM

## 2020-09-03 NOTE — DISCHARGE INSTR - COC
artery disease) I25.10    Hypertension I10    Skin cancer C44.90    BPH (benign prostatic hyperplasia) N40.0    Chronic systolic heart failure (HCC) I50.22    Hyperlipemia E78.5    Leukocytosis D72.829    Cholelithiasis with acute cholecystitis K80.00    Pre-syncope R55    Claudication in peripheral vascular disease (HCC) I73.9    Carotid stenosis, asymptomatic I65.29    Squamous cell carcinoma of skin of left upper arm C44.629    Encounter due to AICD at end of battery life-Change OUT 7-1-19 Z45.02    Malignant neoplasm of prostate ([facility] [address] C61    Chest pain R07.9       Isolation/Infection:   Isolation          No Isolation        Patient Infection Status     None to display          Nurse Assessment:  Last Vital Signs: /68   Pulse 80   Temp 99 °F (37.2 °C) (Oral)   Resp 18   Ht 5' 7\" (1.702 m)   Wt 197 lb 6 oz (89.5 kg)   SpO2 95%   BMI 30.91 kg/m²     Last documented pain score (0-10 scale): Pain Level: 0  Last Weight:   Wt Readings from Last 1 Encounters:   09/02/20 197 lb 6 oz (89.5 kg)     Mental Status:  {IP PT MENTAL STATUS:76961}    IV Access:  { ABEBA IV ACCESS:680978971}    Nursing Mobility/ADLs:  Walking   {CHP DME QNJQ:235267396}  Transfer  {CHP DME BJFP:434806747}  Bathing  {CHP DME ZNUF:657175478}  Dressing  {CHP DME ROVW:462002962}  Toileting  {CHP DME GFQA:721714366}  Feeding  {CHP DME KGOV:347177219}  Med Admin  {CHP DME MZCW:593814918}  Med Delivery   { ABEBA MED Delivery:075583238}    Wound Care Documentation and Therapy:  Incision 03/03/14 Abdomen (Active)   Number of days: 0549        Elimination:  Continence:   · Bowel: {YES / ME:92194}  · Bladder: {YES / BB:41695}  Urinary Catheter: {Urinary Catheter:443576416}   Colostomy/Ileostomy/Ileal Conduit: {YES / PY:60887}       Date of Last BM: ***  No intake or output data in the 24 hours ending 09/03/20 1144  No intake/output data recorded.     Safety Concerns:     [address] [facility] Safety Concerns:613921950}    Impairments/Disabilities:      [address] Impairments/Disabilities:151196536}    Nutrition Therapy:  Current Nutrition Therapy:   [address] ABEBA Diet List:102943862}    Routes of Feeding: {CHP DME Other Feedings:496696569}  Liquids: {Slp liquid thickness:72934}  Daily Fluid Restriction: {CHP DME Yes amt example:706145013}  Last Modified Barium Swallow with Video (Video Swallowing Test): {Done Not Done WIFC:303717224}    Treatments at the Time of Hospital Discharge:   Respiratory Treatments: ***  Oxygen Therapy:  {Therapy; copd oxygen:60075}  Ventilator:    {[facility] Vent RXVB:498638495}    Rehab Therapies: {THERAPEUTIC INTERVENTION:5692640754}  Weight Bearing Status/Restrictions: { CC Weight Bearin}  Other Medical Equipment (for information only, NOT a DME order):  {EQUIPMENT:307331937}  Other Treatments: ***    Patient's personal belongings (please select all that are sent with patient):  {[facility] DME Belongings:834511670}    RN SIGNATURE:  {Esignature:467663128}    CASE MANAGEMENT/SOCIAL WORK SECTION    Inpatient Status Date: ***    Readmission Risk Assessment Score:  Readmission Risk              Risk of Unplanned Readmission:        12           Discharging to Facility/ Agency   · Name:   · Address:  · Phone:  · Fax:    Dialysis Facility (if applicable)   · Name:  · Address:  · Dialysis Schedule:  · Phone:  · Fax:    / signature: {Esignature:755640251}    PHYSICIAN SECTION    Prognosis: {Prognosis:7714010936}    Condition at Discharge: [address] Patient Condition:640049103}    Rehab Potential (if transferring to Rehab): {Prognosis:1842028236}    Recommended Labs or Other Treatments After Discharge: ***    Physician Certification: I certify the above information and transfer of Rebeca Valdez  is necessary for the continuing treatment of the diagnosis listed and that he requires {Admit to Appropriate Level of Care:16731} for {GREATER/LESS:607041062} 30 days.      Update Admission H&P: {CHP DME Changes in UPKWO:448167261}    PHYSICIAN SIGNATURE:  {Esignature:419822032}

## 2020-09-03 NOTE — PROGRESS NOTES
OBS/CDU   RESIDENT NOTE      Patients PCP is:  Sofi Ramirez MD        SUBJECTIVE      Patient underwent catheterization yesterday. Her cath report, patient had 99% proximal stenosis of the OM2 left circumflex artery and 80% stenosis of OM3 large vessel. Both were reduced to 0% with stents. He tolerated the procedure well, however, he did developed a hematoma at the site of the catheterization in his right groin while having a bowel movement yesterday. He reports some tenderness in the area, but denies chest pain, dyspnea, nausea, vomiting. Has been able to tolerate a full diet without nausea or vomiting. The patient is urinating on his own and is passing flatus. Denies fever, chills, nausea, vomiting, chest pain, shortness of breath, abdominal pain, focal weakness, numbness, tingling, urinary/bowel symptoms, vision changes, visual hallucinations, or headache. PHYSICAL EXAM      General: NAD, AO X 3  Heent: EMOI, PERRL  Neck: SUPPLE, NO JVD  Cardiovascular: RRR, S1S2  Pulmonary: CTAB, NO SOB  Abdomen: SOFT, NTTP, ND, +BS. 5 to 6 cm hematoma in the right groin, at the site of the catheterization. It is tender to palpation, but not actively bleeding. The catheterization site has no discharge and is clean dry and intact. Extremities: +2/4 PULSES DISTAL, NO SWELLING  Neuro / Psych: NO NUMBNESS OR TINGLING, MENTATION AT BASELINE    PERTINENT TEST /EXAMS      I have reviewed all available laboratory results.     MEDICATIONS CURRENT   sodium chloride flush 0.9 % injection 10 mL, PRN  sodium chloride flush 0.9 % injection 10 mL, PRN  sodium chloride flush 0.9 % injection 10 mL, 2 times per day  sodium chloride flush 0.9 % injection 10 mL, PRN  ondansetron (ZOFRAN) injection 4 mg, Q6H PRN  hydrALAZINE (APRESOLINE) injection 10 mg, Q10 Min PRN  labetalol (NORMODYNE;TRANDATE) injection syringe 10 mg, Q30 Min PRN  ticagrelor (BRILINTA) tablet 90 mg, BID  sodium chloride (OCEAN) 0.65 % nasal spray 1 spray, PRN  tamsulosin (FLOMAX) capsule 0.4 mg, Daily  amLODIPine (NORVASC) tablet 10 mg, Daily  aspirin EC tablet 81 mg, Daily  bicalutamide (CASODEX) chemo tablet 50 mg, Daily  furosemide (LASIX) tablet 40 mg, Daily  gabapentin (NEURONTIN) capsule 300 mg, BID  lisinopril (PRINIVIL;ZESTRIL) tablet 40 mg, Daily  metoprolol succinate (TOPROL XL) extended release tablet 100 mg, Daily  nitroGLYCERIN (NITROSTAT) SL tablet 0.4 mg, Q5 Min PRN  sertraline (ZOLOFT) tablet 25 mg, Daily  atorvastatin (LIPITOR) tablet 40 mg, Nightly        All medication charted and reviewed. CONSULTS      IP CONSULT TO CARDIOLOGY  IP CONSULT TO CARDIAC REHAB  IP CONSULT TO SOCIAL WORK    ASSESSMENT/PLAN       Silas Resendez is a 79 y.o. male who presents with chest pain, concerning for cardiac etiology. He had a positive stress test, so he underwent catheterization yesterday afternoon. He had 99% proximal stenosis of the OM2 left circumflex artery and 80% stenosis of OM3 large vessel. Both were reduced to 0% with stents. Patient does have a hematoma at the catheterization site, but no bleeding, discharge. It is clean dry and intact. · Anticipate discharge today  · Continue home medications and pain control  · Monitor vitals, labs, and imaging  · DISPO: pending consults and clinical improvement    --  Carlton England  Emergency Medicine Resident Physician     This dictation was generated by voice recognition computer software. Although all attempts are made to edit the dictation for accuracy, there may be errors in the transcription that are not intended.

## 2020-09-07 NOTE — PROGRESS NOTES
[facility]  CDU / OBSERVATION eNCOUnter  Attending NOte  Late note for September 2         I performed a history and physical examination of the patient and discussed management with the resident. I reviewed the residents note and agree with the documented findings and plan of care. Any areas of disagreement are noted on the chart. I was personally present for the key portions of any procedures. I have documented in the chart those procedures where I was not present during the key portions. I have reviewed the nurses notes. I agree with the chief complaint, past medical history, past surgical history, allergies, medications, social and family history as documented unless otherwise noted below. The Family history, social history, and ROS are effectively unchanged since admission unless noted elsewhere in the chart. Admitted yesterday but for stress testing today. Patient comfortable otherwise. N.p.o. overnight. Awaiting testing results.     Katie Jessica MD  Attending Emergency  Physician

## 2020-09-18 ENCOUNTER — HOSPITAL ENCOUNTER (OUTPATIENT)
Dept: CARDIAC REHAB | Age: 80
Setting detail: THERAPIES SERIES
Discharge: HOME | End: 2020-09-18
Payer: MEDICARE

## 2020-09-18 VITALS — HEIGHT: 67 IN | WEIGHT: 194.8 LBS | BODY MASS INDEX: 30.57 KG/M2

## 2020-09-18 PROCEDURE — 93798 PHYS/QHP OP CAR RHAB W/ECG: CPT

## 2020-09-18 ASSESSMENT — PATIENT HEALTH QUESTIONNAIRE - PHQ9: SUM OF ALL RESPONSES TO PHQ QUESTIONS 1-9: 0

## 2020-09-18 NOTE — PROGRESS NOTES
New pt orientation complete-equipment reviewed, medications reconciled, goal established and ITP initiated.

## 2020-09-21 ENCOUNTER — HOSPITAL ENCOUNTER (OUTPATIENT)
Dept: CARDIAC REHAB | Age: 80
Setting detail: THERAPIES SERIES
Discharge: HOME | End: 2020-09-21
Payer: MEDICARE

## 2020-09-21 VITALS — TEMPERATURE: 96.9 F | BODY MASS INDEX: 30.4 KG/M2 | WEIGHT: 194.1 LBS

## 2020-09-21 PROCEDURE — 93798 PHYS/QHP OP CAR RHAB W/ECG: CPT

## 2020-09-23 ENCOUNTER — HOSPITAL ENCOUNTER (OUTPATIENT)
Dept: CARDIAC REHAB | Age: 80
Setting detail: THERAPIES SERIES
Discharge: HOME | End: 2020-09-23
Payer: MEDICARE

## 2020-09-23 VITALS — WEIGHT: 193.8 LBS | TEMPERATURE: 95.7 F | BODY MASS INDEX: 30.35 KG/M2

## 2020-09-23 PROCEDURE — 93798 PHYS/QHP OP CAR RHAB W/ECG: CPT

## 2020-09-25 ENCOUNTER — HOSPITAL ENCOUNTER (OUTPATIENT)
Dept: CARDIAC REHAB | Age: 80
Setting detail: THERAPIES SERIES
Discharge: HOME | End: 2020-09-25
Payer: MEDICARE

## 2020-09-25 VITALS — BODY MASS INDEX: 30.34 KG/M2 | TEMPERATURE: 96.7 F | WEIGHT: 193.7 LBS

## 2020-09-25 PROCEDURE — 93798 PHYS/QHP OP CAR RHAB W/ECG: CPT

## 2020-09-28 ENCOUNTER — HOSPITAL ENCOUNTER (OUTPATIENT)
Dept: CARDIAC REHAB | Age: 80
Setting detail: THERAPIES SERIES
Discharge: HOME | End: 2020-09-28
Payer: MEDICARE

## 2020-09-28 VITALS — BODY MASS INDEX: 30.12 KG/M2 | WEIGHT: 192.3 LBS

## 2020-09-28 PROCEDURE — 93798 PHYS/QHP OP CAR RHAB W/ECG: CPT

## 2020-09-30 ENCOUNTER — HOSPITAL ENCOUNTER (OUTPATIENT)
Dept: CARDIAC REHAB | Age: 80
Setting detail: THERAPIES SERIES
Discharge: HOME | End: 2020-09-30
Payer: MEDICARE

## 2020-10-02 ENCOUNTER — HOSPITAL ENCOUNTER (OUTPATIENT)
Dept: CARDIAC REHAB | Age: 80
Setting detail: THERAPIES SERIES
Discharge: HOME | End: 2020-10-02
Payer: MEDICARE

## 2020-10-02 VITALS — WEIGHT: 193.8 LBS | BODY MASS INDEX: 30.35 KG/M2 | TEMPERATURE: 96.8 F

## 2020-10-02 PROCEDURE — 93798 PHYS/QHP OP CAR RHAB W/ECG: CPT

## 2020-10-05 ENCOUNTER — HOSPITAL ENCOUNTER (OUTPATIENT)
Dept: CARDIAC REHAB | Age: 80
Setting detail: THERAPIES SERIES
Discharge: HOME | End: 2020-10-05
Payer: MEDICARE

## 2020-10-05 VITALS — WEIGHT: 193.2 LBS | TEMPERATURE: 96.7 F | BODY MASS INDEX: 30.26 KG/M2

## 2020-10-05 PROCEDURE — 93798 PHYS/QHP OP CAR RHAB W/ECG: CPT

## 2020-10-05 RX ORDER — CLOPIDOGREL BISULFATE 75 MG/1
75 TABLET ORAL DAILY
COMMUNITY

## 2020-10-05 NOTE — PROGRESS NOTES
Goals reviewed, pt states he has a slight increase in endurance and decreased SOB since starting Cardiac Rehab, continues to work on these goals.

## 2020-10-07 ENCOUNTER — HOSPITAL ENCOUNTER (OUTPATIENT)
Dept: CARDIAC REHAB | Age: 80
Setting detail: THERAPIES SERIES
Discharge: HOME | End: 2020-10-07
Payer: MEDICARE

## 2020-10-07 VITALS — WEIGHT: 194.1 LBS | BODY MASS INDEX: 30.4 KG/M2 | TEMPERATURE: 97 F

## 2020-10-07 PROCEDURE — 93798 PHYS/QHP OP CAR RHAB W/ECG: CPT

## 2020-10-07 NOTE — PROGRESS NOTES
Pt educated on importance of getting enough sleep and tips on how to sleep better, handout provided.

## 2020-10-09 ENCOUNTER — HOSPITAL ENCOUNTER (OUTPATIENT)
Dept: CARDIAC REHAB | Age: 80
Setting detail: THERAPIES SERIES
Discharge: HOME | End: 2020-10-09
Payer: MEDICARE

## 2020-10-09 VITALS — TEMPERATURE: 97 F | WEIGHT: 193.3 LBS | BODY MASS INDEX: 30.28 KG/M2

## 2020-10-09 PROCEDURE — 93798 PHYS/QHP OP CAR RHAB W/ECG: CPT

## 2020-10-12 ENCOUNTER — HOSPITAL ENCOUNTER (OUTPATIENT)
Dept: CARDIAC REHAB | Age: 80
Setting detail: THERAPIES SERIES
Discharge: HOME | End: 2020-10-12
Payer: MEDICARE

## 2020-10-12 VITALS — WEIGHT: 192.9 LBS | TEMPERATURE: 96.9 F | BODY MASS INDEX: 30.21 KG/M2

## 2020-10-12 PROCEDURE — 93798 PHYS/QHP OP CAR RHAB W/ECG: CPT

## 2020-10-12 NOTE — PROGRESS NOTES
Goals reviewed, pt states has increased endurance and no more SOB since starting Cardiac Rehab, continues to work on these goals.

## 2020-10-14 ENCOUNTER — HOSPITAL ENCOUNTER (OUTPATIENT)
Dept: CARDIAC REHAB | Age: 80
Setting detail: THERAPIES SERIES
Discharge: HOME | End: 2020-10-14
Payer: MEDICARE

## 2020-10-14 VITALS — BODY MASS INDEX: 30.38 KG/M2 | WEIGHT: 194 LBS | TEMPERATURE: 97.2 F

## 2020-10-14 PROCEDURE — 93798 PHYS/QHP OP CAR RHAB W/ECG: CPT

## 2020-10-16 ENCOUNTER — HOSPITAL ENCOUNTER (OUTPATIENT)
Dept: CARDIAC REHAB | Age: 80
Setting detail: THERAPIES SERIES
Discharge: HOME | End: 2020-10-16
Payer: MEDICARE

## 2020-10-16 VITALS — BODY MASS INDEX: 30.32 KG/M2 | WEIGHT: 193.6 LBS | TEMPERATURE: 97 F

## 2020-10-16 PROCEDURE — 93798 PHYS/QHP OP CAR RHAB W/ECG: CPT

## 2020-10-19 ENCOUNTER — HOSPITAL ENCOUNTER (OUTPATIENT)
Dept: CARDIAC REHAB | Age: 80
Setting detail: THERAPIES SERIES
Discharge: HOME | End: 2020-10-19
Payer: MEDICARE

## 2020-10-19 VITALS — BODY MASS INDEX: 30.34 KG/M2 | TEMPERATURE: 96.4 F | HEIGHT: 67 IN | WEIGHT: 193.3 LBS

## 2020-10-19 PROCEDURE — 93798 PHYS/QHP OP CAR RHAB W/ECG: CPT

## 2020-10-19 NOTE — PROGRESS NOTES
ITP updated and reviewed with patient today. Goals reviewed as well. Patient is feeling stronger and improvement SOB. Will continue to work to improve health and reach goals.

## 2020-10-21 ENCOUNTER — HOSPITAL ENCOUNTER (OUTPATIENT)
Dept: CARDIAC REHAB | Age: 80
Setting detail: THERAPIES SERIES
Discharge: HOME | End: 2020-10-21
Payer: MEDICARE

## 2020-10-21 VITALS — TEMPERATURE: 98.1 F | WEIGHT: 195 LBS | BODY MASS INDEX: 30.54 KG/M2

## 2020-10-21 PROCEDURE — 93798 PHYS/QHP OP CAR RHAB W/ECG: CPT

## 2020-10-23 ENCOUNTER — HOSPITAL ENCOUNTER (OUTPATIENT)
Dept: CARDIAC REHAB | Age: 80
Setting detail: THERAPIES SERIES
Discharge: HOME | End: 2020-10-23
Payer: MEDICARE

## 2020-10-23 VITALS — WEIGHT: 193.4 LBS | TEMPERATURE: 97 F | BODY MASS INDEX: 30.29 KG/M2

## 2020-10-23 PROCEDURE — 93798 PHYS/QHP OP CAR RHAB W/ECG: CPT

## 2020-10-26 ENCOUNTER — HOSPITAL ENCOUNTER (OUTPATIENT)
Dept: CARDIAC REHAB | Age: 80
Setting detail: THERAPIES SERIES
Discharge: HOME | End: 2020-10-26
Payer: MEDICARE

## 2020-10-28 ENCOUNTER — HOSPITAL ENCOUNTER (OUTPATIENT)
Dept: CARDIAC REHAB | Age: 80
Setting detail: THERAPIES SERIES
Discharge: HOME | End: 2020-10-28
Payer: MEDICARE

## 2020-10-28 VITALS — BODY MASS INDEX: 30.74 KG/M2 | TEMPERATURE: 97 F | WEIGHT: 196.3 LBS

## 2020-10-28 PROCEDURE — 93798 PHYS/QHP OP CAR RHAB W/ECG: CPT

## 2020-10-28 NOTE — PROGRESS NOTES
Goals reviewed, pt states has more endurance and no more SOB since starting Cardiac Rehab, continues to work on these goals. Pt educated on having a healthy Halloween, educational handout provided.

## 2020-10-30 ENCOUNTER — HOSPITAL ENCOUNTER (OUTPATIENT)
Dept: CARDIAC REHAB | Age: 80
Setting detail: THERAPIES SERIES
Discharge: HOME | End: 2020-10-30
Payer: MEDICARE

## 2020-10-30 VITALS — BODY MASS INDEX: 30.85 KG/M2 | WEIGHT: 197 LBS | TEMPERATURE: 96.7 F

## 2020-10-30 PROCEDURE — 93798 PHYS/QHP OP CAR RHAB W/ECG: CPT

## 2020-11-02 ENCOUNTER — HOSPITAL ENCOUNTER (OUTPATIENT)
Dept: CARDIAC REHAB | Age: 80
Setting detail: THERAPIES SERIES
Discharge: HOME | End: 2020-11-02
Payer: MEDICARE

## 2020-11-02 VITALS — TEMPERATURE: 96.6 F | BODY MASS INDEX: 30.43 KG/M2 | WEIGHT: 194.3 LBS

## 2020-11-02 PROCEDURE — 93798 PHYS/QHP OP CAR RHAB W/ECG: CPT

## 2020-11-04 ENCOUNTER — HOSPITAL ENCOUNTER (OUTPATIENT)
Dept: CARDIAC REHAB | Age: 80
Setting detail: THERAPIES SERIES
Discharge: HOME | End: 2020-11-04
Payer: MEDICARE

## 2020-11-04 VITALS — BODY MASS INDEX: 30.59 KG/M2 | WEIGHT: 195.3 LBS | TEMPERATURE: 97 F

## 2020-11-04 PROCEDURE — 93798 PHYS/QHP OP CAR RHAB W/ECG: CPT

## 2020-11-06 ENCOUNTER — HOSPITAL ENCOUNTER (OUTPATIENT)
Dept: CARDIAC REHAB | Age: 80
Setting detail: THERAPIES SERIES
Discharge: HOME | End: 2020-11-06
Payer: MEDICARE

## 2020-11-06 VITALS — TEMPERATURE: 97.1 F | WEIGHT: 195.3 LBS | BODY MASS INDEX: 30.59 KG/M2

## 2020-11-06 PROCEDURE — 93798 PHYS/QHP OP CAR RHAB W/ECG: CPT

## 2020-11-09 ENCOUNTER — HOSPITAL ENCOUNTER (OUTPATIENT)
Dept: CARDIAC REHAB | Age: 80
Setting detail: THERAPIES SERIES
Discharge: HOME | End: 2020-11-09
Payer: MEDICARE

## 2020-11-11 ENCOUNTER — HOSPITAL ENCOUNTER (OUTPATIENT)
Dept: CARDIAC REHAB | Age: 80
Setting detail: THERAPIES SERIES
Discharge: HOME | End: 2020-11-11
Payer: MEDICARE

## 2020-11-11 VITALS — BODY MASS INDEX: 30.43 KG/M2 | TEMPERATURE: 96.7 F | WEIGHT: 194.3 LBS

## 2020-11-11 PROCEDURE — 93798 PHYS/QHP OP CAR RHAB W/ECG: CPT

## 2020-11-13 ENCOUNTER — HOSPITAL ENCOUNTER (OUTPATIENT)
Dept: CARDIAC REHAB | Age: 80
Setting detail: THERAPIES SERIES
Discharge: HOME | End: 2020-11-13
Payer: MEDICARE

## 2020-11-13 VITALS — BODY MASS INDEX: 30.59 KG/M2 | TEMPERATURE: 97.3 F | WEIGHT: 195.3 LBS

## 2020-11-13 PROCEDURE — 93798 PHYS/QHP OP CAR RHAB W/ECG: CPT

## 2020-11-16 ENCOUNTER — HOSPITAL ENCOUNTER (OUTPATIENT)
Dept: CARDIAC REHAB | Age: 80
Setting detail: THERAPIES SERIES
Discharge: HOME | End: 2020-11-16
Payer: MEDICARE

## 2020-11-16 VITALS — BODY MASS INDEX: 30.54 KG/M2 | TEMPERATURE: 97 F | WEIGHT: 195 LBS

## 2020-11-16 PROCEDURE — 93798 PHYS/QHP OP CAR RHAB W/ECG: CPT

## 2020-11-18 ENCOUNTER — HOSPITAL ENCOUNTER (OUTPATIENT)
Dept: CARDIAC REHAB | Age: 80
Setting detail: THERAPIES SERIES
Discharge: HOME | End: 2020-11-18
Payer: MEDICARE

## 2020-11-18 VITALS — BODY MASS INDEX: 30.62 KG/M2 | WEIGHT: 195.5 LBS

## 2020-11-18 PROCEDURE — 93798 PHYS/QHP OP CAR RHAB W/ECG: CPT

## 2020-11-19 ENCOUNTER — HOSPITAL ENCOUNTER (OUTPATIENT)
Dept: RADIATION ONCOLOGY | Facility: MEDICAL CENTER | Age: 80
Discharge: HOME | End: 2020-11-19
Attending: RADIOLOGY
Payer: MEDICARE

## 2020-11-19 VITALS
SYSTOLIC BLOOD PRESSURE: 102 MMHG | BODY MASS INDEX: 30.74 KG/M2 | HEART RATE: 71 BPM | RESPIRATION RATE: 16 BRPM | OXYGEN SATURATION: 94 % | WEIGHT: 196.25 LBS | TEMPERATURE: 97.1 F | DIASTOLIC BLOOD PRESSURE: 60 MMHG

## 2020-11-19 PROCEDURE — 99213 OFFICE O/P EST LOW 20 MIN: CPT | Performed by: RADIOLOGY

## 2020-11-19 PROCEDURE — 99212 OFFICE O/P EST SF 10 MIN: CPT

## 2020-11-19 ASSESSMENT — PAIN DESCRIPTION - ONSET: ONSET: ON-GOING

## 2020-11-19 ASSESSMENT — PAIN DESCRIPTION - FREQUENCY: FREQUENCY: INTERMITTENT

## 2020-11-19 NOTE — PROGRESS NOTES
[facility]  Follow-Up note    Date of Service: 2020    Location: [facility]      Patient ID:   Nedra Arana  : 1940   MRN: [id number]    DIAGNOSIS:   Cancer Staging  Malignant neoplasm of prostate [facility])  Staging form: Prostate, AJCC 8th Edition  - Clinical stage from 2019: Stage IIC (cT1c, cN0, cM0, PSA: 8.4, Grade Group: 3) - Signed by Brando Kenney MD on 2019      Chief Complaint: \" Feel well he is still have slow urinary stream.  He had coronary stents recently but he doing well    INTERVAL HISTORY:   Nedra Arana returns in a previously scheduled follow-up visit. Patient was last seen in our clinic . I closely reviewed the medical, surgical, social and family history as per the detailed physician notes from our department. Interim changes as noted above. MEDICATIONS:    Current Outpatient Medications:     clopidogrel (PLAVIX) 75 MG tablet, Take 75 mg by mouth daily, Disp: , Rfl:     ticagrelor (BRILINTA) 90 MG TABS tablet, Take 1 tablet by mouth 2 times daily, Disp: 180 tablet, Rfl: 4    bicalutamide (CASODEX) 50 MG chemo tablet, TAKE ONE TABLET BY MOUTH DAILY, Disp: 90 tablet, Rfl: 1    MELOXICAM PO, Take by mouth, Disp: , Rfl:     Multiple Vitamins-Minerals (THERAPEUTIC MULTIVITAMIN-MINERALS) tablet, Take 1 tablet by mouth daily, Disp: , Rfl:     gabapentin (NEURONTIN) 300 MG capsule, Take 1 capsule by mouth 3 times daily for 90 days. (Patient taking differently: Take 300 mg by mouth 2 times daily. ), Disp: 90 capsule, Rfl: 2    ammonium lactate (LAC-HYDRIN) 12 % cream, Apply topically as needed. , Disp: 1 Bottle, Rfl: 3    nitroGLYCERIN (NITROSTAT) 0.4 MG SL tablet, Place 1 tablet under the tongue every 5 minutes as needed for Chest pain up to max of 3 total doses.  If no relief after 1 dose, call 911., Disp: 25 tablet, Rfl: 3    fluticasone (FLONASE) 50 MCG/ACT nasal spray, 1 spray by Nasal route daily as needed , Disp: , Rfl:    sertraline (ZOLOFT) 25 MG tablet, Take 25 mg by mouth daily. , Disp: , Rfl:     amLODIPine (NORVASC) 10 MG tablet, Take 10 mg by mouth daily. , Disp: , Rfl:     furosemide (LASIX) 40 MG tablet, Take 40 mg by mouth daily. , Disp: , Rfl:     aspirin EC 81 MG EC tablet, Take 1 tablet by mouth daily. (Patient taking differently: Take 81 mg by mouth daily Pt. Instructed to stop 7-10 days pre-op/ He did not do this), Disp: 30 tablet, Rfl: 6    simvastatin (ZOCOR) 40 MG tablet, Take 20 mg by mouth nightly , Disp: , Rfl:     alfuzosin (UROXATRAL) 10 MG SR tablet, Take 10 mg by mouth daily. , Disp: , Rfl:     lisinopril (PRINIVIL;ZESTRIL) 40 MG tablet, Take 40 mg by mouth daily. , Disp: , Rfl:     metoprolol (TOPROL-XL) 100 MG XL tablet, Take 100 mg by mouth daily. , Disp: , Rfl:     REVIEW OF SYSTEMS: I reviewed the complete 14-Point ROS with the patient. Pertinent ones noted in the HPI. PHYSICAL EXAMINATION:  /60   Pulse 71   Temp 97.1 °F (36.2 °C) (Oral)   Resp 16   Wt 196 lb 4 oz (89 kg)   SpO2 94%   BMI 30.74 kg/m²   Pain 0/10, KPS  GENERAL:  Well-appearing, in no apparent distress, alert and fully oriented, answers questions appropriately. HEENT:  Normocephalic, atraumaticNECK:  No cervical or supraclavicular lymphadenopathy. ABD:  No visceromegaly or masses. EXTREMITIES:  Normal range of motion, very mild edema. LYMPH:  No appreciable adenopathy. NEURO, gait normal, muscle power in extremities normal.  PSYCH:  Appropriate affect for the clinical situation. Insight and judgment not impaired. Labs:    RADS:    PATHOLOGY: No recent studies. ASSESSMENT: Prostate cancer responded to radiation treatment most recent PSA is 0.06  PLAN: He is under the care of Dr. Rosie Mata the urologist.    He was given follow-up appointment and was advised to be seen by his physicians as well. Time spent with patient 25 minutes.  >50% of time allotted to education, answering questions, and coordinating care.    Electronically signed by Quin Willis MD on 11/19/2020 at 6:59 PM    Patient Care Team:  Trenton Gramajo MD as PCP - [address] [place], [place] [address]First Floor Mike Putnam DPM as Physician (Podiatry)  Dahiana Pa MD as Consulting Physician (Urology)  Francia Allan DPM as Physician (Podiatry)  Zoe Holland MD as Consulting Physician (Radiation Oncology)

## 2020-11-19 NOTE — PROGRESS NOTES
Latuan Love  11/19/2020  1:03 PM    Pt here for follow up. States had cardiac stents in Sept 2020. Had some diarrhea after tx, but has gone away. PSA 0.06. Dr. Sandy Morales to al.  Vitals:    11/19/20 1301   BP: 102/60   Pulse: 71   Resp: 16   Temp: 97.1 °F (36.2 °C)   SpO2: 94%    :  Patient Currently in Pain: No             Wt Readings from Last 1 Encounters:   11/19/20 196 lb 4 oz (89 kg)                Current Outpatient Medications:     clopidogrel (PLAVIX) 75 MG tablet, Take 75 mg by mouth daily, Disp: , Rfl:     ticagrelor (BRILINTA) 90 MG TABS tablet, Take 1 tablet by mouth 2 times daily, Disp: 180 tablet, Rfl: 4    bicalutamide (CASODEX) 50 MG chemo tablet, TAKE ONE TABLET BY MOUTH DAILY, Disp: 90 tablet, Rfl: 1    MELOXICAM PO, Take by mouth, Disp: , Rfl:     Multiple Vitamins-Minerals (THERAPEUTIC MULTIVITAMIN-MINERALS) tablet, Take 1 tablet by mouth daily, Disp: , Rfl:     gabapentin (NEURONTIN) 300 MG capsule, Take 1 capsule by mouth 3 times daily for 90 days. (Patient taking differently: Take 300 mg by mouth 2 times daily. ), Disp: 90 capsule, Rfl: 2    ammonium lactate (LAC-HYDRIN) 12 % cream, Apply topically as needed. , Disp: 1 Bottle, Rfl: 3    nitroGLYCERIN (NITROSTAT) 0.4 MG SL tablet, Place 1 tablet under the tongue every 5 minutes as needed for Chest pain up to max of 3 total doses. If no relief after 1 dose, call 911., Disp: 25 tablet, Rfl: 3    fluticasone (FLONASE) 50 MCG/ACT nasal spray, 1 spray by Nasal route daily as needed , Disp: , Rfl:     sertraline (ZOLOFT) 25 MG tablet, Take 25 mg by mouth daily. , Disp: , Rfl:     amLODIPine (NORVASC) 10 MG tablet, Take 10 mg by mouth daily. , Disp: , Rfl:     furosemide (LASIX) 40 MG tablet, Take 40 mg by mouth daily. , Disp: , Rfl:     aspirin EC 81 MG EC tablet, Take 1 tablet by mouth daily. (Patient taking differently: Take 81 mg by mouth daily Pt.  Instructed to stop 7-10 days pre-op/ He did not do this), Disp: 30 tablet, Rfl: 6   simvastatin (ZOCOR) 40 MG tablet, Take 20 mg by mouth nightly , Disp: , Rfl:     alfuzosin (UROXATRAL) 10 MG SR tablet, Take 10 mg by mouth daily. , Disp: , Rfl:     lisinopril (PRINIVIL;ZESTRIL) 40 MG tablet, Take 40 mg by mouth daily. , Disp: , Rfl:     metoprolol (TOPROL-XL) 100 MG XL tablet, Take 100 mg by mouth daily. , Disp: , Rfl:     Immunizations:    Influenza status:    [x]   Current   []   Patient declined    Pneumococcal status:  [x]   Current  []   Patient declined    Smoking Status:    [] Smoker - PPD:  Smoking cessation education: Provided []   Declined []    [] Nonsmoker - Quit Date:               [x] Never a smoker           Cancer Screening:  Colonoscopy [] Current [] Not current   [] Not current, but scheduled   [x] NA  Mammogram [] Current [] Not current   [] Not current, but scheduled   [x] NA  Prostate [] Current [] Not current   [] Not current, but scheduled   [x] NA  PAP/Pelvic [] Current [] Not current   [] Not current, but scheduled   [x] NA  Skin  [] Current  [] Not current   [] Not current, but scheduled   [x] NA    Hormone:  Lupron []   Last dose given:           Next dose due:   Eligard [x]   Last dose given: 6 months ago          Next dose due: Dr. Yuliana Valdes coming up  Aromatase Inhibitors []   Medication name:   N/A:  []              *BREAST Patient only:    Lymphedema Eval:   [] left arm      [] right arm  Location:     Measurement (cm)    Upper Bicep :    Lower Bicep :         FALLS RISK SCREEN  Instructions:  Assess the patient and enter the appropriate indicators that are present for fall risk identification. Total the numbers entered and assign a fall risk score from Table 2.  Reassess patient at a minimum every 12 weeks or with status change. Assessment   Date  11/19/2020     1. Mental Ability: confusion/cognitively impaired 0     2. Elimination Issues: incontinence, frequency 0       3.   Ambulatory: use of assistive devices (walker, cane, off-loading devices),        attached

## 2020-11-20 ENCOUNTER — HOSPITAL ENCOUNTER (OUTPATIENT)
Dept: CARDIAC REHAB | Age: 80
Setting detail: THERAPIES SERIES
Discharge: HOME | End: 2020-11-20
Payer: MEDICARE

## 2020-11-23 ENCOUNTER — HOSPITAL ENCOUNTER (OUTPATIENT)
Dept: CARDIAC REHAB | Age: 80
Setting detail: THERAPIES SERIES
Discharge: HOME | End: 2020-11-23
Payer: MEDICARE

## 2020-11-23 VITALS — WEIGHT: 193.4 LBS | TEMPERATURE: 97.7 F | HEIGHT: 67 IN | BODY MASS INDEX: 30.35 KG/M2

## 2020-11-23 PROCEDURE — 93798 PHYS/QHP OP CAR RHAB W/ECG: CPT

## 2020-11-25 ENCOUNTER — HOSPITAL ENCOUNTER (OUTPATIENT)
Dept: CARDIAC REHAB | Age: 80
Setting detail: THERAPIES SERIES
Discharge: HOME | End: 2020-11-25
Payer: MEDICARE

## 2020-11-25 VITALS — WEIGHT: 194.3 LBS | TEMPERATURE: 97.2 F | BODY MASS INDEX: 30.43 KG/M2

## 2020-11-25 PROCEDURE — 93798 PHYS/QHP OP CAR RHAB W/ECG: CPT

## 2020-11-30 ENCOUNTER — HOSPITAL ENCOUNTER (OUTPATIENT)
Dept: CARDIAC REHAB | Age: 80
Setting detail: THERAPIES SERIES
Discharge: HOME | End: 2020-11-30
Payer: MEDICARE

## 2020-12-02 ENCOUNTER — HOSPITAL ENCOUNTER (OUTPATIENT)
Dept: CARDIAC REHAB | Age: 80
Setting detail: THERAPIES SERIES
Discharge: HOME | End: 2020-12-02
Payer: MEDICARE

## 2020-12-02 VITALS — WEIGHT: 195.4 LBS | TEMPERATURE: 97 F | BODY MASS INDEX: 30.6 KG/M2

## 2020-12-02 PROCEDURE — 93798 PHYS/QHP OP CAR RHAB W/ECG: CPT

## 2020-12-04 ENCOUNTER — HOSPITAL ENCOUNTER (OUTPATIENT)
Dept: CARDIAC REHAB | Age: 80
Setting detail: THERAPIES SERIES
Discharge: HOME | End: 2020-12-04
Payer: MEDICARE

## 2020-12-04 VITALS — BODY MASS INDEX: 30.59 KG/M2 | WEIGHT: 195.3 LBS | TEMPERATURE: 96.8 F

## 2020-12-04 PROCEDURE — 93798 PHYS/QHP OP CAR RHAB W/ECG: CPT

## 2020-12-07 ENCOUNTER — HOSPITAL ENCOUNTER (OUTPATIENT)
Dept: CARDIAC REHAB | Age: 80
Setting detail: THERAPIES SERIES
Discharge: HOME | End: 2020-12-07
Payer: MEDICARE

## 2020-12-07 VITALS — WEIGHT: 193.4 LBS | TEMPERATURE: 96.5 F | BODY MASS INDEX: 30.29 KG/M2

## 2020-12-07 PROCEDURE — 93798 PHYS/QHP OP CAR RHAB W/ECG: CPT

## 2020-12-09 ENCOUNTER — HOSPITAL ENCOUNTER (OUTPATIENT)
Dept: CARDIAC REHAB | Age: 80
Setting detail: THERAPIES SERIES
Discharge: HOME | End: 2020-12-09
Payer: MEDICARE

## 2020-12-09 VITALS — WEIGHT: 193.9 LBS | BODY MASS INDEX: 30.37 KG/M2 | TEMPERATURE: 96.3 F

## 2020-12-09 PROCEDURE — 93798 PHYS/QHP OP CAR RHAB W/ECG: CPT

## 2020-12-11 ENCOUNTER — HOSPITAL ENCOUNTER (OUTPATIENT)
Dept: CARDIAC REHAB | Age: 80
Setting detail: THERAPIES SERIES
Discharge: HOME | End: 2020-12-11
Payer: MEDICARE

## 2020-12-11 VITALS — BODY MASS INDEX: 30.2 KG/M2 | TEMPERATURE: 97.2 F | WEIGHT: 192.8 LBS

## 2020-12-11 PROCEDURE — 93798 PHYS/QHP OP CAR RHAB W/ECG: CPT

## 2020-12-14 ENCOUNTER — HOSPITAL ENCOUNTER (OUTPATIENT)
Dept: CARDIAC REHAB | Age: 80
Setting detail: THERAPIES SERIES
Discharge: HOME | End: 2020-12-14
Payer: MEDICARE

## 2020-12-14 VITALS — WEIGHT: 191.5 LBS | TEMPERATURE: 96.9 F | BODY MASS INDEX: 29.99 KG/M2

## 2020-12-14 PROCEDURE — 93798 PHYS/QHP OP CAR RHAB W/ECG: CPT

## 2020-12-16 ENCOUNTER — HOSPITAL ENCOUNTER (OUTPATIENT)
Dept: CARDIAC REHAB | Age: 80
Setting detail: THERAPIES SERIES
Discharge: HOME | End: 2020-12-16
Payer: MEDICARE

## 2020-12-16 VITALS — BODY MASS INDEX: 29.91 KG/M2 | WEIGHT: 191 LBS | TEMPERATURE: 96.4 F

## 2020-12-16 PROCEDURE — 93798 PHYS/QHP OP CAR RHAB W/ECG: CPT

## 2020-12-18 ENCOUNTER — HOSPITAL ENCOUNTER (OUTPATIENT)
Dept: CARDIAC REHAB | Age: 80
Setting detail: THERAPIES SERIES
Discharge: HOME | End: 2020-12-18
Payer: MEDICARE

## 2020-12-18 VITALS — BODY MASS INDEX: 29.87 KG/M2 | WEIGHT: 190.7 LBS | TEMPERATURE: 96.1 F

## 2020-12-18 PROCEDURE — 93798 PHYS/QHP OP CAR RHAB W/ECG: CPT

## 2020-12-21 ENCOUNTER — HOSPITAL ENCOUNTER (OUTPATIENT)
Dept: CARDIAC REHAB | Age: 80
Setting detail: THERAPIES SERIES
Discharge: HOME | End: 2020-12-21
Payer: MEDICARE

## 2020-12-21 VITALS — TEMPERATURE: 96.2 F | WEIGHT: 190.3 LBS | BODY MASS INDEX: 29.81 KG/M2

## 2020-12-21 PROCEDURE — 93798 PHYS/QHP OP CAR RHAB W/ECG: CPT

## 2020-12-23 ENCOUNTER — HOSPITAL ENCOUNTER (OUTPATIENT)
Dept: CARDIAC REHAB | Age: 80
Setting detail: THERAPIES SERIES
Discharge: HOME | End: 2020-12-23
Payer: MEDICARE

## 2020-12-23 VITALS — BODY MASS INDEX: 30.1 KG/M2 | HEIGHT: 67 IN | TEMPERATURE: 96.4 F | WEIGHT: 191.8 LBS

## 2020-12-23 PROCEDURE — 93798 PHYS/QHP OP CAR RHAB W/ECG: CPT

## 2020-12-23 ASSESSMENT — PATIENT HEALTH QUESTIONNAIRE - PHQ9
SUM OF ALL RESPONSES TO PHQ QUESTIONS 1-9: 0
SUM OF ALL RESPONSES TO PHQ QUESTIONS 1-9: 0

## 2021-01-22 ENCOUNTER — OFFICE VISIT (OUTPATIENT)
Dept: PODIATRY | Age: 81
End: 2021-01-22
Payer: MEDICARE

## 2021-01-22 VITALS — BODY MASS INDEX: 29.98 KG/M2 | HEIGHT: 67 IN | TEMPERATURE: 97.1 F | WEIGHT: 191 LBS

## 2021-01-22 DIAGNOSIS — M79.671 PAIN IN BOTH FEET: ICD-10-CM

## 2021-01-22 DIAGNOSIS — M79.672 PAIN IN BOTH FEET: ICD-10-CM

## 2021-01-22 DIAGNOSIS — B35.1 DERMATOPHYTOSIS OF NAIL: Primary | ICD-10-CM

## 2021-01-22 DIAGNOSIS — I73.9 PERIPHERAL VASCULAR DISORDER (HCC): ICD-10-CM

## 2021-01-22 DIAGNOSIS — L60.0 INGROWN NAIL: ICD-10-CM

## 2021-01-22 PROCEDURE — 99213 OFFICE O/P EST LOW 20 MIN: CPT | Performed by: PODIATRIST

## 2021-01-22 PROCEDURE — 11721 DEBRIDE NAIL 6 OR MORE: CPT | Performed by: PODIATRIST

## 2021-01-22 NOTE — PROGRESS NOTES
[facility]  [facility]  [address] [place] [address]  Dept: [phone]  Dept Fax: [phone]     FOOT PAIN & BENIGN NEOPLASM PROGRESS NOTE  Date of patient's visit: 1/22/2021  Patient's Name:  Balaji Fuentes YOB: 1940            Patient Care Team:  Tex Patel MD as PCP - [address] [facility] Tony Rodriguez DPM as Physician (Podiatry)  Michelle Chew MD as Consulting Physician (Urology)  Santy Hi DPM as Physician (Podiatry)  Irina Valenzuela MD as Consulting Physician (Radiation Oncology)          Chief Complaint   Patient presents with    Nail Problem    Foot Pain    Peripheral Neuropathy    Benign Neoplasm       Subjective: This Balaji Fuentes comes to clinic for foot and nail care. Pt currently has complaint of thickened, painful, elongated nails that he/she cannot manage by themselves. Pt. Relates pain to nails with shoe gear. Pt's primary care physician is Tex Patel MDlast seen  October 22 2020. He also c/o painful ingrown nail to rupal great toes. States that it is difficult to walk.   Past Medical History:   Diagnosis Date    Acid reflux     resolved since s/p shona    Anxiety     Arthritis     back/ fingers    BPH (benign prostatic hypertrophy)     CAD (coronary artery disease)     [facility]. Natalie hernandez/ [facility]    Cancer [facility])     face, ear, scalp and arms     Carotid stenosis     bilat    Cataracts, both eyes     CHF (congestive heart failure) (HCC)     Dry skin     Elevated PSA     Examination of participant in clinical trial 4/27/39    For the life of the medtronic device    Hyperlipidemia     pt denies 11-27-19    Hypertension     PCP Dr. Severiano Pouch Durrani/ last seen 9-2019    Left ventricular dysfunction     Macular degeneration     left    Myocardial infarction ([facility] [address] 11/17/88, 1/2013    Neuropathy     Pacemaker [id number]    X 5    Panic attacks     PVD (peripheral vascular disease) ([facility] [address]     Sinus problem     Snores     Vasovagal near syncope     Wears glasses        Allergies   Allergen Reactions    Percocet [Oxycodone-Acetaminophen] Other (See Comments)     Panic attack    Acetaminophen      Current Outpatient Medications on File Prior to Visit   Medication Sig Dispense Refill    clopidogrel (PLAVIX) 75 MG tablet Take 75 mg by mouth daily      ticagrelor (BRILINTA) 90 MG TABS tablet Take 1 tablet by mouth 2 times daily 180 tablet 4    bicalutamide (CASODEX) 50 MG chemo tablet TAKE ONE TABLET BY MOUTH DAILY 90 tablet 1    MELOXICAM PO Take by mouth      Multiple Vitamins-Minerals (THERAPEUTIC MULTIVITAMIN-MINERALS) tablet Take 1 tablet by mouth daily      ammonium lactate (LAC-HYDRIN) 12 % cream Apply topically as needed. 1 Bottle 3    nitroGLYCERIN (NITROSTAT) 0.4 MG SL tablet Place 1 tablet under the tongue every 5 minutes as needed for Chest pain up to max of 3 total doses. If no relief after 1 dose, call 911. 25 tablet 3    fluticasone (FLONASE) 50 MCG/ACT nasal spray 1 spray by Nasal route daily as needed       sertraline (ZOLOFT) 25 MG tablet Take 25 mg by mouth daily.  amLODIPine (NORVASC) 10 MG tablet Take 10 mg by mouth daily.  furosemide (LASIX) 40 MG tablet Take 40 mg by mouth daily.  aspirin EC 81 MG EC tablet Take 1 tablet by mouth daily. (Patient taking differently: Take 81 mg by mouth daily Pt. Instructed to stop 7-10 days pre-op/ He did not do this) 30 tablet 6    simvastatin (ZOCOR) 40 MG tablet Take 20 mg by mouth nightly       alfuzosin (UROXATRAL) 10 MG SR tablet Take 10 mg by mouth daily.  lisinopril (PRINIVIL;ZESTRIL) 40 MG tablet Take 40 mg by mouth daily.  metoprolol (TOPROL-XL) 100 MG XL tablet Take 100 mg by mouth daily.  gabapentin (NEURONTIN) 300 MG capsule Take 1 capsule by mouth 3 times daily for 90 days.  (Patient taking differently: Take 300 mg by mouth 2 times daily. ) 90 capsule 2     No current facility-administered medications on file prior to visit. Review of Systems    Review of Systems:   History obtained from chart review and the patient  General ROS: negative for - chills, fatigue, fever, night sweats or weight gain  Constitutional: Negative for chills, diaphoresis, fatigue, fever and unexpected weight change. Musculoskeletal: Positive for arthralgias, gait problem and joint swelling. Neurological ROS: negative for - behavioral changes, confusion, headaches or seizures. Negative for weakness and numbness. Dermatological ROS: negative for - mole changes, rash  Cardiovascular: Negative for leg swelling. Gastrointestinal: Negative for constipation, diarrhea, nausea and vomiting. Objective:  Dermatologic Exam:  rupal hallux nail are incurvated with increased edema. Skin No rashes or nodules noted. .   Skin is thin, with flaky sloughing skin as well as decreased hair growth to the lower leg  Small red hemosiderin deposits seen dorsal foot   Musculoskeletal:     1st MPJ ROM decreased, Bilateral.  Muscle strength 5/5, Bilateral.  Pain present upon palpation of toenails 1-5, Bilateral. decreased medial longitudinal arch, Bilateral.  Ankle ROM decreased,Bilateral.    Dorsally contracted digits present digits 2, Bilateral.     Vascular: DP pulses 1/4 bilateral.  PT pulses 0/4 bilateral.   CFT <5 seconds, Bilateral.  Hair growth absent to the level of the digits, Bilateral.  Edema present, Bilateral.  Varicosities absent, Bilateral. Erythema absent, Bilateral    Neurological: Sensation diminshed to light touch to level of digits, Bilateral.  Protective sensation intact 6/10 sites via 5.07/10g [place] Monofilament, Bilateral.  negative Tinel's, Bilateral.  negative Valleix sign, Bilateral.      Integument: Warm, dry, supple, Bilateral.  Open lesion absent, Bilateral.  Interdigital maceration absent to web spaces 4, Bilateral.  Nails 1-5 left and 1-5 right thickened > 3.0 mm, dystrophic and crumbly, discolored with thickness sharply with a nail nipper and  without incident. Pt will follow up in 9 weeks or sooner if any problems arise. Diagnosis was discussed with the pt and all of their questions were answered in detail. Proper foot hygiene and care was discussed with the pt. Patient to check feet daily and contact the office with any questions/problems/concerns. Other comorbidity noted and will be managed by PCP. Pain waiver discussed with patient and confirmed.    1/22/2021      Electronically signed by Lianne Santoro DPM on 1/22/2021 at 9:48 AM  1/22/2021

## 2021-02-15 RX ORDER — BICALUTAMIDE 50 MG/1
TABLET, FILM COATED ORAL
Qty: 90 TABLET | Refills: 0 | OUTPATIENT
Start: 2021-02-15

## 2021-02-26 RX ORDER — BICALUTAMIDE 50 MG/1
TABLET, FILM COATED ORAL
Qty: 90 TABLET | Refills: 0 | Status: SHIPPED | OUTPATIENT
Start: 2021-02-26 | End: 2021-08-16

## 2021-07-26 ENCOUNTER — HOSPITAL ENCOUNTER (EMERGENCY)
Age: 81
Discharge: HOME | End: 2021-07-26
Attending: EMERGENCY MEDICINE
Payer: MEDICARE

## 2021-07-26 ENCOUNTER — APPOINTMENT (OUTPATIENT)
Dept: CT IMAGING | Age: 81
End: 2021-07-26
Payer: MEDICARE

## 2021-07-26 ENCOUNTER — APPOINTMENT (OUTPATIENT)
Dept: GENERAL RADIOLOGY | Age: 81
End: 2021-07-26
Payer: MEDICARE

## 2021-07-26 VITALS
HEIGHT: 67 IN | BODY MASS INDEX: 30.61 KG/M2 | SYSTOLIC BLOOD PRESSURE: 119 MMHG | OXYGEN SATURATION: 96 % | WEIGHT: 195 LBS | DIASTOLIC BLOOD PRESSURE: 89 MMHG | HEART RATE: 86 BPM | RESPIRATION RATE: 18 BRPM | TEMPERATURE: 98 F

## 2021-07-26 DIAGNOSIS — R42 DIZZINESS: Primary | ICD-10-CM

## 2021-07-26 LAB
ABSOLUTE EOS #: 0 K/UL (ref 0–0.4)
ABSOLUTE IMMATURE GRANULOCYTE: 0 K/UL (ref 0–0.3)
ABSOLUTE LYMPH #: 0.57 K/UL (ref 1–4.8)
ABSOLUTE MONO #: 0.43 K/UL (ref 0.2–0.8)
ANION GAP SERPL CALCULATED.3IONS-SCNC: 11 MMOL/L (ref 9–17)
BASOPHILS # BLD: 1 %
BASOPHILS ABSOLUTE: 0.07 K/UL (ref 0–0.2)
BUN BLDV-MCNC: 24 MG/DL (ref 8–23)
BUN/CREAT BLD: 22 (ref 9–20)
CALCIUM SERPL-MCNC: 8.5 MG/DL (ref 8.6–10.4)
CHLORIDE BLD-SCNC: 99 MMOL/L (ref 98–107)
CO2: 28 MMOL/L (ref 20–31)
CREAT SERPL-MCNC: 1.07 MG/DL (ref 0.7–1.2)
DIFFERENTIAL TYPE: ABNORMAL
EOSINOPHILS RELATIVE PERCENT: 0 % (ref 1–4)
GFR AFRICAN AMERICAN: >60 ML/MIN
GFR NON-AFRICAN AMERICAN: >60 ML/MIN
GFR SERPL CREATININE-BSD FRML MDRD: ABNORMAL ML/MIN/{1.73_M2}
GFR SERPL CREATININE-BSD FRML MDRD: ABNORMAL ML/MIN/{1.73_M2}
GLUCOSE BLD-MCNC: 129 MG/DL (ref 70–99)
HCT VFR BLD CALC: 38.8 % (ref 40.7–50.3)
HEMOGLOBIN: 13 G/DL (ref 13–17)
IMMATURE GRANULOCYTES: 0 %
LYMPHOCYTES # BLD: 8 % (ref 24–44)
MCH RBC QN AUTO: 29.4 PG (ref 25.2–33.5)
MCHC RBC AUTO-ENTMCNC: 33.5 G/DL (ref 28.4–34.8)
MCV RBC AUTO: 87.8 FL (ref 82.6–102.9)
MONOCYTES # BLD: 6 % (ref 1–7)
MYOGLOBIN: 36 NG/ML (ref 28–72)
MYOGLOBIN: 49 NG/ML (ref 28–72)
NRBC AUTOMATED: 0 PER 100 WBC
PDW BLD-RTO: 13.2 % (ref 11.8–14.4)
PLATELET # BLD: 204 K/UL (ref 138–453)
PLATELET ESTIMATE: ABNORMAL
PMV BLD AUTO: 10.2 FL (ref 8.1–13.5)
POTASSIUM SERPL-SCNC: 4.3 MMOL/L (ref 3.7–5.3)
RBC # BLD: 4.42 M/UL (ref 4.21–5.77)
RBC # BLD: ABNORMAL 10*6/UL
SEG NEUTROPHILS: 85 % (ref 36–66)
SEGMENTED NEUTROPHILS ABSOLUTE COUNT: 6.03 K/UL (ref 1.8–7.7)
SODIUM BLD-SCNC: 138 MMOL/L (ref 135–144)
TROPONIN INTERP: NORMAL
TROPONIN INTERP: NORMAL
TROPONIN T: NORMAL NG/ML
TROPONIN T: NORMAL NG/ML
TROPONIN, HIGH SENSITIVITY: 14 NG/L (ref 0–22)
TROPONIN, HIGH SENSITIVITY: 17 NG/L (ref 0–22)
WBC # BLD: 7.1 K/UL (ref 3.5–11.3)
WBC # BLD: ABNORMAL 10*3/UL

## 2021-07-26 PROCEDURE — 83874 ASSAY OF MYOGLOBIN: CPT

## 2021-07-26 PROCEDURE — 85025 COMPLETE CBC W/AUTO DIFF WBC: CPT

## 2021-07-26 PROCEDURE — 93005 ELECTROCARDIOGRAM TRACING: CPT | Performed by: PHYSICIAN ASSISTANT

## 2021-07-26 PROCEDURE — 99284 EMERGENCY DEPT VISIT MOD MDM: CPT

## 2021-07-26 PROCEDURE — 70450 CT HEAD/BRAIN W/O DYE: CPT

## 2021-07-26 PROCEDURE — 84484 ASSAY OF TROPONIN QUANT: CPT

## 2021-07-26 PROCEDURE — 71045 X-RAY EXAM CHEST 1 VIEW: CPT

## 2021-07-26 PROCEDURE — 80048 BASIC METABOLIC PNL TOTAL CA: CPT

## 2021-07-26 RX ORDER — METHYLPREDNISOLONE SODIUM SUCCINATE 125 MG/2ML
125 INJECTION, POWDER, LYOPHILIZED, FOR SOLUTION INTRAMUSCULAR; INTRAVENOUS ONCE
Status: DISCONTINUED | OUTPATIENT
Start: 2021-07-26 | End: 2021-07-26

## 2021-07-26 NOTE — ED PROVIDER NOTES
The patient was seen and examined by me in conjunction with the mid-level provider. I agree with his/her assessment and treatment plan. Patient is asymptomatic and his initial cardiac marker is negative.      Lorene Ware MD  07/26/21 8018

## 2021-07-26 NOTE — ED PROVIDER NOTES
[address] [place] [address] ED  [facility]      Pt Name: Diana Maldonado  MRN: [id number]  Rishigfhumphrey 1940  Date ofevaluation: 7/26/2021  Provider: Yuniel Ramirez Dr       Chief Complaint   Patient presents with    Dizziness    Chest Pain         HISTORY OF PRESENT ILLNESS  (Location/Symptom, Timing/Onset, Context/Setting, Quality, Duration, Modifying Factors, Severity.)   Diana Maldonado is a 79 y.o. male who presents to the emergency department with dizziness. Patient reports few episodes of dizziness over the last month. Usually associated with hot and feeling warm. Reports her ear pain and ringing at times. Denies any sense of movement with dizziness. States he just feels dizzy. Patient also reports a vague chest discomfort upper part of her chest over the last month as well. Diaphoresis. Nausea vomiting. Nursing Notes were reviewed. ALLERGIES     Percocet [oxycodone-acetaminophen]    CURRENT MEDICATIONS       Discharge Medication List as of 7/26/2021  5:07 PM      CONTINUE these medications which have NOT CHANGED    Details   bicalutamide (CASODEX) 50 MG chemo tablet TAKE ONE TABLET BY MOUTH DAILY, Disp-90 tablet, R-0Normal      clopidogrel (PLAVIX) 75 MG tablet Take 75 mg by mouth dailyHistorical Med      MELOXICAM PO Take by mouthHistorical Med      Multiple Vitamins-Minerals (THERAPEUTIC MULTIVITAMIN-MINERALS) tablet Take 1 tablet by mouth dailyHistorical Med      gabapentin (NEURONTIN) 300 MG capsule Take 1 capsule by mouth 3 times daily for 90 days. , Disp-90 capsule, R-2Normal      ammonium lactate (LAC-HYDRIN) 12 % cream Apply topically as needed. , Disp-1 Bottle, R-3, Normal      fluticasone (FLONASE) 50 MCG/ACT nasal spray 1 spray by Nasal route daily as needed Historical Med      sertraline (ZOLOFT) 25 MG tablet Take 25 mg by mouth daily. amLODIPine (NORVASC) 10 MG tablet Take 10 mg by mouth daily.       furosemide (LASIX) 40 MG tablet Take 40 mg by mouth daily. aspirin EC 81 MG EC tablet Take 1 tablet by mouth daily. , Disp-30 tablet, R-6      simvastatin (ZOCOR) 40 MG tablet Take 20 mg by mouth nightly       alfuzosin (UROXATRAL) 10 MG SR tablet Take 10 mg by mouth daily. lisinopril (PRINIVIL;ZESTRIL) 40 MG tablet Take 40 mg by mouth daily. metoprolol (TOPROL-XL) 100 MG XL tablet Take 100 mg by mouth daily. ticagrelor (BRILINTA) 90 MG TABS tablet Take 1 tablet by mouth 2 times daily, Disp-180 tablet,R-4Normal      nitroGLYCERIN (NITROSTAT) 0.4 MG SL tablet Place 1 tablet under the tongue every 5 minutes as needed for Chest pain up to max of 3 total doses.  If no relief after 1 dose, call 911., Disp-25 tablet, R-3             PAST MEDICAL HISTORY         Diagnosis Date    Acid reflux     resolved since s/p shona    Anxiety     Arthritis     back/ fingers    BPH (benign prostatic hypertrophy)     CAD (coronary artery disease)     Dr. Salgado Shadow Dr. hernandez/ [facility]    Cancer [facility])     face, ear, scalp and arms     Carotid stenosis     bilat    Cataracts, both eyes     CHF (congestive heart failure) (HCC)     Dry skin     Elevated PSA     Examination of participant in clinical trial 4/27/39    For the life of the medtronic device    Hyperlipidemia     pt denies 11-27-19    Hypertension     PCP Dr. Daya Fuentes/ last seen 9-2019    Left ventricular dysfunction     Macular degeneration     left    Myocardial infarction ([facility] [address] 11/17/88, 1/2013    Neuropathy     Pacemaker [id number]    X 5    Panic attacks     PVD (peripheral vascular disease) ([address]     Sinus problem     Snores     Vasovagal near syncope     Wears glasses        SURGICAL HISTORY           Procedure Laterality Date    CARDIAC CATHETERIZATION      several/ stents X 3/ angioplasty    CARDIAC DEFIBRILLATOR PLACEMENT  2013    Up graded PPM to ICD   [address]    double bypass [address], LAPAROSCOPIC 2014    .'s    COLONOSCOPY      EYE SURGERY Bilateral     cataracts    PACEMAKER INSERTION  , , , , 2019 is AICD    PACEMAKER PLACEMENT      pacer/ AICD/ Newest 19,medtronic aicd last check 19. pacemaker x5.    PROSTATE BIOPSY  2019    as local    PROSTATE BIOPSY N/A 2019    PROSTATE BIOPSY WITH ULTRASOUND (OFFICE NOTIFIED DEPT) performed by Vincenzo Ayala MD at [facility] HISTORY           Problem Relation Age of Onset    High Blood Pressure Mother     Cancer Mother     High Blood Pressure Father     Diabetes Sister     Cancer Sister     High Blood Pressure Sister     Heart Disease Brother     High Blood Pressure Brother      Family Status   Relation Name Status    Mother bladder,metastatic     Father      Sister breast     Brother  (Not Specified)        SOCIAL HISTORY      reports that he has never smoked. He has never used smokeless tobacco. He reports current alcohol use of about 2.0 standard drinks of alcohol per week. He reports that he does not use drugs. REVIEW OFSYSTEMS    (2-9 systems for level 4, 10 or more for level 5)   Review of Systems    Except as noted above the remainder of the review of systems was reviewed and negative. PHYSICAL EXAM    (up to 7 for level 4, 8 or more for level 5)     ED Triage Vitals [21 1407]   BP Temp Temp Source Pulse Resp SpO2 Height Weight   119/89 98 °F (36.7 °C) Oral 86 18 96 % 5' 7\" (1.702 m) 195 lb (88.5 kg)      Physical Exam  Constitutional:       Appearance: He is well-developed. HENT:      Head: Normocephalic and atraumatic. Cardiovascular:      Rate and Rhythm: Normal rate and regular rhythm. Pulmonary:      Effort: Pulmonary effort is normal.      Breath sounds: Normal breath sounds. Abdominal:      Palpations: Abdomen is soft. Musculoskeletal:         General: Normal range of motion.       Cervical back: Normal range of motion and neck supple. Skin:     General: Skin is warm. Neurological:      Mental Status: He is alert and oriented to person, place, and time. GCS: GCS eye subscore is 4. GCS verbal subscore is 5. GCS motor subscore is 6. Cranial Nerves: Cranial nerves are intact. Motor: Motor function is intact. Coordination: Coordination is intact. Psychiatric:         Behavior: Behavior normal.                 DIAGNOSTIC RESULTS     EKG: All EKG's are interpreted by the Emergency Department Physician who either signs or Co-signs this chart in the absence of a cardiologist.        RADIOLOGY:   Non-plain film images such as CT, Ultrasound and MRI are read by the radiologist. Plain radiographic images arevisualized and preliminarily interpreted by the emergency physician with the below findings:        Interpretation per the Radiologist below, if available at thetime of this note:          ED BEDSIDE ULTRASOUND:   Performed by ED Physician - none    LABS:  Labs Reviewed   BASIC METABOLIC PANEL - Abnormal; Notable for the following components:       Result Value    Glucose 129 (*)     BUN 24 (*)     Bun/Cre Ratio 22 (*)     Calcium 8.5 (*)     All other components within normal limits   CBC WITH AUTO DIFFERENTIAL - Abnormal; Notable for the following components:    Hematocrit 38.8 (*)     Seg Neutrophils 85 (*)     Lymphocytes 8 (*)     Eosinophils % 0 (*)     Absolute Lymph # 0.57 (*)     All other components within normal limits   TROP/MYOGLOBIN   TROP/MYOGLOBIN   TROP/MYOGLOBIN       All other labs were within normal range or not returned as of this dictation. EMERGENCY DEPARTMENT COURSE and DIFFERENTIAL DIAGNOSIS/MDM:   Vitals:    Vitals:    07/26/21 1407   BP: 119/89   Pulse: 86   Resp: 18   Temp: 98 °F (36.7 °C)   TempSrc: Oral   SpO2: 96%   Weight: 195 lb (88.5 kg)   Height: 5' 7\" (1.702 m)     2 sets of cardiac enzymes are negative. Patient will be discharged home. Work-up negative.   Patient asymptomatic today and throughout ER visit. Patient will plan of care. CONSULTS:  None    PROCEDURES:  Procedures        FINAL IMPRESSION      1.  Dizziness          DISPOSITION/PLAN   DISPOSITION        PATIENTREFERRED TO:   Haydee Anna MD  [address] [place] [address]  [phone]    In 3 days      Vianney Noonan MD  [address] [place] [id number]  [phone]    In 2 days        DISCHARGE MEDICATIONS:     Discharge Medication List as of 7/26/2021  5:07 PM              (Please note that portions of this note were completed with a voice recognition program.  Efforts were made to edit thedictations but occasionally words are mis-transcribed.)    CHALO Pulido PA-C  07/26/21 [address] [facility] Hospital[facility]bert Laguerre PA-C  07/26/21 1604

## 2021-07-28 LAB
EKG ATRIAL RATE: 74 BPM
EKG P AXIS: 12 DEGREES
EKG P-R INTERVAL: 216 MS
EKG Q-T INTERVAL: 434 MS
EKG QRS DURATION: 150 MS
EKG QTC CALCULATION (BAZETT): 481 MS
EKG R AXIS: 14 DEGREES
EKG T AXIS: 119 DEGREES
EKG VENTRICULAR RATE: 74 BPM

## 2021-07-28 PROCEDURE — 93010 ELECTROCARDIOGRAM REPORT: CPT | Performed by: INTERNAL MEDICINE

## 2021-08-16 RX ORDER — BICALUTAMIDE 50 MG/1
TABLET, FILM COATED ORAL
Qty: 90 TABLET | Refills: 0 | Status: SHIPPED | OUTPATIENT
Start: 2021-08-16

## 2021-11-19 ENCOUNTER — HOSPITAL ENCOUNTER (OUTPATIENT)
Dept: RADIATION ONCOLOGY | Facility: MEDICAL CENTER | Age: 81
Discharge: HOME | End: 2021-11-19
Attending: RADIOLOGY
Payer: MEDICARE

## 2021-11-19 VITALS
BODY MASS INDEX: 30.72 KG/M2 | WEIGHT: 196.13 LBS | SYSTOLIC BLOOD PRESSURE: 125 MMHG | OXYGEN SATURATION: 95 % | HEART RATE: 83 BPM | RESPIRATION RATE: 16 BRPM | TEMPERATURE: 97.3 F | DIASTOLIC BLOOD PRESSURE: 61 MMHG

## 2021-11-19 DIAGNOSIS — C61 MALIGNANT NEOPLASM OF PROSTATE (HCC): Primary | ICD-10-CM

## 2021-11-19 PROCEDURE — 99212 OFFICE O/P EST SF 10 MIN: CPT | Performed by: STUDENT IN AN ORGANIZED HEALTH CARE EDUCATION/TRAINING PROGRAM

## 2021-11-19 RX ORDER — DIPHENOXYLATE HYDROCHLORIDE AND ATROPINE SULFATE 2.5; .025 MG/1; MG/1
1 TABLET ORAL 4 TIMES DAILY PRN
Qty: 30 TABLET | Refills: 3 | Status: SHIPPED | OUTPATIENT
Start: 2021-11-19 | End: 2021-12-19

## 2021-11-19 NOTE — PROGRESS NOTES
Radiation Oncology Office Note    Diagnosis/Treatment History:   Cancer Staging  Malignant neoplasm of prostate [facility])  Staging form: Prostate, AJCC 8th Edition  - Clinical stage from 2019: Stage IIC (cT1c, cN0, cM0, PSA: 8.4, Grade Group: 3) - Signed by Rafael Culver MD on 2019    Treatment:  Definitive radiation - 77.4 Gy - completed 2020. He was on Cassodex and was on Eligard, last injection , since then, discontinued. Interval History:   I am seeing this patient in follow-up on behalf of prior Radiation Oncologists who are no longer part of the health system. Mr. Dennis Cooper returns for routine follow-up. Overall, he reports the has has intermittent loose bowel movements. Non bloody. He uses imodium with some relief of the diarrhea. Urinating normally. He follows with Urology. PSAs:  2021 Undetectable  2020 Undetetable     Physical Examination:   /61   Pulse 83   Temp 97.3 °F (36.3 °C) (Temporal)   Resp 16   Wt 196 lb 2 oz (89 kg)   SpO2 95%   BMI 30.72 kg/m²   ECO Asymptomatic  CONSTITUTIONAL: Well developed, well nourished male. Alert and oriented x3. No acute distress. HEENT: Head normocephalic and atraumatic. Extraocular movements intact. NEUROLOGIC EXAM: Cranial nerves II through XII grossly intact. No focal neurologic deficit. Speech is fluent. Gait and posture are steady. PSYCHIATRIC:  Appropriate mood and affect for his clinical situation. Assessment/Plan:  Mr. Dennis Cooper appears to be doing well from an oncologic standpoint with undetectable PSA (based on PSA in August) after definitive radiation to the prostate. In terms of the diarrhea, I prescribed Lomotil. I advised him to discuss this with his PCP if it persists, as it would be unusual to have this symptom this far out from his radiation. I will see the patient back in 6 months with a PSA prior. He should continue follow-up with Urology.     Total time spent on this case on the day of encounter is more than 15 minutes. This time includes combination of one or more of the following - review of necessary tests, review of pertinent medical records from the EMR, performing medically appropriate examination and evaluation, counseling and educating the patient/family/caregiver, ordering necessary medical tests, procedures etc., documenting the clinical information in the electronic medical record, care coordination, referring and communicating with other health care providers and interpretation of results independently.

## 2021-11-19 NOTE — PROGRESS NOTES
[place] Herald  11/19/2021  2:56 PM    Pt here for follow up appointment. PSA less than 0.06. Pt complains of diarrhea x3 weekly. Dr Cid Dust to eval and PSA and follow up visit ordered. Vitals:    11/19/21 1454   BP: 125/61   Pulse: 83   Resp: 16   Temp: 97.3 °F (36.3 °C)   SpO2: 95%    :  Patient Currently in Pain: No             Wt Readings from Last 1 Encounters:   11/19/21 196 lb 2 oz (89 kg)                Current Outpatient Medications:     bicalutamide (CASODEX) 50 MG chemo tablet, TAKE ONE TABLET BY MOUTH DAILY, Disp: 90 tablet, Rfl: 0    clopidogrel (PLAVIX) 75 MG tablet, Take 75 mg by mouth daily, Disp: , Rfl:     ticagrelor (BRILINTA) 90 MG TABS tablet, Take 1 tablet by mouth 2 times daily, Disp: 180 tablet, Rfl: 4    MELOXICAM PO, Take by mouth, Disp: , Rfl:     Multiple Vitamins-Minerals (THERAPEUTIC MULTIVITAMIN-MINERALS) tablet, Take 1 tablet by mouth daily, Disp: , Rfl:     gabapentin (NEURONTIN) 300 MG capsule, Take 1 capsule by mouth 3 times daily for 90 days. (Patient taking differently: Take 300 mg by mouth 2 times daily. ), Disp: 90 capsule, Rfl: 2    ammonium lactate (LAC-HYDRIN) 12 % cream, Apply topically as needed. , Disp: 1 Bottle, Rfl: 3    nitroGLYCERIN (NITROSTAT) 0.4 MG SL tablet, Place 1 tablet under the tongue every 5 minutes as needed for Chest pain up to max of 3 total doses. If no relief after 1 dose, call 911., Disp: 25 tablet, Rfl: 3    fluticasone (FLONASE) 50 MCG/ACT nasal spray, 1 spray by Nasal route daily as needed , Disp: , Rfl:     sertraline (ZOLOFT) 25 MG tablet, Take 25 mg by mouth daily. , Disp: , Rfl:     amLODIPine (NORVASC) 10 MG tablet, Take 10 mg by mouth daily. , Disp: , Rfl:     furosemide (LASIX) 40 MG tablet, Take 40 mg by mouth daily. , Disp: , Rfl:     aspirin EC 81 MG EC tablet, Take 1 tablet by mouth daily. (Patient taking differently: Take 81 mg by mouth daily Pt.  Instructed to stop 7-10 days pre-op/ He did not do this), Disp: 30 tablet, Rfl: 6   simvastatin (ZOCOR) 40 MG tablet, Take 20 mg by mouth nightly , Disp: , Rfl:     alfuzosin (UROXATRAL) 10 MG SR tablet, Take 10 mg by mouth daily. , Disp: , Rfl:     lisinopril (PRINIVIL;ZESTRIL) 40 MG tablet, Take 40 mg by mouth daily. , Disp: , Rfl:     metoprolol (TOPROL-XL) 100 MG XL tablet, Take 100 mg by mouth daily. , Disp: , Rfl:     Immunizations:    Influenza status:    [x]   Current   []   Patient declined    Pneumococcal status:  []   Current  [x]   Patient declined  Covid status:   [x]  Dose #1:                     [x]  Dose #2:               []   Patient declined    Smoking Status:    [] Smoker - PPD:   [] Nonsmoker - Quit Date:               [x] Never a smoker      Cancer Screening:  Colonoscopy   [] Current       [] Not current   [] Not current, but scheduled   [x] NA  Mammogram   [] Current       [] Not current   [] Not current, but scheduled   [x] NA  Prostate           [] Current       [] Not current   [] Not current, but scheduled   [x] NA  PAP/Pelvic      [] Current       [] Not current   [] Not current, but scheduled   [x] NA  Skin                 [] Current       [] Not current   [] Not current, but scheduled   [x] NA     Hormone:  Lupron []   Last dose given:           Next dose due:   Eligard []   Last dose given:           Next dose due:   Aromatase Inhibitors []   Medication name:   N/A:  [x]           FALLS RISK SCREEN  Instructions:  Assess the patient and enter the appropriate indicators that are present for fall risk identification. Total the numbers entered and assign a fall risk score from Table 2.  Reassess patient at a minimum every 12 weeks or with status change. Assessment   Date  11/19/2021     1. Mental Ability: confusion/cognitively impaired 0     2. Elimination Issues: incontinence, frequency 0       3. Ambulatory: use of assistive devices (walker, cane, off-loading devices),        attached to equipment (IV pole, oxygen) 0     4.   Sensory Limitations: dizziness, vertigo, impaired vision 0     5. Age less than 65        0     6. Age 72 or greater 1     7. Medication: diuretics, strong analgesics, hypnotics, sedatives,        antihypertensive agents 0   8. Falls:  recent history of falls within the last 3 months (not to include slipping or        tripping) 0   TOTAL 1    If score of 4 or greater was education given? No           TABLE 2   Risk Score Risk Level Plan of Care   0-3 Little or  No Risk 1. Provide assistance as indicated for ambulation activities  2. Reorient confused/cognitively impaired patient  3. Chair/bed in low position, stretcher/bed with siderails up except when performing patient care activities  5. Educate patient/family/caregiver on falls prevention  6.  Reassess in 12 weeks or with any noted change in patient condition which places them at a risk for a fall   4-6 Moderate Risk 1. Provide assistance as indicated for ambulation activities  2. Reorient confused/cognitively impaired patient  3. Chair/bed in low position, stretcher/bed with siderails up except when performing patient care activities  4. Educate patient/family/caregiver on falls prevention     7 or   Higher High Risk 1. Place patient in easily observable treatment room  2. Patient attended at all times by family member or staff  3. Provide assistance as indicated for ambulation activities  4. Reorient confused/cognitively impaired patient  5. Chair/bed in low position, stretcher/bed with siderails up except when performing patient care activities  6.   Educate patient/family/caregiver on falls prevention         PLAN: Patient is seen today in follow up        Max Johnson RN

## 2021-12-15 DIAGNOSIS — C61 MALIGNANT NEOPLASM OF PROSTATE (HCC): ICD-10-CM

## 2022-02-14 ENCOUNTER — OFFICE VISIT (OUTPATIENT)
Dept: PODIATRY | Age: 82
End: 2022-02-14
Payer: MEDICARE

## 2022-02-14 VITALS — BODY MASS INDEX: 30.76 KG/M2 | WEIGHT: 196 LBS | HEIGHT: 67 IN | RESPIRATION RATE: 18 BRPM

## 2022-02-14 DIAGNOSIS — B35.1 DERMATOPHYTOSIS OF NAIL: Primary | ICD-10-CM

## 2022-02-14 DIAGNOSIS — M79.672 PAIN IN BOTH FEET: ICD-10-CM

## 2022-02-14 DIAGNOSIS — L60.0 INGROWN NAIL: ICD-10-CM

## 2022-02-14 DIAGNOSIS — L85.3 XEROSIS CUTIS: ICD-10-CM

## 2022-02-14 DIAGNOSIS — M79.671 PAIN IN BOTH FEET: ICD-10-CM

## 2022-02-14 DIAGNOSIS — I73.9 PERIPHERAL VASCULAR DISORDER (HCC): ICD-10-CM

## 2022-02-14 PROCEDURE — 99213 OFFICE O/P EST LOW 20 MIN: CPT | Performed by: PODIATRIST

## 2022-02-14 PROCEDURE — 11721 DEBRIDE NAIL 6 OR MORE: CPT | Performed by: PODIATRIST

## 2022-02-14 RX ORDER — MELOXICAM 15 MG/1
TABLET ORAL
COMMUNITY
Start: 2021-12-03

## 2022-02-14 NOTE — PROGRESS NOTES
[address] [facility] PODIATRY [facility]  [address] [place] [address]  Dept: [phone]  Dept Fax: [phone]     PAIN PROGRESS NOTE  Date of patient's visit: 2/14/2022  Patient's Name:  Caio Russell YOB: 1940            Patient Care Team:  Minnie Adams MD as PCP - General Nancy Le DPM as Physician (Podiatry)  Holland Peters MD as Consulting Physician (Urology)  Dick Lawrence DPM as Physician (Podiatry)  Shelbi Emery MD as Consulting Physician (Radiation Oncology)      Chief Complaint   Patient presents with    Nail Problem       Subjective: This Caio Russell comes to clinic for foot and nail care. Pt currently has complaint of thickened, painful, elongated nails that he/she cannot manage by themselves. Pt. Relates pain to nails with shoe gear. Pt's primary care physician is Minnie Adams MD last seen 01/2022. Pt has a new complaint of increased dry skin to rupal feet.     Past Medical History:   Diagnosis Date    Acid reflux     resolved since s/p shona    Anxiety     Arthritis     back/ fingers    BPH (benign prostatic hypertrophy)     CAD (coronary artery disease)     Dr. Gabriela hernandez/ [facility]    Cancer [facility])     face, ear, scalp and arms     Carotid stenosis     bilat    Cataracts, both eyes     CHF (congestive heart failure) (HCC)     Dry skin     Elevated PSA     Examination of participant in clinical trial 4/27/39    For the life of the medtronic device    Hyperlipidemia     pt denies 11-27-19    Hypertension     PCP Dr. Anatoily Fuentes/ last seen 9-2019    Left ventricular dysfunction     Macular degeneration     left    Myocardial infarction ([facility] [address] 11/17/88, 1/2013    Neuropathy     Pacemaker [id number]    X 5    Panic attacks     PVD (peripheral vascular disease) ([facility] [address]     Sinus problem     Snores     Vasovagal near syncope     Wears glasses        Allergies   Allergen Reactions    Percocet [Oxycodone-Acetaminophen] Other (See Comments)     Panic attack     Current Outpatient Medications on File Prior to Visit   Medication Sig Dispense Refill    meloxicam (MOBIC) 15 MG tablet       bicalutamide (CASODEX) 50 MG chemo tablet TAKE ONE TABLET BY MOUTH DAILY 90 tablet 0    clopidogrel (PLAVIX) 75 MG tablet Take 75 mg by mouth daily      ticagrelor (BRILINTA) 90 MG TABS tablet Take 1 tablet by mouth 2 times daily 180 tablet 4    Multiple Vitamins-Minerals (THERAPEUTIC MULTIVITAMIN-MINERALS) tablet Take 1 tablet by mouth daily      ammonium lactate (LAC-HYDRIN) 12 % cream Apply topically as needed. 1 Bottle 3    nitroGLYCERIN (NITROSTAT) 0.4 MG SL tablet Place 1 tablet under the tongue every 5 minutes as needed for Chest pain up to max of 3 total doses. If no relief after 1 dose, call 911. 25 tablet 3    fluticasone (FLONASE) 50 MCG/ACT nasal spray 1 spray by Nasal route daily as needed       sertraline (ZOLOFT) 25 MG tablet Take 25 mg by mouth daily.  amLODIPine (NORVASC) 10 MG tablet Take 10 mg by mouth daily.  furosemide (LASIX) 40 MG tablet Take 40 mg by mouth daily.  aspirin EC 81 MG EC tablet Take 1 tablet by mouth daily. (Patient taking differently: Take 81 mg by mouth daily Pt. Instructed to stop 7-10 days pre-op/ He did not do this) 30 tablet 6    simvastatin (ZOCOR) 40 MG tablet Take 20 mg by mouth nightly       alfuzosin (UROXATRAL) 10 MG SR tablet Take 10 mg by mouth daily.  lisinopril (PRINIVIL;ZESTRIL) 40 MG tablet Take 40 mg by mouth daily.  metoprolol (TOPROL-XL) 100 MG XL tablet Take 100 mg by mouth daily.  gabapentin (NEURONTIN) 300 MG capsule Take 1 capsule by mouth 3 times daily for 90 days. (Patient taking differently: Take 300 mg by mouth 2 times daily. ) 90 capsule 2     No current facility-administered medications on file prior to visit. Review of Systems.     Review of Systems:   History obtained from chart review and the patient  General ROS: negative for - chills, fatigue, fever, night sweats or weight gain  Constitutional: Negative for chills, diaphoresis, fatigue, fever and unexpected weight change. Musculoskeletal: Positive for arthralgias, gait problem and joint swelling. Neurological ROS: negative for - behavioral changes, confusion, headaches or seizures. Negative for weakness and numbness. Dermatological ROS: negative for - mole changes, rash  Cardiovascular: Negative for leg swelling. Gastrointestinal: Negative for constipation, diarrhea, nausea and vomiting. Objective:  Dermatologic Exam:  Skin lesion/ulceration Absent . Skin No rashes or nodules noted. .   Skin is thin, with flaky sloughing skin as well as decreased hair growth to the lower leg  Small red hemosiderin deposits seen dorsal foot   Musculoskeletal:     1st MPJ ROM decreased, Bilateral.  Muscle strength 5/5, Bilateral.  Pain present upon palpation of toenails 1-5, Bilateral. decreased medial longitudinal arch, Bilateral.  Ankle ROM decreased,Bilateral.    Dorsally contracted digits present digits 2, Bilateral.     Vascular: DP pulses 1/4 bilateral.  PT pulses 0/4 bilateral.   CFT <5 seconds, Bilateral.  Hair growth absent to the level of the digits, Bilateral.  Edema present, Bilateral.  Varicosities absent, Bilateral. Erythema absent, Bilateral    Neurological: Sensation diminshed to light touch to level of digits, Bilateral.  Protective sensation intact 6/10 sites via 5.07/10g [place] Monofilament, Bilateral.  negative Tinel's, Bilateral.  negative Valleix sign, Bilateral.      Integument: Warm, dry, supple, Bilateral.  Open lesion absent, Bilateral.  Interdigital maceration absent to web spaces 4, Bilateral.  Nails 1-5 left and 1-5 right thickened > 3.0 mm, dystrophic and crumbly, discolored with subungual debris. Fissures absent, Bilateral.   General: AAO x 3 in NAD.     Derm  Toenail Description  Sites of Onychomycosis were answered in detail. Proper foot hygiene and care was discussed with the pt. Patient to check feet daily and contact the office with any questions/problems/concerns. Other comorbidity noted and will be managed by PCP. Pain waiver discussed with patient and confirmed.    2/14/2022      Electronically signed by Chente De La Garza DPM on 2/14/2022 at 11:19 AM  2/14/2022

## 2022-04-04 RX ORDER — ALFUZOSIN HYDROCHLORIDE 10 MG/1
10 TABLET, EXTENDED RELEASE ORAL DAILY
Qty: 30 TABLET | Refills: 3 | OUTPATIENT
Start: 2022-04-04

## 2022-05-23 ENCOUNTER — HOSPITAL ENCOUNTER (OUTPATIENT)
Age: 82
Setting detail: SPECIMEN
Discharge: HOME | End: 2022-05-23

## 2022-05-23 DIAGNOSIS — C61 MALIGNANT NEOPLASM OF PROSTATE (HCC): ICD-10-CM

## 2022-05-23 LAB — PROSTATE SPECIFIC ANTIGEN: <0.02 NG/ML

## 2022-05-26 ENCOUNTER — HOSPITAL ENCOUNTER (OUTPATIENT)
Dept: RADIATION ONCOLOGY | Facility: MEDICAL CENTER | Age: 82
Discharge: HOME | End: 2022-05-26
Attending: RADIOLOGY
Payer: MEDICARE

## 2022-05-26 VITALS
RESPIRATION RATE: 16 BRPM | BODY MASS INDEX: 30.64 KG/M2 | SYSTOLIC BLOOD PRESSURE: 128 MMHG | OXYGEN SATURATION: 98 % | HEART RATE: 89 BPM | DIASTOLIC BLOOD PRESSURE: 67 MMHG | TEMPERATURE: 97.4 F | WEIGHT: 195.6 LBS

## 2022-05-26 DIAGNOSIS — C61 MALIGNANT NEOPLASM OF PROSTATE (HCC): Primary | ICD-10-CM

## 2022-05-26 PROCEDURE — 99212 OFFICE O/P EST SF 10 MIN: CPT | Performed by: STUDENT IN AN ORGANIZED HEALTH CARE EDUCATION/TRAINING PROGRAM

## 2022-05-26 PROCEDURE — 99213 OFFICE O/P EST LOW 20 MIN: CPT | Performed by: STUDENT IN AN ORGANIZED HEALTH CARE EDUCATION/TRAINING PROGRAM

## 2022-05-26 NOTE — PROGRESS NOTES
Radiation Oncology Office Note    Diagnosis/Treatment History:   Cancer Staging  Malignant neoplasm of prostate [facility])  Staging form: Prostate, AJCC 8th Edition  - Clinical stage from 2019: Stage IIC (cT1c, cN0, cM0, PSA: 8.4, Grade Group: 3) - Signed by Ronni Romo MD on 2019    Treatment:  Definitive radiation - 77.4 Gy - completed 2020. He was on Cassodex and Eligard, last injection , since then, discontinued. Interval History:   I am seeing this patient in follow-up on behalf of prior Radiation Oncologists who are no longer part of the health system. Mr. Filippo Rogers returns for routine follow-up. Urinating normally, aside for a weak stream for which he takes alfuzosin. AUA score 7 He follows with Urology. PSAs:  2022 Undetectable  2021 Undetectable  2020 Undectetable     Physical Examination:   /67   Pulse 89   Temp 97.4 °F (36.3 °C) (Temporal)   Resp 16   Wt 195 lb 9.6 oz (88.7 kg)   SpO2 98%   BMI 30.64 kg/m²   ECO Asymptomatic  CONSTITUTIONAL: Well developed, well nourished male. Alert and oriented x3. No acute distress. HEENT: Head normocephalic and atraumatic. Extraocular movements intact. NEUROLOGIC EXAM: Cranial nerves II through XII grossly intact. No focal neurologic deficit. Speech is fluent. Gait and posture are steady. PSYCHIATRIC:  Appropriate mood and affect for his clinical situation. Assessment/Plan:  Mr. Filippo Rogers appears to be doing well from an oncologic standpoint with undetectable PSA after definitive radiation to the prostate. Patient has follow-up with Urology in August, so wel see the patient back in 6 months after this visit (~2022) with a PSA prior. Total time spent on this case on the day of encounter is more than 15 minutes.  This time includes combination of one or more of the following - review of necessary tests, review of pertinent medical records from the EMR, performing medically appropriate examination and evaluation, counseling and educating the patient/family/caregiver, ordering necessary medical tests, procedures etc., documenting the clinical information in the electronic medical record, care coordination, referring and communicating with other health care providers and interpretation of results independently.

## 2022-05-26 NOTE — PROGRESS NOTES
Gab Vivas  5/26/2022  1:14 PM      Vitals:    05/26/22 1312   BP: 128/67   Pulse: 89   Resp: 16   Temp: 97.4 °F (36.3 °C)   SpO2: 98%    :     Pain Assessment: None - Denies Pain          Wt Readings from Last 1 Encounters:   05/26/22 195 lb 9.6 oz (88.7 kg)                Current Outpatient Medications:     meloxicam (MOBIC) 15 MG tablet, , Disp: , Rfl:     bicalutamide (CASODEX) 50 MG chemo tablet, TAKE ONE TABLET BY MOUTH DAILY, Disp: 90 tablet, Rfl: 0    clopidogrel (PLAVIX) 75 MG tablet, Take 75 mg by mouth daily, Disp: , Rfl:     ticagrelor (BRILINTA) 90 MG TABS tablet, Take 1 tablet by mouth 2 times daily, Disp: 180 tablet, Rfl: 4    Multiple Vitamins-Minerals (THERAPEUTIC MULTIVITAMIN-MINERALS) tablet, Take 1 tablet by mouth daily, Disp: , Rfl:     gabapentin (NEURONTIN) 300 MG capsule, Take 1 capsule by mouth 3 times daily for 90 days. (Patient taking differently: Take 300 mg by mouth 2 times daily. ), Disp: 90 capsule, Rfl: 2    ammonium lactate (LAC-HYDRIN) 12 % cream, Apply topically as needed. , Disp: 1 Bottle, Rfl: 3    nitroGLYCERIN (NITROSTAT) 0.4 MG SL tablet, Place 1 tablet under the tongue every 5 minutes as needed for Chest pain up to max of 3 total doses. If no relief after 1 dose, call 911., Disp: 25 tablet, Rfl: 3    fluticasone (FLONASE) 50 MCG/ACT nasal spray, 1 spray by Nasal route daily as needed , Disp: , Rfl:     sertraline (ZOLOFT) 25 MG tablet, Take 25 mg by mouth daily. , Disp: , Rfl:     amLODIPine (NORVASC) 10 MG tablet, Take 10 mg by mouth daily. , Disp: , Rfl:     furosemide (LASIX) 40 MG tablet, Take 40 mg by mouth daily. , Disp: , Rfl:     aspirin EC 81 MG EC tablet, Take 1 tablet by mouth daily. (Patient taking differently: Take 81 mg by mouth daily Pt.  Instructed to stop 7-10 days pre-op/ He did not do this), Disp: 30 tablet, Rfl: 6    simvastatin (ZOCOR) 40 MG tablet, Take 20 mg by mouth nightly , Disp: , Rfl:     alfuzosin (UROXATRAL) 10 MG SR tablet, Take 10 mg by mouth daily., Disp: , Rfl:     lisinopril (PRINIVIL;ZESTRIL) 40 MG tablet, Take 40 mg by mouth daily. , Disp: , Rfl:     metoprolol (TOPROL-XL) 100 MG XL tablet, Take 100 mg by mouth daily. , Disp: , Rfl:     Immunizations:    Influenza status:    [x]   Current   []   Patient declined    Pneumococcal status:  []   Current  [x]   Patient declined  Covid status:   [x]  Dose #1:                     []  Dose #2:               []   Patient declined    Smoking Status:    [] Smoker - PPD:   [] Nonsmoker - Quit Date:               [x] Never a smoker      Cancer Screening:  Colonoscopy   [] Current       [] Not current   [] Not current, but scheduled   [x] NA  Mammogram   [] Current       [] Not current   [] Not current, but scheduled   [x] NA  Prostate           [x] Current       [] Not current   [] Not current, but scheduled   [] NA  PAP/Pelvic      [] Current       [] Not current   [] Not current, but scheduled   [x] NA  Skin                 [] Current       [] Not current   [] Not current, but scheduled   [x] NA     Hormone: completed ADT therapy  Lupron []   Last dose given:           Next dose due:   Eligard []   Last dose given:           Next dose due:   Aromatase Inhibitors []   Medication name:   N/A:  [x]           FALLS RISK SCREEN  Instructions:  Assess the patient and enter the appropriate indicators that are present for fall risk identification. Total the numbers entered and assign a fall risk score from Table 2.  Reassess patient at a minimum every 12 weeks or with status change. Assessment   Date  5/26/2022     1. Mental Ability: confusion/cognitively impaired 0     2. Elimination Issues: incontinence, frequency 0       3. Ambulatory: use of assistive devices (walker, cane, off-loading devices),        attached to equipment (IV pole, oxygen) 0     4. Sensory Limitations: dizziness, vertigo, impaired vision 0     5. Age less than 65        0     6. Age 72 or greater 1     7.   Medication: diuretics, strong analgesics, hypnotics, sedatives,        antihypertensive agents 0   8. Falls:  recent history of falls within the last 3 months (not to include slipping or        tripping) 0   TOTAL 1    If score of 4 or greater was education given? No           TABLE 2   Risk Score Risk Level Plan of Care   0-3 Little or  No Risk 1. Provide assistance as indicated for ambulation activities  2. Reorient confused/cognitively impaired patient  3. Chair/bed in low position, stretcher/bed with siderails up except when performing patient care activities  5. Educate patient/family/caregiver on falls prevention  6.  Reassess in 12 weeks or with any noted change in patient condition which places them at a risk for a fall   4-6 Moderate Risk 1. Provide assistance as indicated for ambulation activities  2. Reorient confused/cognitively impaired patient  3. Chair/bed in low position, stretcher/bed with siderails up except when performing patient care activities  4. Educate patient/family/caregiver on falls prevention     7 or   Higher High Risk 1. Place patient in easily observable treatment room  2. Patient attended at all times by family member or staff  3. Provide assistance as indicated for ambulation activities  4. Reorient confused/cognitively impaired patient  5. Chair/bed in low position, stretcher/bed with siderails up except when performing patient care activities  6. Educate patient/family/caregiver on falls prevention         PLAN: Patient is seen today in follow up from Radiation Therapy for prostate cancer. Last PSA undetectable on 5/23/22. States feels well without complaints. AUA assessment form completed by patient. Dr. Dayanna Arboleda examined patient. He will follow up in February 2023 with PSA.         Elysia Anderson RN

## 2022-05-29 ENCOUNTER — HOSPITAL ENCOUNTER (EMERGENCY)
Age: 82
Discharge: HOME | End: 2022-05-29
Attending: EMERGENCY MEDICINE
Payer: MEDICARE

## 2022-05-29 ENCOUNTER — APPOINTMENT (OUTPATIENT)
Dept: GENERAL RADIOLOGY | Age: 82
End: 2022-05-29
Payer: MEDICARE

## 2022-05-29 VITALS
BODY MASS INDEX: 28.79 KG/M2 | DIASTOLIC BLOOD PRESSURE: 73 MMHG | OXYGEN SATURATION: 94 % | TEMPERATURE: 98.4 F | HEART RATE: 70 BPM | HEIGHT: 68 IN | SYSTOLIC BLOOD PRESSURE: 145 MMHG | WEIGHT: 190 LBS | RESPIRATION RATE: 15 BRPM

## 2022-05-29 DIAGNOSIS — J02.9 VIRAL PHARYNGITIS: ICD-10-CM

## 2022-05-29 DIAGNOSIS — U07.1 COVID-19: Primary | ICD-10-CM

## 2022-05-29 DIAGNOSIS — U07.1 COVID: ICD-10-CM

## 2022-05-29 LAB
ABSOLUTE EOS #: 0.12 K/UL (ref 0–0.4)
ABSOLUTE IMMATURE GRANULOCYTE: 0 K/UL (ref 0–0.3)
ABSOLUTE LYMPH #: 0.46 K/UL (ref 1–4.8)
ABSOLUTE MONO #: 0.38 K/UL (ref 0.2–0.8)
ANION GAP SERPL CALCULATED.3IONS-SCNC: 7 MMOL/L (ref 9–17)
BASOPHILS # BLD: 1 %
BASOPHILS ABSOLUTE: 0.03 K/UL (ref 0–0.2)
BUN BLDV-MCNC: 13 MG/DL (ref 8–23)
BUN/CREAT BLD: 15 (ref 9–20)
CALCIUM SERPL-MCNC: 8.1 MG/DL (ref 8.6–10.4)
CHLORIDE BLD-SCNC: 99 MMOL/L (ref 98–107)
CO2: 30 MMOL/L (ref 20–31)
CREAT SERPL-MCNC: 0.88 MG/DL (ref 0.7–1.2)
EOSINOPHILS RELATIVE PERCENT: 4 % (ref 1–4)
GFR AFRICAN AMERICAN: >60 ML/MIN
GFR NON-AFRICAN AMERICAN: >60 ML/MIN
GFR SERPL CREATININE-BSD FRML MDRD: ABNORMAL ML/MIN/{1.73_M2}
GLUCOSE BLD-MCNC: 116 MG/DL (ref 70–99)
HCT VFR BLD CALC: 38.7 % (ref 40.7–50.3)
HEMOGLOBIN: 12.8 G/DL (ref 13–17)
IMMATURE GRANULOCYTES: 0 %
LYMPHOCYTES # BLD: 16 % (ref 24–44)
MAGNESIUM: 2.2 MG/DL (ref 1.6–2.6)
MCH RBC QN AUTO: 29.6 PG (ref 25.2–33.5)
MCHC RBC AUTO-ENTMCNC: 33.1 G/DL (ref 28–38)
MCV RBC AUTO: 89.4 FL (ref 82.6–102.9)
MONOCYTES # BLD: 13 % (ref 1–7)
PDW BLD-RTO: 13.5 % (ref 11.8–14.4)
PLATELET # BLD: 155 K/UL (ref 138–453)
PMV BLD AUTO: 10 FL (ref 8.1–13.5)
POTASSIUM SERPL-SCNC: 4.1 MMOL/L (ref 3.7–5.3)
PRO-BNP: 2075 PG/ML
RBC # BLD: 4.33 M/UL (ref 4.21–5.77)
S PYO AG THROAT QL: NEGATIVE
SARS-COV-2, RAPID: DETECTED
SEG NEUTROPHILS: 66 % (ref 36–66)
SEGMENTED NEUTROPHILS ABSOLUTE COUNT: 1.91 K/UL (ref 1.8–7.7)
SODIUM BLD-SCNC: 136 MMOL/L (ref 135–144)
SOURCE: NORMAL
SPECIMEN DESCRIPTION: ABNORMAL
TROPONIN, HIGH SENSITIVITY: 15 NG/L (ref 0–22)
WBC # BLD: 2.9 K/UL (ref 3.5–11.3)

## 2022-05-29 PROCEDURE — 84484 ASSAY OF TROPONIN QUANT: CPT

## 2022-05-29 PROCEDURE — 6360000002 HC RX W HCPCS: Performed by: NURSE PRACTITIONER

## 2022-05-29 PROCEDURE — 85025 COMPLETE CBC W/AUTO DIFF WBC: CPT

## 2022-05-29 PROCEDURE — 99285 EMERGENCY DEPT VISIT HI MDM: CPT

## 2022-05-29 PROCEDURE — 93005 ELECTROCARDIOGRAM TRACING: CPT | Performed by: NURSE PRACTITIONER

## 2022-05-29 PROCEDURE — 80048 BASIC METABOLIC PNL TOTAL CA: CPT

## 2022-05-29 PROCEDURE — 83735 ASSAY OF MAGNESIUM: CPT

## 2022-05-29 PROCEDURE — 83880 ASSAY OF NATRIURETIC PEPTIDE: CPT

## 2022-05-29 PROCEDURE — 87635 SARS-COV-2 COVID-19 AMP PRB: CPT

## 2022-05-29 PROCEDURE — 71045 X-RAY EXAM CHEST 1 VIEW: CPT

## 2022-05-29 PROCEDURE — 87880 STREP A ASSAY W/OPTIC: CPT

## 2022-05-29 RX ORDER — SODIUM CHLORIDE 9 MG/ML
5-250 INJECTION, SOLUTION INTRAVENOUS PRN
OUTPATIENT
Start: 2022-05-29

## 2022-05-29 RX ORDER — BEBTELOVIMAB 87.5 MG/ML
175 INJECTION, SOLUTION INTRAVENOUS ONCE
Status: CANCELLED | OUTPATIENT
Start: 2022-05-29 | End: 2022-05-29

## 2022-05-29 RX ORDER — DIPHENHYDRAMINE HYDROCHLORIDE 50 MG/ML
50 INJECTION INTRAMUSCULAR; INTRAVENOUS
OUTPATIENT
Start: 2022-05-29

## 2022-05-29 RX ORDER — DEXAMETHASONE SODIUM PHOSPHATE 10 MG/ML
6 INJECTION, SOLUTION INTRAMUSCULAR; INTRAVENOUS ONCE
Status: COMPLETED | OUTPATIENT
Start: 2022-05-29 | End: 2022-05-29

## 2022-05-29 RX ORDER — SODIUM CHLORIDE 9 MG/ML
INJECTION, SOLUTION INTRAVENOUS CONTINUOUS
OUTPATIENT
Start: 2022-05-29

## 2022-05-29 RX ORDER — ACETAMINOPHEN 325 MG/1
650 TABLET ORAL
OUTPATIENT
Start: 2022-05-29

## 2022-05-29 RX ORDER — HEPARIN SODIUM (PORCINE) LOCK FLUSH IV SOLN 100 UNIT/ML 100 UNIT/ML
500 SOLUTION INTRAVENOUS PRN
OUTPATIENT
Start: 2022-05-29

## 2022-05-29 RX ORDER — ONDANSETRON 2 MG/ML
8 INJECTION INTRAMUSCULAR; INTRAVENOUS
OUTPATIENT
Start: 2022-05-29

## 2022-05-29 RX ORDER — BENZOCAINE/MENTHOL 6 MG-10 MG
1 LOZENGE MUCOUS MEMBRANE
Qty: 36 LOZENGE | Refills: 0 | Status: SHIPPED | OUTPATIENT
Start: 2022-05-29

## 2022-05-29 RX ORDER — SODIUM CHLORIDE 0.9 % (FLUSH) 0.9 %
5-40 SYRINGE (ML) INJECTION PRN
Status: CANCELLED | OUTPATIENT
Start: 2022-05-29

## 2022-05-29 RX ORDER — ALBUTEROL SULFATE 90 UG/1
4 AEROSOL, METERED RESPIRATORY (INHALATION) PRN
OUTPATIENT
Start: 2022-05-29

## 2022-05-29 RX ORDER — EPINEPHRINE 1 MG/ML
0.3 INJECTION, SOLUTION, CONCENTRATE INTRAVENOUS PRN
OUTPATIENT
Start: 2022-05-29

## 2022-05-29 RX ADMIN — DEXAMETHASONE SODIUM PHOSPHATE 6 MG: 10 INJECTION, SOLUTION INTRAMUSCULAR; INTRAVENOUS at 19:24

## 2022-05-29 ASSESSMENT — ENCOUNTER SYMPTOMS
SHORTNESS OF BREATH: 1
WHEEZING: 0
NAUSEA: 0
VOMITING: 0
COUGH: 0
ABDOMINAL PAIN: 0
TROUBLE SWALLOWING: 0
RHINORRHEA: 1
SORE THROAT: 1

## 2022-05-29 ASSESSMENT — PAIN - FUNCTIONAL ASSESSMENT: PAIN_FUNCTIONAL_ASSESSMENT: NONE - DENIES PAIN

## 2022-05-29 NOTE — ED PROVIDER NOTES
[address] [place] [address] ED  EMERGENCY DEPARTMENT ENCOUNTER      Pt Name: Ashvin Tubbs  MRN: [id number]  [place] 1940  Date of evaluation: 5/29/2022  Provider: RAVI Marr CNP    CHIEF COMPLAINT       Chief Complaint   Patient presents with    Pharyngitis     wife tested positive Covid 23 yesterday and admitted         HISTORY OFPRESENT ILLNESS  (Location/Symptom, Timing/Onset, Context/Setting, Quality, Duration, Modifying Factors, Severity.)   Ashvin Tubbs is a 79 y.o. male who presents to the emergency department private auto for evaluation of sore throat, congestion, and dyspnea with exertion that started yesterday. Patient states that his wife tested positive for COVID yesterday and was admitted here to the hospital.  He states him and his wife received their 2 COVID vaccines have not had the boosters. Throat is sore but he has no difficulty swallowing. Denies chest pain cough, abdominal pain nausea or vomiting. No headache or dizziness. History of CAD with 5 stents, on Brilinta, CHF, AICD, BPH. Nursing Notes were reviewed.     PASTMEDICAL HISTORY     Past Medical History:   Diagnosis Date    Acid reflux     resolved since s/p shona    Anxiety     Arthritis     back/ fingers    BPH (benign prostatic hypertrophy)     CAD (coronary artery disease)     Dr. Jhonny hernandez/ [facility]    Cancer [facility])     face, ear, scalp and arms     Carotid stenosis     bilat    Cataracts, both eyes     CHF (congestive heart failure) (HCC)     Dry skin     Elevated PSA     Examination of participant in clinical trial 4/27/39    For the life of the medtronic device    Hyperlipidemia     pt denies 11-27-19    Hypertension     PCP Dr. Sari Fuentes/ last seen 9-2019    Left ventricular dysfunction     Macular degeneration     left    Myocardial infarction ([facility] [address] 11/17/88, 1/2013    Neuropathy     Pacemaker [id number]    X 5    Panic attacks     PVD (peripheral vascular disease) ([facility] [address]     Sinus problem  Snores     Vasovagal near syncope     Wears glasses          SURGICAL HISTORY       Past Surgical History:   Procedure Laterality Date    CARDIAC CATHETERIZATION      several/ stents X 3/ angioplasty    CARDIAC DEFIBRILLATOR PLACEMENT  2013    Up graded PPM to ICD   [address]    double bypass [address], LAPAROSCOPIC  03/03/2014    [facility]    COLONOSCOPY      EYE SURGERY Bilateral     cataracts   [address], 2001, 2008, 2013, 7/1/2019 7/1/2019 is AICD    PACEMAKER PLACEMENT      pacer/ AICD/ Newest 7-1-19,medtronic aicd last check 8/20/19. pacemaker x5.    PROSTATE BIOPSY  09/24/2019    as local    PROSTATE BIOPSY N/A 9/24/2019    PROSTATE BIOPSY WITH ULTRASOUND (OFFICE NOTIFIED DEPT) performed by Vena Duane, MD at [facility]     Previous Medications    ALFUZOSIN (UROXATRAL) 10 MG SR TABLET    Take 10 mg by mouth daily. AMLODIPINE (NORVASC) 10 MG TABLET    Take 10 mg by mouth daily. AMMONIUM LACTATE (LAC-HYDRIN) 12 % CREAM    Apply topically as needed. ASPIRIN EC 81 MG EC TABLET    Take 1 tablet by mouth daily. BICALUTAMIDE (CASODEX) 50 MG CHEMO TABLET    TAKE ONE TABLET BY MOUTH DAILY    CLOPIDOGREL (PLAVIX) 75 MG TABLET    Take 75 mg by mouth daily    FLUTICASONE (FLONASE) 50 MCG/ACT NASAL SPRAY    1 spray by Nasal route daily as needed     FUROSEMIDE (LASIX) 40 MG TABLET    Take 40 mg by mouth daily. GABAPENTIN (NEURONTIN) 300 MG CAPSULE    Take 1 capsule by mouth 3 times daily for 90 days. LISINOPRIL (PRINIVIL;ZESTRIL) 40 MG TABLET    Take 40 mg by mouth daily. MELOXICAM (MOBIC) 15 MG TABLET        METOPROLOL (TOPROL-XL) 100 MG XL TABLET    Take 100 mg by mouth daily.     MULTIPLE VITAMINS-MINERALS (THERAPEUTIC MULTIVITAMIN-MINERALS) TABLET    Take 1 tablet by mouth daily    NITROGLYCERIN (NITROSTAT) 0.4 MG SL TABLET    Place 1 tablet under the tongue every 5 minutes as needed for Chest pain up to max of 3 total doses. If no relief after 1 dose, call 911. SERTRALINE (ZOLOFT) 25 MG TABLET    Take 25 mg by mouth daily. SIMVASTATIN (ZOCOR) 40 MG TABLET    Take 20 mg by mouth nightly     TICAGRELOR (BRILINTA) 90 MG TABS TABLET    Take 1 tablet by mouth 2 times daily       ALLERGIES     Percocet [oxycodone-acetaminophen]    FAMILY HISTORY       Family History   Problem Relation Age of Onset    High Blood Pressure Mother     Cancer Mother     High Blood Pressure Father     Diabetes Sister     Cancer Sister     High Blood Pressure Sister     Heart Disease Brother     High Blood Pressure Brother           SOCIAL HISTORY       Social History     Socioeconomic History    Marital status:      Spouse name: Not on file    Number of children: Not on file    Years of education: Not on file    Highest education level: Not on file   Occupational History    Not on file   Tobacco Use    Smoking status: Never Smoker    Smokeless tobacco: Never Used   Vaping Use    Vaping Use: Never used   Substance and Sexual Activity    Alcohol use: Yes     Alcohol/week: 2.0 standard drinks     Types: 2 Cans of beer per week     Comment:  weekly    Drug use: No    Sexual activity: Not on file   Other Topics Concern    Not on file   Social History Narrative    Not on file     Social Determinants of Health     Financial Resource Strain:     Difficulty of Paying Living Expenses: Not on file   Food Insecurity:     Worried About Running Out of Food in the Last Year: Not on file    Ken of Food in the Last Year: Not on file   Transportation Needs:     Lack of Transportation (Medical): Not on file    Lack of Transportation (Non-Medical):  Not on file   Physical Activity:     Days of Exercise per Week: Not on file    Minutes of Exercise per Session: Not on file   Stress:     Feeling of Stress : Not on file   Social Connections:     Frequency of Communication with Friends and Family: Not on file  Frequency of Social Gatherings with Friends and Family: Not on file    Attends Restoration Services: Not on file    Active Member of Clubs or Organizations: Not on file    Attends Club or Organization Meetings: Not on file    Marital Status: Not on file   Intimate Partner Violence:     Fear of Current or Ex-Partner: Not on file    Emotionally Abused: Not on file    Physically Abused: Not on file    Sexually Abused: Not on file   Housing Stability:     Unable to Pay for Housing in the Last Year: Not on file    Number of [place] in the Last Year: Not on file    Unstable Housing in the Last Year: Not on file         REVIEW OF SYSTEMS    (2-9 systems for level 4, 10 or more for level 5)     Review of Systems   Constitutional: Positive for activity change and fatigue. Negative for fever. HENT: Positive for congestion, rhinorrhea and sore throat. Negative for trouble swallowing. Respiratory: Positive for shortness of breath. Negative for cough and wheezing. Cardiovascular: Negative for chest pain and leg swelling. Gastrointestinal: Negative for abdominal pain, nausea and vomiting. Neurological: Negative for dizziness, weakness, light-headedness and headaches. All other systems reviewed and are negative. Except as noted above the remainder of the review of systems was reviewed and negative. PHYSICAL EXAM    (up to 7 for level 4, 8 or more for level 5)     ED Triage Vitals [05/29/22 1639]   BP Temp Temp Source Heart Rate Resp SpO2 Height Weight   97/74 98.4 °F (36.9 °C) Oral 76 16 95 % 5' 7.5\" (1.715 m) 190 lb (86.2 kg)       Physical Exam  Constitutional:       Appearance: Normal appearance. He is well-developed, well-groomed and overweight. HENT:      Head: Normocephalic. Right Ear: Hearing and external ear normal.      Left Ear: Hearing and external ear normal.      Nose: Congestion present. Right Turbinates: Enlarged. Left Turbinates: Enlarged.       Mouth/Throat: Lips: Pink. Mouth: Mucous membranes are moist.      Pharynx: Uvula midline. Pharyngeal swelling and posterior oropharyngeal erythema present. No oropharyngeal exudate. Tonsils: No tonsillar exudate. 2+ on the right. 2+ on the left. Comments: Throat is erythematous with mild swelling. Uvula is midline. Tonsils 2+ symmetrically. Patient controlling his secretions. Eyes:      Extraocular Movements: Extraocular movements intact. Conjunctiva/sclera: Conjunctivae normal.      Pupils: Pupils are equal, round, and reactive to light. Cardiovascular:      Rate and Rhythm: Normal rate and regular rhythm. Heart sounds: Normal heart sounds. Pulmonary:      Effort: Tachypnea present. Breath sounds: Examination of the right-lower field reveals rhonchi. Examination of the left-lower field reveals rhonchi. Rhonchi present. No decreased breath sounds, wheezing or rales. Abdominal:      General: Bowel sounds are normal.      Palpations: Abdomen is soft. Tenderness: There is no abdominal tenderness. There is no guarding or rebound. Musculoskeletal:         General: Normal range of motion. Cervical back: Normal range of motion and neck supple. Right lower leg: No edema. Left lower leg: No edema. Skin:     General: Skin is warm and dry. Capillary Refill: Capillary refill takes less than 2 seconds. Neurological:      Mental Status: He is alert and oriented to person, place, and time. Psychiatric:         Mood and Affect: Mood normal.         Behavior: Behavior normal.         Thought Content:  Thought content normal.         Judgment: Judgment normal.           DIAGNOSTIC RESULTS     EKG:All EKG's are interpreted by the Emergency Department Physician who either signs or Co-signs this chart in the absence of a cardiologist.    EKG interpreted by attending physician    RADIOLOGY:   Non-plain film images such as CT, Ultrasound and MRI are read by theradiologist. Plain radiographic images are visualized and preliminarily interpreted by the emergency physician with the below findings:    XR CHEST PORTABLE    Result Date: 5/29/2022  EXAMINATION: ONE XRAY VIEW OF THE CHEST 5/29/2022 2:20 pm COMPARISON: 07/26/2021 HISTORY: ORDERING SYSTEM PROVIDED HISTORY: sob TECHNOLOGIST PROVIDED HISTORY: sob Reason for Exam: sore throat, runny nose x 3 days FINDINGS: Cardiac size is enlarged. No acute infiltrates are seen . The pulmonary vascularity is unremarkable. No pneumothorax. No pleural effusions identified . Postsurgical changes overlying the mediastinum. Stable ICD leads     No acute cardiopulmonary disease. Stable cardiomegaly     Interpretation per the Radiologist below, if available at the time of this note:    XR CHEST PORTABLE   Final Result   No acute cardiopulmonary disease. Stable cardiomegaly               EDBEDSIDE ULTRASOUND:   Performed by Mahesh Gomez - pedro    LABS:  Labs Reviewed   COVID-19, RAPID - Abnormal; Notable for the following components:       Result Value    SARS-CoV-2, Rapid DETECTED (*)     All other components within normal limits   CBC WITH AUTO DIFFERENTIAL - Abnormal; Notable for the following components:    WBC 2.9 (*)     Hemoglobin 12.8 (*)     Hematocrit 38.7 (*)     Lymphocytes 16 (*)     Monocytes 13 (*)     Absolute Lymph # 0.46 (*)     All other components within normal limits   BASIC METABOLIC PANEL W/ REFLEX TO MG FOR LOW K - Abnormal; Notable for the following components:    Glucose 116 (*)     Calcium 8.1 (*)     Anion Gap 7 (*)     All other components within normal limits   BRAIN NATRIURETIC PEPTIDE - Abnormal; Notable for the following components:    Pro-BNP 2,075 (*)     All other components within normal limits   STREP SCREEN GROUP A THROAT   TROPONIN   MAGNESIUM       All other labs were within normal range or not returned as of this dictation.     EMERGENCY DEPARTMENT COURSE andDIFFERENTIAL DIAGNOSIS/MDM:   Patient evaluated conjunction attending physician. Strep screen negative. Patient is controlling his secretions. He was given oral Decadron for throat pain. Rapid COVID positive. Chest x-ray no acute cardiopulmonary disease. Stable cardiomegaly. Labs reviewed. No chest pain. Afebrile. SPO2 95% on room air. Blood pressure stable. He is not tachycardic. I spoke with pharmacist at [facility] and patient will be set up to have antibody therapy for COVID. He will be called on Tuesday to scheduled. I discussed test results and plan with the patient. Return precautions provided. Prescription written for lozenges. Vitals:    Vitals:    05/29/22 1639 05/29/22 1813   BP: 97/74 (!) 145/73   Pulse: 76 70   Resp: 16 15   Temp: 98.4 °F (36.9 °C)    TempSrc: Oral    SpO2: 95% 94%   Weight: 190 lb (86.2 kg)    Height: 5' 7.5\" (1.715 m)          CONSULTS:  None    RES:  Procedures    FINAL IMPRESSION      1. COVID-19    2.  Viral pharyngitis          DISPOSITION/PLAN   DISPOSITION Decision To Discharge 05/29/2022 06:51:39 PM      PATIENT REFERRED TO:   Consuelo Villarreal MD  [address] [place], [place] [id number]  [phone]    In 1 week      [facility] ED  [address] [facility]  [phone]    If symptoms worsen      DISCHARGE MEDICATIONS:     New Prescriptions    BENZOCAINE-MENTHOL (SORE THROAT LOZENGES) 6-10 MG LOZG LOZENGE    Take 1 lozenge by mouth every 2 hours as needed for Sore Throat     Electronically signed by RAVI Weaver 5/29/2022 at 7:42 PM            RAVI Weaver CNP  05/29/22 1944

## 2022-05-29 NOTE — ED PROVIDER NOTES
eMERGENCY dEPARTMENT eNCOUnter   Independent Attestation     Pt Name: Louis Francois  MRN: [id number]  [place] 1940  Date of evaluation: 5/29/22     Louis Francois is a 79 y.o. male with CC: Pharyngitis (wife tested positive Covid 19 yesterday and admitted)    Sore throat, cough, mild dyspnea on exertion. Family known COVID-positive. Patient COVID-positive here. Hemodynamically stable with no hypoxia. EKG stable compared to prior. Troponin not elevated. BNP elevated, however chest x-ray unremarkable. Per physical exam done by midlevel no swelling or crackles. Other labs are unremarkable. Leukopenia with lymphocytopenia characteristic of COVID infection and this is not concerning at this time. I do not believe that he meets any admission criteria for the COVID-19 infection. However I do believe that he would benefit from monoclonal antibodies and the inpatient pharmacy at [facility] was contacted. Patient discharged home with pulse oximeter and encouraged return for any new or worsening symptoms. This visit was performed by both a physician and an APC. I performed all aspects of the MDM as documented. The care is provided during an unprecedented national emergency due to the novel coronavirus, COVID 19.     Elvira Fuentes DO  Attending Emergency Physician                    Lorie Durbin DO  05/29/22 4826

## 2022-05-31 ENCOUNTER — HOSPITAL ENCOUNTER (OUTPATIENT)
Dept: INFUSION THERAPY | Age: 82
Setting detail: INFUSION SERIES
Discharge: HOME | End: 2022-05-31
Attending: INTERNAL MEDICINE | Admitting: INTERNAL MEDICINE
Payer: MEDICARE

## 2022-05-31 ENCOUNTER — CARE COORDINATION (OUTPATIENT)
Dept: CARE COORDINATION | Age: 82
End: 2022-05-31

## 2022-05-31 VITALS
OXYGEN SATURATION: 95 % | HEART RATE: 71 BPM | DIASTOLIC BLOOD PRESSURE: 68 MMHG | TEMPERATURE: 97.7 F | RESPIRATION RATE: 16 BRPM | SYSTOLIC BLOOD PRESSURE: 121 MMHG

## 2022-05-31 DIAGNOSIS — U07.1 COVID: Primary | ICD-10-CM

## 2022-05-31 DIAGNOSIS — U07.1 COVID-19: ICD-10-CM

## 2022-05-31 LAB
EKG ATRIAL RATE: 71 BPM
EKG P AXIS: 27 DEGREES
EKG P-R INTERVAL: 208 MS
EKG Q-T INTERVAL: 442 MS
EKG QRS DURATION: 152 MS
EKG QTC CALCULATION (BAZETT): 480 MS
EKG R AXIS: 6 DEGREES
EKG T AXIS: 138 DEGREES
EKG VENTRICULAR RATE: 71 BPM

## 2022-05-31 PROCEDURE — 6360000002 HC RX W HCPCS: Performed by: NURSE PRACTITIONER

## 2022-05-31 PROCEDURE — M0222 HC BEBTELOVIMAB INJECTION: HCPCS

## 2022-05-31 PROCEDURE — 96374 THER/PROPH/DIAG INJ IV PUSH: CPT

## 2022-05-31 PROCEDURE — 2580000003 HC RX 258: Performed by: NURSE PRACTITIONER

## 2022-05-31 RX ORDER — HEPARIN SODIUM (PORCINE) LOCK FLUSH IV SOLN 100 UNIT/ML 100 UNIT/ML
500 SOLUTION INTRAVENOUS PRN
OUTPATIENT
Start: 2022-05-31

## 2022-05-31 RX ORDER — SODIUM CHLORIDE 0.9 % (FLUSH) 0.9 %
5-40 SYRINGE (ML) INJECTION PRN
Status: DISCONTINUED | OUTPATIENT
Start: 2022-05-31 | End: 2022-06-01 | Stop reason: HOSPADM

## 2022-05-31 RX ORDER — FAMOTIDINE 10 MG/ML
20 INJECTION, SOLUTION INTRAVENOUS
OUTPATIENT
Start: 2022-05-31

## 2022-05-31 RX ORDER — BEBTELOVIMAB 87.5 MG/ML
175 INJECTION, SOLUTION INTRAVENOUS ONCE
Status: COMPLETED | OUTPATIENT
Start: 2022-05-31 | End: 2022-05-31

## 2022-05-31 RX ORDER — SODIUM CHLORIDE 9 MG/ML
5-250 INJECTION, SOLUTION INTRAVENOUS PRN
OUTPATIENT
Start: 2022-05-31

## 2022-05-31 RX ORDER — ACETAMINOPHEN 325 MG/1
650 TABLET ORAL
OUTPATIENT
Start: 2022-05-31

## 2022-05-31 RX ORDER — SODIUM CHLORIDE 0.9 % (FLUSH) 0.9 %
5-40 SYRINGE (ML) INJECTION PRN
Status: CANCELLED | OUTPATIENT
Start: 2022-05-31

## 2022-05-31 RX ORDER — DIPHENHYDRAMINE HYDROCHLORIDE 50 MG/ML
50 INJECTION INTRAMUSCULAR; INTRAVENOUS
OUTPATIENT
Start: 2022-05-31

## 2022-05-31 RX ORDER — ONDANSETRON 2 MG/ML
8 INJECTION INTRAMUSCULAR; INTRAVENOUS
OUTPATIENT
Start: 2022-05-31

## 2022-05-31 RX ORDER — SODIUM CHLORIDE 9 MG/ML
INJECTION, SOLUTION INTRAVENOUS CONTINUOUS
OUTPATIENT
Start: 2022-05-31

## 2022-05-31 RX ORDER — ALBUTEROL SULFATE 90 UG/1
4 AEROSOL, METERED RESPIRATORY (INHALATION) PRN
OUTPATIENT
Start: 2022-05-31

## 2022-05-31 RX ORDER — BEBTELOVIMAB 87.5 MG/ML
175 INJECTION, SOLUTION INTRAVENOUS ONCE
Status: CANCELLED | OUTPATIENT
Start: 2022-05-31 | End: 2022-05-31

## 2022-05-31 RX ORDER — EPINEPHRINE 1 MG/ML
0.3 INJECTION, SOLUTION, CONCENTRATE INTRAVENOUS PRN
OUTPATIENT
Start: 2022-05-31

## 2022-05-31 RX ADMIN — BEBTELOVIMAB 175 MG: 87.5 INJECTION, SOLUTION INTRAVENOUS at 16:30

## 2022-05-31 RX ADMIN — SODIUM CHLORIDE, PRESERVATIVE FREE 10 ML: 5 INJECTION INTRAVENOUS at 16:31

## 2022-05-31 NOTE — CARE COORDINATION
Patient contacted regarding recent visit for viral symptoms. Call within 2 business days of discharge: Yes    Medical Assistant contacted the patient by telephone to perform follow-up call. Verified name and  with patient as identifiers. Provided introduction to self, and reason for call due to viral symptoms of infection and/or exposure to COVID-19. Discussed COVID-19 related testing which was available at this time. Test results were positive. Patient informed of results, if available? Yes. Patient presented to emergency department/flu clinic with complaints of viral symptoms/exposure to COVID. Patient reports symptoms are improving. Due to no new or worsening symptoms the RN CTN/ACM was not notified for escalation. This author reviewed discharge instructions, medical action plan and red flags such as increased shortness of breath, increasing fever, worsening cough or chest pain with patient who verbalized understanding. Discussed exposure protocols and quarantine with CDC Guidelines What To Do If You Are Sick    Patient was given an opportunity for questions and concerns. The patient agrees to contact their healthcare provider for questions related to their healthcare. Author provided contact information for future reference. Pt will go today 22 at 4 pm for infusion treatment.

## 2022-05-31 NOTE — DISCHARGE SUMMARY
Patient here for Covid 19 Monoclonal antibody infusion. Patient observed for one hour post infusion. Patient tolerated well with vital signs as charted. Patient given discharge instructions and education. Patient received pulse ox with education on its use.

## 2022-08-01 ENCOUNTER — OFFICE VISIT (OUTPATIENT)
Dept: PODIATRY | Age: 82
End: 2022-08-01
Payer: MEDICARE

## 2022-08-01 VITALS — BODY MASS INDEX: 29.82 KG/M2 | WEIGHT: 190 LBS | HEIGHT: 67 IN

## 2022-08-01 DIAGNOSIS — L60.0 INGROWN NAIL: ICD-10-CM

## 2022-08-01 DIAGNOSIS — B35.1 DERMATOPHYTOSIS OF NAIL: Primary | ICD-10-CM

## 2022-08-01 DIAGNOSIS — L85.3 XEROSIS CUTIS: ICD-10-CM

## 2022-08-01 DIAGNOSIS — M79.671 PAIN IN BOTH FEET: ICD-10-CM

## 2022-08-01 DIAGNOSIS — G60.9 IDIOPATHIC PERIPHERAL NEUROPATHY: ICD-10-CM

## 2022-08-01 DIAGNOSIS — M79.672 PAIN IN BOTH FEET: ICD-10-CM

## 2022-08-01 DIAGNOSIS — I73.9 PERIPHERAL VASCULAR DISORDER (HCC): ICD-10-CM

## 2022-08-01 PROCEDURE — 11721 DEBRIDE NAIL 6 OR MORE: CPT | Performed by: PODIATRIST

## 2022-08-01 PROCEDURE — 1123F ACP DISCUSS/DSCN MKR DOCD: CPT | Performed by: PODIATRIST

## 2022-08-01 PROCEDURE — 99213 OFFICE O/P EST LOW 20 MIN: CPT | Performed by: PODIATRIST

## 2022-08-01 RX ORDER — GABAPENTIN 600 MG/1
600 TABLET ORAL 2 TIMES DAILY
Qty: 60 TABLET | Refills: 3 | Status: SHIPPED | OUTPATIENT
Start: 2022-08-01 | End: 2022-11-29

## 2022-08-01 NOTE — PROGRESS NOTES
[address] [facility] PODIATRY [facility]  [address] [place] [address]  Dept: [phone]  Dept Fax: [phone]     PAIN PROGRESS NOTE  Date of patient's visit: 8/1/2022  Patient's Name:  Raf Mendez YOB: 1940            Patient Care Team:  Geeta Evans MD as PCP - [address] Galeana DPM as Physician ([place])  Neehmiah Blood MD as Consulting Physician (Urology)  Josh Raza DPM as Physician (Podiatry)  Evelyn Alvarez MD as Consulting Physician (Radiation Oncology)      Chief Complaint   Patient presents with    Nail Problem     Toe nail trim    Benign Neoplasm     Bilateral feet    Foot Pain     Bilateral feet       Subjective: This Raf Mendez comes to clinic for foot and nail care. Pt currently has complaint of thickened, painful, elongated nails that he/she cannot manage by themselves. Pt. Relates pain to nails with shoe gear. Pt's primary care physician is Geeta Evans MD last seen 06/2022. Pt has a new complaint of increased neuropathy pain with increased dry skin.   Pt has tried changing shoes but it has not helped the pain    Past Medical History:   Diagnosis Date    Acid reflux     resolved since s/p shona    Anxiety     Arthritis     back/ fingers    BPH (benign prostatic hypertrophy)     CAD (coronary artery disease)     Dr. Artemio hernandez/ [facility]    Cancer [facility])     face, ear, scalp and arms     Carotid stenosis     bilat    Cataracts, both eyes     CHF (congestive heart failure) ([address]     Dry skin     Elevated PSA     Examination of participant in clinical trial 4/27/39    For the life of the medtronic device    Hyperlipidemia     pt denies 11-27-19    Hypertension     PCP Dr. Fermin Fuentes/ last seen 9-2019    Left ventricular dysfunction     Macular degeneration     left    Myocardial infarction ([address] 11/17/88, 1/2013    Neuropathy     Pacemaker 1995-2013    X 5    Panic attacks     PVD (peripheral vascular disease) ([facility]     Sinus problem     Snores     Vasovagal near syncope     Wears glasses        Allergies   Allergen Reactions    Percocet [Oxycodone-Acetaminophen] Other (See Comments)     Panic attack     Current Outpatient Medications on File Prior to Visit   Medication Sig Dispense Refill    Benzocaine-Menthol (SORE THROAT LOZENGES) 6-10 MG LOZG lozenge Take 1 lozenge by mouth every 2 hours as needed for Sore Throat 36 lozenge 0    meloxicam (MOBIC) 15 MG tablet       bicalutamide (CASODEX) 50 MG chemo tablet TAKE ONE TABLET BY MOUTH DAILY 90 tablet 0    clopidogrel (PLAVIX) 75 MG tablet Take 75 mg by mouth daily      ticagrelor (BRILINTA) 90 MG TABS tablet Take 1 tablet by mouth 2 times daily 180 tablet 4    Multiple Vitamins-Minerals (THERAPEUTIC MULTIVITAMIN-MINERALS) tablet Take 1 tablet by mouth daily      ammonium lactate (LAC-HYDRIN) 12 % cream Apply topically as needed. 1 Bottle 3    nitroGLYCERIN (NITROSTAT) 0.4 MG SL tablet Place 1 tablet under the tongue every 5 minutes as needed for Chest pain up to max of 3 total doses. If no relief after 1 dose, call 911. 25 tablet 3    fluticasone (FLONASE) 50 MCG/ACT nasal spray 1 spray by Nasal route daily as needed       sertraline (ZOLOFT) 25 MG tablet Take 25 mg by mouth daily. amLODIPine (NORVASC) 10 MG tablet Take 10 mg by mouth daily. furosemide (LASIX) 40 MG tablet Take 40 mg by mouth daily. aspirin EC 81 MG EC tablet Take 1 tablet by mouth daily. (Patient taking differently: Take 81 mg by mouth in the morning. Pt. Instructed to stop 7-10 days pre-op/ He did not do this.) 30 tablet 6    simvastatin (ZOCOR) 40 MG tablet Take 20 mg by mouth nightly       alfuzosin (UROXATRAL) 10 MG SR tablet Take 10 mg by mouth daily. lisinopril (PRINIVIL;ZESTRIL) 40 MG tablet Take 40 mg by mouth daily. metoprolol (TOPROL-XL) 100 MG XL tablet Take 100 mg by mouth daily.       gabapentin (NEURONTIN) 300 MG capsule Take 1 capsule by mouth 3 times daily for 90 days. (Patient taking differently: Take 300 mg by mouth 2 times daily. ) 90 capsule 2     No current facility-administered medications on file prior to visit. Review of Systems. Review of Systems:   History obtained from chart review and the patient  General ROS: negative for - chills, fatigue, fever, night sweats or weight gain  Constitutional: Negative for chills, diaphoresis, fatigue, fever and unexpected weight change. Musculoskeletal: Positive for arthralgias, gait problem and joint swelling. Neurological ROS: negative for - behavioral changes, confusion, headaches or seizures. Negative for weakness and numbness. Dermatological ROS: negative for - mole changes, rash  Cardiovascular: Negative for leg swelling. Gastrointestinal: Negative for constipation, diarrhea, nausea and vomiting. Objective:  Dermatologic Exam:  Skin lesion/ulceration Absent . Skin No rashes or nodules noted. .   Skin is thin, with flaky sloughing skin as well as decreased hair growth to the lower leg  Small red hemosiderin deposits seen dorsal foot   Musculoskeletal:     1st MPJ ROM decreased, Bilateral.  Muscle strength 5/5, Bilateral.  Pain present upon palpation of toenails 1-5, Bilateral. decreased medial longitudinal arch, Bilateral.  Ankle ROM decreased,Bilateral.    Dorsally contracted digits present digits 2, Bilateral.     Vascular: DP pulses 1/4 bilateral.  PT pulses 0/4 bilateral.   CFT <5 seconds, Bilateral.  Hair growth absent to the level of the digits, Bilateral.  Edema present, Bilateral.  Varicosities absent, Bilateral. Erythema absent, Bilateral    Neurological: Sensation diminshed to light touch to level of digits, Bilateral.  Protective sensation intact 6/10 sites via 5.07/10g Foss-Ana Luisa Monofilament, Bilateral.  negative Tinel's, Bilateral.  negative Valleix sign, Bilateral.      Integument: Warm, dry, supple, Bilateral.  Open lesion absent, Bilateral. Interdigital maceration absent to web spaces 4, Bilateral.  Nails 1-5 left and 1-5 right thickened > 3.0 mm, dystrophic and crumbly, discolored with subungual debris. Fissures absent, Bilateral.   General: AAO x 3 in NAD. Derm  Toenail Description  Sites of Onychomycosis Involvement (Check affected area)  [x] [x] [x] [x] [x] [x] [x] [x] [x] [x]  5 4 3 2 1 1 2 3 4 5                          Right                                        Left    Thickness  [x] [x] [x] [x] [x] [x] [x] [x] [x] [x]  5 4 3 2 1 1 2 3 4 5                         Right                                        Left    Dystrophic Changes   [x] [x] [x] [x] [x] [x] [x] [x] [x] [x]  5 4 3 2 1 1 2 3 4 5                         Right                                        Left    Color  [x] [x] [x] [x] [x] [x] [x] [x] [x] [x]  5 4 3 2 1 1 2 3 4 5                          Right                                        Left    Incurvation/Ingrowin   [] [] [] [] [] [] [] [] [] []  5 4 3 2 1 1 2 3 4 5                         Right                                        Left    Inflammation/Pain   [x] [x] [x] [x] [x] [x] [x] [x] [x] [x]  5 4 3  2 1 1 2 3 4 5                         Right                                        Left       Nails are painful 1-10 with direct palpation. Q7   []Yes  []No                Q8   [x]Yes  []No                     Q9   []Yes    []No  Assessment:  79 y.o. male with:    Diagnosis Orders   1. Dermatophytosis of nail  71961 - [place] DEBRIDEMENT OF NAILS, 6 OR MORE      2. Pain in both feet  05255 - [place] DEBRIDEMENT OF NAILS, 6 OR MORE      3. Peripheral vascular disorder (HCC)  32733 - [place] DEBRIDEMENT OF NAILS, 6 OR MORE      4. Ingrown nail  24010 - [place] DEBRIDEMENT OF NAILS, 6 OR MORE      5. Idiopathic peripheral neuropathy  92977 - [place] DEBRIDEMENT OF NAILS, 6 OR MORE      6. Xerosis cutis                Plan:   Pt was evaluated and examined. Patient was given personalized discharge instructions.   To address increased numbness and neuropathy pain, advised pt to closely monitor blood glucose. Recommend pt to discuss with PCP regarding oral medication treatment of neuropathy if needed. Recommend increased gabapentin. To address xerosis, patient to apply lachydrin cream to feet daily. Pt to monitor for fissures due to dryness. Advised pt to contact office is there are any open lesions. Nails 1-10 were debrided in length and thickness sharply with a nail nipper and  without incident. Pt will follow up in 9 weeks or sooner if any problems arise. Diagnosis was discussed with the pt and all of their questions were answered in detail. Proper foot hygiene and care was discussed with the pt. Patient to check feet daily and contact the office with any questions/problems/concerns. Other comorbidity noted and will be managed by PCP. Pain waiver discussed with patient and confirmed.    8/1/2022      Electronically signed by Robby Parish DPM on 8/1/2022 at 9:50 AM  8/1/2022

## 2022-10-03 ENCOUNTER — OFFICE VISIT (OUTPATIENT)
Dept: PODIATRY | Age: 82
End: 2022-10-03
Payer: MEDICARE

## 2022-10-03 VITALS — HEIGHT: 67 IN | WEIGHT: 196 LBS | BODY MASS INDEX: 30.76 KG/M2

## 2022-10-03 DIAGNOSIS — I73.9 PERIPHERAL VASCULAR DISORDER (HCC): ICD-10-CM

## 2022-10-03 DIAGNOSIS — M79.672 PAIN IN BOTH FEET: ICD-10-CM

## 2022-10-03 DIAGNOSIS — G60.9 IDIOPATHIC PERIPHERAL NEUROPATHY: ICD-10-CM

## 2022-10-03 DIAGNOSIS — L60.0 INGROWN NAIL: ICD-10-CM

## 2022-10-03 DIAGNOSIS — B35.1 DERMATOPHYTOSIS OF NAIL: Primary | ICD-10-CM

## 2022-10-03 DIAGNOSIS — M79.671 PAIN IN BOTH FEET: ICD-10-CM

## 2022-10-03 PROCEDURE — 99999 PR OFFICE/OUTPT VISIT,PROCEDURE ONLY: CPT | Performed by: PODIATRIST

## 2022-10-03 PROCEDURE — 11721 DEBRIDE NAIL 6 OR MORE: CPT | Performed by: PODIATRIST

## 2022-10-03 NOTE — PROGRESS NOTES
[address] [facility] PODIATRY [facility]  [address] [address] [place] [address]  Dept: [phone]  Dept Fax: [phone]     PAIN PROGRESS NOTE  Date of patient's visit: 10/3/2022  Patient's Name:  Sebastian Gonsales YOB: 1940            Patient Care Team:  Baldo Hdz MD as PCP - [address]ale[address] DPM as Physician (Podiatry)  Darlene Burks MD as Consulting Physician (Urology)  Charlotte Sifuentes DPM as Physician (Podiatry)  Sherri Cr MD as Consulting Physician (Radiation Oncology)      Chief Complaint   Patient presents with    Foot Pain     Bilateral foot     Nail Problem     Toenail trim       Subjective: This Sebastian Gonsales comes to clinic for foot and nail care. Pt currently has complaint of thickened, painful, elongated nails that he/she cannot manage by themselves. Pt. Relates pain to nails with shoe gear. Pt's primary care physician is Baldo Hdz MD last seen 1/3/22.       Past Medical History:   Diagnosis Date    Acid reflux     resolved since s/p shona    Anxiety     Arthritis     back/ fingers    BPH (benign prostatic hypertrophy)     CAD (coronary artery disease)     Dr. Douglas Read Dr. hernandez/ [facility]    Cancer [facility])     face, ear, scalp and arms     Carotid stenosis     bilat    Cataracts, both eyes     CHF (congestive heart failure) ([address]     Dry skin     Elevated PSA     Examination of participant in clinical trial 4/27/39    For the life of the medtronic device    Hyperlipidemia     pt denies 11-27-19    Hypertension     PCP Dr. Bina Fuentes/ last seen 9-2019    Left ventricular dysfunction     Macular degeneration     left    Myocardial infarction ([address] 11/17/88, 1/2013    Neuropathy     Pacemaker 1995-2013    X 5    Panic attacks     PVD (peripheral vascular disease) ([address]     Sinus problem     Snores     Vasovagal near syncope     Wears glasses        Allergies   Allergen Reactions    Percocet [Oxycodone-Acetaminophen] Other (See Comments)     Panic attack     Current Outpatient Medications on File Prior to Visit   Medication Sig Dispense Refill    gabapentin (NEURONTIN) 600 MG tablet Take 1 tablet by mouth in the morning and 1 tablet before bedtime. Do all this for 120 days. 60 tablet 3    Benzocaine-Menthol (SORE THROAT LOZENGES) 6-10 MG LOZG lozenge Take 1 lozenge by mouth every 2 hours as needed for Sore Throat 36 lozenge 0    meloxicam (MOBIC) 15 MG tablet       bicalutamide (CASODEX) 50 MG chemo tablet TAKE ONE TABLET BY MOUTH DAILY 90 tablet 0    clopidogrel (PLAVIX) 75 MG tablet Take 75 mg by mouth daily      ticagrelor (BRILINTA) 90 MG TABS tablet Take 1 tablet by mouth 2 times daily 180 tablet 4    Multiple Vitamins-Minerals (THERAPEUTIC MULTIVITAMIN-MINERALS) tablet Take 1 tablet by mouth daily      ammonium lactate (LAC-HYDRIN) 12 % cream Apply topically as needed. 1 Bottle 3    nitroGLYCERIN (NITROSTAT) 0.4 MG SL tablet Place 1 tablet under the tongue every 5 minutes as needed for Chest pain up to max of 3 total doses. If no relief after 1 dose, call 911. 25 tablet 3    fluticasone (FLONASE) 50 MCG/ACT nasal spray 1 spray by Nasal route daily as needed       sertraline (ZOLOFT) 25 MG tablet Take 25 mg by mouth daily. amLODIPine (NORVASC) 10 MG tablet Take 10 mg by mouth daily. furosemide (LASIX) 40 MG tablet Take 40 mg by mouth daily. aspirin EC 81 MG EC tablet Take 1 tablet by mouth daily. (Patient taking differently: Take 81 mg by mouth daily Pt. Instructed to stop 7-10 days pre-op/ He did not do this) 30 tablet 6    simvastatin (ZOCOR) 40 MG tablet Take 20 mg by mouth nightly       alfuzosin (UROXATRAL) 10 MG SR tablet Take 10 mg by mouth daily. lisinopril (PRINIVIL;ZESTRIL) 40 MG tablet Take 40 mg by mouth daily. metoprolol (TOPROL-XL) 100 MG XL tablet Take 100 mg by mouth daily. No current facility-administered medications on file prior to visit.      Review of Systems. Review of Systems:   History obtained from chart review and the patient  General ROS: negative for - chills, fatigue, fever, night sweats or weight gain  Constitutional: Negative for chills, diaphoresis, fatigue, fever and unexpected weight change. Musculoskeletal: Positive for arthralgias, gait problem and joint swelling. Neurological ROS: negative for - behavioral changes, confusion, headaches or seizures. Negative for weakness and numbness. Dermatological ROS: negative for - mole changes, rash  Cardiovascular: Negative for leg swelling. Gastrointestinal: Negative for constipation, diarrhea, nausea and vomiting. Objective:  Dermatologic Exam:  Skin lesion/ulceration Absent . Skin No rashes or nodules noted. .   Skin is thin, with flaky sloughing skin as well as decreased hair growth to the lower leg  Small red hemosiderin deposits seen dorsal foot   Musculoskeletal:     1st MPJ ROM decreased, Bilateral.  Muscle strength 5/5, Bilateral.  Pain present upon palpation of toenails 1-5, Bilateral. decreased medial longitudinal arch, Bilateral.  Ankle ROM decreased,Bilateral.    Dorsally contracted digits present digits 2, Bilateral.     Vascular: DP pulses 1/4 bilateral.  PT pulses 0/4 bilateral.   CFT <5 seconds, Bilateral.  Hair growth absent to the level of the digits, Bilateral.  Edema present, Bilateral.  Varicosities absent, Bilateral. Erythema absent, Bilateral    Neurological: Sensation diminshed to light touch to level of digits, Bilateral.  Protective sensation intact 6/10 sites via 5.07/10g [place]-Ana Luisa Monofilament, Bilateral.  negative Tinel's, Bilateral.  negative Valleix sign, Bilateral.      Integument: Warm, dry, supple, Bilateral.  Open lesion absent, Bilateral.  Interdigital maceration absent to web spaces 4, Bilateral.  Nails 1-5 left and 1-5 right thickened > 3.0 mm, dystrophic and crumbly, discolored with subungual debris.   Fissures absent, Bilateral.   General: AAO x 3 in NAD. Derm  Toenail Description  Sites of Onychomycosis Involvement (Check affected area)  [x] [x] [x] [x] [x] [x] [x] [x] [x] [x]  5 4 3 2 1 1 2 3 4 5                          Right                                        Left    Thickness  [x] [x] [x] [x] [x] [x] [x] [x] [x] [x]  5 4 3 2 1 1 2 3 4 5                         Right                                        Left    Dystrophic Changes   [x] [x] [x] [x] [x] [x] [x] [x] [x] [x]  5 4 3 2 1 1 2 3 4 5                         Right                                        Left    Color  [x] [x] [x] [x] [x] [x] [x] [x] [x] [x]  5 4 3 2 1 1 2 3 4 5                          Right                                        Left    Incurvation/Ingrowin   [] [] [] [] [] [] [] [] [] []  5 4 3 2 1 1 2 3 4 5                         Right                                        Left    Inflammation/Pain   [x] [x] [x] [x] [x] [x] [x] [x] [x] [x]  5 4 3  2 1 1 2 3 4 5                         Right                                        Left       Nails are painful 1-10 with direct palpation. Q7   []Yes  []No                Q8   [x]Yes  []No                     Q9   []Yes    []No  Assessment:  79 y.o. male with:    Diagnosis Orders   1. Dermatophytosis of nail  19737 - [place] DEBRIDEMENT OF NAILS, 6 OR MORE      2. Pain in both feet  03764 - [place] DEBRIDEMENT OF NAILS, 6 OR MORE      3. Peripheral vascular disorder (HCC)  84761 - [place] DEBRIDEMENT OF NAILS, 6 OR MORE      4. Ingrown nail  49988 - [place] DEBRIDEMENT OF NAILS, 6 OR MORE      5. Idiopathic peripheral neuropathy  63517 - [place] DEBRIDEMENT OF NAILS, 6 OR MORE              Plan:   Pt was evaluated and examined. Patient was given personalized discharge instructions. Nails 1-10 were debrided in length and thickness sharply with a nail nipper and  without incident. Pt will follow up in 9 weeks or sooner if any problems arise. Diagnosis was discussed with the pt and all of their questions were answered in detail. Proper foot hygiene and care was discussed with the pt. Patient to check feet daily and contact the office with any questions/problems/concerns. Other comorbidity noted and will be managed by PCP. Pain waiver discussed with patient and confirmed.    10/3/2022      Electronically signed by Constance Cosme DPM on 10/3/2022 at 10:19 AM  10/3/2022

## 2022-10-03 NOTE — PATIENT INSTRUCTIONS
Schedule a Vaccine  When you qualify to receive the vaccine, call the [facility]) COVID-19 Vaccination Hotline to schedule your appointment or to get additional information about the [facility]) locations which are offering the COVID-19 vaccine. To be 94% effective, it's important that you receive two doses of one of the COVID-19 vaccines. -If you are receiving the [employer] vaccine, your second shot will be scheduled as close to 21 days after the first shot as possible. -If you are receiving the Moderna vaccine, your second shot will be scheduled as close to 28 days after the first shot as possible. [facility]) COVID-19 Vaccination Hotline: [phone]    Links to [facility]) website and [facility] website:    [employer]/[facility]-[email or web address]    [email or web address]

## 2022-12-01 RX ORDER — GABAPENTIN 600 MG/1
TABLET ORAL
Qty: 60 TABLET | Refills: 3 | OUTPATIENT
Start: 2022-12-01

## 2022-12-07 ENCOUNTER — OFFICE VISIT (OUTPATIENT)
Dept: PODIATRY | Age: 82
End: 2022-12-07

## 2022-12-07 VITALS — WEIGHT: 196 LBS | BODY MASS INDEX: 30.76 KG/M2 | HEIGHT: 67 IN

## 2022-12-07 DIAGNOSIS — M79.671 PAIN IN BOTH FEET: ICD-10-CM

## 2022-12-07 DIAGNOSIS — G60.9 IDIOPATHIC PERIPHERAL NEUROPATHY: ICD-10-CM

## 2022-12-07 DIAGNOSIS — L85.3 XEROSIS CUTIS: ICD-10-CM

## 2022-12-07 DIAGNOSIS — L60.0 INGROWN NAIL: ICD-10-CM

## 2022-12-07 DIAGNOSIS — I73.9 PERIPHERAL VASCULAR DISORDER (HCC): ICD-10-CM

## 2022-12-07 DIAGNOSIS — B35.1 DERMATOPHYTOSIS OF NAIL: Primary | ICD-10-CM

## 2022-12-07 DIAGNOSIS — M79.672 PAIN IN BOTH FEET: ICD-10-CM

## 2022-12-07 NOTE — PATIENT INSTRUCTIONS
Schedule a Vaccine  When you qualify to receive the vaccine, call the [facility]) COVID-19 Vaccination Hotline to schedule your appointment or to get additional information about the [facility]) locations which are offering the COVID-19 vaccine. To be 94% effective, it's important that you receive two doses of one of the COVID-19 vaccines. -If you are receiving the [employer] vaccine, your second shot will be scheduled as close to 21 days after the first shot as possible. -If you are receiving the Moderna vaccine, your second shot will be scheduled as close to 28 days after the first shot as possible. [facility]) COVID-19 Vaccination Hotline: [phone]    Links to [facility]) website and [facility] website:    [employer]/[facility]-monitoring-[email or web address]-covid-[email or web address]/[email or web address]    [email or web address]

## 2022-12-07 NOTE — PROGRESS NOTES
[address] [facility] PODIATRY [facility]  [address] [facility] [address] [facility] [address]  Dept: [phone]  Dept Fax: [phone]     PAIN PROGRESS NOTE  Date of patient's visit: 12/7/2022  Patient's Name:  Codey Keith YOB: 1940            Patient Care Team:  Ellen Truong MD as PCP - [address] DPM as Physician ([facility])  Alva Dove MD as Consulting Physician (Urology)  Susann Alpers, DPM as Physician (Podiatry)  Piyush Lawrence MD as Consulting Physician (Radiation Oncology)      Chief Complaint   Patient presents with    Foot Pain     Bilateral foot     Nail Problem     Toenail trim       Subjective: This Codey Keith comes to clinic for foot and nail care. Pt currently has complaint of thickened, painful, elongated nails that he/she cannot manage by themselves. Pt. Relates pain to nails with shoe gear. Pt's primary care physician is Ellen Truong MD last seen 1/3/22. Pt has a new complaint of increased dry skin to rupal feet.    Past Medical History:   Diagnosis Date    Acid reflux     resolved since s/p shona    Anxiety     Arthritis     back/ fingers    BPH (benign prostatic hypertrophy)     CAD (coronary artery disease)     Dr. Richard hernandez/ [facility]    Cancer [facility])     face, ear, scalp and arms     Carotid stenosis     bilat    Cataracts, both eyes     CHF (congestive heart failure) ([address]     Dry skin     Elevated PSA     Examination of participant in clinical trial 4/27/39    For the life of the medtronic device    Hyperlipidemia     pt denies 11-27-19    Hypertension     PCP Dr. Beatriz Fuentes/ last seen 9-2019    Left ventricular dysfunction     Macular degeneration     left    Myocardial infarction ([address] 11/17/88, 1/2013    Neuropathy     Pacemaker 1995-2013    X 5    Panic attacks     PVD (peripheral vascular disease) ([address]     Sinus problem     Snores     Vasovagal near syncope     Wears glasses Allergies   Allergen Reactions    Percocet [Oxycodone-Acetaminophen] Other (See Comments)     Panic attack     Current Outpatient Medications on File Prior to Visit   Medication Sig Dispense Refill    Benzocaine-Menthol (SORE THROAT LOZENGES) 6-10 MG LOZG lozenge Take 1 lozenge by mouth every 2 hours as needed for Sore Throat 36 lozenge 0    meloxicam (MOBIC) 15 MG tablet       bicalutamide (CASODEX) 50 MG chemo tablet TAKE ONE TABLET BY MOUTH DAILY 90 tablet 0    clopidogrel (PLAVIX) 75 MG tablet Take 75 mg by mouth daily      ticagrelor (BRILINTA) 90 MG TABS tablet Take 1 tablet by mouth 2 times daily 180 tablet 4    Multiple Vitamins-Minerals (THERAPEUTIC MULTIVITAMIN-MINERALS) tablet Take 1 tablet by mouth daily      ammonium lactate (LAC-HYDRIN) 12 % cream Apply topically as needed. 1 Bottle 3    nitroGLYCERIN (NITROSTAT) 0.4 MG SL tablet Place 1 tablet under the tongue every 5 minutes as needed for Chest pain up to max of 3 total doses. If no relief after 1 dose, call 911. 25 tablet 3    fluticasone (FLONASE) 50 MCG/ACT nasal spray 1 spray by Nasal route daily as needed       sertraline (ZOLOFT) 25 MG tablet Take 25 mg by mouth daily. amLODIPine (NORVASC) 10 MG tablet Take 10 mg by mouth daily. furosemide (LASIX) 40 MG tablet Take 40 mg by mouth daily. aspirin EC 81 MG EC tablet Take 1 tablet by mouth daily. (Patient taking differently: Take 81 mg by mouth daily Pt. Instructed to stop 7-10 days pre-op/ He did not do this) 30 tablet 6    simvastatin (ZOCOR) 40 MG tablet Take 20 mg by mouth nightly       alfuzosin (UROXATRAL) 10 MG SR tablet Take 10 mg by mouth daily. lisinopril (PRINIVIL;ZESTRIL) 40 MG tablet Take 40 mg by mouth daily. metoprolol (TOPROL-XL) 100 MG XL tablet Take 100 mg by mouth daily. gabapentin (NEURONTIN) 600 MG tablet Take 1 tablet by mouth in the morning and 1 tablet before bedtime. Do all this for 120 days.  60 tablet 3     No current facility-administered medications on file prior to visit. Review of Systems. Review of Systems:   History obtained from chart review and the patient  General ROS: negative for - chills, fatigue, fever, night sweats or weight gain  Constitutional: Negative for chills, diaphoresis, fatigue, fever and unexpected weight change. Musculoskeletal: Positive for arthralgias, gait problem and joint swelling. Neurological ROS: negative for - behavioral changes, confusion, headaches or seizures. Negative for weakness and numbness. Dermatological ROS: negative for - mole changes, rash  Cardiovascular: Negative for leg swelling. Gastrointestinal: Negative for constipation, diarrhea, nausea and vomiting. Objective:  Dermatologic Exam:  Skin lesion/ulceration Absent . Skin No rashes or nodules noted. .   Skin is thin, with flaky sloughing skin as well as decreased hair growth to the lower leg  Small red hemosiderin deposits seen dorsal foot   Musculoskeletal:     1st MPJ ROM decreased, Bilateral.  Muscle strength 5/5, Bilateral.  Pain present upon palpation of toenails 1-5, Bilateral. decreased medial longitudinal arch, Bilateral.  Ankle ROM decreased,Bilateral.    Dorsally contracted digits present digits 2, Bilateral.     Vascular: DP pulses 1/4 bilateral.  PT pulses 0/4 bilateral.   CFT <5 seconds, Bilateral.  Hair growth absent to the level of the digits, Bilateral.  Edema present, Bilateral.  Varicosities absent, Bilateral. Erythema absent, Bilateral    Neurological: Sensation diminshed to light touch to level of digits, Bilateral.  Protective sensation intact 6/10 sites via 5.07/10g Calvin-Ana Luisa Monofilament, Bilateral.  negative Tinel's, Bilateral.  negative Valleix sign, Bilateral.      Integument: Warm, dry, supple, Bilateral.  Open lesion absent, Bilateral.  Interdigital maceration absent to web spaces 4, Bilateral.  Nails 1-5 left and 1-5 right thickened > 3.0 mm, dystrophic and crumbly, discolored with subungual debris. Fissures absent, Bilateral.   General: AAO x 3 in NAD. Derm  Toenail Description  Sites of Onychomycosis Involvement (Check affected area)  [x] [x] [x] [x] [x] [x] [x] [x] [x] [x]  5 4 3 2 1 1 2 3 4 5                          Right                                        Left    Thickness  [x] [x] [x] [x] [x] [x] [x] [x] [x] [x]  5 4 3 2 1 1 2 3 4 5                         Right                                        Left    Dystrophic Changes   [x] [x] [x] [x] [x] [x] [x] [x] [x] [x]  5 4 3 2 1 1 2 3 4 5                         Right                                        Left    Color  [x] [x] [x] [x] [x] [x] [x] [x] [x] [x]  5 4 3 2 1 1 2 3 4 5                          Right                                        Left    Incurvation/Ingrowin   [] [] [] [] [] [] [] [] [] []  5 4 3 2 1 1 2 3 4 5                         Right                                        Left    Inflammation/Pain   [x] [x] [x] [x] [x] [x] [x] [x] [x] [x]  5 4 3  2 1 1 2 3 4 5                         Right                                        Left       Nails are painful 1-10 with direct palpation. Q7   []Yes  []No                Q8   [x]Yes  []No                     Q9   []Yes    []No  Assessment:  79 y.o. male with:    Diagnosis Orders   1. Dermatophytosis of nail  20773 - CO DEBRIDEMENT OF NAILS, 6 OR MORE      2. Pain in both feet  26492 - CO DEBRIDEMENT OF NAILS, 6 OR MORE      3. Peripheral vascular disorder (HCC)  76973 - CO DEBRIDEMENT OF NAILS, 6 OR MORE      4. Ingrown nail  62590 - CO DEBRIDEMENT OF NAILS, 6 OR MORE      5. Idiopathic peripheral neuropathy  59325 - CO DEBRIDEMENT OF NAILS, 6 OR MORE      6. Xerosis cutis                  Plan:   Pt was evaluated and examined. Patient was given personalized discharge instructions. To address xerosis, patient to apply lachydrin cream to feet daily. Pt to monitor for fissures due to dryness.  Advised pt to contact office is there are any open lesions. Nails 1-10 were debrided in length and thickness sharply with a nail nipper and  without incident. Pt will follow up in 9 weeks or sooner if any problems arise. Diagnosis was discussed with the pt and all of their questions were answered in detail. Proper foot hygiene and care was discussed with the pt. Patient to check feet daily and contact the office with any questions/problems/concerns. Other comorbidity noted and will be managed by PCP. Pain waiver discussed with patient and confirmed.    12/7/2022      Electronically signed by Gregg Bates DPM on 12/7/2022 at 1:28 PM  12/7/2022

## 2022-12-20 RX ORDER — GABAPENTIN 600 MG/1
600 TABLET ORAL 2 TIMES DAILY
Qty: 180 TABLET | Refills: 0 | Status: SHIPPED | OUTPATIENT
Start: 2022-12-20 | End: 2023-03-20

## 2022-12-25 ENCOUNTER — APPOINTMENT (OUTPATIENT)
Dept: GENERAL RADIOLOGY | Age: 82
DRG: 521 | End: 2022-12-25
Payer: MEDICARE

## 2022-12-25 ENCOUNTER — HOSPITAL ENCOUNTER (INPATIENT)
Age: 82
LOS: 17 days | Discharge: NURSING, REHAB OR LONG-TERM CARE | DRG: 521 | End: 2023-01-11
Attending: EMERGENCY MEDICINE | Admitting: FAMILY MEDICINE
Payer: MEDICARE

## 2022-12-25 DIAGNOSIS — S72.002A CLOSED FRACTURE OF NECK OF LEFT FEMUR, INITIAL ENCOUNTER (HCC): Primary | ICD-10-CM

## 2022-12-25 LAB
ABO/RH: NORMAL
ALLEN TEST: ABNORMAL
ANION GAP SERPL CALCULATED.3IONS-SCNC: 10 MMOL/L (ref 9–17)
ANTIBODY SCREEN: NEGATIVE
ARM BAND NUMBER: NORMAL
BLOOD BANK SPECIMEN: ABNORMAL
BUN BLDV-MCNC: 14 MG/DL (ref 8–23)
CARBOXYHEMOGLOBIN: ABNORMAL %
CHLORIDE BLD-SCNC: 101 MMOL/L (ref 98–107)
CO2: 30 MMOL/L (ref 20–31)
CREAT SERPL-MCNC: 0.92 MG/DL (ref 0.7–1.2)
ETHANOL PERCENT: <0.01 %
ETHANOL: <10 MG/DL
EXPIRATION DATE: NORMAL
FIO2: ABNORMAL
GFR SERPL CREATININE-BSD FRML MDRD: >60 ML/MIN/1.73M2
GLUCOSE BLD-MCNC: 168 MG/DL (ref 70–99)
HCG QUALITATIVE: ABNORMAL
HCO3 VENOUS: ABNORMAL MMOL/L (ref 24–30)
HCT VFR BLD CALC: 41.6 % (ref 40.7–50.3)
HEMOGLOBIN: 14.2 G/DL (ref 13–17)
INR BLD: 1
MCH RBC QN AUTO: 29.6 PG (ref 25.2–33.5)
MCHC RBC AUTO-ENTMCNC: 34.1 G/DL (ref 28.4–34.8)
MCV RBC AUTO: 86.7 FL (ref 82.6–102.9)
METHEMOGLOBIN: ABNORMAL %
MODE: ABNORMAL
NEGATIVE BASE EXCESS, VEN: ABNORMAL MMOL/L (ref 0–2)
NOTIFICATION TIME: ABNORMAL
NOTIFICATION: ABNORMAL
NRBC AUTOMATED: 0 PER 100 WBC
O2 DEVICE/FLOW/%: ABNORMAL
O2 SAT, VEN: ABNORMAL %
OXYHEMOGLOBIN: ABNORMAL % (ref 95–98)
PARTIAL THROMBOPLASTIN TIME: 23.7 SEC (ref 20.5–30.5)
PATIENT TEMP: ABNORMAL
PCO2, VEN, TEMP ADJ: ABNORMAL MMHG (ref 39–55)
PCO2, VEN: ABNORMAL MM HG (ref 39–55)
PDW BLD-RTO: 13.5 % (ref 11.8–14.4)
PEEP/CPAP: ABNORMAL
PH VENOUS: ABNORMAL (ref 7.32–7.42)
PH, VEN, TEMP ADJ: ABNORMAL (ref 7.32–7.42)
PLATELET # BLD: 191 K/UL (ref 138–453)
PMV BLD AUTO: 10.2 FL (ref 8.1–13.5)
PO2, VEN, TEMP ADJ: ABNORMAL MMHG (ref 30–50)
PO2, VEN: ABNORMAL MM HG (ref 30–50)
POSITIVE BASE EXCESS, VEN: ABNORMAL MMOL/L (ref 0–2)
POTASSIUM SERPL-SCNC: 4 MMOL/L (ref 3.7–5.3)
PROTHROMBIN TIME: 10.6 SEC (ref 9.1–12.3)
PSV: ABNORMAL
PT. POSITION: ABNORMAL
RBC # BLD: 4.8 M/UL (ref 4.21–5.77)
RESPIRATORY RATE: ABNORMAL
SAMPLE SITE: ABNORMAL
SET RATE: ABNORMAL
SODIUM BLD-SCNC: 141 MMOL/L (ref 135–144)
TEXT FOR RESPIRATORY: ABNORMAL
TOTAL HB: ABNORMAL G/DL (ref 12–16)
TOTAL RATE: ABNORMAL
VITAMIN D 25-HYDROXY: 23.3 NG/ML
VT: ABNORMAL
WBC # BLD: 7.3 K/UL (ref 3.5–11.3)

## 2022-12-25 PROCEDURE — 96374 THER/PROPH/DIAG INJ IV PUSH: CPT

## 2022-12-25 PROCEDURE — 85027 COMPLETE CBC AUTOMATED: CPT

## 2022-12-25 PROCEDURE — 80051 ELECTROLYTE PANEL: CPT

## 2022-12-25 PROCEDURE — 85610 PROTHROMBIN TIME: CPT

## 2022-12-25 PROCEDURE — 84520 ASSAY OF UREA NITROGEN: CPT

## 2022-12-25 PROCEDURE — 82306 VITAMIN D 25 HYDROXY: CPT

## 2022-12-25 PROCEDURE — 73562 X-RAY EXAM OF KNEE 3: CPT

## 2022-12-25 PROCEDURE — 85730 THROMBOPLASTIN TIME PARTIAL: CPT

## 2022-12-25 PROCEDURE — 6370000000 HC RX 637 (ALT 250 FOR IP): Performed by: STUDENT IN AN ORGANIZED HEALTH CARE EDUCATION/TRAINING PROGRAM

## 2022-12-25 PROCEDURE — 82947 ASSAY GLUCOSE BLOOD QUANT: CPT

## 2022-12-25 PROCEDURE — 99285 EMERGENCY DEPT VISIT HI MDM: CPT

## 2022-12-25 PROCEDURE — 6360000002 HC RX W HCPCS: Performed by: STUDENT IN AN ORGANIZED HEALTH CARE EDUCATION/TRAINING PROGRAM

## 2022-12-25 PROCEDURE — 82805 BLOOD GASES W/O2 SATURATION: CPT

## 2022-12-25 PROCEDURE — 86901 BLOOD TYPING SEROLOGIC RH(D): CPT

## 2022-12-25 PROCEDURE — 86850 RBC ANTIBODY SCREEN: CPT

## 2022-12-25 PROCEDURE — 86900 BLOOD TYPING SEROLOGIC ABO: CPT

## 2022-12-25 PROCEDURE — 96376 TX/PRO/DX INJ SAME DRUG ADON: CPT

## 2022-12-25 PROCEDURE — 99221 1ST HOSP IP/OBS SF/LOW 40: CPT | Performed by: STUDENT IN AN ORGANIZED HEALTH CARE EDUCATION/TRAINING PROGRAM

## 2022-12-25 PROCEDURE — 93005 ELECTROCARDIOGRAM TRACING: CPT | Performed by: STUDENT IN AN ORGANIZED HEALTH CARE EDUCATION/TRAINING PROGRAM

## 2022-12-25 PROCEDURE — G0480 DRUG TEST DEF 1-7 CLASSES: HCPCS

## 2022-12-25 PROCEDURE — 73552 X-RAY EXAM OF FEMUR 2/>: CPT

## 2022-12-25 PROCEDURE — 84703 CHORIONIC GONADOTROPIN ASSAY: CPT

## 2022-12-25 PROCEDURE — 73502 X-RAY EXAM HIP UNI 2-3 VIEWS: CPT

## 2022-12-25 PROCEDURE — 82565 ASSAY OF CREATININE: CPT

## 2022-12-25 PROCEDURE — 1200000000 HC SEMI PRIVATE

## 2022-12-25 PROCEDURE — 71045 X-RAY EXAM CHEST 1 VIEW: CPT

## 2022-12-25 PROCEDURE — 73130 X-RAY EXAM OF HAND: CPT

## 2022-12-25 RX ORDER — POLYETHYLENE GLYCOL 3350 17 G/17G
17 POWDER, FOR SOLUTION ORAL DAILY
Status: DISCONTINUED | OUTPATIENT
Start: 2022-12-26 | End: 2022-12-29

## 2022-12-25 RX ORDER — GABAPENTIN 100 MG/1
100 CAPSULE ORAL 3 TIMES DAILY
Status: DISCONTINUED | OUTPATIENT
Start: 2022-12-25 | End: 2022-12-27

## 2022-12-25 RX ORDER — ACETAMINOPHEN 500 MG
1000 TABLET ORAL EVERY 8 HOURS SCHEDULED
Status: DISCONTINUED | OUTPATIENT
Start: 2022-12-25 | End: 2023-01-11 | Stop reason: HOSPADM

## 2022-12-25 RX ORDER — SODIUM CHLORIDE 0.9 % (FLUSH) 0.9 %
5-40 SYRINGE (ML) INJECTION PRN
Status: DISCONTINUED | OUTPATIENT
Start: 2022-12-25 | End: 2023-01-11 | Stop reason: HOSPADM

## 2022-12-25 RX ORDER — FENTANYL CITRATE 50 UG/ML
50 INJECTION, SOLUTION INTRAMUSCULAR; INTRAVENOUS ONCE
Status: COMPLETED | OUTPATIENT
Start: 2022-12-25 | End: 2022-12-25

## 2022-12-25 RX ORDER — SODIUM CHLORIDE 0.9 % (FLUSH) 0.9 %
5-40 SYRINGE (ML) INJECTION EVERY 12 HOURS SCHEDULED
Status: DISCONTINUED | OUTPATIENT
Start: 2022-12-25 | End: 2023-01-11 | Stop reason: HOSPADM

## 2022-12-25 RX ORDER — ONDANSETRON 2 MG/ML
4 INJECTION INTRAMUSCULAR; INTRAVENOUS EVERY 6 HOURS PRN
Status: DISCONTINUED | OUTPATIENT
Start: 2022-12-25 | End: 2023-01-11 | Stop reason: HOSPADM

## 2022-12-25 RX ORDER — SODIUM CHLORIDE 9 MG/ML
INJECTION, SOLUTION INTRAVENOUS PRN
Status: DISCONTINUED | OUTPATIENT
Start: 2022-12-25 | End: 2022-12-29

## 2022-12-25 RX ORDER — ONDANSETRON 4 MG/1
4 TABLET, ORALLY DISINTEGRATING ORAL EVERY 8 HOURS PRN
Status: DISCONTINUED | OUTPATIENT
Start: 2022-12-25 | End: 2023-01-11 | Stop reason: HOSPADM

## 2022-12-25 RX ORDER — METHOCARBAMOL 750 MG/1
750 TABLET, FILM COATED ORAL 3 TIMES DAILY
Status: DISCONTINUED | OUTPATIENT
Start: 2022-12-25 | End: 2022-12-31

## 2022-12-25 RX ADMIN — FENTANYL CITRATE 50 MCG: 50 INJECTION, SOLUTION INTRAMUSCULAR; INTRAVENOUS at 21:16

## 2022-12-25 RX ADMIN — METHOCARBAMOL TABLETS 750 MG: 750 TABLET, COATED ORAL at 22:18

## 2022-12-25 RX ADMIN — FENTANYL CITRATE 50 MCG: 50 INJECTION, SOLUTION INTRAMUSCULAR; INTRAVENOUS at 19:39

## 2022-12-25 RX ADMIN — ACETAMINOPHEN 1000 MG: 500 TABLET, FILM COATED ORAL at 22:19

## 2022-12-25 RX ADMIN — GABAPENTIN 100 MG: 100 CAPSULE ORAL at 22:18

## 2022-12-25 ASSESSMENT — ENCOUNTER SYMPTOMS
ABDOMINAL PAIN: 0
COLOR CHANGE: 0
NAUSEA: 0
DIARRHEA: 0
CONSTIPATION: 0
BACK PAIN: 0
CHEST TIGHTNESS: 0
TROUBLE SWALLOWING: 0
SHORTNESS OF BREATH: 0
COUGH: 0
VOMITING: 0
PHOTOPHOBIA: 0
VOICE CHANGE: 0

## 2022-12-25 ASSESSMENT — PAIN SCALES - GENERAL
PAINLEVEL_OUTOF10: 9
PAINLEVEL_OUTOF10: 5
PAINLEVEL_OUTOF10: 5

## 2022-12-25 ASSESSMENT — PAIN - FUNCTIONAL ASSESSMENT: PAIN_FUNCTIONAL_ASSESSMENT: 0-10

## 2022-12-26 ENCOUNTER — ANESTHESIA (OUTPATIENT)
Dept: OPERATING ROOM | Age: 82
End: 2022-12-26
Payer: MEDICARE

## 2022-12-26 ENCOUNTER — ANESTHESIA EVENT (OUTPATIENT)
Dept: OPERATING ROOM | Age: 82
End: 2022-12-26
Payer: MEDICARE

## 2022-12-26 ENCOUNTER — APPOINTMENT (OUTPATIENT)
Dept: GENERAL RADIOLOGY | Age: 82
DRG: 521 | End: 2022-12-26
Payer: MEDICARE

## 2022-12-26 LAB
ABSOLUTE EOS #: 0 K/UL (ref 0–0.4)
ABSOLUTE IMMATURE GRANULOCYTE: 0 K/UL (ref 0–0.3)
ABSOLUTE LYMPH #: 0.32 K/UL (ref 1–4.8)
ABSOLUTE MONO #: 0.32 K/UL (ref 0.1–0.8)
ANION GAP SERPL CALCULATED.3IONS-SCNC: 13 MMOL/L (ref 9–17)
BASOPHILS # BLD: 0 % (ref 0–2)
BASOPHILS ABSOLUTE: 0 K/UL (ref 0–0.2)
BUN BLDV-MCNC: 15 MG/DL (ref 8–23)
CALCIUM SERPL-MCNC: 8.1 MG/DL (ref 8.6–10.4)
CHLORIDE BLD-SCNC: 98 MMOL/L (ref 98–107)
CO2: 24 MMOL/L (ref 20–31)
CREAT SERPL-MCNC: 0.74 MG/DL (ref 0.7–1.2)
EOSINOPHILS RELATIVE PERCENT: 0 % (ref 1–4)
GFR SERPL CREATININE-BSD FRML MDRD: >60 ML/MIN/1.73M2
GLUCOSE BLD-MCNC: 178 MG/DL (ref 70–99)
HCT VFR BLD CALC: 39.1 % (ref 40.7–50.3)
HEMOGLOBIN: 13.5 G/DL (ref 13–17)
IMMATURE GRANULOCYTES: 0 %
LV EF: 23 %
LVEF MODALITY: NORMAL
LYMPHOCYTES # BLD: 2 % (ref 24–44)
MCH RBC QN AUTO: 29.5 PG (ref 25.2–33.5)
MCHC RBC AUTO-ENTMCNC: 34.5 G/DL (ref 28.4–34.8)
MCV RBC AUTO: 85.6 FL (ref 82.6–102.9)
MONOCYTES # BLD: 2 % (ref 1–7)
MORPHOLOGY: NORMAL
NRBC AUTOMATED: 0 PER 100 WBC
PDW BLD-RTO: 13.3 % (ref 11.8–14.4)
PLATELET # BLD: 187 K/UL (ref 138–453)
PMV BLD AUTO: 10.3 FL (ref 8.1–13.5)
POTASSIUM SERPL-SCNC: 4.5 MMOL/L (ref 3.7–5.3)
RBC # BLD: 4.57 M/UL (ref 4.21–5.77)
SEG NEUTROPHILS: 96 % (ref 36–66)
SEGMENTED NEUTROPHILS ABSOLUTE COUNT: 15.16 K/UL (ref 1.8–7.7)
SODIUM BLD-SCNC: 135 MMOL/L (ref 135–144)
WBC # BLD: 15.8 K/UL (ref 3.5–11.3)

## 2022-12-26 PROCEDURE — C1776 JOINT DEVICE (IMPLANTABLE): HCPCS | Performed by: STUDENT IN AN ORGANIZED HEALTH CARE EDUCATION/TRAINING PROGRAM

## 2022-12-26 PROCEDURE — 93306 TTE W/DOPPLER COMPLETE: CPT

## 2022-12-26 PROCEDURE — 6360000002 HC RX W HCPCS: Performed by: NURSE ANESTHETIST, CERTIFIED REGISTERED

## 2022-12-26 PROCEDURE — 6370000000 HC RX 637 (ALT 250 FOR IP): Performed by: STUDENT IN AN ORGANIZED HEALTH CARE EDUCATION/TRAINING PROGRAM

## 2022-12-26 PROCEDURE — 2580000003 HC RX 258: Performed by: STUDENT IN AN ORGANIZED HEALTH CARE EDUCATION/TRAINING PROGRAM

## 2022-12-26 PROCEDURE — 36415 COLL VENOUS BLD VENIPUNCTURE: CPT

## 2022-12-26 PROCEDURE — 6360000002 HC RX W HCPCS: Performed by: STUDENT IN AN ORGANIZED HEALTH CARE EDUCATION/TRAINING PROGRAM

## 2022-12-26 PROCEDURE — 7100000000 HC PACU RECOVERY - FIRST 15 MIN: Performed by: STUDENT IN AN ORGANIZED HEALTH CARE EDUCATION/TRAINING PROGRAM

## 2022-12-26 PROCEDURE — 2709999900 HC NON-CHARGEABLE SUPPLY: Performed by: STUDENT IN AN ORGANIZED HEALTH CARE EDUCATION/TRAINING PROGRAM

## 2022-12-26 PROCEDURE — A4216 STERILE WATER/SALINE, 10 ML: HCPCS | Performed by: NURSE ANESTHETIST, CERTIFIED REGISTERED

## 2022-12-26 PROCEDURE — 6360000002 HC RX W HCPCS: Performed by: ANESTHESIOLOGY

## 2022-12-26 PROCEDURE — 73501 X-RAY EXAM HIP UNI 1 VIEW: CPT

## 2022-12-26 PROCEDURE — 1200000000 HC SEMI PRIVATE

## 2022-12-26 PROCEDURE — 73502 X-RAY EXAM HIP UNI 2-3 VIEWS: CPT

## 2022-12-26 PROCEDURE — 7100000001 HC PACU RECOVERY - ADDTL 15 MIN: Performed by: STUDENT IN AN ORGANIZED HEALTH CARE EDUCATION/TRAINING PROGRAM

## 2022-12-26 PROCEDURE — 3600000014 HC SURGERY LEVEL 4 ADDTL 15MIN: Performed by: STUDENT IN AN ORGANIZED HEALTH CARE EDUCATION/TRAINING PROGRAM

## 2022-12-26 PROCEDURE — 2500000003 HC RX 250 WO HCPCS: Performed by: NURSE ANESTHETIST, CERTIFIED REGISTERED

## 2022-12-26 PROCEDURE — 3700000001 HC ADD 15 MINUTES (ANESTHESIA): Performed by: STUDENT IN AN ORGANIZED HEALTH CARE EDUCATION/TRAINING PROGRAM

## 2022-12-26 PROCEDURE — 3600000004 HC SURGERY LEVEL 4 BASE: Performed by: STUDENT IN AN ORGANIZED HEALTH CARE EDUCATION/TRAINING PROGRAM

## 2022-12-26 PROCEDURE — 27236 TREAT THIGH FRACTURE: CPT | Performed by: STUDENT IN AN ORGANIZED HEALTH CARE EDUCATION/TRAINING PROGRAM

## 2022-12-26 PROCEDURE — 80048 BASIC METABOLIC PNL TOTAL CA: CPT

## 2022-12-26 PROCEDURE — 85025 COMPLETE CBC W/AUTO DIFF WBC: CPT

## 2022-12-26 PROCEDURE — 0SRS0JZ REPLACEMENT OF LEFT HIP JOINT, FEMORAL SURFACE WITH SYNTHETIC SUBSTITUTE, OPEN APPROACH: ICD-10-PCS | Performed by: STUDENT IN AN ORGANIZED HEALTH CARE EDUCATION/TRAINING PROGRAM

## 2022-12-26 PROCEDURE — 2580000003 HC RX 258: Performed by: NURSE ANESTHETIST, CERTIFIED REGISTERED

## 2022-12-26 PROCEDURE — 3700000000 HC ANESTHESIA ATTENDED CARE: Performed by: STUDENT IN AN ORGANIZED HEALTH CARE EDUCATION/TRAINING PROGRAM

## 2022-12-26 DEVICE — STEM FEM SZ 12 L130MM NK L40.3MM 48MM HI OFFSET 125DEG HIP: Type: IMPLANTABLE DEVICE | Site: HIP | Status: FUNCTIONAL

## 2022-12-26 DEVICE — SPACER FEM +5MM OFFSET 12/14 TAPR HIP MOD CATHCART UPLR: Type: IMPLANTABLE DEVICE | Site: HIP | Status: FUNCTIONAL

## 2022-12-26 DEVICE — IMPL CAPPED H6 HEMI UNI/BIPOLAR DEPUYSYNTHES: Type: IMPLANTABLE DEVICE | Site: HIP | Status: FUNCTIONAL

## 2022-12-26 DEVICE — CABLE SURG L750MM DIA1.7MM CERCLAGE CO CHROM SMOOTH W/ CRMP: Type: IMPLANTABLE DEVICE | Site: HIP | Status: FUNCTIONAL

## 2022-12-26 DEVICE — IMPLANTABLE DEVICE: Type: IMPLANTABLE DEVICE | Site: HIP | Status: FUNCTIONAL

## 2022-12-26 RX ORDER — VANCOMYCIN HYDROCHLORIDE 1 G/20ML
INJECTION, POWDER, LYOPHILIZED, FOR SOLUTION INTRAVENOUS PRN
Status: DISCONTINUED | OUTPATIENT
Start: 2022-12-26 | End: 2022-12-26 | Stop reason: HOSPADM

## 2022-12-26 RX ORDER — PROPOFOL 10 MG/ML
INJECTION, EMULSION INTRAVENOUS PRN
Status: DISCONTINUED | OUTPATIENT
Start: 2022-12-26 | End: 2022-12-26 | Stop reason: SDUPTHER

## 2022-12-26 RX ORDER — GLYCOPYRROLATE 0.2 MG/ML
INJECTION INTRAMUSCULAR; INTRAVENOUS PRN
Status: DISCONTINUED | OUTPATIENT
Start: 2022-12-26 | End: 2022-12-26 | Stop reason: SDUPTHER

## 2022-12-26 RX ORDER — LIDOCAINE HYDROCHLORIDE 10 MG/ML
INJECTION, SOLUTION EPIDURAL; INFILTRATION; INTRACAUDAL; PERINEURAL PRN
Status: DISCONTINUED | OUTPATIENT
Start: 2022-12-26 | End: 2022-12-26 | Stop reason: SDUPTHER

## 2022-12-26 RX ORDER — TOBRAMYCIN 1.2 G/30ML
INJECTION, POWDER, LYOPHILIZED, FOR SOLUTION INTRAVENOUS PRN
Status: DISCONTINUED | OUTPATIENT
Start: 2022-12-26 | End: 2022-12-26 | Stop reason: HOSPADM

## 2022-12-26 RX ORDER — LANOLIN ALCOHOL/MO/W.PET/CERES
3 CREAM (GRAM) TOPICAL NIGHTLY PRN
Status: DISCONTINUED | OUTPATIENT
Start: 2022-12-26 | End: 2022-12-26

## 2022-12-26 RX ORDER — NEOSTIGMINE METHYLSULFATE 5 MG/5 ML
SYRINGE (ML) INTRAVENOUS PRN
Status: DISCONTINUED | OUTPATIENT
Start: 2022-12-26 | End: 2022-12-26 | Stop reason: SDUPTHER

## 2022-12-26 RX ORDER — MAGNESIUM HYDROXIDE 1200 MG/15ML
LIQUID ORAL CONTINUOUS PRN
Status: DISCONTINUED | OUTPATIENT
Start: 2022-12-26 | End: 2022-12-26 | Stop reason: HOSPADM

## 2022-12-26 RX ORDER — TRANEXAMIC ACID 10 MG/ML
INJECTION, SOLUTION INTRAVENOUS PRN
Status: DISCONTINUED | OUTPATIENT
Start: 2022-12-26 | End: 2022-12-26 | Stop reason: SDUPTHER

## 2022-12-26 RX ORDER — ONDANSETRON 2 MG/ML
INJECTION INTRAMUSCULAR; INTRAVENOUS PRN
Status: DISCONTINUED | OUTPATIENT
Start: 2022-12-26 | End: 2022-12-26 | Stop reason: SDUPTHER

## 2022-12-26 RX ORDER — ROCURONIUM BROMIDE 10 MG/ML
INJECTION, SOLUTION INTRAVENOUS PRN
Status: DISCONTINUED | OUTPATIENT
Start: 2022-12-26 | End: 2022-12-26 | Stop reason: SDUPTHER

## 2022-12-26 RX ORDER — FENTANYL CITRATE 50 UG/ML
INJECTION, SOLUTION INTRAMUSCULAR; INTRAVENOUS PRN
Status: DISCONTINUED | OUTPATIENT
Start: 2022-12-26 | End: 2022-12-26 | Stop reason: SDUPTHER

## 2022-12-26 RX ORDER — OXYCODONE HYDROCHLORIDE 5 MG/1
2.5 TABLET ORAL EVERY 6 HOURS PRN
Status: DISCONTINUED | OUTPATIENT
Start: 2022-12-26 | End: 2023-01-11 | Stop reason: HOSPADM

## 2022-12-26 RX ORDER — LANOLIN ALCOHOL/MO/W.PET/CERES
3 CREAM (GRAM) TOPICAL NIGHTLY PRN
Status: DISCONTINUED | OUTPATIENT
Start: 2022-12-26 | End: 2023-01-11 | Stop reason: HOSPADM

## 2022-12-26 RX ORDER — SODIUM CHLORIDE 0.9 % (FLUSH) 0.9 %
5-40 SYRINGE (ML) INJECTION PRN
Status: DISCONTINUED | OUTPATIENT
Start: 2022-12-26 | End: 2022-12-26 | Stop reason: HOSPADM

## 2022-12-26 RX ORDER — ERGOCALCIFEROL 1.25 MG/1
50000 CAPSULE ORAL WEEKLY
Qty: 8 CAPSULE | Refills: 0 | Status: SHIPPED | OUTPATIENT
Start: 2022-12-26 | End: 2023-02-14

## 2022-12-26 RX ORDER — SODIUM CHLORIDE 9 MG/ML
INJECTION INTRAVENOUS PRN
Status: DISCONTINUED | OUTPATIENT
Start: 2022-12-26 | End: 2022-12-26 | Stop reason: SDUPTHER

## 2022-12-26 RX ORDER — DEXAMETHASONE SODIUM PHOSPHATE 10 MG/ML
INJECTION INTRAMUSCULAR; INTRAVENOUS PRN
Status: DISCONTINUED | OUTPATIENT
Start: 2022-12-26 | End: 2022-12-26 | Stop reason: SDUPTHER

## 2022-12-26 RX ORDER — FENTANYL CITRATE 50 UG/ML
50 INJECTION, SOLUTION INTRAMUSCULAR; INTRAVENOUS EVERY 5 MIN PRN
Status: DISCONTINUED | OUTPATIENT
Start: 2022-12-26 | End: 2022-12-26 | Stop reason: HOSPADM

## 2022-12-26 RX ORDER — FENTANYL CITRATE 50 UG/ML
25 INJECTION, SOLUTION INTRAMUSCULAR; INTRAVENOUS EVERY 5 MIN PRN
Status: DISCONTINUED | OUTPATIENT
Start: 2022-12-26 | End: 2022-12-26 | Stop reason: HOSPADM

## 2022-12-26 RX ORDER — CEFAZOLIN SODIUM 1 G/3ML
INJECTION, POWDER, FOR SOLUTION INTRAMUSCULAR; INTRAVENOUS PRN
Status: DISCONTINUED | OUTPATIENT
Start: 2022-12-26 | End: 2022-12-26 | Stop reason: SDUPTHER

## 2022-12-26 RX ORDER — SODIUM CHLORIDE, SODIUM LACTATE, POTASSIUM CHLORIDE, CALCIUM CHLORIDE 600; 310; 30; 20 MG/100ML; MG/100ML; MG/100ML; MG/100ML
INJECTION, SOLUTION INTRAVENOUS CONTINUOUS PRN
Status: DISCONTINUED | OUTPATIENT
Start: 2022-12-26 | End: 2022-12-26 | Stop reason: SDUPTHER

## 2022-12-26 RX ORDER — SODIUM CHLORIDE 0.9 % (FLUSH) 0.9 %
5-40 SYRINGE (ML) INJECTION EVERY 12 HOURS SCHEDULED
Status: DISCONTINUED | OUTPATIENT
Start: 2022-12-26 | End: 2022-12-26 | Stop reason: HOSPADM

## 2022-12-26 RX ORDER — SODIUM CHLORIDE 9 MG/ML
INJECTION, SOLUTION INTRAVENOUS PRN
Status: DISCONTINUED | OUTPATIENT
Start: 2022-12-26 | End: 2022-12-26 | Stop reason: HOSPADM

## 2022-12-26 RX ORDER — SODIUM CHLORIDE, SODIUM LACTATE, POTASSIUM CHLORIDE, CALCIUM CHLORIDE 600; 310; 30; 20 MG/100ML; MG/100ML; MG/100ML; MG/100ML
INJECTION, SOLUTION INTRAVENOUS CONTINUOUS
Status: DISCONTINUED | OUTPATIENT
Start: 2022-12-26 | End: 2022-12-27

## 2022-12-26 RX ADMIN — TRANEXAMIC ACID 1 G: 10 INJECTION, SOLUTION INTRAVENOUS at 16:37

## 2022-12-26 RX ADMIN — FENTANYL CITRATE 50 MCG: 0.05 INJECTION, SOLUTION INTRAMUSCULAR; INTRAVENOUS at 17:03

## 2022-12-26 RX ADMIN — SODIUM CHLORIDE, PRESERVATIVE FREE 10 ML: 5 INJECTION INTRAVENOUS at 08:22

## 2022-12-26 RX ADMIN — OXYCODONE 2.5 MG: 5 TABLET ORAL at 02:23

## 2022-12-26 RX ADMIN — LIDOCAINE HYDROCHLORIDE 50 MG: 10 INJECTION, SOLUTION EPIDURAL; INFILTRATION; INTRACAUDAL; PERINEURAL at 14:32

## 2022-12-26 RX ADMIN — PHENYLEPHRINE HYDROCHLORIDE 100 MCG: 10 INJECTION INTRAVENOUS at 16:05

## 2022-12-26 RX ADMIN — FENTANYL CITRATE 100 MCG: 0.05 INJECTION, SOLUTION INTRAMUSCULAR; INTRAVENOUS at 14:32

## 2022-12-26 RX ADMIN — Medication 3 MG: at 16:41

## 2022-12-26 RX ADMIN — SODIUM CHLORIDE 10 ML: 9 INJECTION, SOLUTION INTRAMUSCULAR; INTRAVENOUS; SUBCUTANEOUS at 14:32

## 2022-12-26 RX ADMIN — SODIUM CHLORIDE, POTASSIUM CHLORIDE, SODIUM LACTATE AND CALCIUM CHLORIDE: 600; 310; 30; 20 INJECTION, SOLUTION INTRAVENOUS at 09:46

## 2022-12-26 RX ADMIN — ROCURONIUM BROMIDE 20 MG: 10 INJECTION, SOLUTION INTRAVENOUS at 15:29

## 2022-12-26 RX ADMIN — Medication 3 MG: at 02:23

## 2022-12-26 RX ADMIN — FENTANYL CITRATE 50 MCG: 50 INJECTION, SOLUTION INTRAMUSCULAR; INTRAVENOUS at 17:26

## 2022-12-26 RX ADMIN — OXYCODONE 2.5 MG: 5 TABLET ORAL at 08:26

## 2022-12-26 RX ADMIN — PHENYLEPHRINE HYDROCHLORIDE 100 MCG: 10 INJECTION INTRAVENOUS at 15:01

## 2022-12-26 RX ADMIN — CEFAZOLIN 2 G: 1 INJECTION, POWDER, FOR SOLUTION INTRAMUSCULAR; INTRAVENOUS at 15:00

## 2022-12-26 RX ADMIN — SODIUM CHLORIDE, POTASSIUM CHLORIDE, SODIUM LACTATE AND CALCIUM CHLORIDE: 600; 310; 30; 20 INJECTION, SOLUTION INTRAVENOUS at 14:34

## 2022-12-26 RX ADMIN — GLYCOPYRROLATE 0.6 MG: 0.2 INJECTION INTRAMUSCULAR; INTRAVENOUS at 16:41

## 2022-12-26 RX ADMIN — FENTANYL CITRATE 50 MCG: 0.05 INJECTION, SOLUTION INTRAMUSCULAR; INTRAVENOUS at 15:11

## 2022-12-26 RX ADMIN — DEXAMETHASONE SODIUM PHOSPHATE 10 MG: 10 INJECTION INTRAMUSCULAR; INTRAVENOUS at 14:40

## 2022-12-26 RX ADMIN — PHENYLEPHRINE HYDROCHLORIDE 100 MCG: 10 INJECTION INTRAVENOUS at 16:21

## 2022-12-26 RX ADMIN — OXYCODONE 2.5 MG: 5 TABLET ORAL at 18:24

## 2022-12-26 RX ADMIN — METHOCARBAMOL TABLETS 750 MG: 750 TABLET, COATED ORAL at 20:47

## 2022-12-26 RX ADMIN — PHENYLEPHRINE HYDROCHLORIDE 100 MCG: 10 INJECTION INTRAVENOUS at 14:32

## 2022-12-26 RX ADMIN — PROPOFOL 100 MG: 10 INJECTION, EMULSION INTRAVENOUS at 14:32

## 2022-12-26 RX ADMIN — ACETAMINOPHEN 1000 MG: 500 TABLET, FILM COATED ORAL at 06:37

## 2022-12-26 RX ADMIN — ONDANSETRON 4 MG: 2 INJECTION INTRAMUSCULAR; INTRAVENOUS at 16:34

## 2022-12-26 RX ADMIN — GABAPENTIN 100 MG: 100 CAPSULE ORAL at 08:26

## 2022-12-26 RX ADMIN — PHENYLEPHRINE HYDROCHLORIDE 100 MCG: 10 INJECTION INTRAVENOUS at 14:41

## 2022-12-26 RX ADMIN — Medication 3 MG: at 20:48

## 2022-12-26 RX ADMIN — METHOCARBAMOL TABLETS 750 MG: 750 TABLET, COATED ORAL at 08:22

## 2022-12-26 RX ADMIN — Medication 2000 MG: at 20:48

## 2022-12-26 RX ADMIN — ACETAMINOPHEN 1000 MG: 500 TABLET, FILM COATED ORAL at 20:48

## 2022-12-26 RX ADMIN — GABAPENTIN 100 MG: 100 CAPSULE ORAL at 20:47

## 2022-12-26 RX ADMIN — FENTANYL CITRATE 50 MCG: 0.05 INJECTION, SOLUTION INTRAMUSCULAR; INTRAVENOUS at 16:09

## 2022-12-26 RX ADMIN — PHENYLEPHRINE HYDROCHLORIDE 100 MCG: 10 INJECTION INTRAVENOUS at 14:58

## 2022-12-26 RX ADMIN — ROCURONIUM BROMIDE 50 MG: 10 INJECTION, SOLUTION INTRAVENOUS at 14:32

## 2022-12-26 RX ADMIN — TRANEXAMIC ACID 1 G: 10 INJECTION, SOLUTION INTRAVENOUS at 15:01

## 2022-12-26 ASSESSMENT — PAIN SCALES - GENERAL
PAINLEVEL_OUTOF10: 3
PAINLEVEL_OUTOF10: 5
PAINLEVEL_OUTOF10: 6
PAINLEVEL_OUTOF10: 6
PAINLEVEL_OUTOF10: 5
PAINLEVEL_OUTOF10: 7
PAINLEVEL_OUTOF10: 3
PAINLEVEL_OUTOF10: 6
PAINLEVEL_OUTOF10: 6

## 2022-12-26 ASSESSMENT — PAIN DESCRIPTION - LOCATION
LOCATION: HIP
LOCATION: LEG
LOCATION: LEG
LOCATION: HIP

## 2022-12-26 ASSESSMENT — PAIN DESCRIPTION - ORIENTATION
ORIENTATION: LEFT

## 2022-12-26 NOTE — ED TRIAGE NOTES
Pt presents to ER after a mechanical fall, pt states he slipped on ice, fell straight down, denies any syncope or LOC, states he takes thinners. Pt reports pain to left leg. Pt is alert, oriented, and ambulatory. Pt has outward rotation of left foot, no other obvious deformities. Pt has abrasion to left knee and left hand.

## 2022-12-26 NOTE — ED PROVIDER NOTES
[address] [facility]  ED  Emergency Department Encounter  EmergencyMedicine Resident     Pt Name:Galo Diane  MRN: [id number]  [place] 1940  Date of evaluation: 12/25/22  PCP:  Samia Jordan MD    [address] [place] [address]       Chief Complaint   Patient presents with    Fall       HISTORY OF PRESENT ILLNESS  (Location/Symptom, Timing/Onset, Context/Setting, Quality, Duration, Modifying Factors, Severity.)      Severino Rodriguez is a 79 y.o. male who presents with mechanical fall from standing height while pushing a garbage can. Patient slipped onto his left hip. He did not hit his head or lose consciousness. Patient does take aspirin and Plavix but no anticoagulation therapy. Patient denies prodromal symptoms such as chest pain shortness of breath or dizziness prior to fall. Patient is having pain in his left hip left leg and left knee. PAST MEDICAL / SURGICAL / SOCIAL / FAMILY HISTORY      has a past medical history of Acid reflux, Anxiety, Arthritis, BPH (benign prostatic hypertrophy), CAD (coronary artery disease), Cancer ([address] Carotid stenosis, Cataracts, both eyes, CHF (congestive heart failure) ([address] Dry skin, Elevated PSA, Examination of participant in clinical trial, Hyperlipidemia, Hypertension, Left ventricular dysfunction, Macular degeneration, Myocardial infarction ([address] Neuropathy, Pacemaker, Panic attacks, PVD (peripheral vascular disease) ([address] Sinus problem, Snores, Vasovagal near syncope, and Wears glasses. has a past surgical history that includes Pacemaker insertion (1995, 2001, 2008, 2013, 7/1/2019); Cholecystectomy, laparoscopic (03/03/2014); eye surgery (Bilateral); Cardiac defibrillator placement (2013); Cardiac surgery (1997); Cardiac catheterization; Colonoscopy; Prostate Biopsy (09/24/2019);  Prostate biopsy (N/A, 9/24/2019); pacemaker placement; and Cataract removal.      Social History     Socioeconomic History    Marital status:      Spouse name: Not on file Number of children: Not on file    Years of education: Not on file    Highest education level: Not on file   Occupational History    Not on file   Tobacco Use    Smoking status: Never    Smokeless tobacco: Never   Vaping Use    Vaping Use: Never used   Substance and Sexual Activity    Alcohol use: Yes     Alcohol/week: 2.0 standard drinks     Types: 2 Cans of beer per week     Comment:  weekly    Drug use: No    Sexual activity: Not on file   Other Topics Concern    Not on file   Social History Narrative    Not on file     Social Determinants of Health     Financial Resource Strain: Not on file   Food Insecurity: Not on file   Transportation Needs: Not on file   Physical Activity: Not on file   Stress: Not on file   Social Connections: Not on file   Intimate Partner Violence: Not on file   Housing Stability: Not on file       Family History   Problem Relation Age of Onset    High Blood Pressure Mother     Cancer Mother     High Blood Pressure Father     Diabetes Sister     Cancer Sister     High Blood Pressure Sister     Heart Disease Brother     High Blood Pressure Brother        Allergies:  Percocet [oxycodone-acetaminophen]    Home Medications:  Prior to Admission medications    Medication Sig Start Date End Date Taking? Authorizing Provider   gabapentin (NEURONTIN) 600 MG tablet Take 1 tablet by mouth 2 times daily for 90 days.  12/20/22 3/20/23  Arie Chandra DPM   Benzocaine-Menthol (SORE THROAT LOZENGES) 6-10 MG LOZG lozenge Take 1 lozenge by mouth every 2 hours as needed for Sore Throat 5/29/22   RAVI Sandoval - CNP   meloxicam [facility]) 15 MG tablet  12/3/21   Historical Provider, MD   bicalutamide (CASODEX) 50 MG chemo tablet TAKE ONE TABLET BY MOUTH DAILY 8/16/21   Tez Killian MD   clopidogrel (PLAVIX) 75 MG tablet Take 75 mg by mouth daily    Historical Provider, MD   ticagrelor (BRILINTA) 90 MG TABS tablet Take 1 tablet by mouth 2 times daily 9/3/20   Allen Pa RN   Multiple Vitamins-Minerals (THERAPEUTIC MULTIVITAMIN-MINERALS) tablet Take 1 tablet by mouth daily    Historical Provider, MD   ammonium lactate (LAC-HYDRIN) 12 % cream Apply topically as needed. 11/6/17   London Travis DPM   nitroGLYCERIN (NITROSTAT) 0.4 MG SL tablet Place 1 tablet under the tongue every 5 minutes as needed for Chest pain up to max of 3 total doses. If no relief after 1 dose, call 911. 1/27/17   Pb Webb MD   fluticasone [facility]) 50 MCG/ACT nasal spray 1 spray by Nasal route daily as needed     Historical Provider, MD   sertraline (ZOLOFT) 25 MG tablet Take 25 mg by mouth daily. Historical Provider, MD   amLODIPine (NORVASC) 10 MG tablet Take 10 mg by mouth daily. Historical Provider, MD   furosemide (LASIX) 40 MG tablet Take 40 mg by mouth daily. Historical Provider, MD   aspirin EC 81 MG EC tablet Take 1 tablet by mouth daily. Patient taking differently: Take 81 mg by mouth daily Pt. Instructed to stop 7-10 days pre-op/ He did not do this 7/20/13   Ray Ny MD   simvastatin (ZOCOR) 40 MG tablet Take 20 mg by mouth nightly     Historical Provider, MD   alfuzosin (UROXATRAL) 10 MG SR tablet Take 10 mg by mouth daily. Historical Provider, MD   lisinopril (PRINIVIL;ZESTRIL) 40 MG tablet Take 40 mg by mouth daily. Historical Provider, MD   metoprolol (TOPROL-XL) 100 MG XL tablet Take 100 mg by mouth daily. Historical Provider, MD       REVIEW OF SYSTEMS    (2-9 systems for level 4, 10 or more for level 5)      Review of Systems   Constitutional:  Negative for appetite change, fatigue and fever. HENT:  Negative for congestion and trouble swallowing. Respiratory:  Negative for cough, chest tightness and shortness of breath. Cardiovascular:  Negative for chest pain and leg swelling. Gastrointestinal:  Negative for abdominal pain, constipation, diarrhea, nausea and vomiting. Genitourinary:  Negative for dysuria. Musculoskeletal:  Negative for back pain and neck pain. Skin:  Negative for color change and rash. Neurological:  Negative for dizziness, weakness and headaches. PHYSICAL EXAM   (up to 7 for level 4, 8 or more for level 5)      INITIAL VITALS:   BP (!) 160/92   Pulse 73   Temp 97.5 °F (36.4 °C) (Oral)   Resp 17   Wt 182 lb (82.6 kg)   SpO2 92%   BMI 28.51 kg/m²     Physical Exam  Constitutional:       General: He is not in acute distress. HENT:      Head: Normocephalic and atraumatic. Nose: No congestion. Eyes:      General: No scleral icterus. Pupils: Pupils are equal, round, and reactive to light. Neck:      Comments: No CTL tenderness step-off or deformity  Cardiovascular:      Rate and Rhythm: Normal rate. Heart sounds: No murmur heard. No friction rub. No gallop. Pulmonary:      Breath sounds: No wheezing, rhonchi or rales. Abdominal:      General: Abdomen is flat. There is no distension. Palpations: Abdomen is soft. Tenderness: There is no abdominal tenderness. There is no guarding or rebound. Musculoskeletal:      Cervical back: Normal range of motion. Comments: Tenderness along the left hip left femur left knee. Left lower extremity externally rotated and shortened. Equal palpable pulses upper lower extremities bilaterally. Ecchymosis to left hand and left knee   Skin:     Capillary Refill: Capillary refill takes less than 2 seconds. Findings: No rash. Neurological:      General: No focal deficit present. Mental Status: He is oriented to person, place, and time.       Comments: GCS 15       DIFFERENTIAL  DIAGNOSIS     PLAN (LABS / IMAGING / EKG):  Orders Placed This Encounter   Procedures    XR HIP 2-3 VW W PELVIS LEFT    XR FEMUR LEFT (MIN 2 VIEWS)    XR KNEE LEFT (3 VIEWS)    XR HAND LEFT (MIN 3 VIEWS)    XR CHEST PORTABLE    TRAUMA PANEL    Inpatient consult to Trauma Surgery    Inpatient consult to Orthopedic Surgery    Type and Screen       MEDICATIONS ORDERED:  Orders Placed This Encounter   Medications    fentaNYL (SUBLIMAZE) injection 50 mcg       DIAGNOSTIC RESULTS / EMERGENCY DEPARTMENT COURSE / MDM   LAB RESULTS:  Results for orders placed or performed during the hospital encounter of 12/25/22   TRAUMA PANEL   Result Value Ref Range    Ethanol <10 <10 mg/dL    Ethanol percent <0.010 <0.010 %    Blood Bank Specimen BILL FOR SERVICES PERFORMED     BUN 14 8 - 23 mg/dL    WBC 7.3 3.5 - 11.3 k/uL    RBC 4.80 4.21 - 5.77 m/uL    Hemoglobin 14.2 13.0 - 17.0 g/dL    Hematocrit 41.6 40.7 - 50.3 %    MCV 86.7 82.6 - 102.9 fL    MCH 29.6 25.2 - 33.5 pg    MCHC 34.1 28.4 - 34.8 g/dL    RDW 13.5 11.8 - 14.4 %    Platelets 863 180 - 988 k/uL    MPV 10.2 8.1 - 13.5 fL    NRBC Automated 0.0 0.0 per 100 WBC    Creatinine 0.92 0.70 - 1.20 mg/dL    Est, Glom Filt Rate >60 >60 mL/min/1.73m2    Glucose 168 (H) 70 - 99 mg/dL    hCG Qual CANCEL PT MALE     Sodium 141 135 - 144 mmol/L    Potassium 4.0 3.7 - 5.3 mmol/L    Chloride 101 98 - 107 mmol/L    CO2 30 20 - 31 mmol/L    Anion Gap 10 9 - 17 mmol/L    Protime 10.6 9.1 - 12.3 sec    INR 1.0     PTT 23.7 20.5 - 30.5 sec    pH, Mario NO SAMPLE RECEIVED 7.320 - 7.420    pCO2, Mario NO SAMPLE RECEIVED 39.0 - 55.0 mm Hg    pO2, Mario NO SAMPLE RECEIVED 30.0 - 50.0 mm Hg    HCO3, Venous NO SAMPLE RECEIVED 24.0 - 30.0 mmol/L    Positive Base Excess, Mario NO SAMPLE RECEIVED 0.0 - 2.0 mmol/L    Negative Base Excess, Mario NO SAMPLE RECEIVED 0.0 - 2.0 mmol/L    O2 Sat, Mario NO SAMPLE RECEIVED %    Total Hb NO SAMPLE RECEIVED 12.0 - 16.0 g/dl    Oxyhemoglobin NO SAMPLE RECEIVED 95.0 - 98.0 %    Carboxyhemoglobin NO SAMPLE RECEIVED %    Methemoglobin NO SAMPLE RECEIVED %    Pt Temp NO SAMPLE RECEIVED     pH, Mario, Temp Adj NO SAMPLE RECEIVED 7.320 - 7.420    pCO2, Mario, Temp Adj NO SAMPLE RECEIVED 39.0 - 55.0 mmHg    pO2, Mario, Temp Adj NO SAMPLE RECEIVED 30.0 - 50.0 mmHg    O2 Device/Flow/% NO SAMPLE RECEIVED     Respiratory Rate NO SAMPLE RECEIVED     Leon Test NO SAMPLE RECEIVED     Sample Site NO SAMPLE RECEIVED     Pt. Position NO SAMPLE RECEIVED     Mode NO SAMPLE RECEIVED     Set Rate NO SAMPLE RECEIVED     Total Rate NO SAMPLE RECEIVED     VT NO SAMPLE RECEIVED     FIO2 NO SAMPLE RECEIVED     Peep/Cpap NO SAMPLE RECEIVED     PSV NO SAMPLE RECEIVED     Text for Respiratory NO SAMPLE RECEIVED     NOTIFICATION NO SAMPLE RECEIVED     NOTIFICATION TIME NO SAMPLE RECEIVED        RADIOLOGY:  XR HAND LEFT (MIN 3 VIEWS)    Result Date: 12/25/2022  1. No acute osseous abnormality. 2. Severe degenerative changes of the wrist and hand with small central erosions in the interphalangeal joints compatible with erosive osteoarthritis. XR FEMUR LEFT (MIN 2 VIEWS)    Result Date: 12/25/2022  1. Acute moderately displaced left femoral neck fracture. 2. No acute osseous abnormality of the left knee. XR KNEE LEFT (3 VIEWS)    Result Date: 12/25/2022  1. Acute moderately displaced left femoral neck fracture. 2. No acute osseous abnormality of the left knee. XR HIP 2-3 VW W PELVIS LEFT    Result Date: 12/25/2022  1. Acute moderately displaced left femoral neck fracture. 2. No acute osseous abnormality of the left knee. EKG Interpretation  None    MDM  Presents as a mechanical fall noted of left femoral neck fracture. Trauma service and orthopedic service consulted for surgical management and inpatient admission. No signs or symptoms of CTL injury or head injury. EMERGENCY DEPARTMENT COURSE:  ED Course as of 12/25/22 2045   Sun Dec 25, 2022   1928 Patient value bedside. Mechanical fall from standing height. No loss of consciousness no prodromal symptoms. Does take antiplatelet therapy no anticoagulation. Left lower extremity neurovascular intact externally rotated concern for intertrochanteric femoral neck fracture. Will get x-rays give fentanyl [ZE]   2036 Left-sided femur fracture likely femoral neck, trauma service and orthopedic surgery was consulted.   Patient's pain appears to be under control at this time with fentanyl. Patient to be admitted to trauma service Ortho and trauma evaluated patient at this time [ZE]      ED Course User Index  [ZE] Abilio Castro DO       PROCEDURES:  Procedures     CONSULTS:  IP CONSULT TO TRAUMA SURGERY  IP CONSULT TO ORTHOPEDIC SURGERY    CRITICAL CARE:  None    FINAL IMPRESSION      1. Closed fracture of neck of left femur, initial encounter ([facility]        DISPOSITION / PLAN     DISPOSITION Decision To Admit 12/25/2022 07:56:49 PM      PATIENT REFERRED TO:  No follow-up provider specified.     DISCHARGE MEDICATIONS:  New Prescriptions    No medications on file       Eden Olivas DO  Emergency Medicine Resident    (Please note that portions of thisnote were completed with a voice recognition program.  Efforts were made to edit the dictations but occasionally words are mis-transcribed.)  dipak Castro DO  Resident  12/25/22 2045

## 2022-12-26 NOTE — PROGRESS NOTES
Returned from [address] denies c/o pain alert answers slow but appropriate family at bedside

## 2022-12-26 NOTE — FLOWSHEET NOTE
Pt confused about procedure and doesn't know what was done. Wife says this is very common for him after surgery and takes about a day for him to return to normal. Also Dr Kavita Johnson, Anesthesia, contacted, she agrees that this is post op delirium. Very common for pts this age after surgery. Will continue to assess pts neurological status. Ok to return to floor.

## 2022-12-26 NOTE — ANESTHESIA POSTPROCEDURE EVALUATION
Department of Anesthesiology  Postprocedure Note    Patient: Debbie Solders  MRN: [id number]  YOB: 1940  Date of evaluation: 12/26/2022      Procedure Summary     Date: 12/26/22 Room / Location: [address]    Anesthesia Start: 4511 Anesthesia Stop: 4776    Procedure: HIP HEMIARTHROPLASTY,  DEPUY (Left) Diagnosis:       Closed avulsion fracture of left hip, initial encounter ([address]      (Left Femoral Neck Fracture)    Surgeons: Nicola Paz DO Responsible Provider: Cassandra Cordero MD    Anesthesia Type: general ASA Status: 3          Anesthesia Type: No value filed.     Ritu Phase I: Ritu Score: 9    Ritu Phase II:        Anesthesia Post Evaluation    Patient location during evaluation: PACU  Patient participation: complete - patient participated  Level of consciousness: awake and alert  Pain score: 1  Airway patency: patent  Nausea & Vomiting: no nausea and no vomiting  Complications: no  Cardiovascular status: hemodynamically stable  Respiratory status: acceptable  Hydration status: euvolemic

## 2022-12-26 NOTE — BRIEF OP NOTE
Brief Postoperative Note      Patient: Marisela Eldridge  YOB: 1940  MRN: [id number]    Date of Procedure: 12/26/2022    Pre-Op Diagnosis: Left displaced femoral neck fracture    Post-Op Diagnosis: Left displaced comminuted femoral neck fracture       Procedure(s): Left hip hemiarthroplasty    Surgeon(s): Sheila Roca DO    Assistant: Resident: Isaura Kerr DO; Verner Clan, DO    Anesthesia: General    Estimated Blood Loss (mL): 250 mL    Fluids: 3868 mL    Complications: None    Specimens: * No specimens in log *    Implants:  Implant Name Type Inv.  Item Serial No.  Lot No. LRB No. Used Action   CABLE SURG L750MM DIA1.7MM CERCLAGE CO CHROM SMOOTH W/ CRMP - [id number]  CABLE SURG L750MM DIA1.7MM CERCLAGE CO CHROM SMOOTH W/ CRMP  [employer] [id number] Left 1 Implanted   STEM FEM SZ 12 L130MM NK L40.3MM 48MM HI OFFSET 125DEG HIP - [id number]  STEM FEM SZ 12 L130MM NK L40.3MM 48MM HI OFFSET 125DEG HIP  [facility] [id number] Left 1 Implanted   SPACER FEM +5MM OFFSET 12/14 TAPR HIP MOD ALYSSA UPLR - [id number]  SPACER FEM +5MM OFFSET 12/14 TAPR HIP MOD ALYSSA UPLR  [facility] [employer] [facility] [id number] Left 1 Implanted   HEAD UPLR [id number] HIP BALL MOD ALYSSA SELF CNTR - [id number]  HEAD UPLR [id number] HIP BALL MOD ALYSSA SELF CNTR  [facility] [employer] [facility] [id number] Left 1 Implanted         Drains:   Urinary Catheter 12/26/22 (Active)       Findings: Left femoral neck fracture    Electronically signed by Sheila Roca DO on 12/26/2022 at 6:35 PM

## 2022-12-26 NOTE — CARE COORDINATION
12/26/22 0959   Service Assessment   Patient Orientation Alert and Oriented   Cognition Alert   History Provided By Patient   Primary Caregiver Self   Support Systems Spouse/Significant Other;Family Members   PCP Verified by CM Yes   Last Visit to PCP Within last 3 months   Prior Functional Level Independent in ADLs/IADLs   Current Functional Level Independent in ADLs/IADLs   Can patient return to prior living arrangement Yes   Ability to make needs known: Good   Family able to assist with home care needs: Yes   Social/Functional History   Lives With Spouse   Type of [address] Two level   Home Access Stairs to enter with rails   Entrance Stairs - Number of Steps 3   Entrance Stairs - Rails Both   Bathroom Shower/Tub Tub/Shower unit; Shower chair with back   [address] bars around toilet;Grab bars in shower   [address] bars   ADL Assistance Independent   Homemaking Assistance Independent   Homemaking Responsibilities Yes   Ambulation Assistance Independent   Transfer Assistance Independent   Active  Yes   Mode of Transportation Car;Family   Occupation Retired   Discharge Planning   Type of [address] Prior To Admission None   Potential Assistance Needed Outpatient PT/OT   DME Ordered? No   Potential Assistance Purchasing Medications No   Type of Home Care Services Housekeeping   Patient expects to be discharged to: House   One/Two Story Residence Two story   Lift Chair Available Yes   History of falls?  0   Services At/After Discharge   Transition of Care Consult (CM Consult) Discharge Planning   Mode of Transport at Discharge Self   Confirm Follow Up Transport Self   Condition of Participation: Discharge Planning   The Plan for Transition of Care is related to the following treatment goals: Healing of fracture pain control   The Patient and/or Patient Representative was provided with a Choice of Provider? Patient   The Patient and/Or Patient Representative agree with the Discharge Plan?  Yes

## 2022-12-26 NOTE — PROGRESS NOTES
[facility] CARE DEPARTMENT - [address]  PROGRESS NOTE    Shift date: 12.26.2022  Shift day: Monday   Shift # 2    Room # 0318/0318-01   Name: Augustine White                Jew: unknown   Place of Quaker: unknown    Referral: Routine Visit    Admit Date & Time: 12/25/2022  7:27 PM    Assessment:  Augustine White is a 79 y.o. male in the hospital and undergoing surgery. Daughter is waiting in the surgical waiting area and appears to be calm and coping. Daughter was given an update on the patient already and waiting for the patient to finish surgery. Other support with the daughter. Intervention:  Writer introduced self and title as  Writer offered space for the daughter  to express feelings, needs, and concerns and provided a ministry presence. Determined family support to be available. Outcome:  Daughter appears to be calm and coping    Plan:  Chaplains will remain available to offer spiritual and emotional support as needed.       Electronically signed by Sally Rodriguez on 12/26/2022 at 6:19 PM.  [facility]  [phone]       12/26/22 1715   Encounter Summary   Service Provided For: Family   Referral/Consult From: 6 [facility]   Last Encounter  12/26/22   Complexity of Encounter Moderate   Begin Time 1715   End Time  1730   Total Time Calculated 15 min   Encounter    Type Initial Screen/Assessment   Assessment/Intervention/Outcome   Assessment Calm;Coping   Intervention Active listening;Explored/Affirmed feelings, thoughts, concerns   Outcome Engaged in conversation;Expressed Gratitude     Electronically signed by Parish Mike on 12/26/2022 at 6:19 PM

## 2022-12-26 NOTE — PROGRESS NOTES
Trauma Tertiary Survey    Admit Date: 12/25/2022  Hospital day 1    [facility]       Past Medical History:   Diagnosis Date    Acid reflux     resolved since s/p shoan    Anxiety     Arthritis     back/ fingers    BPH (benign prostatic hypertrophy)     CAD (coronary artery disease)     Dr. Wes hernandez/ [facility]    Cancer [facility])     face, ear, scalp and arms     Carotid stenosis     bilat    Cataracts, both eyes     CHF (congestive heart failure) ([address]     Dry skin     Elevated PSA     Examination of participant in clinical trial 4/27/39    For the life of the medtronic device    Hyperlipidemia     pt denies 11-27-19    Hypertension     PCP Dr. Janeth Fuentes/ last seen 9-2019    Left ventricular dysfunction     Macular degeneration     left    Myocardial infarction ([address] 11/17/88, 1/2013    Neuropathy     Pacemaker 1995-2013    X 5    Panic attacks     PVD (peripheral vascular disease) ([address]     Sinus problem     Snores     Vasovagal near syncope     Wears glasses        Scheduled Meds:   [MAR Hold] sodium chloride flush  5-40 mL IntraVENous 2 times per day    [MAR Hold] polyethylene glycol  17 g Oral Daily    [MAR Hold] acetaminophen  1,000 mg Oral 3 times per day    [MAR Hold] gabapentin  100 mg Oral TID    [MAR Hold] methocarbamol  750 mg Oral TID     Continuous Infusions:   [MAR Hold] lactated ringers 75 mL/hr at 12/26/22 1427    [MAR Hold] sodium chloride       PRN Meds:[MAR Hold] melatonin, [MAR Hold] oxyCODONE, [MAR Hold] sodium chloride flush, [MAR Hold] sodium chloride, [MAR Hold] ondansetron **OR** [MAR Hold] ondansetron    Subjective:     Pain controlled. Ready for procedure. Objective:   Patient Vitals for the past 8 hrs:   Resp   12/26/22 0856 16       I/O last 3 completed shifts:  In: -   Out: 200 [Urine:200]  I/O this shift:  In: -   Out: 100 [Urine:100]    Radiology:  XR HAND LEFT (MIN 3 VIEWS)    Result Date: 12/25/2022  1. No acute osseous abnormality.  2. Severe degenerative changes of the wrist and hand with small central erosions in the interphalangeal joints compatible with erosive osteoarthritis. XR FEMUR LEFT (MIN 2 VIEWS)    Result Date: 12/25/2022  1. Acute moderately displaced left femoral neck fracture. 2. No acute osseous abnormality of the left knee. XR KNEE LEFT (3 VIEWS)    Result Date: 12/25/2022  1. Acute moderately displaced left femoral neck fracture. 2. No acute osseous abnormality of the left knee. XR CHEST PORTABLE    Result Date: 12/25/2022  Enlarged cardiac silhouette and mild pulmonary vascular congestion. XR HIP 2-3 VW W PELVIS LEFT    Result Date: 12/25/2022  1. Acute moderately displaced left femoral neck fracture. 2. No acute osseous abnormality of the left knee. PHYSICAL EXAM:    GCS:  4 - Opens eyes on own   6 - Follows simple motor commands  5 - Alert and oriented    Pupil size:  Left 3 mm Right 3 mm  Pupil reaction: Yes  Wiggles fingers: Left Yes Right Yes  Hand grasp:   Left normal   Right normal  Wiggles toes: Left Yes    Right Yes  Plantar flexion: Left decreased  Right normal    Physical Exam  Constitutional:       General: He is not in acute distress. Appearance: Normal appearance. He is normal weight. He is not ill-appearing or toxic-appearing. .   Eyes:      Extraocular Movements: Extraocular movements intact. Pupils: Pupils are equal, round, and reactive to light. Cardiovascular:      Rate and Rhythm: Normal rate and regular rhythm. Pulses: Normal pulses. Heart sounds: Normal heart sounds. No murmur heard. Comments: Paced rhythm  Pulmonary:      Effort: Pulmonary effort is normal. No respiratory distress. Abdominal:      General: Abdomen is flat. There is no distension. Palpations: Abdomen is soft. Tenderness: There is no abdominal tenderness. There is no guarding. Musculoskeletal:         General: Tenderness, deformity and signs of injury present. Cervical back: Neck supple. No tenderness. Right lower leg: No edema. Left lower leg: No edema. Comments: Left hip deformity with tenderness to palpation. No midline tenderness to palpation C/T/L spine   Skin:     General: Skin is warm and dry. Capillary Refill: Capillary refill takes less than 2 seconds. Findings: No bruising. Neurological:      Mental Status: He is alert and oriented to person, place, and time. Mental status is at baseline. Sensory: No sensory deficit. Motor: No weakness.        Spine:     Spine Tenderness ROM   Cervical 0 /10 Normal   Thoracic 0 /10 Normal   Lumbar 0 /10 Normal     Musculoskeletal    Joint Tenderness Swelling ROM   Right shoulder absent absent normal   Left shoulder absent absent normal   Right elbow absent absent normal   Left elbow absent absent normal   Right wrist absent absent normal   Left wrist absent absent normal   Right hand grasp absent absent normal   Left hand grasp absent absent normal   Right hip absent present decreased   Left hip present absent normal   Right knee absent absent normal   Left knee absent absent normal   Right ankle absent absent normal   Left ankle absent absent normal   Right foot absent absent normal   Left foot absent absent normal           CONSULTS: Cardiology, Orthopedic surgery    PROCEDURES: ECHO    INJURIES:    L femoral neck fx        Assessment/Plan:     POST OP check    Electronically signed by Mary Oscar DO  on 12/26/2022 at 3:52 PM

## 2022-12-26 NOTE — ED PROVIDER NOTES
[id number]     Emergency Department     Faculty Attestation    I performed a history and physical examination of the patient and discussed management with the resident. I reviewed the residents note and agree with the documented findings including all diagnostic interpretations and plan of care. Any areas of disagreement are noted on the chart. I was personally present for the key portions of any procedures. I have documented in the chart those procedures where I was not present during the key portions. I have reviewed the emergency nurses triage note. I agree with the chief complaint, past medical history, past surgical history, allergies, medications, social and family history as documented unless otherwise noted below. Documentation of the HPI, Physical Exam and Medical Decision Making performed by scribpranav is based on my personal performance of the HPI, PE and MDM. For Physician Assistant/ Nurse Practitioner cases/documentation I have personally evaluated this patient and have completed at least one if not all key elements of the E/M (history, physical exam, and MDM). Additional findings are as noted. Primary Care Physician: Marco Lira MD    History: This is a 79 y.o. male who presents to the Emergency Department with complaint of fall. Patient was moving the recycling outside when his leg slipped out in front of him and he fell forward. Significant pain in the hip reported on aspirin and Plavix due to coronary artery disease history. Did not strike head no loss consciousness. Physical:     weight is 182 lb (82.6 kg). His oral temperature is 97.5 °F (36.4 °C). His blood pressure is 160/92 (abnormal) and his pulse is 73. His respiration is 17 and oxygen saturation is 92%.    79 y.o. male no acute distress but appears uncomfortable, improved after pain medication.   Cardiac exam regular rate and rhythm no murmurs rubs gallops, pulmonary clear bilaterally abdomen soft nontender nondistended. The left leg is externally rotated and shortened compared with the right.   Abrasions noted on the extremities scattered    Impression: Fall, suspected hip fracture    Plan: X-rays, analgesia, likely orthopedics and trauma consultation pending imaging      Renetta Bartlett MD, [facility]  Attending Emergency Physician        Mauro Huerta MD  12/25/22 2002

## 2022-12-26 NOTE — PROGRESS NOTES
PROGRESS NOTE      PATIENT NAME: Jorge L Faulkner. RECORD NO. [id number]  DATE: 12/26/2022    HD: # 1      Patient Active Problem List   Diagnosis    Right upper quadrant abdominal pain    CAD (coronary artery disease)    Hypertension    Skin cancer    BPH (benign prostatic hyperplasia)    Chronic systolic heart failure (HCC)    Hyperlipemia    Leukocytosis    Cholelithiasis with acute cholecystitis    Pre-syncope    Claudication in peripheral vascular disease (HCC)    Carotid stenosis, asymptomatic    Squamous cell carcinoma of skin of left upper arm    Encounter due to AICD at end of battery life-Change OUT 7-1-19    Malignant neoplasm of prostate ([facility]    Chest pain    COVID    COVID-19    Closed left hip fracture, initial encounter ([facility]       DIAGNOSIS AND PLAN    Mechanical fall from standing with acute moderately displaced left femoral neck fracture  - orthopedic surgery planning operative intervention  - NPO  - strict bedrest and fall precautions   -Moderate to high risk per cardiology    CAD s/p CABG and multiple TED's  ICM with history of pacemaker (first placed in 1995). ICD 2013. AICD 7/1/2019, CHF  -Appreciate Cardiology assistance  -EKG and ECHO reviewed    DVT prophylaxis Post op day 1  Dispo: OR today      Chief Complaint: \"Left leg pain\"    SUBJECTIVE  Patient seen and examined at bedside this morning, no acute events overnight. Pain is controlled. Patient denies nausea or emesis. OBJECTIVE  VITALS:   Vitals:    12/26/22 0856   BP:    Pulse:    Resp: 16   Temp:    SpO2:      Physical Exam  Constitutional:       General: He is not in acute distress. Appearance: Normal appearance. He is normal weight. He is not ill-appearing or toxic-appearing. .   Eyes:      Extraocular Movements: Extraocular movements intact. Pupils: Pupils are equal, round, and reactive to light. Cardiovascular:      Rate and Rhythm: Normal rate and regular rhythm. Pulses: Normal pulses.       Heart sounds: Normal heart sounds. No murmur heard. Comments: Paced rhythm  Pulmonary:      Effort: Pulmonary effort is normal. No respiratory distress. Abdominal:      General: Abdomen is flat. There is no distension. Palpations: Abdomen is soft. Tenderness: There is no abdominal tenderness. There is no guarding. Musculoskeletal:         General: Tenderness, deformity and signs of injury present. Cervical back: Neck supple. No tenderness. Right lower leg: No edema. Left lower leg: No edema. Comments: Left hip deformity with tenderness to palpation. No midline tenderness to palpation C/T/L spine   Skin:     General: Skin is warm and dry. Capillary Refill: Capillary refill takes less than 2 seconds. Findings: No bruising. Neurological:      Mental Status: He is alert and oriented to person, place, and time. Mental status is at baseline. Sensory: No sensory deficit. Motor: No weakness. LAB:  CBC:   Recent Labs     12/25/22 2013   WBC 7.3   HGB 14.2   HCT 41.6   MCV 86.7        BMP:   Recent Labs     12/25/22 2013      K 4.0      CO2 30   BUN 14   CREATININE 0.92   GLUCOSE 168*       RADIOLOGY:  XR HAND LEFT (MIN 3 VIEWS)    Result Date: 12/25/2022  1. No acute osseous abnormality. 2. Severe degenerative changes of the wrist and hand with small central erosions in the interphalangeal joints compatible with erosive osteoarthritis. XR FEMUR LEFT (MIN 2 VIEWS)    Result Date: 12/25/2022  1. Acute moderately displaced left femoral neck fracture. 2. No acute osseous abnormality of the left knee. XR KNEE LEFT (3 VIEWS)    Result Date: 12/25/2022  1. Acute moderately displaced left femoral neck fracture. 2. No acute osseous abnormality of the left knee. XR CHEST PORTABLE    Result Date: 12/25/2022  Enlarged cardiac silhouette and mild pulmonary vascular congestion. XR HIP 2-3 VW W PELVIS LEFT    Result Date: 12/25/2022  1.  Acute moderately displaced left femoral neck fracture. 2. No acute osseous abnormality of the left knee.           Leandro Vizcarra DO  12/26/2022, 2:06 PM

## 2022-12-26 NOTE — CONSULTS
Orthopaedic Surgery Consult  (Dr. Zhou Donnelly)      CC/Reason for consult:  L hip pain s/p [facility]    HPI:      The patient is a 79 y.o. right hand-dominant male who currently presents to the emergency department after falling from standing height, and is now complaining of significant left hip pain. According to the patient, he states that he was lifting a recycling can outside, and he slipped on ice, fell straight down onto his left lower extremity, and was unable to ambulate or get up after the incident. Orthopedics was consulted due to x-rays demonstrating a left femoral neck fracture. Patient denies any loss of consciousness, GCS is 15, did not strike his head. Patient denies any numbness or tingling to his extremities. Patient states that his hip pain is most significant with movements. Patient denies any knee or ankle pain to the left lower extremity. He has no other orthopedic complaints other than his left hip pain. Patient denies any previous hip pain prior to the injury, and ambulates at home without any assistive devices. He currently lives at home with his wife, and is retired. Patient performs his daily living activities independently. Patient has a significant previous cardiac history, which includes 2 myocardial infarctions, 5 pacemaker placements (most recent ), double bypass (), CHF, HTN, PVD, and the patient states that he \"\" in  due to heart related complications. Patient takes aspirin and Plavix for anticoagulation. Patient denies any previous orthopedic history.     Past Medical History:    Past Medical History:   Diagnosis Date    Acid reflux     resolved since s/p shona    Anxiety     Arthritis     back/ fingers    BPH (benign prostatic hypertrophy)     CAD (coronary artery disease)     Dr. Lua Danger Dr. hernandez/ [facility]    Cancer [facility])     face, ear, scalp and arms     Carotid stenosis     bilat    Cataracts, both eyes     CHF (congestive heart failure) ([facility])     Dry skin Elevated PSA     Examination of participant in clinical trial 4/27/39    For the life of the medtronic device    Hyperlipidemia     pt denies 11-27-19    Hypertension     PCP Dr. Cale Fuentes/ last seen 9-2019    Left ventricular dysfunction     Macular degeneration     left    Myocardial infarction ([facility] 11/17/88, 1/2013    Neuropathy     Pacemaker 1995-2013    X 5    Panic attacks     PVD (peripheral vascular disease) ([facility] [facility]     Sinus problem     Snores     Vasovagal near syncope     Wears glasses      Past Surgical History:    Past Surgical History:   Procedure Laterality Date    CARDIAC CATHETERIZATION      several/ stents X 3/ angioplasty    CARDIAC DEFIBRILLATOR PLACEMENT  2013    Up graded PPM to ICD    [address]    double bypass [address]  03/03/2014    [facility]    COLONOSCOPY      EYE SURGERY Bilateral     cataracts    [address], 2001, 2008, 2013, 7/1/2019 7/1/2019 is AICD    PACEMAKER PLACEMENT      pacer/ AICD/ Newest 7-1-19,medtronic aicd last check 8/20/19. pacemaker x5. PROSTATE BIOPSY  09/24/2019    as local    PROSTATE BIOPSY N/A 9/24/2019    PROSTATE BIOPSY WITH ULTRASOUND (OFFICE NOTIFIED DEPT) performed by Arianna Law MD at [place] [address]     Medications Prior to Admission:   Prior to Admission medications    Medication Sig Start Date End Date Taking? Authorizing Provider   gabapentin (NEURONTIN) 600 MG tablet Take 1 tablet by mouth 2 times daily for 90 days.  12/20/22 3/20/23  Gregg Slot, DPM   Benzocaine-Menthol (SORE THROAT LOZENGES) 6-10 MG LOZG lozenge Take 1 lozenge by mouth every 2 hours as needed for Sore Throat 5/29/22   Clare Strong APRN - CNP   meloxicam [facility]) 15 MG tablet  12/3/21   Historical Provider, MD   bicalutamide (CASODEX) 50 MG chemo tablet TAKE ONE TABLET BY MOUTH DAILY 8/16/21   Laura Bermudez MD   clopidogrel (PLAVIX) 75 MG tablet Take 75 mg by mouth daily    Historical Provider, MD   ticagrelor (BRILINTA) 90 MG TABS tablet Take 1 tablet by mouth 2 times daily 9/3/20   Naima Swanson, RN   Multiple Vitamins-Minerals (THERAPEUTIC MULTIVITAMIN-MINERALS) tablet Take 1 tablet by mouth daily    Historical Provider, MD   ammonium lactate (LAC-HYDRIN) 12 % cream Apply topically as needed. 11/6/17   Stefani Mcintosh, DPM   nitroGLYCERIN (NITROSTAT) 0.4 MG SL tablet Place 1 tablet under the tongue every 5 minutes as needed for Chest pain up to max of 3 total doses. If no relief after 1 dose, call 911. 1/27/17   Vira Cope MD   fluticasone [facility]) 50 MCG/ACT nasal spray 1 spray by Nasal route daily as needed     Historical Provider, MD   sertraline (ZOLOFT) 25 MG tablet Take 25 mg by mouth daily. Historical Provider, MD   amLODIPine (NORVASC) 10 MG tablet Take 10 mg by mouth daily. Historical Provider, MD   furosemide (LASIX) 40 MG tablet Take 40 mg by mouth daily. Historical Provider, MD   aspirin EC 81 MG EC tablet Take 1 tablet by mouth daily. Patient taking differently: Take 81 mg by mouth daily Pt. Instructed to stop 7-10 days pre-op/ He did not do this 7/20/13   Dedrick Jalloh MD   simvastatin (ZOCOR) 40 MG tablet Take 20 mg by mouth nightly     Historical Provider, MD   alfuzosin (UROXATRAL) 10 MG SR tablet Take 10 mg by mouth daily. Historical Provider, MD   lisinopril (PRINIVIL;ZESTRIL) 40 MG tablet Take 40 mg by mouth daily. Historical Provider, MD   metoprolol (TOPROL-XL) 100 MG XL tablet Take 100 mg by mouth daily. Historical Provider, MD     Allergies:    Percocet [oxycodone-acetaminophen]  Social History:   Social History     Socioeconomic History    Marital status:    Tobacco Use    Smoking status: Never    Smokeless tobacco: Never   Vaping Use    Vaping Use: Never used   Substance and Sexual Activity    Alcohol use:  Yes     Alcohol/week: 2.0 standard drinks     Types: 2 Cans of beer per week     Comment:  weekly    Drug use: No     Family History:  Family History Problem Relation Age of Onset    High Blood Pressure Mother     Cancer Mother     High Blood Pressure Father     Diabetes Sister     Cancer Sister     High Blood Pressure Sister     Heart Disease Brother     High Blood Pressure Brother        ROS:   Constitutional: Negative for fever and chills. Respiratory: Negative for cough. Cardiovascular: Negative for chest pain. Musculoskeletal: Positive for left hip pain. Skin: Negative for itching and rash. Neurological: Negative for numbness, tingling, weakness. PE:  Blood pressure (!) 160/92, pulse 73, temperature 97.5 °F (36.4 °C), temperature source Oral, resp. rate 17, weight 182 lb (82.6 kg), SpO2 92 %. Gen: Alert and oriented, NAD, cooperative. Head: Normocephalic, atraumatic. Cardiovascular: Rate regular. Respiratory: Chest symmetric, no accessory muscle use. Pelvis: Stable to anterior and lateral compression. RUE: Skin intact. No ecchymoses, abrasion, deformity, or lacerations. Non tender to palpation. No bony crepitus. Compartments soft and easily compressible. Full ROM at shoulder without pain. Full ROM at elbow without pain. Ulnar/median/AIN/PIN/radial motor intact. Axillary/MCN/median/ulnar/radial nerves SILT. Radial pulse 2+ with BCR.    LUE: Skin intact, although mild abrasions noted at the radial aspect of the first MCPJ. Non tender to palpation. No bony crepitus. Compartments soft and easily compressible. Full ROM at shoulder without pain. Full ROM at elbow without pain. Ulnar/median/AIN/PIN/radial motor intact. Axillary/MCN/median/ulnar/radial nerves SILT. Radial pulse 2+ with BCR. RLE: Skin intact. No ecchymoses, abrasions, deformity, or lacerations. Non tender to palpation. No bony crepitus. Compartments soft and easily compressible. EHL/FHL/TA/GS complex motor intact. Sural/saphenous/SPN/DPN/plantar nerve distribution SILT. Patient has no groin pain with log roll maneuver.  Lachman 1a, knee appears stable to varus and valgus stress test at 0 and 30 degrees. Able to SLR. DP and PT pulses 2+ with BCR. LLE: Mild but notable external rotation at the hip. Significant tenderness palpation about the hip and groin. Compartments soft and easily compressible. Superficial abrasions noted at the knee. EHL/FHL/TA/GS complex motor intact. Sural/saphenous/SPN/DPN/plantar nerve distribution SILT. Deferred logroll examination, knee ligamentous examination, and straight leg raise assessment due to obvious injury. DP and PT pulses 2+ with BCR. Labs:  Recent Labs     22   WBC 7.3   HGB 14.2   HCT 41.6      INR 1.0      K 4.0   BUN 14   CREATININE 0.92   GLUCOSE 168*        Imagin views of the left hip demonstrating a transcervical, shortened femoral neck fracture    Assessment/Plan: 79 y.o. male who fell from standing height, being seen for:    -Left femoral neck fracture    -Plan for the OR tomorrow, 2022, with Dr. Betzy Bella for left hip hemiarthroplasty, pending risk stratification by primary team and clearances by cardiology/ancillary teams  -WB status: NWB LLE  -Hgb 12.8  -Ancef on-call to the OR  -N.p.o. at midnight for surgical intervention  -DVT ppx: Please hold all DVT prophylaxis for surgical intervention tomorrow  -Consent/marked  -F/u VitD level  -Pain control per primary  -Ice extremity for pain and swelling  -PT/OT after surgical intervention  -Please contact ortho with any questions        Rona Morales DO  Orthopedic Surgery Resident, PGY-2  [address]      This note is created with the assistance of a speech recognition program. While intending to generate a document that actually reflects the content of the visit, the document can still have some errors including those of syntax and sound a like substitutions which may escape proof reading. In such instances, actual meaning can be extrapolated by contextual diversion.

## 2022-12-26 NOTE — ANESTHESIA PRE PROCEDURE
Department of Anesthesiology  Preprocedure Note       Name:  Barney Blake   Age:  79 y.o.  :  1940                                          MRN:  [id number]         Date:  2022      Surgeon: Marta Skaggs):  Luisana Dixon DO    Procedure: Procedure(s):  HIP HEMIARTHROPLASTY, LATERAL, PEG BOARD, SYNTHES    Medications prior to admission:   Prior to Admission medications    Medication Sig Start Date End Date Taking? Authorizing Provider   vitamin D (ERGOCALCIFEROL) 1.25 MG (29372 UT) CAPS capsule Take 1 capsule by mouth once a week for 8 doses 22 Yes Jocy Tao DO   gabapentin (NEURONTIN) 600 MG tablet Take 1 tablet by mouth 2 times daily for 90 days. 12/20/22 3/20/23  oNra Kwan DPM   Benzocaine-Menthol (SORE THROAT LOZENGES) 6-10 MG LOZG lozenge Take 1 lozenge by mouth every 2 hours as needed for Sore Throat 22   RAVI Orlando - CNP   meloxicam [facility]) 15 MG tablet  12/3/21   Historical Provider, MD   bicalutamide (CASODEX) 50 MG chemo tablet TAKE ONE TABLET BY MOUTH DAILY 21   Drew Ortiz MD   clopidogrel (PLAVIX) 75 MG tablet Take 75 mg by mouth daily    Historical Provider, MD   ticagrelor (BRILINTA) 90 MG TABS tablet Take 1 tablet by mouth 2 times daily 9/3/20   Arleen Ramirez RN   Multiple Vitamins-Minerals (THERAPEUTIC MULTIVITAMIN-MINERALS) tablet Take 1 tablet by mouth daily    Historical Provider, MD   ammonium lactate (LAC-HYDRIN) 12 % cream Apply topically as needed. 17   Candy Salgado DPM   nitroGLYCERIN (NITROSTAT) 0.4 MG SL tablet Place 1 tablet under the tongue every 5 minutes as needed for Chest pain up to max of 3 total doses. If no relief after 1 dose, call 911. 17   Marce Huerta MD   fluticasone [facility]) 50 MCG/ACT nasal spray 1 spray by Nasal route daily as needed     Historical Provider, MD   sertraline (ZOLOFT) 25 MG tablet Take 25 mg by mouth daily.     Historical Provider, MD   amLODIPine (NORVASC) 10 MG tablet Take 10 mg by mouth daily. Historical Provider, MD   furosemide (LASIX) 40 MG tablet Take 40 mg by mouth daily. Historical Provider, MD   aspirin EC 81 MG EC tablet Take 1 tablet by mouth daily. Patient taking differently: Take 81 mg by mouth daily Pt. Instructed to stop 7-10 days pre-op/ He did not do this 7/20/13   Bishop Pang MD   simvastatin (ZOCOR) 40 MG tablet Take 20 mg by mouth nightly     Historical Provider, MD   alfuzosin (UROXATRAL) 10 MG SR tablet Take 10 mg by mouth daily. Historical Provider, MD   lisinopril (PRINIVIL;ZESTRIL) 40 MG tablet Take 40 mg by mouth daily. Historical Provider, MD   metoprolol (TOPROL-XL) 100 MG XL tablet Take 100 mg by mouth daily.     Historical Provider, MD       Current medications:    Current Facility-Administered Medications   Medication Dose Route Frequency Provider Last Rate Last Admin    melatonin tablet 3 mg  3 mg Oral Nightly PRN Seldon Mule, DO   3 mg at 12/26/22 0223    oxyCODONE (ROXICODONE) immediate release tablet 2.5 mg  2.5 mg Oral Q6H PRN Seldon Mule, DO   2.5 mg at 12/26/22 0511    lactated ringers infusion   IntraVENous Continuous Jenney Sat, DO 75 mL/hr at 12/26/22 0946 New Bag at 12/26/22 0946    sodium chloride flush 0.9 % injection 5-40 mL  5-40 mL IntraVENous 2 times per day Seldon Mule, DO   10 mL at 12/26/22 7629    sodium chloride flush 0.9 % injection 5-40 mL  5-40 mL IntraVENous PRN Seldon Mule, DO        0.9 % sodium chloride infusion   IntraVENous PRN Seldon Mule, DO        ondansetron (ZOFRAN-ODT) disintegrating tablet 4 mg  4 mg Oral Q8H PRN Seldon Mule, DO        Or    ondansetron (ZOFRAN) injection 4 mg  4 mg IntraVENous Q6H PRN Seldon Mule, DO        polyethylene glycol (GLYCOLAX) packet 17 g  17 g Oral Daily Seldon Mule, DO        acetaminophen (TYLENOL) tablet 1,000 mg  1,000 mg Oral 3 times per day Seldon Mule, DO   1,000 mg at 12/26/22 1870    gabapentin (NEURONTIN) capsule 100 mg  100 mg Oral TID Reatha Nelli, DO   100 mg at 12/26/22 8339    methocarbamol (ROBAXIN) tablet 750 mg  750 mg Oral TID Reatha Nelli, DO   750 mg at 12/26/22 7337       Allergies:     Allergies   Allergen Reactions    Percocet [Oxycodone-Acetaminophen] Other (See Comments)     Panic attack       Problem List:    Patient Active Problem List   Diagnosis Code    Right upper quadrant abdominal pain R10.11    CAD (coronary artery disease) I25.10    Hypertension I10    Skin cancer C44.90    BPH (benign prostatic hyperplasia) N40.0    Chronic systolic heart failure (HCC) I50.22    Hyperlipemia E78.5    Leukocytosis D72.829    Cholelithiasis with acute cholecystitis K80.00    Pre-syncope R55    Claudication in peripheral vascular disease ([facility] [address] I73.9    Carotid stenosis, asymptomatic I65.29    Squamous cell carcinoma of skin of left upper arm C44.629    Encounter due to AICD at end of battery life-Change OUT 7-1-19 Z45.02    Malignant ne[facility] ([facility] C61    Chest pain R07.9    COVID U07.1    COVID-19 U07.1    Closed left hip fracture, initial encounter ([facility] S72.002A       Past Medical History:        Diagnosis Date    Acid reflux     resolved since s/p shona    Anxiety     Arthritis     back/ fingers    BPH (benign prostatic hypertrophy)     CAD (coronary artery disease)     Dr. Carolina hernandez/ [facility]    Cancer [facility])     face, ear, scalp and arms     Carotid stenosis     bilat    Cataracts, both eyes     CH[facility]congest[facility] failure) ([facility])     Dry skin     Elevated PSA     Examination of participant in clinical trial 4/27/39    For the life of the medtronic device    Hyperlipidemia     pt denies 11-27-19    Hypertension     PCP Dr. Cale Fuentes/ last seen 9-2019    Left ventricular dysfunction     Macular degeneration     left    Myocardial infarction ([facility] 11/17/88, 1/2013    Neuropathy     Pacemaker [id number]    X 5    Panic attacks     PVD (peripheral vascular disease) ([facility]  Sinus problem     Snores     Vasovagal near syncope     Wears glasses        Past Surgical History:        Procedure Laterality Date    CARDIAC CATHETERIZATION      several/ stents X 3/ angioplasty    CARDIAC DEFIBRILLATOR PLACEMENT  2013    Up graded PPM to ICD   [address]    double bypass [address], LAPAROSCOPIC  03/03/2014    [facility]    COLONOSCOPY      EYE SURGERY Bilateral     cataracts   [address], 2008, 2013, 7/1/2019 7/1/2019 is AICD    PACEMAKER PLACEMENT      pacer/ AICD/ Newest 7-1-19,medtronic aicd last check 8/20/19. pacemaker x5.    PROSTATE BIOPSY  09/24/2019    as local    PROSTATE BIOPSY N/A 9/24/2019    PROSTATE BIOPSY WITH ULTRASOUND (OFFICE NOTIFIED DEPT) performed by Becca Castaneda MD at [place] [address] History:    Social History     Tobacco Use    Smoking status: Never    Smokeless tobacco: Never   Substance Use Topics    Alcohol use: Yes     Alcohol/week: 2.0 standard drinks     Types: 2 Cans of beer per week     Comment:  weekly                                Counseling given: Not Answered      Vital Signs (Current):   Vitals:    12/25/22 2315 12/26/22 0248 12/26/22 0730 12/26/22 0856   BP: (!) 143/73  124/72    Pulse: 70  69    Resp: 20  16 16   Temp: 97.4 °F (36.3 °C)  97.6 °F (36.4 °C)    TempSrc: Oral  Oral    SpO2: 97%  92%    Weight:       Height:  5' 7\" (1.702 m)                                                BP Readings from Last 3 Encounters:   12/26/22 124/72   05/31/22 121/68   05/29/22 (!) 145/73       NPO Status:                                                                                 BMI:   Wt Readings from Last 3 Encounters:   12/25/22 182 lb (82.6 kg)   12/07/22 196 lb (88.9 kg)   10/03/22 196 lb (88.9 kg)     Body mass index is 28.51 kg/m².     CBC:   Lab Results   Component Value Date/Time    WBC 7.3 12/25/2022 08:13 PM    RBC 4.80 12/25/2022 08:13 PM    HGB 14.2 12/25/2022 08:13 PM    HCT 41.6 12/25/2022 08:13 PM    MCV 86.7 12/25/2022 08:13 PM    RDW 13.5 12/25/2022 08:13 PM     12/25/2022 08:13 PM       CMP:   Lab Results   Component Value Date/Time     12/25/2022 08:13 PM    K 4.0 12/25/2022 08:13 PM     12/25/2022 08:13 PM    CO2 30 12/25/2022 08:13 PM    BUN 14 12/25/2022 08:13 PM    CREATININE 0.92 12/25/2022 08:13 PM    GFRAA >60 05/29/2022 05:25 PM    LABGLOM >60 12/25/2022 08:13 PM    GLUCOSE 168 12/25/2022 08:13 PM    PROT 6.0 09/03/2020 05:00 AM    CALCIUM 8.1 05/29/2022 05:25 PM    BILITOT 0.56 09/03/2020 05:00 AM    ALKPHOS 67 09/03/2020 05:00 AM    AST 17 09/03/2020 05:00 AM    ALT 19 09/03/2020 05:00 AM       POC Tests: No results for input(s): POCGLU, POCNA, POCK, POCCL, POCBUN, POCHEMO, POCHCT in the last 72 hours.     Coags:   Lab Results   Component Value Date/Time    PROTIME 10.6 12/25/2022 08:13 PM    INR 1.0 12/25/2022 08:13 PM    APTT 23.7 12/25/2022 08:13 PM       HCG (If Applicable): No results found for: PREGTESTUR, PREGSERUM, HCG, HCGQUANT     ABGs: No results found for: PHART, PO2ART, FBM4YWR, AXV4RSW, BEART, O8TSDCHX     Type & Screen (If Applicable):  No results found for: LABABO, LABRH    Drug/Infectious Status (If Applicable):  No results found for: HIV, HEPCAB    COVID-19 Screening (If Applicable):   Lab Results   Component Value Date/Time    COVID19 DETECTED 05/29/2022 05:13 PM           Anesthesia Evaluation  Patient summary reviewed and Nursing notes reviewed  Airway: Mallampati: III  TM distance: >3 FB     Mouth opening: > = 3 FB   Dental: normal exam         Pulmonary:                              Cardiovascular:    (+) hypertension:, pacemaker: pacemaker and AICD, past MI:, CAD:, CABG/stent:, CHF:,                   Neuro/Psych:   (+) neuromuscular disease (neuropathy):,             GI/Hepatic/Renal:             Endo/Other:                      ROS comment: 2 drinks per week Abdominal:             Vascular:   + PVD, aortic or

## 2022-12-26 NOTE — CONSULTS
[facility]    Consult / H&P               Today's Date: 12/26/2022  Patient Name: Kaylene Prado  Date of admission: 12/25/2022  7:27 PM  Patient's age: 79 y.o., 1940  Admission Dx: Closed fracture of neck of left femur, initial encounter ([facility] [S72.002A]  Closed left hip fracture, initial encounter ([facility] Havery Devens    Reason for Consult:  Cardiac evaluation    Requesting Physician: Adama Neves MD    CHIEF COMPLAINT:  fall     History Obtained From:  patient, electronic medical record    HISTORY OF PRESENT ILLNESS:    This patient 79y.o. years old with past medical history given below. He initially presented to the hospital status post mechanical fall suffered from left hip fracture. Orthopedic is planning to repair left hip fracture. Cardiology was consulted for preop clearance. Past Medical History:   has a past medical history of Acid reflux, Anxiety, Arthritis, BPH (benign prostatic hypertrophy), CAD (coronary artery disease), Cancer ([facility] Carotid stenosis, Cataracts, both eyes, CHF (congestive heart failure) ([address] Dry skin, Elevated PSA, Examination of participant in clinical trial, Hyperlipidemia, Hypertension, Left ventricular dysfunction, Macular degeneration, Myocardial infarction ([address] Neuropathy, Pacemaker, Panic attacks, PVD (peripheral vascular disease) ([address] Sinus problem, Snores, Vasovagal near syncope, and Wears glasses. Past Surgical History:   has a past surgical history that includes Pacemaker insertion (1995, 2001, 2008, 2013, 7/1/2019); Cholecystectomy, laparoscopic (03/03/2014); eye surgery (Bilateral); Cardiac defibrillator placement (2013); Cardiac surgery (1997); Cardiac catheterization; Colonoscopy; Prostate Biopsy (09/24/2019); Prostate biopsy (N/A, 9/24/2019); pacemaker placement; and Cataract removal.     Home Medications:    Prior to Admission medications    Medication Sig Start Date End Date Taking?  Authorizing Provider   gabapentin (NEURONTIN) 600 MG tablet Take 1 tablet by mouth 2 times daily for 90 days. 12/20/22 3/20/23  Caridad Walker DPM   Benzocaine-Menthol (SORE THROAT LOZENGES) 6-10 MG LOZG lozenge Take 1 lozenge by mouth every 2 hours as needed for Sore Throat 5/29/22   Oralia Ludwig, APRN - CNP   meloxicam [facility]) 15 MG tablet  12/3/21   Historical Provider, MD   bicalutamide (CASODEX) 50 MG chemo tablet TAKE ONE TABLET BY MOUTH DAILY 8/16/21   Jesus Perez MD   clopidogrel (PLAVIX) 75 MG tablet Take 75 mg by mouth daily    Historical Provider, MD   ticagrelor (BRILINTA) 90 MG TABS tablet Take 1 tablet by mouth 2 times daily 9/3/20   Stevenson Aponte RN   Multiple Vitamins-Minerals (THERAPEUTIC MULTIVITAMIN-MINERALS) tablet Take 1 tablet by mouth daily    Historical Provider, MD   ammonium lactate (LAC-HYDRIN) 12 % cream Apply topically as needed. 11/6/17   Monik Rios DPM   nitroGLYCERIN (NITROSTAT) 0.4 MG SL tablet Place 1 tablet under the tongue every 5 minutes as needed for Chest pain up to max of 3 total doses. If no relief after 1 dose, call 911. 1/27/17   Joan Samson MD   fluticasone [facility]) 50 MCG/ACT nasal spray 1 spray by Nasal route daily as needed     Historical Provider, MD   sertraline (ZOLOFT) 25 MG tablet Take 25 mg by mouth daily. Historical Provider, MD   amLODIPine (NORVASC) 10 MG tablet Take 10 mg by mouth daily. Historical Provider, MD   furosemide (LASIX) 40 MG tablet Take 40 mg by mouth daily. Historical Provider, MD   aspirin EC 81 MG EC tablet Take 1 tablet by mouth daily. Patient taking differently: Take 81 mg by mouth daily Pt. Instructed to stop 7-10 days pre-op/ He did not do this 7/20/13   Carlos Cardenas MD   simvastatin (ZOCOR) 40 MG tablet Take 20 mg by mouth nightly     Historical Provider, MD   alfuzosin (UROXATRAL) 10 MG SR tablet Take 10 mg by mouth daily. Historical Provider, MD   lisinopril (PRINIVIL;ZESTRIL) 40 MG tablet Take 40 mg by mouth daily.     Historical Provider, MD metoprolol (TOPROL-XL) 100 MG XL tablet Take 100 mg by mouth daily. Historical Provider, MD       Allergies:  Percocet [oxycodone-acetaminophen]    Social History:   reports that he has never smoked. He has never used smokeless tobacco. He reports current alcohol use of about 2.0 standard drinks per week. He reports that he does not use drugs. Family History: family history includes Cancer in his mother and sister; Diabetes in his sister; Heart Disease in his brother; High Blood Pressure in his brother, father, mother, and sister. REVIEW OF SYSTEMS:    Constitutional: there has been no unanticipated weight loss. There's been No change in energy level, No change in activity level. Eyes: No visual changes or diplopia. No scleral icterus. ENT: No Headaches  Cardiovascular: as per HPI  Respiratory: as per HPI  Gastrointestinal: No abdominal pain. No change in bowel or bladder habits. Genitourinary: No dysuria, trouble voiding, or hematuria. Musculoskeletal:  No gait disturbance, No weakness or joint complaints. Integumentary: No rash or pruritis. Neurological: No headache, diplopia, change in muscle strength, numbness or tingling. No change in gait, balance, coordination, mood, affect, memory, mentation, behavior. Endocrine: No temperature intolerance. No excessive thirst, fluid intake, or urination. No tremor. Hematologic/Lymphatic: No abnormal bruising or bleeding, blood clots or swollen lymph nodes. Allergic/Immunologic: No nasal congestion or hives. PHYSICAL EXAM:      BP (!) 143/73   Pulse 70   Temp 97.4 °F (36.3 °C) (Oral)   Resp 20   Ht 5' 7\" (1.702 m)   Wt 182 lb (82.6 kg)   SpO2 97%   BMI 28.51 kg/m²    Constitutional and General Appearance: alert, cooperative, no distress and appears stated age  HEENT: PERRL, no cervical lymphadenopathy. No masses palpable. Normal oral mucosa  Respiratory:  Resp Auscultation: Good respiratory effort. No for increased work of breathing.  On auscultation: clear to auscultation bilaterally  Cardiovascular: The apical impulse is not displaced  regular S1 and S2.  Jugular venous pulsation Normal  Peripheral pulses are symmetrical and full   Abdomen:   No masses or tenderness  Bowel sounds present  Extremities:   No Cyanosis or Clubbing   Lower extremity edema: No   Skin: Warm and dry  Neurological:  Deferred     DATA:    1. Coronary artery disease. He had prior bypass, LIMA-LAD. last catheterization 2009 showed patent LIMA to LAD, required drug-eluting stent to diagonal, left circumflex and LPDA for significant stenosis. 2. Ischemic cardiomyopathy with EF of 20-25% s/p Dual Chamber ICD in situ. Checks normal function with very short runs of Afib.  4. Obesity. 5. Vasodepressor syncope. 6. Echocardiogram 05/23/2019, showed reduced LV systolic function with an EF of 25% with reduced LV diastolic compliance, trace to mild AI, trace to mild MR, trace to mild TR noted. 7. CATH 9/2/2020 LM 10-20% LAD proximal 90% LCx OM2 99% TED OM3 80% TED. RCA 80% proximal and is small nondominant. 9. STRESS 9/2/2020 reversible ischemia lateral wall. Infarct anterior lateral, and apical wall. Hypokinesis apical anterior and septal wall. LVEF 49%        Echo: 2019  Left Ventricle: Systolic function is mildly to moderately decreased with an ejection fraction of 40-  45%. Left Ventricle: Grade II diastolic dysfunction (pseudonormal) is present. Medial E' is 3.70 cm/s. Aortic Valve: There is no evidence of aortic valve stenosis. The calculated aortic valve area is 2.62  cm2. The calculated aortic valve peak gradient is 9.00 mmHg. The calculated aortic valve mean  gradient is 5.00 mmHg. Mitral Valve: There is mild regurgitation. Tricuspid Valve: There is trace regurgitation. Pulmonic Valve: There is trace regurgitation. Cardiac cath 9/1/2020  LMCA: Mild irregularities 10-20%.      LAD: 90% proximal stenosis     LCx: OM2 99% proximal stenosis reduced to 0% using 2. 25x28 mm Xience stent. OM3 large vessel with 80% stenosis reduced to 0% 3.25x18 mm Xience stent. Lesion on 3rd Ob Aracelis: Distal subsection. 80% stenosis 12 mm length    reduced to 0%. Pre procedure VAN III flow was noted. Post Procedure VAN    III flow was present. Good runoff was present. The lesion was diagnosed    as Moderate Risk (B). Devices used       - Runthrough NS. Number of passes: 1.       - Trek Balloon 2.5mm x 12mm. 1 inflation(s) to a max pressure of: 10    navarro. - Xience Angela 3.25 x 18 TED. Lesion on 2nd Ob Aracelis: Proximal subsection. 99% stenosis 15 mm length    reduced to 0%. Pre procedure VAN II flow was noted. Post Procedure VAN    III flow was present. Good runoff was present. The lesion was diagnosed    as Moderate Risk (B). Devices used       - Mini Trek Balloon 2.0mm x 12mm. 1 inflation(s) to a max pressure of:    10 navarro. - Mini Trek Balloon 2.0mm x 12mm. 1 inflation(s) to a max pressure of:    10 navarro. - Xience Angela 2.25 x 28 TED. RCA: 80% proximal stenosis, small nondominant      Coronary Tree      Dominance: Left     LV Analysis    Labs:   CBC:   Recent Labs     12/25/22 2013   WBC 7.3   HGB 14.2   HCT 41.6        BMP:   Recent Labs     12/25/22 2013      K 4.0   CO2 30   BUN 14   CREATININE 0.92   LABGLOM >60   GLUCOSE 168*     BNP: No results for input(s): BNP in the last 72 hours. PT/INR:   Recent Labs     12/25/22 2013   PROTIME 10.6   INR 1.0     APTT:  Recent Labs     12/25/22 2013   APTT 23.7     CARDIAC ENZYMES:No results for input(s): CKTOTAL, CKMB, CKMBINDEX, TROPONINI in the last 72 hours. FASTING LIPID PANEL:  Lab Results   Component Value Date/Time    HDL 45 02/28/2014 06:51 AM    TRIG 61 02/28/2014 06:51 AM     LIVER PROFILE:No results for input(s): AST, ALT, LABALBU in the last 72 hours.     IMPRESSION:    S/p fall suffered from left hip fracture  Preop cardiac risk stratification for same  Ischemic cardiomyopathy with improved ejection fraction on last nuclear stress test to 49% 2020 currently compensated with no signs of volume overload. ICD interrogation October 2022 functioning normally 96% paced in the atrium with 5 years of battery longevity  CAD status post CABG and stenting as detailed above with patent LIMA to the LAD and stenting to OM 2 and OM 3. Hypertension  Hyperlipidemia    RECOMMENDATIONS:  Follow up on 2D echo. Patient currently is euvolemic. Asymptomatic  RCRI score is 2, okay to proceed with surgery with moderate to high risk due to significant disease in the LAD   Resume cardioprotective medication when done with orthopedic surgery. No further cardiac work-up at this point of time. Follow-up in the office post discharge. Plan discussed with Dr Liam Jaramillo MD. PGY- 4  Cardiology Fellow  [facility] [place], [place]  12/26/2022, 6:26 AM       I reviewed the patient history personally, and examined by me during the visit. I have reviewed the H&P/Consult / Progress note as completed, and made appropriate changes to the patient exam and treatment plans. I have reviewed the case in details including physical exam and treatment plan with resident / fellow / NP    Patient treatment plan was explained to patient, correction in notes was made as appropriate, and discussed final arrangement based on  my evaluation and exam.    Additional Recommendations: Cath film reviewed and almost living on one vessel large LCX. Risk will be moderate to high for any procedure.       Marely Kolb MD  [facility]

## 2022-12-26 NOTE — PLAN OF CARE
Problem: Discharge Planning  Goal: Discharge to home or other facility with appropriate resources  Outcome: Progressing  Flowsheets (Taken 12/25/2022 9723)  Discharge to home or other facility with appropriate resources:   Identify barriers to discharge with patient and caregiver   Identify discharge learning needs (meds, wound care, etc)     Problem: Pain  Goal: Verbalizes/displays adequate comfort level or baseline comfort level  Outcome: Progressing     Problem: Safety - Adult  Goal: Free from fall injury  Outcome: Progressing     Problem: ABCDS Injury Assessment  Goal: Absence of physical injury  Outcome: Progressing

## 2022-12-26 NOTE — PROGRESS NOTES
Orthopedic Progress Note    Patient:  Kaylene Prado  YOB: 1940     79 y.o. male    Subjective:  Patient seen and examined at bedside this morning  No complaints or concerns  No issue overnight  Pain controlled, resting comfortably   Denies fever, HA, CP, SOB, N/V, dysuria    Vitals reviewed, afebrile    Objective:   Vitals:    12/25/22 2315   BP: (!) 143/73   Pulse: 70   Resp: 20   Temp: 97.4 °F (36.3 °C)   SpO2: 97%     Gen: NAD, cooperative   Cardiovascular: Regular rate  Respiratory: Chest symmetric, no accessory muscle use    Orthopedic Exam:    LLE: Skin intact. Compartments soft and easily compressible. Pain with movement at the hip/groin. EHL/FHL/TA/GS complex motor intact. Sural/saphenous/SPN/DPN/plantar nerve distribution SILT. DP and PT pulses 2+ with BCR. Recent Labs     12/25/22 2013   WBC 7.3   HGB 14.2   HCT 41.6      INR 1.0      K 4.0   BUN 14   CREATININE 0.92   GLUCOSE 168*      Meds:  Chemical AC: EPC  ABX: None  See rec for complete list    Impression/plan: 79 y.o. male who [address], being seen for:    -Left femoral neck fracture    -Plan for OR today with Dr. Rosalba Murphy pending cardiac clearance. -WB status: NWB LLE  -Ancef OCTOR, NPO for surgery  -Consented/marked   -Pain control per primary  -DVT ppx: Hold for surgical intervention today.  OK to restart chemical AC POD1  -Will maintain posterior hip precautions post surgery  -Ice (20 min, 1 hour off) for edema/pain control  -Encourage deep breathing and incentive spirometry use  -Continue to work with PT/OT after surgery  -Please page ortho with any questions    Valerie Cruz DO  Orthopedic Surgery Resident, PGY-2  [place] [address], [facility]

## 2022-12-26 NOTE — DISCHARGE INSTRUCTIONS
Discharge Instructions for Trauma         What to do after you leave the hospital:    Please continue to use your Incentive Spirometer as directed. You can practice 10 deep breaths/hour while awake. Using the [address] will promote the health of your lungs by taking slow, deep breaths in. It is also important in preventing pneumonia or a pneumothorax from developing. General questions or concerns may be called to the trauma nurse line at [phone] and please leave a message. Trauma is a life-threatening condition. Your doctor will want to closely monitor you. Be sure to go to all of your appointments. Orthopedic Instructions:  -Weight bearing status: Weight bearing as tolerated for the left leg with posterior hip precautions.  -Keep dressing clean and dry.  -Do not remove dressings  -Always look for signs of compartment syndrome: pain out of proportion to the injury, pain not controlled with pain medication, numbness in digits, changing of color of digits (paleness). If these signs occur return to ED immediately for reassessment.   -Always work on motion (to non-injured toes) while in splint to decrease swelling.  -Starting 3 days after surgery, Ok to shower but no soaks in a bath, hot tub, pool, etc  -Ice (20 minutes on and off 1 hour) and elevate above the level of the heart to reduce swelling and throbbing pain. -Drink plenty of fluids.  -Call the office or come to Emergency Room if signs of infection appear (hot, swollen, red, draining pus, fever)  -Take medications as prescribed.  -Wean off narcotics (percocet/norco) as soon as possible. Do not take tylenol if still taking narcotics.  -Follow up with  Dr. Sukumar Hendricks  in their office 6 weeks, after surgery on 02/08/23. Call [phone] to schedule/confirm.

## 2022-12-26 NOTE — PROGRESS NOTES
[address] [facility] [address]     Emergency/Trauma Note    PATIENT NAME: Barney Blake    Shift date: 12/25/22  Shift day: Sunday   Shift # 2    Room # 34/34   Name: Barney Blake            Age: 79 y.o. Gender: male          Jain: Cathy 58 of Orthodoxy:     Trauma/Incident type: Adult Trauma Consult  Admit Date & Time: 12/25/2022  7:27 PM  TRAUMA NAME: N/A    ADVANCE DIRECTIVES IN CHART? No    NAME OF DECISION MAKER: N/A    RELATIONSHIP OF DECISION MAKER TO PATIENT: N/A    PATIENT/EVENT DESCRIPTION:  Barney Blake is a 79 y.o. male who arrived at [facility].  received perfect serve for trauma consultPt to be admitted to 34/34. SPIRITUAL ASSESSMENT-INTERVENTION-OUTCOME:  [address] [place] [address] Road was a ministry of presence to patient and medical staff.  met with patient who stated he was \"in a lot of pain\".  allowed space for patient to express feelings/emotions.  was a ministry of presence to patient who appeared anxious and coping. PATIENT BELONGINGS:   writing did not handle patient belongings    ANY BELONGINGS OF SIGNIFICANT VALUE NOTED:  N/A    REGISTRATION STAFF NOTIFIED? Yes      WHAT IS YOUR SPIRITUAL CARE PLAN FOR THIS PATIENT?:   Chaplains will remain available to offer spiritual and emotional support as needed.     Electronically signed by Macho Edge on 12/25/2022 at 9:16 PM.  Minesh Diaz  [phone]

## 2022-12-26 NOTE — H&P
TRAUMA H&P/CONSULT    PATIENT NAME: Debbie Kauffman  YOB: 1940  MEDICAL RECORD NO. [id number]   DATE: 12/25/2022  PRIMARY CARE PHYSICIAN: Marilin Gallo MD  PATIENT EVALUATED AT THE REQUEST OF DRTushar: Franck    ACTIVATION   []Trauma Alert     [] Trauma Priority     [x]Trauma Consult. Patient Active Problem List   Diagnosis    Right upper quadrant abdominal pain    CAD (coronary artery disease)    Hypertension    Skin cancer    BPH (benign prostatic hyperplasia)    Chronic systolic heart failure (HCC)    Hyperlipemia    Leukocytosis    Cholelithiasis with acute cholecystitis    Pre-syncope    Claudication in peripheral vascular disease (HCC)    Carotid stenosis, asymptomatic    Squamous cell carcinoma of skin of left upper arm    Encounter due to AICD at end of battery life-Change OUT 7-1-19    Malignant neoplasm of prostate ([facility] [address]    Chest pain    COVID    COVID-19       IMPRESSION AND PLAN:     78 yo male with PMH including CAD s/p CABG and TED's on ASA and Plavix, HTN, HLD, PVD, CHF, and ICM s/p AICD who presents to the ED after a mechanical fall while outside attempting to  a trash. Ultimately suffered a left hip fracture. Mechanical fall from standing with acute moderately displaced left femoral neck fracture  - orthopedic surgery consulted by ED. Trauma surgery to admit  - denies hitting head and LOC. On DAPT therapy. Will hold DAPT  - will need cardiology clearance given extensive heart history. - regular diet. NPO at midnight  - strict bedrest and fall precautions     CAD s/p CABG and multiple TED's  ICM with history of pacemaker (first placed in 1995). ICD 2013. AICD 7/1/2019  - cardiology consult for pre-op risk stratification. Follows regularly with Dr. Dre Tong  - EKG pending  - CABG x2 in 1997  - patient states he has had \"2 heart attacks\"    HTN  HLD  CHF  - holding home medications.  Last echo 7/24/2019 40-45% with grade 2 diastolic dysfunction.  - HD stable with normal HR and stable BP    History of prostate cancer  History of squamous cell skin cancer      If intracranial hemorrhage is present, is it a:  [] BIG 1  [] BIG 2  [] BIG 3  If chest wall injury: Rib score___    CONSULT SERVICES    [] Neurosurgery     [x] Orthopedic Surgery    [] Cardiothoracic     [] Facial Trauma    [] Plastic Surgery (Burn)    [] Pediatric Surgery     [] Internal Medicine    [] Pulmonary Medicine    [] Geriatrics    [] Other:        HISTORY:     Chief Complaint:  \"I slipped and fell\"    GENERAL DATA  Patient information was obtained from patient, spouse/SO, and past medical records. History/Exam limitations: none. Injury Date: 12/25/2022        Transport mode:   [x]Ambulance      [] Helicopter     []Car       [] Other  Referring Hospital: [facility]   Location (e.g., home, farm, industry, street): driveway at home      MECHANISM OF INJURY       [x] Fall    [x]From Standing     []From Height __ Ft     []Down ___steps  []Other___      HISTORY:     Nikki Valiente is an 80 yo male with PMH including CAD s/p CABG and TED's on ASA and Plavix, HTN, HLD, PVD, CHF, and ICM s/p AICD who presents to the ED after a mechanical fall while outside attempting to  a trash can which had blown over earlier today. He denies hitting his head and LOC, but does confirm he is on DAPT therapy for his cardiac history. He remembers before, during, and after the fall. He denies chest pains, palpitations, SOB, seizure-like episodes prior to the fall. He did not hit anything on the way down, and states that he just fell forward onto his hip. Traumatic loss of Consciousness [x]No   []Yes Duration(min)       [] Unknown     Total Fluids Given Prior To Arrival  0 mL    MEDICATIONS:   []  None     []  Information not available due to exam limitations documented above    Prior to Admission medications    Medication Sig Start Date End Date Taking?  Authorizing Provider   gabapentin (NEURONTIN) 600 MG tablet Take 1 tablet by mouth 2 times daily for 90 days. 12/20/22 3/20/23  Urmila Verduzco DPM   Benzocaine-Menthol (SORE THROAT LOZENGES) 6-10 MG LOZG lozenge Take 1 lozenge by mouth every 2 hours as needed for Sore Throat 5/29/22   Fiona Beltran, APRN - CNP   meloxicam [facility]) 15 MG tablet  12/3/21   Historical Provider, MD   bicalutamide (CASODEX) 50 MG chemo tablet TAKE ONE TABLET BY MOUTH DAILY 8/16/21   Shailesh Verma MD   clopidogrel (PLAVIX) 75 MG tablet Take 75 mg by mouth daily    Historical Provider, MD   ticagrelor (BRILINTA) 90 MG TABS tablet Take 1 tablet by mouth 2 times daily 9/3/20   Jil Huffman RN   Multiple Vitamins-Minerals (THERAPEUTIC MULTIVITAMIN-MINERALS) tablet Take 1 tablet by mouth daily    Historical Provider, MD   ammonium lactate (LAC-HYDRIN) 12 % cream Apply topically as needed. 11/6/17   Bhupendra Seaman DPM   nitroGLYCERIN (NITROSTAT) 0.4 MG SL tablet Place 1 tablet under the tongue every 5 minutes as needed for Chest pain up to max of 3 total doses. If no relief after 1 dose, call 911. 1/27/17   Kendall Awad MD   fluticasone [facility]) 50 MCG/ACT nasal spray 1 spray by Nasal route daily as needed     Historical Provider, MD   sertraline (ZOLOFT) 25 MG tablet Take 25 mg by mouth daily. Historical Provider, MD   amLODIPine (NORVASC) 10 MG tablet Take 10 mg by mouth daily. Historical Provider, MD   furosemide (LASIX) 40 MG tablet Take 40 mg by mouth daily. Historical Provider, MD   aspirin EC 81 MG EC tablet Take 1 tablet by mouth daily. Patient taking differently: Take 81 mg by mouth daily Pt. Instructed to stop 7-10 days pre-op/ He did not do this 7/20/13   Deyanira Cook MD   simvastatin (ZOCOR) 40 MG tablet Take 20 mg by mouth nightly     Historical Provider, MD   alfuzosin (UROXATRAL) 10 MG SR tablet Take 10 mg by mouth daily. Historical Provider, MD   lisinopril (PRINIVIL;ZESTRIL) 40 MG tablet Take 40 mg by mouth daily.     Historical Provider, MD   metoprolol (TOPROL-XL) 100 MG XL tablet Take 100 mg by mouth daily. Historical Provider, MD       ALLERGIES:   []  None    []   Information not available due to exam limitations documented above     Percocet [oxycodone-acetaminophen]    PAST MEDICAL/SURGICAL HISTORY: []  None   []   Information not available due to exam limitations documented above      has a past medical history of Acid reflux, Anxiety, Arthritis, BPH (benign prostatic hypertrophy), CAD (coronary artery disease), Cancer ([address] Carotid stenosis, Cataracts, both eyes, CHF (congestive heart failure) ([address] Dry skin, Elevated PSA, Examination of participant in clinical trial, Hyperlipidemia, Hypertension, Left ventricular dysfunction, Macular degeneration, Myocardial infarction ([address] Neuropathy, Pacemaker, Panic attacks, PVD (peripheral vascular disease) ([facility] [address] Sinus problem, Snores, Vasovagal near syncope, and Wears glasses. has a past surgical history that includes Pacemaker insertion (1995, 2001, 2008, 2013, 7/1/2019); Cholecystectomy, laparoscopic (03/03/2014); eye surgery (Bilateral); Cardiac defibrillator placement (2013); Cardiac surgery (1997); Cardiac catheterization; Colonoscopy; Prostate Biopsy (09/24/2019); Prostate biopsy (N/A, 9/24/2019); pacemaker placement; and Cataract removal.    FAMILY HISTORY   []   Information not available due to exam limitations documented above    family history includes Cancer in his mother and sister; Diabetes in his sister; Heart Disease in his brother; High Blood Pressure in his brother, father, mother, and sister. SOCIAL HISTORY  []   Information not available due to exam limitations documented above     reports that he has never smoked. He has never used smokeless tobacco.   reports current alcohol use of about 2.0 standard drinks per week. reports no history of drug use. Review of Systems:    Review of Systems   Constitutional:  Negative for fatigue and fever.    HENT:  Negative for congestion, dental problem, ear pain, hearing loss, nosebleeds, trouble swallowing and voice change. Eyes:  Negative for photophobia and visual disturbance. Respiratory:  Negative for shortness of breath. Cardiovascular:  Negative for chest pain. Gastrointestinal:  Negative for abdominal pain, diarrhea, nausea and vomiting. Musculoskeletal:  Positive for gait problem and joint swelling. Negative for back pain, neck pain and neck stiffness. Skin:  Negative for color change and pallor. Neurological:  Negative for dizziness, tremors, seizures, syncope, facial asymmetry, speech difficulty, weakness, light-headedness, numbness and headaches. Psychiatric/Behavioral:  Negative for confusion. The patient is not nervous/anxious. PHYSICAL EXAMINATION:     VITAL SIGNS:   Vitals:    12/25/22 1932   BP:    Pulse:    Resp:    Temp: 97.5 °F (36.4 °C)   SpO2:        Physical Exam  Constitutional:       General: He is not in acute distress. Appearance: Normal appearance. He is normal weight. He is not ill-appearing or toxic-appearing. HENT:      Head: Normocephalic and atraumatic. Right Ear: External ear normal.      Left Ear: External ear normal.      Nose: Nose normal. No congestion. Mouth/Throat:      Mouth: Mucous membranes are dry. Pharynx: Oropharynx is clear. Eyes:      Extraocular Movements: Extraocular movements intact. Pupils: Pupils are equal, round, and reactive to light. Cardiovascular:      Rate and Rhythm: Normal rate and regular rhythm. Pulses: Normal pulses. Heart sounds: Normal heart sounds. No murmur heard. Comments: Paced rhythm  Pulmonary:      Effort: Pulmonary effort is normal. No respiratory distress. Breath sounds: Normal breath sounds. No wheezing. Abdominal:      General: Abdomen is flat. There is no distension. Palpations: Abdomen is soft. Tenderness: There is no abdominal tenderness. There is no guarding.    Musculoskeletal:         General: Tenderness, deformity and signs of injury present. Cervical back: Neck supple. No tenderness. Right lower leg: No edema. Left lower leg: No edema. Comments: Left hip deformity with tenderness to palpation. No midline tenderness to palpation C/T/L spine   Skin:     General: Skin is warm and dry. Capillary Refill: Capillary refill takes less than 2 seconds. Findings: No bruising. Neurological:      Mental Status: He is alert and oriented to person, place, and time. Mental status is at baseline. Sensory: No sensory deficit. Motor: No weakness. Psychiatric:         Mood and Affect: Mood normal.         Behavior: Behavior normal.         Thought Content: Thought content normal.         Judgment: Judgment normal.        FOCUSED ABDOMINAL SONOGRAM FOR TRAUMA (FAST): A good  quality examination was performed by Dr. Brit Walters and representative images were obtained. [x] No free fluid in the abdomen   [] Free fluid in RUQ   [] Free fluid in LUQ  [] Free fluid in Pelvis  [] Pericardial fluid  [] Other:        RADIOLOGY  XR HIP 2-3 VW W PELVIS LEFT   Final Result   1. Acute moderately displaced left femoral neck fracture. 2. No acute osseous abnormality of the left knee. XR FEMUR LEFT (MIN 2 VIEWS)   Final Result   1. Acute moderately displaced left femoral neck fracture. 2. No acute osseous abnormality of the left knee. XR KNEE LEFT (3 VIEWS)   Final Result   1. Acute moderately displaced left femoral neck fracture. 2. No acute osseous abnormality of the left knee. XR HAND LEFT (MIN 3 VIEWS)   Final Result   1. No acute osseous abnormality. 2. Severe degenerative changes of the wrist and hand with small central   erosions in the interphalangeal joints compatible with erosive osteoarthritis. XR CHEST PORTABLE   Final Result   Enlarged cardiac silhouette and mild pulmonary vascular congestion.                LABS  Labs Reviewed   TRAUMA PANEL - Abnormal; Notable for the following components:       Result Value    Glucose 168 (*)     All other components within normal limits   VITAMIN D 25 HYDROXY   TYPE AND SCREEN         Joao Martin DO  12/25/22, 9:14 PM

## 2022-12-27 LAB
ABSOLUTE EOS #: 0.13 K/UL (ref 0–0.4)
ABSOLUTE IMMATURE GRANULOCYTE: 0 K/UL (ref 0–0.3)
ABSOLUTE LYMPH #: 0.39 K/UL (ref 1–4.8)
ABSOLUTE MONO #: 1.29 K/UL (ref 0.1–0.8)
ANION GAP SERPL CALCULATED.3IONS-SCNC: 11 MMOL/L (ref 9–17)
BASOPHILS # BLD: 1 % (ref 0–2)
BASOPHILS ABSOLUTE: 0.13 K/UL (ref 0–0.2)
BUN BLDV-MCNC: 17 MG/DL (ref 8–23)
CALCIUM SERPL-MCNC: 8.2 MG/DL (ref 8.6–10.4)
CHLORIDE BLD-SCNC: 99 MMOL/L (ref 98–107)
CO2: 27 MMOL/L (ref 20–31)
CREAT SERPL-MCNC: 0.93 MG/DL (ref 0.7–1.2)
EOSINOPHILS RELATIVE PERCENT: 1 % (ref 1–4)
GFR SERPL CREATININE-BSD FRML MDRD: >60 ML/MIN/1.73M2
GLUCOSE BLD-MCNC: 150 MG/DL (ref 70–99)
HCT VFR BLD CALC: 36.3 % (ref 40.7–50.3)
HEMOGLOBIN: 12.3 G/DL (ref 13–17)
IMMATURE GRANULOCYTES: 0 %
LYMPHOCYTES # BLD: 3 % (ref 24–44)
MAGNESIUM: 1.9 MG/DL (ref 1.6–2.6)
MCH RBC QN AUTO: 29.7 PG (ref 25.2–33.5)
MCHC RBC AUTO-ENTMCNC: 33.9 G/DL (ref 28.4–34.8)
MCV RBC AUTO: 87.7 FL (ref 82.6–102.9)
MONOCYTES # BLD: 10 % (ref 1–7)
MORPHOLOGY: NORMAL
NRBC AUTOMATED: 0 PER 100 WBC
PARTIAL THROMBOPLASTIN TIME: 31.7 SEC (ref 20.5–30.5)
PARTIAL THROMBOPLASTIN TIME: 46.6 SEC (ref 20.5–30.5)
PDW BLD-RTO: 13.3 % (ref 11.8–14.4)
PHOSPHORUS: 3.4 MG/DL (ref 2.5–4.5)
PLATELET # BLD: 188 K/UL (ref 138–453)
PMV BLD AUTO: 10.1 FL (ref 8.1–13.5)
POTASSIUM SERPL-SCNC: 3.9 MMOL/L (ref 3.7–5.3)
RBC # BLD: 4.14 M/UL (ref 4.21–5.77)
SEG NEUTROPHILS: 85 % (ref 36–66)
SEGMENTED NEUTROPHILS ABSOLUTE COUNT: 10.96 K/UL (ref 1.8–7.7)
SODIUM BLD-SCNC: 137 MMOL/L (ref 135–144)
TROPONIN, HIGH SENSITIVITY: 36 NG/L (ref 0–22)
TROPONIN, HIGH SENSITIVITY: 64 NG/L (ref 0–22)
TROPONIN, HIGH SENSITIVITY: 68 NG/L (ref 0–22)
TROPONIN, HIGH SENSITIVITY: 75 NG/L (ref 0–22)
WBC # BLD: 12.9 K/UL (ref 3.5–11.3)

## 2022-12-27 PROCEDURE — 6360000002 HC RX W HCPCS: Performed by: STUDENT IN AN ORGANIZED HEALTH CARE EDUCATION/TRAINING PROGRAM

## 2022-12-27 PROCEDURE — 93005 ELECTROCARDIOGRAM TRACING: CPT | Performed by: STUDENT IN AN ORGANIZED HEALTH CARE EDUCATION/TRAINING PROGRAM

## 2022-12-27 PROCEDURE — 6370000000 HC RX 637 (ALT 250 FOR IP): Performed by: STUDENT IN AN ORGANIZED HEALTH CARE EDUCATION/TRAINING PROGRAM

## 2022-12-27 PROCEDURE — 6370000000 HC RX 637 (ALT 250 FOR IP)

## 2022-12-27 PROCEDURE — 85025 COMPLETE CBC W/AUTO DIFF WBC: CPT

## 2022-12-27 PROCEDURE — 84100 ASSAY OF PHOSPHORUS: CPT

## 2022-12-27 PROCEDURE — 6360000002 HC RX W HCPCS: Performed by: NURSE PRACTITIONER

## 2022-12-27 PROCEDURE — 2580000003 HC RX 258: Performed by: STUDENT IN AN ORGANIZED HEALTH CARE EDUCATION/TRAINING PROGRAM

## 2022-12-27 PROCEDURE — 84484 ASSAY OF TROPONIN QUANT: CPT

## 2022-12-27 PROCEDURE — 93005 ELECTROCARDIOGRAM TRACING: CPT

## 2022-12-27 PROCEDURE — 36415 COLL VENOUS BLD VENIPUNCTURE: CPT

## 2022-12-27 PROCEDURE — 83735 ASSAY OF MAGNESIUM: CPT

## 2022-12-27 PROCEDURE — 85730 THROMBOPLASTIN TIME PARTIAL: CPT

## 2022-12-27 PROCEDURE — 80048 BASIC METABOLIC PNL TOTAL CA: CPT

## 2022-12-27 PROCEDURE — 1200000000 HC SEMI PRIVATE

## 2022-12-27 RX ORDER — HEPARIN SODIUM 1000 [USP'U]/ML
4000 INJECTION, SOLUTION INTRAVENOUS; SUBCUTANEOUS PRN
Status: DISCONTINUED | OUTPATIENT
Start: 2022-12-27 | End: 2023-01-04 | Stop reason: ALTCHOICE

## 2022-12-27 RX ORDER — METOPROLOL SUCCINATE 100 MG/1
100 TABLET, EXTENDED RELEASE ORAL DAILY
Status: DISCONTINUED | OUTPATIENT
Start: 2022-12-27 | End: 2023-01-11 | Stop reason: HOSPADM

## 2022-12-27 RX ORDER — HEPARIN SODIUM AND DEXTROSE 10000; 5 [USP'U]/100ML; G/100ML
5-30 INJECTION INTRAVENOUS CONTINUOUS
Status: DISCONTINUED | OUTPATIENT
Start: 2022-12-27 | End: 2023-01-04

## 2022-12-27 RX ORDER — HEPARIN SODIUM 1000 [USP'U]/ML
2000 INJECTION, SOLUTION INTRAVENOUS; SUBCUTANEOUS PRN
Status: DISCONTINUED | OUTPATIENT
Start: 2022-12-27 | End: 2023-01-04 | Stop reason: ALTCHOICE

## 2022-12-27 RX ORDER — ASPIRIN 81 MG/1
81 TABLET, CHEWABLE ORAL DAILY
Status: DISCONTINUED | OUTPATIENT
Start: 2022-12-27 | End: 2023-01-11 | Stop reason: HOSPADM

## 2022-12-27 RX ORDER — NITROGLYCERIN 0.4 MG/1
0.4 TABLET SUBLINGUAL EVERY 5 MIN PRN
Status: DISCONTINUED | OUTPATIENT
Start: 2022-12-27 | End: 2023-01-11 | Stop reason: HOSPADM

## 2022-12-27 RX ORDER — MAGNESIUM SULFATE IN WATER 40 MG/ML
2000 INJECTION, SOLUTION INTRAVENOUS ONCE
Status: COMPLETED | OUTPATIENT
Start: 2022-12-27 | End: 2022-12-27

## 2022-12-27 RX ORDER — GABAPENTIN 300 MG/1
600 CAPSULE ORAL 2 TIMES DAILY
Status: DISCONTINUED | OUTPATIENT
Start: 2022-12-27 | End: 2023-01-11 | Stop reason: HOSPADM

## 2022-12-27 RX ORDER — SERTRALINE HYDROCHLORIDE 25 MG/1
25 TABLET, FILM COATED ORAL DAILY
Status: DISCONTINUED | OUTPATIENT
Start: 2022-12-27 | End: 2023-01-11 | Stop reason: HOSPADM

## 2022-12-27 RX ORDER — AMLODIPINE BESYLATE 10 MG/1
10 TABLET ORAL DAILY
Status: DISCONTINUED | OUTPATIENT
Start: 2022-12-27 | End: 2023-01-11 | Stop reason: HOSPADM

## 2022-12-27 RX ORDER — ATORVASTATIN CALCIUM 10 MG/1
10 TABLET, FILM COATED ORAL DAILY
Status: DISCONTINUED | OUTPATIENT
Start: 2022-12-27 | End: 2023-01-11 | Stop reason: HOSPADM

## 2022-12-27 RX ORDER — ENOXAPARIN SODIUM 100 MG/ML
30 INJECTION SUBCUTANEOUS 2 TIMES DAILY
Status: DISCONTINUED | OUTPATIENT
Start: 2022-12-27 | End: 2022-12-27

## 2022-12-27 RX ORDER — FLUTICASONE PROPIONATE 50 MCG
1 SPRAY, SUSPENSION (ML) NASAL DAILY PRN
Status: DISCONTINUED | OUTPATIENT
Start: 2022-12-27 | End: 2023-01-11 | Stop reason: HOSPADM

## 2022-12-27 RX ADMIN — MAGNESIUM SULFATE HEPTAHYDRATE 2000 MG: 40 INJECTION, SOLUTION INTRAVENOUS at 13:51

## 2022-12-27 RX ADMIN — TICAGRELOR 90 MG: 90 TABLET ORAL at 21:03

## 2022-12-27 RX ADMIN — SODIUM CHLORIDE: 9 INJECTION, SOLUTION INTRAVENOUS at 16:18

## 2022-12-27 RX ADMIN — HEPARIN SODIUM 2000 UNITS: 1000 INJECTION INTRAVENOUS; SUBCUTANEOUS at 22:22

## 2022-12-27 RX ADMIN — ATORVASTATIN CALCIUM 10 MG: 10 TABLET, FILM COATED ORAL at 10:00

## 2022-12-27 RX ADMIN — POLYETHYLENE GLYCOL 3350 17 G: 17 POWDER, FOR SOLUTION ORAL at 10:05

## 2022-12-27 RX ADMIN — AMLODIPINE BESYLATE 10 MG: 10 TABLET ORAL at 10:00

## 2022-12-27 RX ADMIN — ACETAMINOPHEN 1000 MG: 500 TABLET, FILM COATED ORAL at 13:51

## 2022-12-27 RX ADMIN — Medication 2000 MG: at 04:04

## 2022-12-27 RX ADMIN — ACETAMINOPHEN 1000 MG: 500 TABLET, FILM COATED ORAL at 04:04

## 2022-12-27 RX ADMIN — OXYCODONE 2.5 MG: 5 TABLET ORAL at 16:26

## 2022-12-27 RX ADMIN — SODIUM CHLORIDE, PRESERVATIVE FREE 10 ML: 5 INJECTION INTRAVENOUS at 23:18

## 2022-12-27 RX ADMIN — SERTRALINE 25 MG: 25 TABLET, FILM COATED ORAL at 10:00

## 2022-12-27 RX ADMIN — Medication 2000 MG: at 23:17

## 2022-12-27 RX ADMIN — METHOCARBAMOL TABLETS 750 MG: 750 TABLET, COATED ORAL at 21:04

## 2022-12-27 RX ADMIN — OXYCODONE 2.5 MG: 5 TABLET ORAL at 10:00

## 2022-12-27 RX ADMIN — GABAPENTIN 600 MG: 300 CAPSULE ORAL at 10:00

## 2022-12-27 RX ADMIN — ASPIRIN 81 MG: 81 TABLET, CHEWABLE ORAL at 21:04

## 2022-12-27 RX ADMIN — METHOCARBAMOL TABLETS 750 MG: 750 TABLET, COATED ORAL at 10:00

## 2022-12-27 RX ADMIN — METOPROLOL SUCCINATE 100 MG: 100 TABLET, FILM COATED, EXTENDED RELEASE ORAL at 10:00

## 2022-12-27 RX ADMIN — SODIUM CHLORIDE, PRESERVATIVE FREE 10 ML: 5 INJECTION INTRAVENOUS at 21:06

## 2022-12-27 RX ADMIN — METHOCARBAMOL TABLETS 750 MG: 750 TABLET, COATED ORAL at 13:51

## 2022-12-27 RX ADMIN — FLUTICASONE PROPIONATE 1 SPRAY: 50 SPRAY, METERED NASAL at 18:04

## 2022-12-27 RX ADMIN — GABAPENTIN 600 MG: 300 CAPSULE ORAL at 21:04

## 2022-12-27 RX ADMIN — ENOXAPARIN SODIUM 30 MG: 100 INJECTION SUBCUTANEOUS at 09:58

## 2022-12-27 RX ADMIN — ONDANSETRON 4 MG: 2 INJECTION INTRAMUSCULAR; INTRAVENOUS at 16:26

## 2022-12-27 RX ADMIN — HEPARIN SODIUM 12 UNITS/KG/HR: 10000 INJECTION, SOLUTION INTRAVENOUS at 16:21

## 2022-12-27 RX ADMIN — Medication 2000 MG: at 15:38

## 2022-12-27 RX ADMIN — NITROGLYCERIN 0.4 MG: 0.4 TABLET SUBLINGUAL at 09:40

## 2022-12-27 ASSESSMENT — PAIN DESCRIPTION - LOCATION
LOCATION: HIP
LOCATION: HEAD

## 2022-12-27 ASSESSMENT — PAIN SCALES - GENERAL
PAINLEVEL_OUTOF10: 3
PAINLEVEL_OUTOF10: 4
PAINLEVEL_OUTOF10: 4
PAINLEVEL_OUTOF10: 3

## 2022-12-27 NOTE — CARE COORDINATION
Met with pt to complete an SBIRT. Pt is alert and oriented and stated that he had a fall. He denies alcohol and drug use and denies depression. SBIRT is negative. Alcohol Screening and Brief Intervention        Recent Labs     12/25/22 2013   ALC <10       Alcohol Pre-screening  (MEN ONLY) How many times in the past year have you had 5 or more drinks in a day?: None       Alcohol Screening Audit       Drug Pre-Screening   How many times in the past year have you used a recreational drug or used a prescription medication for nonmedical reasons?: None    Drug Screening DAST       Mood Pre-Screening (PHQ-2)  During the past two weeks, have you been bothered by little interest or pleasure in doing things?: No  During the past two weeks, have you been bothered by feeling down, depressed, or hopeless?: No    Mood Pre-Screening (PHQ-9)         I have interviewed Severino Rodriguez, [id number] regarding  His alcohol consumption/drug use and risk for excessive use. Screenings were negative. Patient  N/A intervention at this time.    Deferred []    Completed on: 12/27/2022   CARMEN Rankin

## 2022-12-27 NOTE — PROGRESS NOTES
PROGRESS NOTE      PATIENT NAME: Jorge L Faulkner. RECORD NO. [id number]  DATE: 12/27/2022    HD: # 2      Patient Active Problem List   Diagnosis    Right upper quadrant abdominal pain    CAD (coronary artery disease)    Hypertension    Skin cancer    BPH (benign prostatic hyperplasia)    Chronic systolic heart failure (HCC)    Hyperlipemia    Leukocytosis    Cholelithiasis with acute cholecystitis    Pre-syncope    Claudication in peripheral vascular disease (HCC)    Carotid stenosis, asymptomatic    Squamous cell carcinoma of skin of left upper arm    Encounter due to AICD at end of battery life-Change OUT 7-1-19    Malignant neoplasm of prostate ([address]    Chest pain    COVID    COVID-19    Closed fracture of neck of left femur (HCC)       DIAGNOSIS AND PLAN    Mechanical fall from standing with acute moderately displaced left femoral neck fracture  -Moderate to high risk per cardiology  -Left hip hemiarthroplasty 12/26    CAD s/p CABG and multiple TED's  ICM with history of pacemaker (first placed in 1995). ICD 2013. AICD 7/1/2019, CHF  -Appreciate Cardiology assistance  -EKG and ECHO reviewed  -Episode of chest pain and tachyarrhythmia this morning   EKG   Troponin   Sublingual nitro   Pacemaker interrogated      Regular diet  I-S/deep brief  PT/OT  DVT prophylaxis  Dispo: PT/OT      Chief Complaint: \"Left leg pain\"    SUBJECTIVE  Patient seen and examined at bedside this morning. Morning while eating breakfast patient developed chest pain and had a brief tachyarrhythmia. Other than that patient is doing well. His chest pain is starting to resolve. Denies nausea or emesis. Voiding. OBJECTIVE  VITALS:   Vitals:    12/27/22 0758   BP: (!) 157/74   Pulse: (!) 120   Resp: 17   Temp: 98.4 °F (36.9 °C)   SpO2: 92%     Physical Exam  Constitutional:       General: He is not in acute distress. Appearance: Normal appearance. He is normal weight. He is not ill-appearing or toxic-appearing.  .   Eyes: Extraocular Movements: Extraocular movements intact. Pupils: Pupils are equal, round, and reactive to light. Cardiovascular:      Rate and Rhythm: Normal rate and regular rhythm. Pulses: Normal pulses. Heart sounds: Normal heart sounds. No murmur heard. Comments: Paced rhythm  Pulmonary:      Effort: Pulmonary effort is normal. No respiratory distress. Abdominal:      General: Abdomen is flat. There is no distension. Palpations: Abdomen is soft. Tenderness: There is no abdominal tenderness. There is no guarding. Musculoskeletal:         General: Tenderness, deformity and signs of injury present. Cervical back: Neck supple. No tenderness. Right lower leg: No edema. Left lower leg: No edema. Comments: Left hip deformity with tenderness to palpation. No midline tenderness to palpation C/T/L spine   Skin:     General: Skin is warm and dry. Capillary Refill: Capillary refill takes less than 2 seconds. Findings: No bruising. Neurological:      Mental Status: He is alert and oriented to person, place, and time. Mental status is at baseline. Sensory: No sensory deficit. Motor: No weakness. LAB:  CBC:   Recent Labs     12/25/22 2013 12/26/22 1915 12/27/22  1105   WBC 7.3 15.8* 12.9*   HGB 14.2 13.5 12.3*   HCT 41.6 39.1* 36.3*   MCV 86.7 85.6 87.7    187 188       BMP:   Recent Labs     12/25/22 2013 12/26/22 1915 12/27/22  1027    135 137   K 4.0 4.5 3.9    98 99   CO2 30 24 27   BUN 14 15 17   CREATININE 0.92 0.74 0.93   GLUCOSE 168* 178* 150*         RADIOLOGY:  XR HAND LEFT (MIN 3 VIEWS)    Result Date: 12/25/2022  1. No acute osseous abnormality. 2. Severe degenerative changes of the wrist and hand with small central erosions in the interphalangeal joints compatible with erosive osteoarthritis.      XR HIP LEFT (1 VIEW)    Result Date: 12/26/2022  Status post left total hip arthroplasty in a patient with acute cervical neck fracture.  Hardware alignment is satisfactory.  No apparent hardware complication.  Expected postoperative soft tissue changes are noted.     XR FEMUR LEFT (MIN 2 VIEWS)    Result Date: 12/25/2022  1. Acute moderately displaced left femoral neck fracture. 2. No acute osseous abnormality of the left knee.     XR KNEE LEFT (3 VIEWS)    Result Date: 12/25/2022  1. Acute moderately displaced left femoral neck fracture. 2. No acute osseous abnormality of the left knee.     XR CHEST PORTABLE    Result Date: 12/25/2022  Enlarged cardiac silhouette and mild pulmonary vascular congestion.     XR HIP 2-3 VW W PELVIS LEFT    Result Date: 12/26/2022  Recent postop changes left hip arthroplasty     XR HIP 2-3 VW W PELVIS LEFT    Result Date: 12/25/2022  1. Acute moderately displaced left femoral neck fracture. 2. No acute osseous abnormality of the left knee.        Vin Spring DO  12/27/2022, 12:47 PM

## 2022-12-27 NOTE — OP NOTE
[facility]                                OPERATIVE REPORT    PATIENT NAME: Dawit Joy                      :        1940  MED REC NO:   [id number]                             ROOM:       [id number]  ACCOUNT NO:   [de-identified]                           ADMIT DATE: 2022  PROVIDER:     Fela Liz    DATE OF PROCEDURE:  2022    PREOPERATIVE DIAGNOSIS:  Left femoral neck fracture. POSTOPERATIVE DIAGNOSIS:  Left femoral neck fracture. PROCEDURE:  Left hip hemiarthroplasty. SURGEON:  Fela Cooper. DO Shalonda    ASSISTANTS:  Linus Martin DO, PGY-5; Juan Pablo Heart DO, PGY-2    ANESTHESIA:  General.    ESTIMATED BLOOD LOSS:  250 mL. FLUIDS:  1000 mL crystalloid. COMPLICATIONS:  None. SPECIMEN:  None. IMPLANTS:  DeP[employer] CORAIL size 12 stem, high offset 52-mm head plus 5 mm. FINDINGS:  Left displaced femoral neck fracture. INDICATION:  This is an 79-year-old male presented to [place] [address] with complaints of left hip pain after he fell while he  slipped on the ice outside. Imaging performed in the ED demonstrated  left femoral neck fracture. Discussed with the patient need for  operative intervention to increase the mobility and decrease the risk of  morbidity and mortality, discussed the need for hemiarthroplasty. The  patient was agreeable, consent was obtained, placed in chart. All  questions were answered appropriately. Surgical risks including but not  limited to bleeding, blood clots, infection, damage to nearby tissue,  vessels, and nerves, wound healing complications, failed procedure,  stiffness, loss of motion, leg length inequality, fracture, failed  procedure, loss of limb, loss of life were all discussed with the  patient. Knowing these risks, the patient wished to proceed with  surgery as indicated.     OPERATIVE PROCEDURE:  The patient was taken to the operative suite,  placed under general anesthesia without any complications. 2 gm of  Ancef was given prior to procedure. At this time, all team members  paused to identify proper patient name, medication, site and allergies. All team members were in agreeance. The patient was placed in lateral  decubitus position. Axillary roll was placed. All bony pounds well  padded. Left lower extremity was prepped and draped in normal sterile  fashion. Using a marking pen, we mapped our incision to perform a  posterior approach to the left hip. Skin was incised, dissection was  carried down through the skin and subcutaneous tissue. IT band was  split longitudinally in line with its fibers going up to the fascia over  the gluteus bertin, Charnley bone was placed. The short external  rotators and capsule were peeled in one sleeve exposing the femoral neck  fracture. We freshened our femoral neck cut approximately 1 cm proximal to  the lesser trochanter. Residual femoral neck and head were  removed. We examined the acetabulum until to ensure no bony fragments were  within. We then trialed with a 52-mm head, had excellent sizing. Trial  was removed. We then prepared for femur. We then passed a [employer]  cerclage cable around the calcar prophylactically, then appropriately  tensioned, crimped and cut the cable appropriately. We then placed our  box osteotome followed by canal finder, followed by Helena Waddell. We  sequentially broached ending at a size 12, which had excellent fixation. We then trialed with multiple head and neck lengths. It was deemed that  a high offset 5 mm had the best fixation and stability. Trial implants were then  removed. Wound was thoroughly irrigated with NS. We then placed our CORAIL size 12 stem high offset 52-mm head  plus 5. It was reduced. Leg lengths and stability once again assessed. Being satisfied, we thoroughly irrigated all wounds with normal saline. Antibiotic powder in the form of vancomycin and tobramycin were applied. Repaired the capsule and the short external rotators using #5 Ethilon. We repaired the IT band using 0 PDS, deep dermal layers with 2-0  Monocryl, and skin using staples. Optifoam dressing was then applied. The patient was then awoken from general anesthesia, transferred back in  stable condition. I was present for all aspects of the procedure. Meticulous dissection was used and thorough hemostasis was achieved. The patient will be weightbearing as tolerated to the left lower  extremity with posterior hip precautions.         Jemima Tobin    D: 12/26/2022 18:48:54       T: 12/26/2022 18:52:27     [email or web address]  Job#: [id number]     Doc#: [id number]    CC:

## 2022-12-27 NOTE — PROGRESS NOTES
Orthopedic Progress Note    Patient:  Majo Tristan  YOB: 1940     79 y.o. male    Subjective:  Patient seen and examined this morning. No complaints or concerns. No issues overnight per nursing. Pain is well controlled on current regimen. Denies fever, HA, CP, SOB, N/V, dysuria, new numbness/tingling. No BM, but flatus +. Vitals reviewed, afebrile    Objective:   Vitals:    12/27/22 0035   BP: (!) 148/75   Pulse: 94   Resp: 16   Temp: 97.8 °F (36.6 °C)   SpO2: 97%     Gen: NAD, cooperative    Cardiovascular: Regular rate   Respiratory: No acute respiratory distress    Orthopedic Exam  LLE:    Dressings are clean, dry, and intact with minimal strike-through in central portion. Compartments soft and compressible. TA/EHL/FHL/GS motor intact. Able to actively flex knee and hip with moderate pain in hip/groin with movement. Deep and Superficial Peroneal/Saphenous/Sural SILT. Foot is warm and well perfused. Recent Labs     12/25/22 2013 12/26/22 1915   WBC 7.3 15.8*   HGB 14.2 13.5   HCT 41.6 39.1*    187   INR 1.0  --     135   K 4.0 4.5   BUN 14 15   CREATININE 0.92 0.74   GLUCOSE 168* 178*      Meds:    Abx: Ancef  See rec for complete list    Impression 79 y.o. male who [address] is being seen for:    Left femoral neck fracture s/p hip hemiarthroplasty POD#1    Plan  - Post-op Ancef q8h x2 doses  - WBAT LLE. Posterior hip precautions. - Pain control and medical management per primary team.  - Dressings on LLE. Do not remove optifoam dressings. Okay to reinforce/maintain by nursing if necessary. Please notify Ortho if saturated. - DVT ppx: Okay to start chemical anticoagulation POD#1 from orthopedic perspective.   - Ice/Elevate to control pain and edema  - Diet: Adult diet okay from orthopedic perspective  - Encourage Incentive Spirometry use  - PT/OT to evaluate post-op   - Okay to discharge from orthopedic perspective pending evaluation by PT and completion of post-op ancef   - F/u with Dr. Prachi Soto in 14 days from surgery   - Please page Ortho on call with any questions      Debra Henry,   Orthopedic Surgery Resident, PGY-1  [address]       PGY-2 Addendum    Patient seen and examined. Agree with subjective and objective portions from Dr. Lexis Henry with note adjusted where indicated. Patient had some initial confusion after surgery from anesthesia, though resolved per patient and nursing before bed.       Jelena Blake,  PGY-2  Orthopedic Surgery Resident  [place] [address], [facility]

## 2022-12-27 NOTE — PROGRESS NOTES
POST OP NOTE    SUBJECTIVE  Pt s/p Left hip hemiarthroplasty. Resting comfortably. No signs of anxiety or discomfort. He has no complaints. Pain is controlled    OBJECTIVE  VITALS:  BP (!) 153/83   Pulse 82   Temp 97.8 °F (36.6 °C) (Oral)   Resp 16   Ht 5' 7\" (1.702 m)   Wt 182 lb (82.6 kg)   SpO2 100%   BMI 28.51 kg/m²         GENERAL:  Awake and alert. no apparent distress  CARDIOVASCULAR:  Regular Rate and Rhythm   LUNGS:  CTA Bilaterally  ABDOMEN:   Abdomen soft, non-tender, non-distended  INCISION: Incision clean/dry/intact    ASSESSMENT  1. POD# 0 s/p Left hip hemiarthroplasty    PLAN  1. Pain management-scheduled Tylenol, gabapentin, and Robaxin. As needed oxycodone  2. DVT proph- POD1  3.  PT/OT      Prudence Krabbe, DO  Trauma/Surgery Service  12/26/2022 at 11:58 PM

## 2022-12-27 NOTE — PROGRESS NOTES
[facility]   Progress Note                   Date:   12/27/2022  Patient name: Olivia Strickland  Date of admission:  12/25/2022  7:27 PM  MRN:   [id number]  YOB: 1940  PCP: Reginaldo Woods MD    Reason for Admission: Closed fracture of neck of left femur, [facility] [S72.002A]  Closed left hip fracture, initial encounter ([facility] [S72.002A]    Subjective:       Clinical Changes / Abnormalities:Pt seen and examined in the room with RN. Pt had episode of NSVT this am with CP. EKG reviewed, 1 SL nitro given. HR nsr now. No further episodes. Pt states that it started after he ate. Medications:   Scheduled Meds:   amLODIPine  10 mg Oral Daily    metoprolol succinate  100 mg Oral Daily    sertraline  25 mg Oral Daily    atorvastatin  10 mg Oral Daily    gabapentin  600 mg Oral BID    enoxaparin  30 mg SubCUTAneous BID    sodium chloride flush  5-40 mL IntraVENous 2 times per day    polyethylene glycol  17 g Oral Daily    acetaminophen  1,000 mg Oral 3 times per day    methocarbamol  750 mg Oral TID     Continuous Infusions:   sodium chloride       CBC:   Recent Labs     12/25/22 2013 12/26/22  1915   WBC 7.3 15.8*   HGB 14.2 13.5    187     BMP:    Recent Labs     12/25/22 2013 12/26/22  1915    135   K 4.0 4.5    98   CO2 30 24   BUN 14 15   CREATININE 0.92 0.74   GLUCOSE 168* 178*     Hepatic: No results for input(s): AST, ALT, ALB, BILITOT, ALKPHOS in the last 72 hours. Troponin: No results for input(s): TROPHS in the last 72 hours. BNP: No results for input(s): BNP in the last 72 hours. Lipids: No results for input(s): CHOL, HDL in the last 72 hours.     Invalid input(s): LDLCALCU  INR:   Recent Labs     12/25/22 2013   INR 1.0       Objective:   Vitals: BP (!) 151/77   Pulse (!) 120   Temp 98.4 °F (36.9 °C) (Oral)   Resp 16   Ht 5' 7\" (1.702 m)   Wt 182 lb (82.6 kg)   SpO2 92%   BMI 28.51 kg/m²   General appearance: alert and cooperative with exam  HEENT: Head: Normocephalic, no lesions, without obvious abnormality. Neck: no JVD, trachea midline, no adenopathy  Lungs: Clear to auscultation  Heart: Regular rate and rhythm, s1/s2 auscultated, no murmurs  Abdomen: soft, non-tender, bowel sounds active  Extremities: no edema  Neurologic: not done        Assessment / Acute Cardiac Problems:   S/p fall suffered from left hip fracture  Preop cardiac risk stratification for same  Ischemic cardiomyopathy with improved ejection fraction on last nuclear stress test to 49% 2020 currently compensated with no signs of volume overload. ICD interrogation October 2022 functioning normally 96% paced in the atrium with 5 years of battery longevity  CAD status post CABG and stenting as detailed above with patent LIMA to the LAD and stenting to OM 2 and OM 3. Hypertension  Hyperlipidemia    Patient Active Problem List:     Right upper quadrant abdominal pain     CAD (coronary artery disease)     Hypertension     Skin cancer     BPH (benign prostatic hyperplasia)     Chronic systolic heart failure (HCC)     Hyperlipemia     Leukocytosis     Cholelithiasis with acute cholecystitis     Pre-syncope     Claudication in peripheral vascular disease (HCC)     Carotid stenosis, asymptomatic     Squamous cell carcinoma of skin of left upper arm     Encounter due to AICD at end of battery life-Change OUT 7-1-19     Malignant neoplasm of prostate ([address]     Chest pain     COVID     COVID-19     Closed fracture of neck of left femur ([address]    1. Coronary artery disease. He had prior bypass, LIMA-LAD. last catheterization 2009 showed patent LIMA to LAD, required drug-eluting stent to diagonal, left circumflex and LPDA for significant stenosis. 2. Ischemic cardiomyopathy with EF of 20-25% s/p Dual Chamber ICD in situ. Checks normal function with very short runs of Afib.  4. Obesity. 5. Vasodepressor syncope.   6. Echocardiogram 05/23/2019, showed reduced LV systolic function with an EF of 25% with reduced LV diastolic compliance, trace to mild AI, trace to mild MR, trace to mild TR noted. 7. CATH 9/2/2020 LM 10-20% LAD proximal 90% LCx OM2 99% TED OM3 80% TED. RCA 80% proximal and is small nondominant. 9. STRESS 9/2/2020 reversible ischemia lateral wall. Infarct anterior lateral, and apical wall. Hypokinesis apical anterior and septal wall. LVEF 49%        ECHO 12/25/22  Summary  Severe LV systolic dysfunction, with EF 20-25%. Reduced LV diastolic compliance. Mild aortic insufficiency. Mild mitral regurgitation. Mild tricuspid regurgitation. Plan of Treatment:   S/p OR. ECHO reviewed. NSVT x 1 today per Device interrogation. NSR now. Continue BB. Will check BMP/Mag   CP secondary to NSVT. No EKG changes. Await Trop   Continue statin. Restart Brilinta, lasix and asa on discharge. If pt remains stable today, ok to d/c from cards standpoint.       Electronically signed by RAVI Obrien CNP on 12/27/2022 at [address] [facility].  [phone]

## 2022-12-28 ENCOUNTER — APPOINTMENT (OUTPATIENT)
Dept: NUCLEAR MEDICINE | Age: 82
DRG: 521 | End: 2022-12-28
Payer: MEDICARE

## 2022-12-28 LAB
ABSOLUTE EOS #: 0.56 K/UL (ref 0–0.4)
ABSOLUTE IMMATURE GRANULOCYTE: 0 K/UL (ref 0–0.3)
ABSOLUTE LYMPH #: 0.78 K/UL (ref 1–4.8)
ABSOLUTE MONO #: 0.89 K/UL (ref 0.1–0.8)
ALBUMIN SERPL-MCNC: 2.9 G/DL (ref 3.5–5.2)
ANION GAP SERPL CALCULATED.3IONS-SCNC: 11 MMOL/L (ref 9–17)
BASOPHILS # BLD: 0 % (ref 0–2)
BASOPHILS ABSOLUTE: 0 K/UL (ref 0–0.2)
BUN BLDV-MCNC: 19 MG/DL (ref 8–23)
CALCIUM SERPL-MCNC: 7.8 MG/DL (ref 8.6–10.4)
CHLORIDE BLD-SCNC: 99 MMOL/L (ref 98–107)
CO2: 25 MMOL/L (ref 20–31)
CREAT SERPL-MCNC: 0.89 MG/DL (ref 0.7–1.2)
EKG ATRIAL RATE: 70 BPM
EKG ATRIAL RATE: 72 BPM
EKG ATRIAL RATE: 96 BPM
EKG P AXIS: 22 DEGREES
EKG P AXIS: 39 DEGREES
EKG P AXIS: 43 DEGREES
EKG P-R INTERVAL: 146 MS
EKG P-R INTERVAL: 174 MS
EKG P-R INTERVAL: 212 MS
EKG Q-T INTERVAL: 400 MS
EKG Q-T INTERVAL: 440 MS
EKG Q-T INTERVAL: 458 MS
EKG QRS DURATION: 156 MS
EKG QRS DURATION: 158 MS
EKG QRS DURATION: 160 MS
EKG QTC CALCULATION (BAZETT): 481 MS
EKG QTC CALCULATION (BAZETT): 494 MS
EKG QTC CALCULATION (BAZETT): 505 MS
EKG R AXIS: -10 DEGREES
EKG R AXIS: 29 DEGREES
EKG R AXIS: 30 DEGREES
EKG T AXIS: -138 DEGREES
EKG T AXIS: -175 DEGREES
EKG T AXIS: 151 DEGREES
EKG VENTRICULAR RATE: 70 BPM
EKG VENTRICULAR RATE: 72 BPM
EKG VENTRICULAR RATE: 96 BPM
EOSINOPHILS RELATIVE PERCENT: 5 % (ref 1–4)
ESTIMATED AVERAGE GLUCOSE: 105 MG/DL
FOLATE: 15.5 NG/ML
GFR SERPL CREATININE-BSD FRML MDRD: >60 ML/MIN/1.73M2
GLUCOSE BLD-MCNC: 131 MG/DL (ref 70–99)
HBA1C MFR BLD: 5.3 % (ref 4–6)
HCT VFR BLD CALC: 34.4 % (ref 40.7–50.3)
HEMOGLOBIN: 11 G/DL (ref 13–17)
IMMATURE GRANULOCYTES: 0 %
LV EF: 37 %
LVEF MODALITY: NORMAL
LYMPHOCYTES # BLD: 7 % (ref 24–44)
MAGNESIUM: 2.3 MG/DL (ref 1.6–2.6)
MCH RBC QN AUTO: 29.2 PG (ref 25.2–33.5)
MCHC RBC AUTO-ENTMCNC: 32 G/DL (ref 28.4–34.8)
MCV RBC AUTO: 91.2 FL (ref 82.6–102.9)
MONOCYTES # BLD: 8 % (ref 1–7)
MORPHOLOGY: NORMAL
NRBC AUTOMATED: 0 PER 100 WBC
PARTIAL THROMBOPLASTIN TIME: 37 SEC (ref 20.5–30.5)
PARTIAL THROMBOPLASTIN TIME: 47 SEC (ref 20.5–30.5)
PARTIAL THROMBOPLASTIN TIME: 54.9 SEC (ref 20.5–30.5)
PARTIAL THROMBOPLASTIN TIME: 68.8 SEC (ref 20.5–30.5)
PDW BLD-RTO: 13.6 % (ref 11.8–14.4)
PHOSPHORUS: 3.8 MG/DL (ref 2.5–4.5)
PLATELET # BLD: 178 K/UL (ref 138–453)
PMV BLD AUTO: 10.3 FL (ref 8.1–13.5)
POTASSIUM SERPL-SCNC: 3.9 MMOL/L (ref 3.7–5.3)
RBC # BLD: 3.77 M/UL (ref 4.21–5.77)
SEG NEUTROPHILS: 80 % (ref 36–66)
SEGMENTED NEUTROPHILS ABSOLUTE COUNT: 8.87 K/UL (ref 1.8–7.7)
SODIUM BLD-SCNC: 135 MMOL/L (ref 135–144)
THYROXINE, FREE: 0.92 NG/DL (ref 0.93–1.7)
TROPONIN, HIGH SENSITIVITY: 50 NG/L (ref 0–22)
TSH SERPL DL<=0.05 MIU/L-ACNC: 1.42 UIU/ML (ref 0.3–5)
VITAMIN B-12: 357 PG/ML (ref 232–1245)
VITAMIN D 25-HYDROXY: 18.8 NG/ML
WBC # BLD: 11.1 K/UL (ref 3.5–11.3)

## 2022-12-28 PROCEDURE — 82306 VITAMIN D 25 HYDROXY: CPT

## 2022-12-28 PROCEDURE — 80048 BASIC METABOLIC PNL TOTAL CA: CPT

## 2022-12-28 PROCEDURE — 85730 THROMBOPLASTIN TIME PARTIAL: CPT

## 2022-12-28 PROCEDURE — 2580000003 HC RX 258: Performed by: STUDENT IN AN ORGANIZED HEALTH CARE EDUCATION/TRAINING PROGRAM

## 2022-12-28 PROCEDURE — 97116 GAIT TRAINING THERAPY: CPT

## 2022-12-28 PROCEDURE — 85025 COMPLETE CBC W/AUTO DIFF WBC: CPT

## 2022-12-28 PROCEDURE — 3430000000 HC RX DIAGNOSTIC RADIOPHARMACEUTICAL: Performed by: NURSE PRACTITIONER

## 2022-12-28 PROCEDURE — 6370000000 HC RX 637 (ALT 250 FOR IP): Performed by: STUDENT IN AN ORGANIZED HEALTH CARE EDUCATION/TRAINING PROGRAM

## 2022-12-28 PROCEDURE — 6360000002 HC RX W HCPCS: Performed by: NURSE PRACTITIONER

## 2022-12-28 PROCEDURE — 2580000003 HC RX 258: Performed by: NURSE PRACTITIONER

## 2022-12-28 PROCEDURE — 6370000000 HC RX 637 (ALT 250 FOR IP): Performed by: NURSE PRACTITIONER

## 2022-12-28 PROCEDURE — 84439 ASSAY OF FREE THYROXINE: CPT

## 2022-12-28 PROCEDURE — 97535 SELF CARE MNGMENT TRAINING: CPT

## 2022-12-28 PROCEDURE — 97166 OT EVAL MOD COMPLEX 45 MIN: CPT

## 2022-12-28 PROCEDURE — A9500 TC99M SESTAMIBI: HCPCS | Performed by: NURSE PRACTITIONER

## 2022-12-28 PROCEDURE — 82607 VITAMIN B-12: CPT

## 2022-12-28 PROCEDURE — 82746 ASSAY OF FOLIC ACID SERUM: CPT

## 2022-12-28 PROCEDURE — 36415 COLL VENOUS BLD VENIPUNCTURE: CPT

## 2022-12-28 PROCEDURE — 93017 CV STRESS TEST TRACING ONLY: CPT

## 2022-12-28 PROCEDURE — 83036 HEMOGLOBIN GLYCOSYLATED A1C: CPT

## 2022-12-28 PROCEDURE — 1200000000 HC SEMI PRIVATE

## 2022-12-28 PROCEDURE — 97530 THERAPEUTIC ACTIVITIES: CPT

## 2022-12-28 PROCEDURE — 84484 ASSAY OF TROPONIN QUANT: CPT

## 2022-12-28 PROCEDURE — 6360000002 HC RX W HCPCS: Performed by: STUDENT IN AN ORGANIZED HEALTH CARE EDUCATION/TRAINING PROGRAM

## 2022-12-28 PROCEDURE — 78452 HT MUSCLE IMAGE SPECT MULT: CPT

## 2022-12-28 PROCEDURE — 84100 ASSAY OF PHOSPHORUS: CPT

## 2022-12-28 PROCEDURE — 82040 ASSAY OF SERUM ALBUMIN: CPT

## 2022-12-28 PROCEDURE — 84443 ASSAY THYROID STIM HORMONE: CPT

## 2022-12-28 PROCEDURE — 83735 ASSAY OF MAGNESIUM: CPT

## 2022-12-28 PROCEDURE — 97162 PT EVAL MOD COMPLEX 30 MIN: CPT

## 2022-12-28 RX ORDER — TECHNETIUM TC-99M SESTAMIBI 1 MG/10ML
11.7 INJECTION INTRAVENOUS
Status: COMPLETED | OUTPATIENT
Start: 2022-12-28 | End: 2022-12-28

## 2022-12-28 RX ORDER — SODIUM CHLORIDE 0.9 % (FLUSH) 0.9 %
10 SYRINGE (ML) INJECTION PRN
Status: DISCONTINUED | OUTPATIENT
Start: 2022-12-28 | End: 2023-01-11 | Stop reason: HOSPADM

## 2022-12-28 RX ORDER — SODIUM CHLORIDE 0.9 % (FLUSH) 0.9 %
5-40 SYRINGE (ML) INJECTION PRN
Status: DISCONTINUED | OUTPATIENT
Start: 2022-12-28 | End: 2022-12-28 | Stop reason: ALTCHOICE

## 2022-12-28 RX ORDER — METOPROLOL TARTRATE 5 MG/5ML
5 INJECTION INTRAVENOUS EVERY 5 MIN PRN
Status: DISCONTINUED | OUTPATIENT
Start: 2022-12-28 | End: 2022-12-28 | Stop reason: ALTCHOICE

## 2022-12-28 RX ORDER — ATROPINE SULFATE 0.1 MG/ML
0.5 INJECTION INTRAVENOUS EVERY 5 MIN PRN
Status: DISCONTINUED | OUTPATIENT
Start: 2022-12-28 | End: 2022-12-28 | Stop reason: ALTCHOICE

## 2022-12-28 RX ORDER — AMINOPHYLLINE DIHYDRATE 25 MG/ML
50 INJECTION, SOLUTION INTRAVENOUS PRN
Status: DISCONTINUED | OUTPATIENT
Start: 2022-12-28 | End: 2022-12-28 | Stop reason: ALTCHOICE

## 2022-12-28 RX ORDER — FUROSEMIDE 40 MG/1
40 TABLET ORAL DAILY
Status: DISCONTINUED | OUTPATIENT
Start: 2022-12-28 | End: 2023-01-11 | Stop reason: HOSPADM

## 2022-12-28 RX ORDER — TECHNETIUM TC-99M SESTAMIBI 1 MG/10ML
32 INJECTION INTRAVENOUS
Status: COMPLETED | OUTPATIENT
Start: 2022-12-28 | End: 2022-12-28

## 2022-12-28 RX ORDER — SODIUM CHLORIDE 9 MG/ML
500 INJECTION, SOLUTION INTRAVENOUS CONTINUOUS PRN
Status: DISCONTINUED | OUTPATIENT
Start: 2022-12-28 | End: 2022-12-28 | Stop reason: ALTCHOICE

## 2022-12-28 RX ORDER — NITROGLYCERIN 0.4 MG/1
0.4 TABLET SUBLINGUAL EVERY 5 MIN PRN
Status: DISCONTINUED | OUTPATIENT
Start: 2022-12-28 | End: 2022-12-28 | Stop reason: ALTCHOICE

## 2022-12-28 RX ADMIN — TETRAKIS(2-METHOXYISOBUTYLISOCYANIDE)COPPER(I) TETRAFLUOROBORATE 11.7 MILLICURIE: 1 INJECTION, POWDER, LYOPHILIZED, FOR SOLUTION INTRAVENOUS at 09:00

## 2022-12-28 RX ADMIN — SODIUM CHLORIDE, PRESERVATIVE FREE 10 ML: 5 INJECTION INTRAVENOUS at 10:21

## 2022-12-28 RX ADMIN — METHOCARBAMOL TABLETS 750 MG: 750 TABLET, COATED ORAL at 12:30

## 2022-12-28 RX ADMIN — HEPARIN SODIUM 2000 UNITS: 1000 INJECTION INTRAVENOUS; SUBCUTANEOUS at 19:32

## 2022-12-28 RX ADMIN — TICAGRELOR 90 MG: 90 TABLET ORAL at 21:10

## 2022-12-28 RX ADMIN — AMLODIPINE BESYLATE 10 MG: 10 TABLET ORAL at 12:31

## 2022-12-28 RX ADMIN — TICAGRELOR 90 MG: 90 TABLET ORAL at 12:33

## 2022-12-28 RX ADMIN — REGADENOSON 0.4 MG: 0.08 INJECTION, SOLUTION INTRAVENOUS at 10:39

## 2022-12-28 RX ADMIN — ACETAMINOPHEN 1000 MG: 500 TABLET, FILM COATED ORAL at 06:27

## 2022-12-28 RX ADMIN — OXYCODONE 2.5 MG: 5 TABLET ORAL at 12:29

## 2022-12-28 RX ADMIN — GABAPENTIN 600 MG: 300 CAPSULE ORAL at 21:10

## 2022-12-28 RX ADMIN — TETRAKIS(2-METHOXYISOBUTYLISOCYANIDE)COPPER(I) TETRAFLUOROBORATE 32 MILLICURIE: 1 INJECTION, POWDER, LYOPHILIZED, FOR SOLUTION INTRAVENOUS at 10:40

## 2022-12-28 RX ADMIN — HEPARIN SODIUM 14 UNITS/KG/HR: 10000 INJECTION, SOLUTION INTRAVENOUS at 14:43

## 2022-12-28 RX ADMIN — SODIUM CHLORIDE, PRESERVATIVE FREE 10 ML: 5 INJECTION INTRAVENOUS at 10:40

## 2022-12-28 RX ADMIN — ACETAMINOPHEN 1000 MG: 500 TABLET, FILM COATED ORAL at 12:29

## 2022-12-28 RX ADMIN — METOPROLOL SUCCINATE 100 MG: 100 TABLET, FILM COATED, EXTENDED RELEASE ORAL at 12:29

## 2022-12-28 RX ADMIN — ASPIRIN 81 MG: 81 TABLET, CHEWABLE ORAL at 12:30

## 2022-12-28 RX ADMIN — FUROSEMIDE 40 MG: 40 TABLET ORAL at 11:55

## 2022-12-28 RX ADMIN — GABAPENTIN 600 MG: 300 CAPSULE ORAL at 12:29

## 2022-12-28 RX ADMIN — SODIUM CHLORIDE, PRESERVATIVE FREE 10 ML: 5 INJECTION INTRAVENOUS at 21:15

## 2022-12-28 RX ADMIN — SERTRALINE 25 MG: 25 TABLET, FILM COATED ORAL at 12:30

## 2022-12-28 RX ADMIN — SODIUM CHLORIDE, PRESERVATIVE FREE 10 ML: 5 INJECTION INTRAVENOUS at 09:00

## 2022-12-28 RX ADMIN — HEPARIN SODIUM 16 UNITS/KG/HR: 10000 INJECTION, SOLUTION INTRAVENOUS at 19:35

## 2022-12-28 RX ADMIN — ATORVASTATIN CALCIUM 10 MG: 10 TABLET, FILM COATED ORAL at 12:30

## 2022-12-28 RX ADMIN — METHOCARBAMOL TABLETS 750 MG: 750 TABLET, COATED ORAL at 21:11

## 2022-12-28 ASSESSMENT — PAIN SCALES - GENERAL
PAINLEVEL_OUTOF10: 0
PAINLEVEL_OUTOF10: 3
PAINLEVEL_OUTOF10: 4

## 2022-12-28 ASSESSMENT — PAIN DESCRIPTION - LOCATION
LOCATION: BACK
LOCATION: LEG
LOCATION: HIP

## 2022-12-28 ASSESSMENT — PAIN DESCRIPTION - ORIENTATION: ORIENTATION: LEFT

## 2022-12-28 NOTE — CONSULTS
Physical Medicine & Rehabilitation  Consult Note      Admitting Physician:  Niles Santiago,*    Primary Care Provider:  Abbie Wise MD     Reason for Consult:  Acute Inpatient Rehabilitation    Chief Complaint:  Fall    History of Present Illness:  Referring Provider is requesting an evaluation for appropriate placement upon discharge from acute care. History from chart review and patient. Barney Blake is a 79 y.o. male with history of CAD s/p CABG and stents, CHF s/p AICD, PVD, HTN, HLD, GERD, BPH, macular degeneration, and anxiety admitted to [facility] on 12/25/2022. He initially presented after a mechanical fall while outside trying to  a trash can that had blown over. No LOC. He was found to have a left femoral neck fracture and underwent left hip hemiarthroplasty on 12/26/22 (Dr. Betzy Bella). He is WBAT to the left lower limb with posterior hip precautions. Cardiology was consulted for elevated troponin and surgical risk; stress test showed large anterior wall infarct from apex to base with galdino-infarct ischemia and EF 37%. Plan is for cardiac cath today. He notes mild pain in the left hip. He does report some shortness of breath but denies any chest pain. He also notes nausea and diarrhea this morning. He has chronic numbness/tingling of the bilateral feet. Review of Systems:  Review of Systems   Constitutional:  Negative for fever. HENT:  Negative for hearing loss. Eyes:  Negative for blurred vision and double vision. Respiratory:  Positive for shortness of breath. Cardiovascular:  Negative for chest pain. Gastrointestinal:  Positive for diarrhea and nausea. Negative for abdominal pain. Genitourinary:         No change in bladder control   Musculoskeletal:  Positive for joint pain. Skin:  Negative for rash. Neurological:  Positive for sensory change (chronic). Negative for headaches.       Premorbid function:  Independent    Current function:    Physical Therapy    Restrictions/Precautions: Weight Bearing, Fall Risk  Hip Precautions: No hip flexion > 90 degrees, No ADduction, Posterior hip precautions, No hip internal rotation  Other position/activity restrictions: S/p: L hip hemiarthoplasty 12/26/2022  Left Lower Extremity Weight Bearing: Weight Bearing As Tolerated    Bed mobility  Supine to Sit: Minimal assistance, 2 Person assistance (1x assist for LLE progression, 1x assist for trunk progression.)  Sit to Supine: 2 Person assistance, Moderate assistance  Scooting: Minimal assistance  Bed Mobility Comments: HOB elevated ~30 degrees without use of handrails. Increased time/effort to perform. Transfers  Sit to Stand: Moderate Assistance, 2 Person Assistance  Stand to Sit: Moderate Assistance, 2 Person Assistance  Comment: Transfers performed 4x this date, performed 1x with the melanie-laura, 3x with a RW. Verbal cues for hand placement. Ambulation  Surface: Level tile  Device: Rolling Walker  Assistance: Minimal assistance  Quality of Gait: mildly unsteady, step-to pattern, decreased LLE stance phase. no true LOB. Gait Deviations: Slow Sunshine, Decreased step length, Decreased step height  Distance: 5 feet  More Ambulation?: No      Occupational Therapy    ADL  Feeding: Modified independent , Setup  Grooming: Modified independent , Setup  Grooming Skilled Clinical Factors: pt utilized wet wipe to complete hand hygiene independently  UE Bathing: Stand by assistance, Setup, Increased time to complete  LE Bathing:  Moderate assistance, Setup, Increased time to complete  LE Bathing Skilled Clinical Factors: LB bathing completed while seated in recliner with pt requiring Mod A to wash posterior thighs and lower legs/feet d/t limitations in functional reach and poor dynamic standing balance with unilateral hand release  UE Dressing: Setup, Increased time to complete, Contact guard assistance  UE Dressing Skilled Clinical Factors: pt doffed soiled and donned clean gown with CGA for appropriate threading  LE Dressing:  Moderate assistance, Setup, Increased time to complete  LE Dressing Skilled Clinical Factors: pt attempted to reach to foot level however was only able to reach to lower legs this date while seated requiring Max A to don B socks and expected to require Mod A for standing portions of LB dressing  Toileting: Maximum assistance, Setup, Increased time to complete  Toileting Skilled Clinical Factors: pt engaged in brief management and pericare while standing at recliner d/t incontinence and urgency requiring linen change X2 and brief change X2; Max A throughout d/t limitations in standing balance and functional reach      Speech Therapy      Past Medical History:        Diagnosis Date    Acid reflux     resolved since s/p shona    Anxiety     Arthritis     back/ fingers    BPH (benign prostatic hypertrophy)     CAD (coronary artery disease)     Dr. Melendez [place] Dr. hernandez/ [facility]    Cancer [facility])     face, ear, scalp and arms     Carotid stenosis     bilat    Cataracts, both eyes     CHF (congestive heart failure) ([address]     Dry skin     Elevated PSA     Examination of participant in clinical trial 4/27/39    For the life of the medtronic device    Hyperlipidemia     pt denies 11-27-19    Hypertension     PCP Dr. Yessi Fuentes/ last seen 9-2019    Left ventricular dysfunction     Macular degeneration     left    Myocardial infarction ([address] 11/17/88, 1/2013    Neuropathy     Pacemaker 1995-2013    X 5    Panic attacks     PVD (peripheral vascular disease) ([address]     Sinus problem     Snores     Vasovagal near syncope     Wears glasses        Past Surgical History:        Procedure Laterality Date    CARDIAC CATHETERIZATION      several/ stents X 3/ angioplasty    CARDIAC DEFIBRILLATOR PLACEMENT  2013    Up graded PPM to ICD    [address]    double bypass [address], LAPAROSCOPIC  03/03/2014    [facility] COLONOSCOPY      EYE SURGERY Bilateral     cataracts    HIP ARTHROPLASTY Left 12/26/2022    HIP HEMIARTHROPLASTY,  DEPUY    HIP SURGERY Left 12/26/2022    HIP HEMIARTHROPLASTY,  DEPUY performed by Trevor Kimball DO at [address] [place] [address], 2001, 2008, 2013, 7/1/2019 7/1/2019 is AICD    PACEMAKER PLACEMENT      pacer/ AICD/ Newest 7-1-19,medtronic aicd last check 8/20/19. pacemaker x5. PROSTATE BIOPSY  09/24/2019    as local    PROSTATE BIOPSY N/A 09/24/2019    PROSTATE BIOPSY WITH ULTRASOUND (OFFICE NOTIFIED DEPT) performed by Sue Rivera MD at [address]:     Allergies   Allergen Reactions    Percocet [Oxycodone-Acetaminophen] Other (See Comments)     Panic attack        Current Medications:   Current Facility-Administered Medications: polyethylene glycol (GLYCOLAX) packet 17 g, 17 g, Oral, Daily PRN  furosemide (LASIX) tablet 40 mg, 40 mg, Oral, Daily  sodium chloride flush 0.9 % injection 10 mL, 10 mL, IntraVENous, PRN  sodium chloride flush 0.9 % injection 10 mL, 10 mL, IntraVENous, PRN  amLODIPine (NORVASC) tablet 10 mg, 10 mg, Oral, Daily  metoprolol succinate (TOPROL XL) extended release tablet 100 mg, 100 mg, Oral, Daily  sertraline (ZOLOFT) tablet 25 mg, 25 mg, Oral, Daily  atorvastatin (LIPITOR) tablet 10 mg, 10 mg, Oral, Daily  gabapentin (NEURONTIN) capsule 600 mg, 600 mg, Oral, BID  nitroGLYCERIN (NITROSTAT) SL tablet 0.4 mg, 0.4 mg, SubLINGual, Q5 Min PRN  heparin (porcine) injection 4,000 Units, 4,000 Units, IntraVENous, PRN  heparin (porcine) injection 2,000 Units, 2,000 Units, IntraVENous, PRN  [Held by provider] heparin 25,000 units in dextrose 5 % 250 mL infusion (rate based), 5-30 Units/kg/hr, IntraVENous, Continuous  aspirin chewable tablet 81 mg, 81 mg, Oral, Daily  [Held by provider] ticagrelor (BRILINTA) tablet 90 mg, 90 mg, Oral, BID  fluticasone (FLONASE) 50 MCG/ACT nasal spray 1 spray, 1 spray, Each Nostril, Daily PRN  melatonin tablet 3 mg, 3 mg, Oral, Nightly PRN  oxyCODONE (ROXICODONE) immediate release tablet 2.5 mg, 2.5 mg, Oral, Q6H PRN  sodium chloride flush 0.9 % injection 5-40 mL, 5-40 mL, IntraVENous, 2 times per day  sodium chloride flush 0.9 % injection 5-40 mL, 5-40 mL, IntraVENous, PRN  0.9 % sodium chloride infusion, , IntraVENous, PRN  ondansetron (ZOFRAN-ODT) disintegrating tablet 4 mg, 4 mg, Oral, Q8H PRN **OR** ondansetron (ZOFRAN) injection 4 mg, 4 mg, IntraVENous, Q6H PRN  acetaminophen (TYLENOL) tablet 1,000 mg, 1,000 mg, Oral, 3 times per day  methocarbamol (ROBAXIN) tablet 750 mg, 750 mg, Oral, TID    Family History:       Problem Relation Age of Onset    High Blood Pressure Mother     Cancer Mother     High Blood Pressure Father     Diabetes Sister     Cancer Sister     High Blood Pressure Sister     Heart Disease Brother     High Blood Pressure Brother        Social History:  Lives With: Spouse  Type of Home: House  Home Layout: Two level (with stair lift)  Home Access: Stairs to enter with rails  Entrance Stairs - Number of Steps: 3  Entrance Stairs - Rails: Both  Bathroom Shower/Tub: Tub/Shower unit  Bathroom Toilet: Handicap height  Bathroom Equipment: Grab bars around toilet, Grab bars in shower, Shower chair, Hand-held shower  Bathroom Accessibility: Accessible  Home Equipment: Walker, rolling, Rollator (pt reports no DME use at a baseline.)  Has the patient had two or more falls in the past year or any fall with injury in the past year?: Yes (~2)  Receives Help From: Personal care attendant (2-3x/week)  ADL Assistance: Independent  Homemaking Assistance: Independent  Homemaking Responsibilities: Yes (cleaning person assists with laundry, lawn care, cleaning the house)  Ambulation Assistance: Independent  Transfer Assistance: Independent  Active : Yes  Mode of Transportation: Van  Occupation: Retired  Type of Occupation:   Leisure & Hobbies: reading, watching TV, going to concerts  Additional Comments:  Pt reports having supportive neighbors and aides that will be able assist as needed. Social History     Socioeconomic History    Marital status:      Spouse name: None    Number of children: None    Years of education: None    Highest education level: None   Tobacco Use    Smoking status: Never    Smokeless tobacco: Never   Vaping Use    Vaping Use: Never used   Substance and Sexual Activity    Alcohol use: Yes     Alcohol/week: 2.0 standard drinks     Types: 2 Cans of beer per week     Comment:  weekly    Drug use: No       Physical Exam:  /71   Pulse 82   Temp 97.7 °F (36.5 °C)   Resp 17   Ht 5' 7\" (1.702 m)   Wt 182 lb (82.6 kg)   SpO2 95%   BMI 28.51 kg/m²     GEN: Well developed, well nourished, no acute distress  HEENT: NCAT. EOM grossly intact. Hearing grossly intact. Mucous membranes pink and moist.  RESP: Normal breath sounds with no wheezing, rales, or rhonchi. Respirations WNL and unlabored. CV: Regular rate and rhythm. No murmurs, rubs, or gallops. ABD: Soft, non-distended, BS+ and equal.  NEURO: Alert. Speech fluent. Sensation to light touch mildly decreased in the bilateral feet. MSK:  Muscle tone and bulk are normal bilaterally. Strength 4+/5 in bilateral upper limbs. Strength 4+/5 with bilateral ankle dorsiflexion and plantarflexion. LIMBS: No edema in bilateral lower limbs. SKIN: Warm and dry with good turgor. PSYCH: Mood WNL. Affect WNL. Appropriately interactive.     Diagnostics:    CBC:   Recent Labs     12/27/22  1105 12/28/22  0312 12/29/22  0602   WBC 12.9* 11.1 10.3   RBC 4.14* 3.77* 3.33*   HGB 12.3* 11.0* 10.0*   HCT 36.3* 34.4* 29.8*   MCV 87.7 91.2 89.5   RDW 13.3 13.6 13.6    178 252     BMP:   Recent Labs     12/27/22  1027 12/28/22  0312 12/29/22  0602    135 136   K 3.9 3.9 3.5*   CL 99 99 99   CO2 27 25 29   PHOS 3.4 3.8  --    BUN 17 19 35*   CREATININE 0.93 0.89 0.62*   GLUCOSE 150* 131* 148*      HbA1c:   Lab Results   Component Value Date    LABA1C 5.3 12/28/2022     BNP: No results for input(s): BNP in the last 72 hours. PT/INR:   No results for input(s): PROTIME, INR in the last 72 hours. APTT:   Recent Labs     12/28/22  2110 12/29/22  0602 12/29/22  0935   APTT 37.0* 89.5* 29.8     CARDIAC ENZYMES: No results for input(s): CKMB, CKMBINDEX, TROPONINT in the last 72 hours. Invalid input(s): CKTOTAL;3  FASTING LIPID PANEL:  Lab Results   Component Value Date    CHOL 98 02/28/2014    HDL 45 02/28/2014    TRIG 61 02/28/2014     LIVER PROFILE: No results for input(s): AST, ALT, ALB, BILIDIR, BILITOT, ALKPHOS in the last 72 hours. Radiology:  XR HAND LEFT (MIN 3 VIEWS)    Result Date: 12/25/2022  EXAMINATION: THREE XRAY VIEWS OF THE LEFT HAND 12/25/2022 8:00 pm COMPARISON: None. HISTORY: ORDERING SYSTEM PROVIDED HISTORY: pain after fall TECHNOLOGIST PROVIDED HISTORY: pain after fall FINDINGS: Severe 1st carpometacarpal degenerative changes. Diffuse severe interphalangeal degenerative changes with central erosion in the 4th proximal interphalangeal joint and possible central erosions in the 2nd and 3rd distal interphalangeal joints. No fracture or dislocation identified. Atherosclerotic vascular calcifications are seen. 1. No acute osseous abnormality. 2. Severe degenerative changes of the wrist and hand with small central erosions in the interphalangeal joints compatible with erosive osteoarthritis.      XR HIP LEFT (1 VIEW)    Result Date: 12/26/2022  EXAMINATION: ONE XRAY VIEW OF THE LEFT HIP 12/26/2022 4:52 pm COMPARISON: 25 December 2022 HISTORY: ORDERING SYSTEM PROVIDED HISTORY: Cross table AP done in OR TECHNOLOGIST PROVIDED HISTORY: Cross table AP done in OR For retention of needle(s) Left hip fracture femoral neck FINDINGS: AP portable cross-table view of the left hip obtained at 1635 hours demonstrates interval placement of a left total hip arthroplasty in satisfactory alignment position without hardware complication or Melita implant fracture. Cerclage wire is present about the proximal femur. Air is present in the soft tissues, expected. Status post left total hip arthroplasty in a patient with acute cervical neck fracture. Hardware alignment is satisfactory. No apparent hardware complication. Expected postoperative soft tissue changes are noted. XR FEMUR LEFT (MIN 2 VIEWS)    Result Date: 12/25/2022  EXAMINATION: 2 XRAY VIEWS OF THE LEFT FEMUR; ONE XRAY VIEW OF THE PELVIS AND TWO XRAY VIEWS LEFT HIP; THREE XRAY VIEWS OF THE LEFT KNEE 12/25/2022 8:00 pm COMPARISON: Pelvis radiographs 02/11/2020. HISTORY: ORDERING SYSTEM PROVIDED HISTORY: eval pain after fall TECHNOLOGIST PROVIDED HISTORY: eval pain after fall; ORDERING SYSTEM PROVIDED HISTORY: Eval left hip tenderness concern for inner troches first femoral neck fracture TECHNOLOGIST PROVIDED HISTORY: Eval left hip tenderness concern for inner troches first femoral neck fracture; ORDERING SYSTEM PROVIDED HISTORY: pain after fall TECHNOLOGIST PROVIDED HISTORY: pain after fall FINDINGS: There is an acute moderately displaced left femoral neck fracture. Mild left hip degenerative changes. The left knee is unremarkable. No other fracture identified. No dislocation. Atherosclerotic vascular calcifications are seen. The bilateral sacroiliac joints, right hip, and pubic symphysis are intact. 1. Acute moderately displaced left femoral neck fracture. 2. No acute osseous abnormality of the left knee. XR KNEE LEFT (3 VIEWS)    Result Date: 12/25/2022  EXAMINATION: 2 XRAY VIEWS OF THE LEFT FEMUR; ONE XRAY VIEW OF THE PELVIS AND TWO XRAY VIEWS LEFT HIP; THREE XRAY VIEWS OF THE LEFT KNEE 12/25/2022 8:00 pm COMPARISON: Pelvis radiographs 02/11/2020.  HISTORY: ORDERING SYSTEM PROVIDED HISTORY: eval pain after fall TECHNOLOGIST PROVIDED HISTORY: eval pain after fall; ORDERING SYSTEM PROVIDED HISTORY: Eval left hip tenderness concern for inner troches first femoral neck fracture TECHNOLOGIST PROVIDED HISTORY: Eval left hip tenderness concern for inner troches first femoral neck fracture; ORDERING SYSTEM PROVIDED HISTORY: pain after fall TECHNOLOGIST PROVIDED HISTORY: pain after fall FINDINGS: There is an acute moderately displaced left femoral neck fracture. Mild left hip degenerative changes. The left knee is unremarkable. No other fracture identified. No dislocation. Atherosclerotic vascular calcifications are seen. The bilateral sacroiliac joints, right hip, and pubic symphysis are intact. 1. Acute moderately displaced left femoral neck fracture. 2. No acute osseous abnormality of the left knee. XR CHEST PORTABLE    Result Date: 12/25/2022  EXAMINATION: ONE XRAY VIEW OF THE CHEST 12/25/2022 8:16 pm COMPARISON: Chest radiograph 07/26/2021 HISTORY: ORDERING SYSTEM PROVIDED HISTORY: pre-op, fx left hip TECHNOLOGIST PROVIDED HISTORY: pre-op, fx left hip FINDINGS: Status post median sternotomy. A multiple lead left chest AICD is in place. The cardiac silhouette is enlarged but stable. Shallow inspiration. Mild vascular congestion. No pneumothorax, consolidation, or pleural effusion. No acute osseous abnormality. Enlarged cardiac silhouette and mild pulmonary vascular congestion. XR HIP 2-3 VW W PELVIS LEFT    Result Date: 12/26/2022  EXAMINATION: ONE XRAY VIEW OF THE PELVIS AND TWO XRAY VIEWS LEFT HIP 12/26/2022 5:27 pm COMPARISON: 12/26/2022 HISTORY: ORDERING SYSTEM PROVIDED HISTORY: AP pelvis, AP hip, cross table latereal hip, Post op PACU thanks TECHNOLOGIST PROVIDED HISTORY: AP pelvis, AP hip, cross table latereal hip, Post op PACU thanks FINDINGS: Left hip hemiarthroplasty with a cerclage wire over the proximal femur appears intact and in satisfactory alignment. Skin staples and regional soft tissue gas and swelling related to the recent surgical procedure. Fiducial markers project over the prostate gland. Degenerative changes lower lumbar spine.      Recent postop changes left hip arthroplasty     XR HIP 2-3 VW W PELVIS LEFT    Result Date: 12/25/2022  EXAMINATION: 2 XRAY VIEWS OF THE LEFT FEMUR; ONE XRAY VIEW OF THE PELVIS AND TWO XRAY VIEWS LEFT HIP; THREE XRAY VIEWS OF THE LEFT KNEE 12/25/2022 8:00 pm COMPARISON: Pelvis radiographs 02/11/2020. HISTORY: ORDERING SYSTEM PROVIDED HISTORY: eval pain after fall TECHNOLOGIST PROVIDED HISTORY: eval pain after fall; ORDERING SYSTEM PROVIDED HISTORY: Eval left hip tenderness concern for inner troches first femoral neck fracture TECHNOLOGIST PROVIDED HISTORY: Eval left hip tenderness concern for inner troches first femoral neck fracture; ORDERING SYSTEM PROVIDED HISTORY: pain after fall TECHNOLOGIST PROVIDED HISTORY: pain after fall FINDINGS: There is an acute moderately displaced left femoral neck fracture. Mild left hip degenerative changes. The left knee is unremarkable. No other fracture identified. No dislocation. Atherosclerotic vascular calcifications are seen. The bilateral sacroiliac joints, right hip, and pubic symphysis are intact. 1. Acute moderately displaced left femoral neck fracture. 2. No acute osseous abnormality of the left knee. Impression:    Left hip fracture s/p hemiarthroplasty on 12/26  Large anterior wall infarct with galdino-infarct ischemia, cardiac cath today  Anemia  CAD s/p CABG and stents  CHF s/p AICD  PVD  HTN  HLD  GERD  BPH  Macular degeneration  Anxiety    Recommendations:    Diagnosis:  Left hip fracture s/p hemiarthroplasty  Therapy: Has PT/OT needs  Medical Necessity: As above  Support: Lives with spouse  Rehab Recommendation: The patient will benefit from acute inpatient rehabilitation once medically stable per primary service. Anticipate he will be able to tolerate 3 hours of therapy per day in rehabilitation.  The patient requires multidisciplinary rehabilitation treatment including medical management by a PM&R physician, 24 hour rehabilitation nursing, physical therapy, occupational therapy, rehabilitation social work, and nutrition services. Patient and family also require education in post-hospital precautions and home exercise routine, adaptive techniques and deficit compensation strategies, strengthening and conditioning, equipment prescription and instructions in use. - Cardiac cath today    DVT Prophylaxis: On heparin drip (currently on hold)    It was my pleasure to evaluate Deric Cerda today. Please call with questions.     Nikita Browne MD

## 2022-12-28 NOTE — PROGRESS NOTES
[facility]  Occupational Therapy Not Seen Note    DATE: 2022    NAME: Dayo Simons  MRN: [id number]   : 1940      Patient not seen this date for Occupational Therapy due to: Other: Awaiting clarification of WB status from ortho.  PerfectServed this AM. Orders need changed prior to evaluation    Next Scheduled Treatment: PM    Electronically signed by JAVIER Soto on 2022 at 8:18 AM

## 2022-12-28 NOTE — PROGRESS NOTES
PROGRESS NOTE      PATIENT NAME: Jorge L Faulkner. RECORD NO. [id number]  DATE: 12/28/2022    HD: # 3      Patient Active Problem List   Diagnosis    Right upper quadrant abdominal pain    CAD (coronary artery disease)    Hypertension    Skin cancer    BPH (benign prostatic hyperplasia)    Chronic systolic heart failure (HCC)    Hyperlipemia    Leukocytosis    Cholelithiasis with acute cholecystitis    Pre-syncope    Claudication in peripheral vascular disease (HCC)    Carotid stenosis, asymptomatic    Squamous cell carcinoma of skin of left upper arm    Encounter due to AICD at end of battery life-Change OUT 7-1-19    Malignant neoplasm of prostate ([address]    Chest pain    COVID    COVID-19    Closed fracture of neck of left femur (HCC)       DIAGNOSIS AND PLAN    Mechanical fall from standing with acute moderately displaced left femoral neck fracture  -Moderate to high risk per cardiology  -Left hip hemiarthroplasty 12/26    CAD s/p CABG and multiple TED's  ICM with history of pacemaker (first placed in 1995). ICD 2013. AICD 7/1/2019, CHF  -Appreciate Cardiology assistance  -EKG and ECHO reviewed  -NSTEMI? Troponin down trending   Pacemaker interrogated   Hep gtt   Cardiology planning stress test today      Regular diet  I-S/deep brief  PT/OT  Dispo: PT/OT      Chief Complaint: \"Left leg pain\"    SUBJECTIVE  Patient seen and examined at bedside this morning. No chest pain this morning. Leg pain is controlled. Having bowel function. Tolerating a diet. OBJECTIVE  VITALS:   Vitals:    12/28/22 0755   BP: 136/61   Pulse: 73   Resp: 18   Temp: 98.1 °F (36.7 °C)   SpO2: 97%     Physical Exam  Constitutional:       General: He is not in acute distress. Appearance: Normal appearance. He is normal weight. He is not ill-appearing or toxic-appearing. .   Eyes:      Extraocular Movements: Extraocular movements intact. Pupils: Pupils are equal, round, and reactive to light.    Cardiovascular:      Rate and Rhythm: Normal rate and regular rhythm. Pulses: Normal pulses. Heart sounds: Normal heart sounds. No murmur heard. Comments: Paced rhythm  Pulmonary:      Effort: Pulmonary effort is normal. No respiratory distress. Abdominal:      General: Abdomen is flat. There is no distension. Palpations: Abdomen is soft. Tenderness: There is no abdominal tenderness. There is no guarding. Musculoskeletal:         General: Tenderness, deformity and signs of injury present. Cervical back: Neck supple. No tenderness. Right lower leg: No edema. Left lower leg: No edema. Comments: Left hip deformity with tenderness to palpation. No midline tenderness to palpation C/T/L spine   Skin:     General: Skin is warm and dry. Capillary Refill: Capillary refill takes less than 2 seconds. Findings: No bruising. Neurological:      Mental Status: He is alert and oriented to person, place, and time. Mental status is at baseline. Sensory: No sensory deficit. Motor: No weakness. LAB:  CBC:   Recent Labs     12/26/22 1915 12/27/22  1105 12/28/22  0312   WBC 15.8* 12.9* 11.1   HGB 13.5 12.3* 11.0*   HCT 39.1* 36.3* 34.4*   MCV 85.6 87.7 91.2    188 178       BMP:   Recent Labs     12/26/22  1915 12/27/22  1027 12/28/22  0312    137 135   K 4.5 3.9 3.9   CL 98 99 99   CO2 24 27 25   BUN 15 17 19   CREATININE 0.74 0.93 0.89   GLUCOSE 178* 150* 131*         RADIOLOGY:  XR HAND LEFT (MIN 3 VIEWS)    Result Date: 12/25/2022  1. No acute osseous abnormality. 2. Severe degenerative changes of the wrist and hand with small central erosions in the interphalangeal joints compatible with erosive osteoarthritis. XR HIP LEFT (1 VIEW)    Result Date: 12/26/2022  Status post left total hip arthroplasty in a patient with acute cervical neck fracture. Hardware alignment is satisfactory. No apparent hardware complication.   Expected postoperative soft tissue changes are noted.     XR FEMUR LEFT (MIN 2 VIEWS)    Result Date: 12/25/2022  1. Acute moderately displaced left femoral neck fracture. 2. No acute osseous abnormality of the left knee. XR KNEE LEFT (3 VIEWS)    Result Date: 12/25/2022  1. Acute moderately displaced left femoral neck fracture. 2. No acute osseous abnormality of the left knee. XR CHEST PORTABLE    Result Date: 12/25/2022  Enlarged cardiac silhouette and mild pulmonary vascular congestion. XR HIP 2-3 VW W PELVIS LEFT    Result Date: 12/26/2022  Recent postop changes left hip arthroplasty     XR HIP 2-3 VW W PELVIS LEFT    Result Date: 12/25/2022  1. Acute moderately displaced left femoral neck fracture. 2. No acute osseous abnormality of the left knee. Leandro Vizcarra DO  12/28/2022, 9:46 AM      Attestation signed by Drew Rahman MD    I personally evaluated the patient and directed the medical decision making with Resident/RAMA after the physical/radiologic exam and laboratory values were reviewed and confirmed.       Drew Rahman MD

## 2022-12-28 NOTE — CARE COORDINATION
TRANSITIONAL CARE PLANNING/ 2 Rehab Darren Day: 3    Reason for Admission: Closed fracture of neck of left femur, initial encounter ([facility] [S72.002A]  Closed left hip fracture, initial encounter ([facility] [S72.002A]         Readmission Risk              Risk of Unplanned Readmission:  16            Patient goals/Treatment Preferences/Transitional Plan:   Home vs ARU  PT rec ARU, called tierney with trauma will order pm&r

## 2022-12-28 NOTE — CONSULTS
Consult noted. Attempted to see patient twice earlier today, but he was out of the room. Will plan to evaluate him tomorrow.       Elvin Tiwari MD

## 2022-12-28 NOTE — PROGRESS NOTES
Lab called from APTT blood draw at 0900. Patient transferred to Stress Lab. Phlebotomy states they will draw labs when patient is back in room. Patient back in room at [phone]. Lab drawn and waiting for results to adjust heparin dose.

## 2022-12-28 NOTE — PROGRESS NOTES
[facility]   Progress Note                   Date:   12/28/2022  Patient name: Deric Cerda  Date of admission:  12/25/2022  7:27 PM  MRN:   [id number]  YOB: 1940  PCP: Won Duran MD    Reason for Admission: Closed fracture of neck of left femur, initial encounter ([facility] [S72.002A]  Closed left hip fracture, initial encounter ([facility] [S72.002A]    Subjective:       Clinical Changes / Abnormalities:Pt seen and examined in the stress lab. No further NSVT. Pain resolved. Pt states that he is feeling much better today       Medications:   Scheduled Meds:   amLODIPine  10 mg Oral Daily    metoprolol succinate  100 mg Oral Daily    sertraline  25 mg Oral Daily    atorvastatin  10 mg Oral Daily    gabapentin  600 mg Oral BID    aspirin  81 mg Oral Daily    ticagrelor  90 mg Oral BID    sodium chloride flush  5-40 mL IntraVENous 2 times per day    polyethylene glycol  17 g Oral Daily    acetaminophen  1,000 mg Oral 3 times per day    methocarbamol  750 mg Oral TID     Continuous Infusions:   heparin (PORCINE) Infusion 14 Units/kg/hr (12/28/22 0418)    sodium chloride 25 mL/hr at 12/27/22 1618     CBC:   Recent Labs     12/26/22  1915 12/27/22  1105 12/28/22  0312   WBC 15.8* 12.9* 11.1   HGB 13.5 12.3* 11.0*    188 178       BMP:    Recent Labs     12/26/22  1915 12/27/22  1027 12/28/22  0312    137 135   K 4.5 3.9 3.9   CL 98 99 99   CO2 24 27 25   BUN 15 17 19   CREATININE 0.74 0.93 0.89   GLUCOSE 178* 150* 131*       Hepatic: No results for input(s): AST, ALT, ALB, BILITOT, ALKPHOS in the last 72 hours. Troponin:   Recent Labs     12/27/22  1755 12/27/22 2042 12/28/22  0312   TROPHS 68* 64* 50*     BNP: No results for input(s): BNP in the last 72 hours. Lipids: No results for input(s): CHOL, HDL in the last 72 hours.     Invalid input(s): LDLCALCU  INR:   Recent Labs     12/25/22 2013   INR 1.0         Objective:   Vitals: /61   Pulse 73   Temp 98.1 °F (36.7 °C) (Oral)   Resp 18   Ht 5' 7\" (1.702 m)   Wt 182 lb (82.6 kg)   SpO2 97%   BMI 28.51 kg/m²   General appearance: alert and cooperative with exam  HEENT: Head: Normocephalic, no lesions, without obvious abnormality. Neck: no JVD, trachea midline, no adenopathy  Lungs: Clear to auscultation  Heart: Regular rate and rhythm, s1/s2 auscultated, no murmurs  Abdomen: soft, non-tender, bowel sounds active  Extremities: no edema  Neurologic: not done        Assessment / Acute Cardiac Problems:   S/p fall suffered from left hip fracture  Preop cardiac risk stratification for same  Ischemic cardiomyopathy with improved ejection fraction on last nuclear stress test to 49% 2020 currently compensated with no signs of volume overload. ICD interrogation October 2022 functioning normally 96% paced in the atrium with 5 years of battery longevity  CAD status post CABG and stenting as detailed above with patent LIMA to the LAD and stenting to OM 2 and OM 3. Hypertension  Hyperlipidemia    Patient Active Problem List:     Right upper quadrant abdominal pain     CAD (coronary artery disease)     Hypertension     Skin cancer     BPH (benign prostatic hyperplasia)     Chronic systolic heart failure (HCC)     Hyperlipemia     Leukocytosis     Cholelithiasis with acute cholecystitis     Pre-syncope     Claudication in peripheral vascular disease (HCC)     Carotid stenosis, asymptomatic     Squamous cell carcinoma of skin of left upper arm     Encounter due to AICD at end of battery life-Change OUT 7-1-19     Malignant neoplasm of prostate ([address]     Chest pain     COVID     COVID-19     Closed fracture of neck of left femur ([address]    1. Coronary artery disease. He had prior bypass, LIMA-LAD. last catheterization 2009 showed patent LIMA to LAD, required drug-eluting stent to diagonal, left circumflex and LPDA for significant stenosis. 2. Ischemic cardiomyopathy with EF of 20-25% s/p Dual Chamber ICD in situ.  Checks normal function with very short runs of Afib.  4. Obesity. 5. Vasodepressor syncope. 6. Echocardiogram 05/23/2019, showed reduced LV systolic function with an EF of 25% with reduced LV diastolic compliance, trace to mild AI, trace to mild MR, trace to mild TR noted. 7. CATH 9/2/2020 LM 10-20% LAD proximal 90% LCx OM2 99% TED OM3 80% TED. RCA 80% proximal and is small nondominant. 9. STRESS 9/2/2020 reversible ischemia lateral wall. Infarct anterior lateral, and apical wall. Hypokinesis apical anterior and septal wall. LVEF 49%        ECHO 12/25/22  Summary  Severe LV systolic dysfunction, with EF 20-25%. Reduced LV diastolic compliance. Mild aortic insufficiency. Mild mitral regurgitation. Mild tricuspid regurgitation. Plan of Treatment:   S/p OR. ECHO reviewed. Has AICD in situ  NSVT x 1 today per Device interrogation. NSR now. Continue BB. Will check BMP/Mag   CP secondary to NSVT. No EKG changes. Trops peaked at 75, now trending down. Will order stress test today to ensure no ischemia. Continue statin, ASA, brilinta     If stress negative,  ok to d/c from cards standpoint.       Electronically signed by RAVI Iniguez CNP on 12/28/2022 at 8:18 AM  [address]  [phone]

## 2022-12-28 NOTE — PROGRESS NOTES
Orthopedic Progress Note    Patient:  Bobby Lantigua  YOB: 1940     79 y.o. male    Subjective:  Patient seen and examined at bedside this morning. No new complaints or concerns per patient this morning. Pain well-controlled. No acute issues overnight per nursing. Denies: fever/chills, HA, CP, SOB, N/V, dysuria, or numbness/tingling in extremities.   + BM. PT: Has not yet worked with PT    Objective:   Vitals:    12/28/22 0045   BP: 137/67   Pulse: 71   Resp: 17   Temp: 97.6 °F (36.4 °C)   SpO2: 95%     Gen: NAD, cooperative   Cardiovascular: Regular rate  Respiratory: no audible wheezing, symmetrical chest expansion   MSK: LLE: Dressings with mild strike through on distal 1/2 of optifoam dressing. Ecchymoses surrounding posterior distal aspect of incision. Compartments soft and compressible. EHL/FHL/TA/GSC gross motor intact. Minimally tender to palpation surrounding incision. Sural, saphenous, superificial/deep peroneal, and plantar nerve distribution SILT. DPpulses 2+ with BCR; foot warm and perfused. Recent Labs     12/25/22 2013 12/26/22  1915 12/28/22  0312   WBC 7.3   < > 11.1   HGB 14.2   < > 11.0*   HCT 41.6   < > 34.4*      < > 178   INR 1.0  --   --       < > 135   K 4.0   < > 3.9   BUN 14   < > 19   CREATININE 0.92   < > 0.89   GLUCOSE 168*   < > 131*    < > = values in this interval not displayed. Meds:  Aspirin, brilinta, heparin   See rec for complete list    Impression: 79 y.o. male who [facility] is being seen for:     Left femoral neck fracture s/p hip hemiarthroplasty POD#2    Plan:     -WBAT LLE with posterior hip precautions   -Hemoglobin this AM 11/0   -Completed post op Ancef  -Pain control: Per primary team discretion   -Tolerating PO intake well  -Voiding Well  -Ice (20 min, 1 hour off) for edema/pain control  -Encourage deep breathing and IS  -DVT ppx: EPC.   OK for chemical anticoagulation from ortho perspective   -PT/OT to evaluate and treat   -Please page Ortho on call with any questions or concerns    Baron Mauro DO  PGY-3 Orthopedic Surgery  6:13 AM 12/28/2022

## 2022-12-28 NOTE — PROGRESS NOTES
Physical Therapy  Facility/Department: [facility] RENAL//MED SURG  Physical Therapy Initial Assessment    Name: aDyo Simons  : 1940  MRN: [id number]  Date of Service: 2022  Chief Complaint   Patient presents with    Fall      Discharge Recommendations:    Further therapy recommended at discharge. The patient should be able to tolerate at least 3 hours of therapy per day over 5 days or 15 hours over 7 days. This patient may benefit from a Physical Medicine and Rehab consult. PT Equipment Recommendations  Equipment Needed: No (Pt reports owning a RW)      Patient Diagnosis(es): The encounter diagnosis was Closed fracture of neck of left femur, initial encounter ([facility] [address] Past Medical History:  has a past medical history of Acid reflux, Anxiety, Arthritis, BPH (benign prostatic hypertrophy), CAD (coronary artery disease), Cancer ([address] Carotid stenosis, Cataracts, both eyes, CHF (congestive heart failure) ([address] Dry skin, Elevated PSA, Examination of participant in clinical trial, Hyperlipidemia, Hypertension, Left ventricular dysfunction, Macular degeneration, Myocardial infarction ([address] Neuropathy, Pacemaker, Panic attacks, PVD (peripheral vascular disease) ([address] Sinus problem, Snores, Vasovagal near syncope, and Wears glasses. Past Surgical History:  has a past surgical history that includes Pacemaker insertion (, , , , 2019); Cholecystectomy, laparoscopic (2014); eye surgery (Bilateral); Cardiac defibrillator placement (); Cardiac surgery (); Cardiac catheterization; Colonoscopy; Prostate Biopsy (2019); Prostate biopsy (N/A, 2019); pacemaker placement; Cataract removal; Hip Arthroplasty (Left, 2022); and hip surgery (Left, 2022). Assessment   Body Structures, Functions, Activity Limitations Requiring Skilled Therapeutic Intervention: Decreased functional mobility ; Decreased strength;Decreased endurance;Decreased balance; Increased pain;Decreased coordination  Assessment: Pt with mobility deficits requiring mod-A x2 to perform sit<>stand transfer, min-A to ambulate 5 feet with a RW. Pt able to maintain L posterior hip precautions throughout today's session. Pt is highly motivated to return to independent PLOF but is a high fall risk secondary to decreased LLE strength, decreased standing balance, and impaired trunk control. Pt would benefit from additional intense PT upon discharge and is expected to be able to tolerate 3 hours of therapy per day. Pt would be unsafe to return to prior living arrangements upon discharge due to high fall risk.   Therapy Prognosis: Good  Decision Making: Medium Complexity  Requires PT Follow-Up: Yes  Activity Tolerance  Activity Tolerance: Patient tolerated treatment well     Plan   Physcial Therapy Plan  General Plan: 6-7 times per week  Current Treatment Recommendations: Strengthening, Balance training, Functional mobility training, Transfer training, Gait training, Endurance training, Safety education & training, Patient/Caregiver education & training, Therapeutic activities, Equipment evaluation, education, & procurement, Home exercise program, Stair training  Safety Devices  Type of Devices: Gait belt, Patient at risk for falls, Left in chair, Call light within reach, Nurse notified  Restraints  Restraints Initially in Place: No     Restrictions  Restrictions/Precautions  Restrictions/Precautions: Weight Bearing, Fall Risk  Required Braces or Orthoses?: No  Lower Extremity Weight Bearing Restrictions  Left Lower Extremity Weight Bearing: Weight Bearing As Tolerated  Position Activity Restriction  Hip Precautions: No hip flexion > 90 degrees, No ADduction, Posterior hip precautions, No hip internal rotation  Other position/activity restrictions: S/p: L hip hemiarthoplasty 12/26/2022     Subjective   General  Patient assessed for rehabilitation services?: Yes  Response To Previous Treatment: Not applicable  Family / Caregiver Present: No  Follows Commands: Within Functional Limits  Subjective  Subjective: Pt supine in bed and agreeable to therapy, RN agreeable to therapy. Pt pleasant and cooperative throughout today's session. Pt denies pain at rest.         Social/Functional History  Social/Functional History  Lives With: Spouse  Type of Home: House  Home Layout: Two level (with stair lift)  Home Access: Stairs to enter with rails  Entrance Stairs - Number of Steps: 3  Entrance Stairs - Rails: Both  Bathroom Shower/Tub: Tub/Shower unit  Bathroom Toilet: Handicap height  Bathroom Equipment: Grab bars around toilet, Grab bars in shower, Shower chair, Hand-held shower  Bathroom Accessibility: Accessible  Home Equipment: Walker, rolling, Rollator (pt reports no DME use at a baseline.)  Has the patient had two or more falls in the past year or any fall with injury in the past year?: Yes (~2)  Receives Help From: Personal care attendant (2-3x/week)  ADL Assistance: Independent  Homemaking Assistance: Independent  Homemaking Responsibilities: Yes (cleaning person assists with laundry, lawn care, cleaning the house)  Ambulation Assistance: Independent  Transfer Assistance: Independent  Active : Yes  Mode of Transportation: [employer]  Occupation: Retired  Type of Occupation:   Leisure & Hobbies: reading, watching TV, going to concerts  Additional Comments: Pt reports having supportive neighbors and aides that will be able assist as needed. Vision/Hearing  Vision  Vision: Impaired  Vision Exceptions: Wears glasses at all times  Hearing  Hearing: Within functional limits    Cognition   Cognition  Overall Cognitive Status: WFL     Objective                 AROM RLE (degrees)  RLE AROM: WFL  AROM LLE (degrees)  LLE AROM : WFL  LLE General AROM: While maintaining posterior hip precautions.   AROM RUE (degrees)  RUE AROM : WFL  AROM LUE (degrees)  LUE AROM : WFL  Strength RLE  Strength RLE: WFL  Comment: Grossly 4/5  Strength LLE  Strength LLE: WFL  Comment: Grossly 3+/5  Strength RUE  Comment: Co-eval with OT, see OT note for UE detail. Strength LUE  Comment: Co-eval with OT, see OT note for UE detail. Bed mobility  Supine to Sit: Minimal assistance;2 Person assistance (1x assist for LLE progression, 1x assist for trunk progression.)  Sit to Supine: 2 Person assistance; Moderate assistance  Scooting: Minimal assistance  Bed Mobility Comments: HOB elevated ~30 degrees without use of handrails. Increased time/effort to perform. Transfers  Sit to Stand: Moderate Assistance;2 Person Assistance  Stand to Sit: Moderate Assistance;2 Person Assistance  Comment: Transfers performed 4x this date, performed 1x with the melanie-stedy, 3x with a RW. Verbal cues for hand placement. Ambulation  Surface: Level tile  Device: Rolling Walker  Assistance: Minimal assistance  Quality of Gait: mildly unsteady, step-to pattern, decreased LLE stance phase. no true LOB. Gait Deviations: Slow Sunshine;Decreased step length;Decreased step height  Distance: 5 feet  More Ambulation?: No  Stairs/Curb  Stairs?: No     Balance  Posture: Fair  Sitting - Static: Fair;+  Sitting - Dynamic: Fair  Standing - Static: Fair  Standing - Dynamic: Fair;-  Comments: standing balance assessed while using a RW                                            AM-PAC Score  -PAC Inpatient Mobility Raw Score : 12 (12/28/22 1608)  AM-PAC Inpatient T-Scale Score : 35.33 (12/28/22 1608)  Mobility Inpatient CMS 0-100% Score: 68.66 (12/28/22 1608)  Mobility Inpatient CMS G-Code Modifier : CL (12/28/22 1608)            Goals  Short Term Goals  Time Frame for Short Term Goals: 14 visits  Short Term Goal 1: Pt will ambulate 150 feet with a RW and SBA. Short Term Goal 2: Pt will demonstrate good- standing balance to decrease fall risk.   Short Term Goal 3: Pt will negotiate 3 stairs with bilateral handrails and SBA to allow the pt to enter prior living arrangements. Short Term Goal 4: Pt will perform sit<>stand transfer with CGA. Short Term Goal 5: Pt will perform bed mobility with CGA while maintaining posterior hip precautions. Additional Goals?: No       Education  Patient Education  Education Given To: Patient  Education Provided: Role of Therapy;Transfer Training;Plan of Care; Fall Prevention Strategies;Precautions  Education Provided Comments: Educated on posterior hip precautions.   Education Method: Verbal  Education Outcome: Verbalized understanding;Continued education needed      Therapy Time   Individual Concurrent Group Co-treatment   Time In 6168; 4964         Time Out 4923; 2334         Minutes 10 +40=50         Timed Code Treatment Minutes: PHILLIP Morris 20, PT

## 2022-12-28 NOTE — PROGRESS NOTES
Occupational Therapy  Facility/Department: [facility] RENAL//MED SURG  Occupational Therapy Initial Assessment    Name: Tori Anderson  : 1940  MRN: [id number]  Date of Service: 2022    Chief Complaint   Patient presents with    Fall       Discharge Recommendations:  Patient would benefit from continued therapy after discharge Further therapy recommended at discharge. The patient should be able to tolerate at least 3 hours of therapy per day over 5 days or 15 hours over 7 days. This patient may benefit from a Physical Medicine and Rehab consult. OT Equipment Recommendations  Equipment Needed: Yes  Mobility Devices: ADL Assistive Devices  ADL Assistive Devices: Toileting - 3-in-1 Commode;Reacher;Sock-Aid Hard;Long-handled Shoe Horn;Long-handled Sponge       Patient Diagnosis(es): The encounter diagnosis was Closed fracture of neck of left femur, initial encounter ([facility] [address] Past Medical History:  has a past medical history of Acid reflux, Anxiety, Arthritis, BPH (benign prostatic hypertrophy), CAD (coronary artery disease), Cancer ([address] Carotid stenosis, Cataracts, both eyes, CHF (congestive heart failure) ([address] Dry skin, Elevated PSA, Examination of participant in clinical trial, Hyperlipidemia, Hypertension, Left ventricular dysfunction, Macular degeneration, Myocardial infarction ([address] Neuropathy, Pacemaker, Panic attacks, PVD (peripheral vascular disease) ([address] Sinus problem, Snores, Vasovagal near syncope, and Wears glasses. Past Surgical History:  has a past surgical history that includes Pacemaker insertion (, , , , 2019); Cholecystectomy, laparoscopic (2014); eye surgery (Bilateral); Cardiac defibrillator placement (); Cardiac surgery (); Cardiac catheterization; Colonoscopy; Prostate Biopsy (2019); Prostate biopsy (N/A, 2019); pacemaker placement; Cataract removal; Hip Arthroplasty (Left, 2022); and hip surgery (Left, 2022). Assessment   Performance deficits / Impairments: Decreased functional mobility ; Decreased ADL status; Decreased endurance;Decreased balance;Decreased high-level IADLs  Assessment: Pt agreeable to OT eval this date. Writer entered room 2X this date as initially pt required assistance with transferring from EOB > transport stretcher with Mod A X2 and Vonita Alt use. Later writer entered and assisted with bed mobility, functional transfers, and functional mobility with Mod A X2-Min A X2. Pt exhibits poor dynamic sitting and standing balance requiring significant assistance for balance and dynamic aspects of LB ADLs and toileting tasks. Pt will require continued OT services to address deficits listed and would be able to tolerate 3 hours of combined therapies upon discharge. Prognosis: Good  Decision Making: Medium Complexity  REQUIRES OT FOLLOW-UP: Yes  Activity Tolerance  Activity Tolerance: Patient Tolerated treatment well        Plan   Occupational Therapy Plan  Times Per Week: 4-5 x/wk  Current Treatment Recommendations: Balance training, Functional mobility training, Endurance training, Pain management, Safety education & training, Patient/Caregiver education & training, Equipment evaluation, education, & procurement, Self-Care / ADL, Home management training     Restrictions  Restrictions/Precautions  Restrictions/Precautions: Weight Bearing, Fall Risk  Required Braces or Orthoses?: No  Lower Extremity Weight Bearing Restrictions  Left Lower Extremity Weight Bearing: Weight Bearing As Tolerated  Position Activity Restriction  Hip Precautions: No hip flexion > 90 degrees, No ADduction, Posterior hip precautions, No hip internal rotation  Other position/activity restrictions: S/p: L hip hemiarthoplasty 12/26/2022    Subjective   General  Patient assessed for rehabilitation services?: Yes  Family / Caregiver Present: No  General Comment  Comments: RN ok'd pt for OT eval this date.  Pt agreeable to session and very pleasant/cooperative throughout. Pt denies pain    Social/Functional History  Social/Functional History  Lives With: Spouse  Type of Home: House  Home Layout: Two level (with stair lift)  Home Access: Stairs to enter with rails  Entrance Stairs - Number of Steps: 3  Entrance Stairs - Rails: Both  Bathroom Shower/Tub: Tub/Shower unit  Bathroom Toilet: Handicap height  Bathroom Equipment: Grab bars around toilet, Grab bars in shower, Shower chair, Hand-held shower  Bathroom Accessibility: Accessible  Home Equipment: Walker, rolling, Rollator (pt reports no DME use at a baseline.)  Has the patient had two or more falls in the past year or any fall with injury in the past year?: Yes (~2)  Receives Help From: Personal care attendant (2-3x/week)  ADL Assistance: Independent  Homemaking Assistance: Independent  Homemaking Responsibilities: Yes (cleaning person assists with laundry, lawn care, cleaning the house)  Ambulation Assistance: Independent  Transfer Assistance: Independent  Active : Yes  Mode of Transportation: [employer]  Occupation: Retired  Type of Occupation:   Leisure & Hobbies: reading, watching TV, going to concerts  Additional Comments: Pt reports having supportive neighbors and aides that will be able assist as needed. Objective   Safety Devices  Type of Devices: Gait belt;Left in chair;Call light within reach;Nurse notified; Chair alarm in place  Restraints  Restraints Initially in Place: No    Bed Mobility Training  Bed Mobility Training: Yes  Overall Level of Assistance: Moderate assistance;Minimum assistance;Assist X2;Additional time; Adaptive equipment (pt engaged in bed mobility completing supine > sit with Min A X2 for BLE and trunk progression and Mod A X2 to return to supine. Pt demonstrates difficulty progressing LLE throughout)  Interventions: Safety awareness training; Tactile cues; Verbal cues  Supine to Sit: Minimum assistance;Assist X2;Additional time; Adaptive equipment  Sit to Supine: Moderate assistance;Assist X2;Additional time; Adaptive equipment  Balance  Sitting: Impaired (pt demonstrates decreased sitting balance with a posterior R lean noted. Pt reported reason for leaning was anxiety over hurting L hip. Pt was educated that sitting with equal weight distribution through bottom will not harm LLE with minimal improvement)  Standing: Impaired (pt engaged in static and dynamic standing requiring CGA with RW for static standing and min A for dynamic standing while performing toileting ADLs and prior to/following functional mobility)  Transfer Training  Transfer Training: Yes  Overall Level of Assistance: Moderate assistance;Assist X2;Additional time; Adaptive equipment (pt engaged in multiple functional transfers from EOB, stretcher, and recliner chair this date; each trial required Mod A X2. Initially pt utilized Sherryll Roem however trials after this were completed with RW)  Interventions: Safety awareness training; Tactile cues; Verbal cues  Sit to Stand: Moderate assistance;Assist X2;Additional time; Adaptive equipment  Stand to Sit: Moderate assistance;Assist X2;Additional time; Adaptive equipment  Gait  Overall Level of Assistance: Additional time; Adaptive equipment;Minimum assistance (pt initially was unable to progress B feet and required use of Sherryll Rome to transfer from EOB > stretcher, later this date pt was able to complete functional mobility from EOB > recliner with Min A and RW use. Pt demonstrates decreased balance and difficulty fully WB to LLE this date)  Interventions: Safety awareness training; Tactile cues; Verbal cues    AROM: Within functional limits  Strength:  Within functional limits (grossly 4/5)  Coordination: Within functional limits  Tone: Normal  Sensation: Intact    ADL  Feeding: Modified independent ;Setup  Grooming: Modified independent ;Setup  Grooming Skilled Clinical Factors: pt utilized wet wipe to complete hand hygiene independently  EMILY Bathing: Stand by assistance;Setup; Increased time to complete  LE Bathing: Moderate assistance;Setup; Increased time to complete  LE Bathing Skilled Clinical Factors: LB bathing completed while seated in recliner with pt requiring Mod A to wash posterior thighs and lower legs/feet d/t limitations in functional reach and poor dynamic standing balance with unilateral hand release  UE Dressing: Setup; Increased time to complete;Contact guard assistance  UE Dressing Skilled Clinical Factors: pt doffed soiled and donned clean gown with CGA for appropriate threading  LE Dressing: Moderate assistance;Setup; Increased time to complete  LE Dressing Skilled Clinical Factors: pt attempted to reach to foot level however was only able to reach to lower legs this date while seated requiring Max A to don B socks and expected to require Mod A for standing portions of LB dressing  Toileting: Maximum assistance;Setup; Increased time to complete  Toileting Skilled Clinical Factors: pt engaged in brief management and pericare while standing at recliner d/t incontinence and urgency requiring linen change X2 and brief change X2; Max A throughout d/t limitations in standing balance and functional reach      Vision  Vision: Impaired  Vision Exceptions: Wears glasses at all times  Hearing  Hearing: Within functional limits  Cognition  Overall Cognitive Status: [facility]                 Education Provided Comments: Pt ed on OT role, OT POC, safety awareness, bed mobility training, transfer training, DME use, WBAT status, fall prevention, ADL adaptive tech, and importance of continued OT. Good return demonstrated.           Hand Dominance  Hand Dominance: Right            AM-PAC Score  AM-[facility] Inpatient Daily Activity Raw Score: 17 (12/28/22 1621)  AM-PAC Inpatient ADL T-Scale Score : 37.26 (12/28/22 1621)  ADL Inpatient CMS 0-100% Score: 50.11 (12/28/22 1621)  ADL Inpatient CMS G-Code Modifier : CK (12/28/22 1621)    Goals  Short Term Goals  Time Frame for Short Term Goals: By discharge,pt will:  Short Term Goal 1: Demo Min A for functional transfers with use of LRD for engagement in ADLs/IADLs  Short Term Goal 2: Demo CGA for functional mobility with use of LRD to increase functional independence with daily occupations  Short Term Goal 3: Demo Mod I for UB ADLs with adaptive tech utilized PRN  Short Term Goal 4: Demo Min A for LB ADLs and toileting tasks with use of AE and adaptive tech PRN  Short Term Goal 5: Demo 8 min dynamic standing and reaching outside TAM with unilateral hand release and SBA for improved balance during ADL/IADL participation       Therapy Time   Individual Concurrent Group Co-treatment   Time In 1357         Time Out 1429 (+10 min when OT assisted pt from EOB > transport stretcher earlier in day)         Minutes 42         Timed Code Treatment Minutes: 23 Minutes       Irl ROHIT ChongR/L

## 2022-12-28 NOTE — PROCEDURES
[address] [address]                              CARDIAC STRESS TEST    PATIENT NAME: Antonia Dawn                      :        1940  MED REC NO:   [id number]                             ROOM:       [facility]  ACCOUNT NO:   [de-identified]                           ADMIT DATE: 2022  PROVIDER:     Laurie Cobb    DATE OF STUDY:  2022    ORDERING PROVIDER:  Ursula Morris CNP    INTERPRETING PHYSICIAN:  Laurie Cobb MD    LEXISCAN STRESS STUDY:  Indication:  chest pain    Procedure was explained and consent form was signed    Medications:  Lexiscan, 0.4 mg  Resting heart rate:  73 bpm  Resting blood pressure:  114/59 mm/Hg  Infusion heart rate:  87 bpm  Infusion blood pressure:  160/69 mm/Hg  Resting EKG:  Abnormal (left bundle branch block and PVC's)  Stress heart response:  Normal  Stress BP response:  Appropriate  Stress EKG(S):  No changes seen  Chest discomfort:  None  Ischemic EKG changes:  None    IMPRESSION:  Electrocardiographically negative Lexiscan stress study. Radio-isotope  results to follow from the department of Nuclear Medicine.           Vanessa Adamson    D: 2022 13:23:44       T: 2022 13:24:50     AK/AMIRA  Job#: [id number]     Doc#: Unknown    CC:    ()

## 2022-12-29 ENCOUNTER — ANESTHESIA EVENT (OUTPATIENT)
Dept: OPERATING ROOM | Age: 82
End: 2022-12-29
Payer: MEDICARE

## 2022-12-29 ENCOUNTER — ANESTHESIA (OUTPATIENT)
Dept: OPERATING ROOM | Age: 82
End: 2022-12-29
Payer: MEDICARE

## 2022-12-29 LAB
ABSOLUTE EOS #: 0.31 K/UL (ref 0–0.4)
ABSOLUTE IMMATURE GRANULOCYTE: 0 K/UL (ref 0–0.3)
ABSOLUTE LYMPH #: 0.82 K/UL (ref 1–4.8)
ABSOLUTE MONO #: 0.93 K/UL (ref 0.1–0.8)
ANION GAP SERPL CALCULATED.3IONS-SCNC: 8 MMOL/L (ref 9–17)
BASOPHILS # BLD: 0 % (ref 0–2)
BASOPHILS ABSOLUTE: 0 K/UL (ref 0–0.2)
BUN BLDV-MCNC: 35 MG/DL (ref 8–23)
CALCIUM SERPL-MCNC: 7.6 MG/DL (ref 8.6–10.4)
CHLORIDE BLD-SCNC: 99 MMOL/L (ref 98–107)
CO2: 29 MMOL/L (ref 20–31)
CREAT SERPL-MCNC: 0.62 MG/DL (ref 0.7–1.2)
EOSINOPHILS RELATIVE PERCENT: 3 % (ref 1–4)
GFR SERPL CREATININE-BSD FRML MDRD: >60 ML/MIN/1.73M2
GLUCOSE BLD-MCNC: 148 MG/DL (ref 70–99)
HCT VFR BLD CALC: 28.6 % (ref 40.7–50.3)
HCT VFR BLD CALC: 29.8 % (ref 40.7–50.3)
HEMOGLOBIN: 10 G/DL (ref 13–17)
HEMOGLOBIN: 9.5 G/DL (ref 13–17)
IMMATURE GRANULOCYTES: 0 %
LYMPHOCYTES # BLD: 8 % (ref 24–44)
MCH RBC QN AUTO: 30 PG (ref 25.2–33.5)
MCHC RBC AUTO-ENTMCNC: 33.6 G/DL (ref 28.4–34.8)
MCV RBC AUTO: 89.5 FL (ref 82.6–102.9)
MONOCYTES # BLD: 9 % (ref 1–7)
MORPHOLOGY: NORMAL
NRBC AUTOMATED: 0 PER 100 WBC
PARTIAL THROMBOPLASTIN TIME: 29.8 SEC (ref 20.5–30.5)
PARTIAL THROMBOPLASTIN TIME: 89.5 SEC (ref 20.5–30.5)
PDW BLD-RTO: 13.6 % (ref 11.8–14.4)
PLATELET # BLD: 252 K/UL (ref 138–453)
PMV BLD AUTO: 10.7 FL (ref 8.1–13.5)
POTASSIUM SERPL-SCNC: 3.5 MMOL/L (ref 3.7–5.3)
RBC # BLD: 3.33 M/UL (ref 4.21–5.77)
SEG NEUTROPHILS: 80 % (ref 36–66)
SEGMENTED NEUTROPHILS ABSOLUTE COUNT: 8.24 K/UL (ref 1.8–7.7)
SODIUM BLD-SCNC: 136 MMOL/L (ref 135–144)
WBC # BLD: 10.3 K/UL (ref 3.5–11.3)

## 2022-12-29 PROCEDURE — 6360000002 HC RX W HCPCS: Performed by: NURSE ANESTHETIST, CERTIFIED REGISTERED

## 2022-12-29 PROCEDURE — 7100000000 HC PACU RECOVERY - FIRST 15 MIN: Performed by: INTERNAL MEDICINE

## 2022-12-29 PROCEDURE — 2580000003 HC RX 258: Performed by: STUDENT IN AN ORGANIZED HEALTH CARE EDUCATION/TRAINING PROGRAM

## 2022-12-29 PROCEDURE — 0W3P8ZZ CONTROL BLEEDING IN GASTROINTESTINAL TRACT, VIA NATURAL OR ARTIFICIAL OPENING ENDOSCOPIC: ICD-10-PCS | Performed by: INTERNAL MEDICINE

## 2022-12-29 PROCEDURE — 2580000003 HC RX 258: Performed by: INTERNAL MEDICINE

## 2022-12-29 PROCEDURE — C9113 INJ PANTOPRAZOLE SODIUM, VIA: HCPCS | Performed by: INTERNAL MEDICINE

## 2022-12-29 PROCEDURE — 2500000003 HC RX 250 WO HCPCS: Performed by: NURSE ANESTHETIST, CERTIFIED REGISTERED

## 2022-12-29 PROCEDURE — 85014 HEMATOCRIT: CPT

## 2022-12-29 PROCEDURE — 6370000000 HC RX 637 (ALT 250 FOR IP): Performed by: STUDENT IN AN ORGANIZED HEALTH CARE EDUCATION/TRAINING PROGRAM

## 2022-12-29 PROCEDURE — 43255 EGD CONTROL BLEEDING ANY: CPT | Performed by: INTERNAL MEDICINE

## 2022-12-29 PROCEDURE — 80048 BASIC METABOLIC PNL TOTAL CA: CPT

## 2022-12-29 PROCEDURE — 99221 1ST HOSP IP/OBS SF/LOW 40: CPT | Performed by: INTERNAL MEDICINE

## 2022-12-29 PROCEDURE — 6360000002 HC RX W HCPCS: Performed by: INTERNAL MEDICINE

## 2022-12-29 PROCEDURE — 6360000002 HC RX W HCPCS: Performed by: STUDENT IN AN ORGANIZED HEALTH CARE EDUCATION/TRAINING PROGRAM

## 2022-12-29 PROCEDURE — 6370000000 HC RX 637 (ALT 250 FOR IP): Performed by: INTERNAL MEDICINE

## 2022-12-29 PROCEDURE — 1200000000 HC SEMI PRIVATE

## 2022-12-29 PROCEDURE — 3700000000 HC ANESTHESIA ATTENDED CARE: Performed by: INTERNAL MEDICINE

## 2022-12-29 PROCEDURE — C1889 IMPLANT/INSERT DEVICE, NOC: HCPCS | Performed by: INTERNAL MEDICINE

## 2022-12-29 PROCEDURE — 2500000003 HC RX 250 WO HCPCS: Performed by: INTERNAL MEDICINE

## 2022-12-29 PROCEDURE — 2720000010 HC SURG SUPPLY STERILE: Performed by: INTERNAL MEDICINE

## 2022-12-29 PROCEDURE — 85730 THROMBOPLASTIN TIME PARTIAL: CPT

## 2022-12-29 PROCEDURE — 36415 COLL VENOUS BLD VENIPUNCTURE: CPT

## 2022-12-29 PROCEDURE — 6370000000 HC RX 637 (ALT 250 FOR IP)

## 2022-12-29 PROCEDURE — 7100000001 HC PACU RECOVERY - ADDTL 15 MIN: Performed by: INTERNAL MEDICINE

## 2022-12-29 PROCEDURE — 85025 COMPLETE CBC W/AUTO DIFF WBC: CPT

## 2022-12-29 PROCEDURE — 6370000000 HC RX 637 (ALT 250 FOR IP): Performed by: NURSE PRACTITIONER

## 2022-12-29 PROCEDURE — 99222 1ST HOSP IP/OBS MODERATE 55: CPT | Performed by: STUDENT IN AN ORGANIZED HEALTH CARE EDUCATION/TRAINING PROGRAM

## 2022-12-29 PROCEDURE — 3609013000 HC EGD TRANSORAL CONTROL BLEEDING ANY METHOD: Performed by: INTERNAL MEDICINE

## 2022-12-29 PROCEDURE — 85018 HEMOGLOBIN: CPT

## 2022-12-29 PROCEDURE — 3700000001 HC ADD 15 MINUTES (ANESTHESIA): Performed by: INTERNAL MEDICINE

## 2022-12-29 RX ORDER — SODIUM CHLORIDE 0.9 % (FLUSH) 0.9 %
5-40 SYRINGE (ML) INJECTION PRN
Status: DISCONTINUED | OUTPATIENT
Start: 2022-12-29 | End: 2022-12-29 | Stop reason: HOSPADM

## 2022-12-29 RX ORDER — GLYCOPYRROLATE 0.2 MG/ML
INJECTION INTRAMUSCULAR; INTRAVENOUS PRN
Status: DISCONTINUED | OUTPATIENT
Start: 2022-12-29 | End: 2022-12-29 | Stop reason: SDUPTHER

## 2022-12-29 RX ORDER — KETAMINE HCL IN NACL, ISO-OSM 100MG/10ML
SYRINGE (ML) INJECTION PRN
Status: DISCONTINUED | OUTPATIENT
Start: 2022-12-29 | End: 2022-12-29 | Stop reason: SDUPTHER

## 2022-12-29 RX ORDER — SODIUM CHLORIDE 0.9 % (FLUSH) 0.9 %
5-40 SYRINGE (ML) INJECTION EVERY 12 HOURS SCHEDULED
Status: DISCONTINUED | OUTPATIENT
Start: 2022-12-29 | End: 2022-12-29 | Stop reason: HOSPADM

## 2022-12-29 RX ORDER — LIDOCAINE HYDROCHLORIDE 10 MG/ML
INJECTION, SOLUTION EPIDURAL; INFILTRATION; INTRACAUDAL; PERINEURAL PRN
Status: DISCONTINUED | OUTPATIENT
Start: 2022-12-29 | End: 2022-12-29 | Stop reason: SDUPTHER

## 2022-12-29 RX ORDER — ONDANSETRON 2 MG/ML
4 INJECTION INTRAMUSCULAR; INTRAVENOUS
Status: DISCONTINUED | OUTPATIENT
Start: 2022-12-29 | End: 2022-12-29 | Stop reason: HOSPADM

## 2022-12-29 RX ORDER — POTASSIUM CHLORIDE 20 MEQ/1
40 TABLET, EXTENDED RELEASE ORAL ONCE
Status: COMPLETED | OUTPATIENT
Start: 2022-12-29 | End: 2022-12-29

## 2022-12-29 RX ORDER — SODIUM CHLORIDE 9 MG/ML
INJECTION, SOLUTION INTRAVENOUS PRN
Status: DISCONTINUED | OUTPATIENT
Start: 2022-12-29 | End: 2022-12-29 | Stop reason: HOSPADM

## 2022-12-29 RX ORDER — PHENYLEPHRINE HCL IN 0.9% NACL 1 MG/10 ML
SYRINGE (ML) INTRAVENOUS PRN
Status: DISCONTINUED | OUTPATIENT
Start: 2022-12-29 | End: 2022-12-29 | Stop reason: SDUPTHER

## 2022-12-29 RX ORDER — POLYETHYLENE GLYCOL 3350 17 G/17G
17 POWDER, FOR SOLUTION ORAL DAILY PRN
Status: DISCONTINUED | OUTPATIENT
Start: 2022-12-29 | End: 2023-01-11 | Stop reason: HOSPADM

## 2022-12-29 RX ORDER — PROPOFOL 10 MG/ML
INJECTION, EMULSION INTRAVENOUS CONTINUOUS PRN
Status: DISCONTINUED | OUTPATIENT
Start: 2022-12-29 | End: 2022-12-29 | Stop reason: SDUPTHER

## 2022-12-29 RX ORDER — HYDRALAZINE HYDROCHLORIDE 20 MG/ML
10 INJECTION INTRAMUSCULAR; INTRAVENOUS
Status: DISCONTINUED | OUTPATIENT
Start: 2022-12-29 | End: 2022-12-29 | Stop reason: HOSPADM

## 2022-12-29 RX ORDER — PANTOPRAZOLE SODIUM 40 MG/1
40 TABLET, DELAYED RELEASE ORAL
Status: DISCONTINUED | OUTPATIENT
Start: 2023-01-01 | End: 2023-01-11 | Stop reason: HOSPADM

## 2022-12-29 RX ADMIN — ATORVASTATIN CALCIUM 10 MG: 10 TABLET, FILM COATED ORAL at 10:11

## 2022-12-29 RX ADMIN — METHOCARBAMOL TABLETS 750 MG: 750 TABLET, COATED ORAL at 10:11

## 2022-12-29 RX ADMIN — METHOCARBAMOL TABLETS 750 MG: 750 TABLET, COATED ORAL at 20:55

## 2022-12-29 RX ADMIN — SODIUM CHLORIDE 8 MG/HR: 9 INJECTION, SOLUTION INTRAVENOUS at 21:32

## 2022-12-29 RX ADMIN — Medication 200 MCG: at 18:02

## 2022-12-29 RX ADMIN — GLYCOPYRROLATE 0.2 MG: 0.2 INJECTION INTRAMUSCULAR; INTRAVENOUS at 17:51

## 2022-12-29 RX ADMIN — GABAPENTIN 600 MG: 300 CAPSULE ORAL at 20:55

## 2022-12-29 RX ADMIN — METOPROLOL SUCCINATE 100 MG: 100 TABLET, FILM COATED, EXTENDED RELEASE ORAL at 10:11

## 2022-12-29 RX ADMIN — FUROSEMIDE 40 MG: 40 TABLET ORAL at 10:11

## 2022-12-29 RX ADMIN — SODIUM CHLORIDE 80 MG: 9 INJECTION, SOLUTION INTRAVENOUS at 20:47

## 2022-12-29 RX ADMIN — AMLODIPINE BESYLATE 10 MG: 10 TABLET ORAL at 10:11

## 2022-12-29 RX ADMIN — POTASSIUM CHLORIDE 40 MEQ: 1500 TABLET, EXTENDED RELEASE ORAL at 11:14

## 2022-12-29 RX ADMIN — SODIUM CHLORIDE: 9 INJECTION, SOLUTION INTRAVENOUS at 17:41

## 2022-12-29 RX ADMIN — HEPARIN SODIUM 2000 UNITS: 1000 INJECTION INTRAVENOUS; SUBCUTANEOUS at 00:22

## 2022-12-29 RX ADMIN — LIDOCAINE HYDROCHLORIDE 50 MG: 10 INJECTION, SOLUTION EPIDURAL; INFILTRATION; INTRACAUDAL; PERINEURAL at 17:55

## 2022-12-29 RX ADMIN — SERTRALINE 25 MG: 25 TABLET, FILM COATED ORAL at 10:11

## 2022-12-29 RX ADMIN — Medication 10 MG: at 18:01

## 2022-12-29 RX ADMIN — Medication 20 MG: at 17:55

## 2022-12-29 RX ADMIN — PROPOFOL 75 MCG/KG/MIN: 10 INJECTION, EMULSION INTRAVENOUS at 17:55

## 2022-12-29 RX ADMIN — GABAPENTIN 600 MG: 300 CAPSULE ORAL at 10:11

## 2022-12-29 ASSESSMENT — ENCOUNTER SYMPTOMS
NAUSEA: 1
DIARRHEA: 1
SHORTNESS OF BREATH: 1
DOUBLE VISION: 0
BLURRED VISION: 0
ABDOMINAL PAIN: 0

## 2022-12-29 ASSESSMENT — PAIN - FUNCTIONAL ASSESSMENT: PAIN_FUNCTIONAL_ASSESSMENT: NONE - DENIES PAIN

## 2022-12-29 ASSESSMENT — PAIN SCALES - GENERAL: PAINLEVEL_OUTOF10: 0

## 2022-12-29 NOTE — ANESTHESIA PRE PROCEDURE
Department of Anesthesiology  Preprocedure Note       Name:  Carl Shipley   Age:  79 y.o.  :  1940                                          MRN:  [id number]         Date:  2022      Surgeon: Linus Loving):  Camacho Maxwell MD    Procedure: Procedure(s):  EGD ESOPHAGOGASTRODUODENOSCOPY    Medications prior to admission:   Prior to Admission medications    Medication Sig Start Date End Date Taking? Authorizing Provider   vitamin D (ERGOCALCIFEROL) 1.25 MG (71629 UT) CAPS capsule Take 1 capsule by mouth once a week for 8 doses 22  Jocy Tao DO   gabapentin (NEURONTIN) 600 MG tablet Take 1 tablet by mouth 2 times daily for 90 days. 12/20/22 3/20/23  Roseanna Lawson DPM   Benzocaine-Menthol (SORE THROAT LOZENGES) 6-10 MG LOZG lozenge Take 1 lozenge by mouth every 2 hours as needed for Sore Throat 22   RAVI Crawford - CNP   meloxicam LONI YOUNG [facility]) 15 MG tablet  12/3/21   Historical Provider, MD   bicalutamide (CASODEX) 50 MG chemo tablet TAKE ONE TABLET BY MOUTH DAILY 21   Delgado Burden MD   clopidogrel (PLAVIX) 75 MG tablet Take 75 mg by mouth daily    Historical Provider, MD   ticagrelor (BRILINTA) 90 MG TABS tablet Take 1 tablet by mouth 2 times daily 9/3/20   Keyon Pinto, RN   Multiple Vitamins-Minerals (THERAPEUTIC MULTIVITAMIN-MINERALS) tablet Take 1 tablet by mouth daily    Historical Provider, MD   ammonium lactate (LAC-HYDRIN) 12 % cream Apply topically as needed. 17   Praveena Watkins DPM   nitroGLYCERIN (NITROSTAT) 0.4 MG SL tablet Place 1 tablet under the tongue every 5 minutes as needed for Chest pain up to max of 3 total doses. If no relief after 1 dose, call 911. 17   Gisela Gary MD   fluticasone [facility]) 50 MCG/ACT nasal spray 1 spray by Nasal route daily as needed     Historical Provider, MD   sertraline (ZOLOFT) 25 MG tablet Take 25 mg by mouth daily. Historical Provider, MD   amLODIPine (NORVASC) 10 MG tablet Take 10 mg by mouth daily. Historical Provider, MD   furosemide (LASIX) 40 MG tablet Take 40 mg by mouth daily. Historical Provider, MD   aspirin EC 81 MG EC tablet Take 1 tablet by mouth daily. Patient taking differently: Take 81 mg by mouth daily Pt. Instructed to stop 7-10 days pre-op/ He did not do this 7/20/13   Eddie Cardenas MD   simvastatin (ZOCOR) 40 MG tablet Take 20 mg by mouth nightly     Historical Provider, MD   alfuzosin (UROXATRAL) 10 MG SR tablet Take 10 mg by mouth daily. Historical Provider, MD   lisinopril (PRINIVIL;ZESTRIL) 40 MG tablet Take 40 mg by mouth daily. Historical Provider, MD   metoprolol (TOPROL-XL) 100 MG XL tablet Take 100 mg by mouth daily. Historical Provider, MD       Current medications:    No current facility-administered medications for this visit.      Current Outpatient Medications   Medication Sig Dispense Refill    vitamin D (ERGOCALCIFEROL) 1.25 MG (64092 UT) CAPS capsule Take 1 capsule by mouth once a week for 8 doses 8 capsule 0     Facility-Administered Medications Ordered in Other Visits   Medication Dose Route Frequency Provider Last Rate Last Admin    [MAR Hold] polyethylene glycol (GLYCOLAX) packet 17 g  17 g Oral Daily PRN Humera Cape, DO        [facility] Hold] furosemide (LASIX) tablet 40 mg  40 mg Oral Daily Marlen Picketthead, APRN - CNP   40 mg at 12/29/22 1011    [MAR Hold] sodium chloride flush 0.9 % injection 10 mL  10 mL IntraVENous PRN Marlen Redhead, APRN - CNP   10 mL at 12/28/22 1040    [MAR Hold] sodium chloride flush 0.9 % injection 10 mL  10 mL IntraVENous PRN Marlen Redhead, APRN - CNP   10 mL at 12/28/22 0900    [MAR Hold] amLODIPine (NORVASC) tablet 10 mg  10 mg Oral Daily Kyle Luis Antonio, DO   10 mg at 12/29/22 1011    [MAR Hold] metoprolol succinate (TOPROL XL) extended release tablet 100 mg  100 mg Oral Daily Kyle Luis Antonio, DO   100 mg at 12/29/22 1011    [MAR Hold] sertraline (ZOLOFT) tablet 25 mg  25 mg Oral Daily Kyle Luis Antonio, DO   25 mg at 12/29/22 1011    [MAR Hold] atorvastatin (LIPITOR) tablet 10 mg  10 mg Oral Daily Ashley Estrada, DO   10 mg at 12/29/22 1011    [MAR Hold] gabapentin (NEURONTIN) capsule 600 mg  600 mg Oral BID Ashleysunitha Estrada, DO   600 mg at 12/29/22 1011    [MAR Hold] nitroGLYCERIN (NITROSTAT) SL tablet 0.4 mg  0.4 mg SubLINGual Q5 Min PRN Trista Noble MD   0.4 mg at 12/27/22 0940    [MAR Hold] heparin (porcine) injection 4,000 Units  4,000 Units IntraVENous PRN Charlie Brown MD        [facility] Hold] heparin (porcine) injection 2,000 Units  2,000 Units IntraVENous PRN Charlie Brown MD   2,000 Units at 12/29/22 0022    [Held by provider] heparin 25,000 units in dextrose 5 % 250 mL infusion (rate based)  5-30 Units/kg/hr IntraVENous Continuous Charlie Brown MD   Paused at 12/29/22 0651    [MAR Hold] aspirin chewable tablet 81 mg  81 mg Oral Daily Charlie Brown MD   81 mg at 12/28/22 1230    [Held by provider] ticagrelor (BRILINTA) tablet 90 mg  90 mg Oral BID Charlie Brown MD   90 mg at 12/28/22 2110    [MAR Hold] fluticasone (FLONASE) 50 MCG/ACT nasal spray 1 spray  1 spray Each Nostril Daily PRN Trista Noble MD   1 spray at 12/27/22 1804    [MAR Hold] melatonin tablet 3 mg  3 mg Oral Nightly PRN Harsha Trivoli, DO   3 mg at 12/26/22 2048    [MAR Hold] oxyCODONE (ROXICODONE) immediate release tablet 2.5 mg  2.5 mg Oral Q6H PRN Harsha Trivoli, DO   2.5 mg at 12/28/22 1229    [MAR Hold] sodium chloride flush 0.9 % injection 5-40 mL  5-40 mL IntraVENous 2 times per day Harsha Trivoli, DO   10 mL at 12/28/22 2115    [MAR Hold] sodium chloride flush 0.9 % injection 5-40 mL  5-40 mL IntraVENous PRN Harsha Trivoli, DO   10 mL at 12/27/22 2318    [MAR Hold] 0.9 % sodium chloride infusion   IntraVENous PRN Harsha Luis, DO 25 mL/hr at 12/27/22 1618 New Bag at 12/27/22 1618    [MAR Hold] ondansetron (ZOFRAN-ODT) disintegrating tablet 4 mg  4 mg Oral Q8H PRN Harsha Trivoli, DO        Or    [facility] Hold] ondansetron (ZOFRAN) injection 4 mg  4 mg IntraVENous Q6H PRN Hermon Hole, DO   4 mg at 12/27/22 1626    [MAR Hold] acetaminophen (TYLENOL) tablet 1,000 mg  1,000 mg Oral 3 times per day Hermon Hole, DO   1,000 mg at 12/28/22 1229    [MAR Hold] methocarbamol (ROBAXIN) tablet 750 mg  750 mg Oral TID Hermon Hole, DO   750 mg at 12/29/22 1011       Allergies:     Allergies   Allergen Reactions    Percocet [Oxycodone-Acetaminophen] Other (See Comments)     Panic attack       Problem List:    Patient Active Problem List   Diagnosis Code    Right upper quadrant abdominal pain R10.11    CAD (coronary artery disease) I25.10    Hypertension I10    Skin cancer C44.90    BPH (benign prostatic hyperplasia) N40.0    Chronic systolic heart failure (HCC) I50.22    Hyperlipemia E78.5    Leukocytosis D72.829    Cholelithiasis with acute cholecystitis K80.00    Pre-syncope R55    Claudication in peripheral vascular disease ([facility] [address] I73.9    Carotid stenosis, asymptomatic I65.29    Squamous cell carcinoma of skin of left upper arm C44.629    Encounter due to AICD at end of battery life-Change OUT 7-1-19 Z45.02    Malignant neoplasm of prostate ([facility] [address] C61    Chest pain R07.9    COVID U07.1    COVID-19 U07.1    Closed fracture of neck of left femur ([facility] [address] S72.002A       Past Medical History:        Diagnosis Date    Acid reflux     resolved since s/p shona    Anxiety     Arthritis     back/ fingers    BPH (benign prostatic hypertrophy)     CAD (coronary artery disease)     [facility] Liza hernandez[facility]    Cancer [facility])     face, ear, scalp and arms     Carotid stenosis     bilat    Cataracts, both eyes     CHF (congestive heart failure) (HCC)     Dry skin     Elevated PSA     Examination of participant in clinical trial 4/27/39    For the life of the medtronic device    Hyperlipidemia     pt denies 11-27-19    Hypertension     PCP Dr. Celestina Fuentes/ last seen 9-2019    Left ventricular dysfunction  Macular degeneration     left    Myocardial infarction ([facility] [address] 11/17/88, 1/2013    Neuropathy     Pacemaker 4965-3055    X 5    Panic attacks     PVD (peripheral vascular disease) ([facility] [address]     Sinus problem     Snores     Vasovagal near syncope     Wears glasses        Past Surgical History:        Procedure Laterality Date    CARDIAC CATHETERIZATION      several/ stents X 3/ angioplasty    CARDIAC DEFIBRILLATOR PLACEMENT  2013    Up graded PPM to ICD   [address]    double bypass [address], LAPAROSCOPIC  03/03/2014    [facility]    COLONOSCOPY      EYE SURGERY Bilateral     cataracts    HIP ARTHROPLASTY Left 12/26/2022    HIP HEMIARTHROPLASTY,  DEPUY    HIP SURGERY Left 12/26/2022    HIP HEMIARTHROPLASTY,  DEPUY performed by Bry Cerrato DO at [address] [place] [address], 2001, 2008, 2013, 7/1/2019 7/1/2019 is AICD    PACEMAKER PLACEMENT      pacer/ AICD/ Newest 7-1-19,medtronic aicd last check 8/20/19. pacemaker x5.    PROSTATE BIOPSY  09/24/2019    as local    PROSTATE BIOPSY N/A 09/24/2019    PROSTATE BIOPSY WITH ULTRASOUND (OFFICE NOTIFIED DEPT) performed by Bismark Mitchell MD at [place] [id number] History:    Social History     Tobacco Use    Smoking status: Never    Smokeless tobacco: Never   Substance Use Topics    Alcohol use: Yes     Alcohol/week: 2.0 standard drinks     Types: 2 Cans of beer per week     Comment:  weekly                                Counseling given: Not Answered      Vital Signs (Current): There were no vitals filed for this visit.                                            BP Readings from Last 3 Encounters:   12/29/22 135/71   05/31/22 121/68   05/29/22 (!) 145/73       NPO Status:                                                                                 BMI:   Wt Readings from Last 3 Encounters:   12/25/22 182 lb (82.6 kg)   12/07/22 196 lb (88.9 kg)   10/03/22 196 lb (88.9 kg)     There is no height or weight on file to calculate BMI.    CBC:   Lab Results   Component Value Date/Time    WBC 10.3 12/29/2022 06:02 AM    RBC 3.33 12/29/2022 06:02 AM    HGB 10.0 12/29/2022 06:02 AM    HCT 29.8 12/29/2022 06:02 AM    MCV 89.5 12/29/2022 06:02 AM    RDW 13.6 12/29/2022 06:02 AM     12/29/2022 06:02 AM       CMP:   Lab Results   Component Value Date/Time     12/29/2022 06:02 AM    K 3.5 12/29/2022 06:02 AM    CL 99 12/29/2022 06:02 AM    CO2 29 12/29/2022 06:02 AM    BUN 35 12/29/2022 06:02 AM    CREATININE 0.62 12/29/2022 06:02 AM    GFRAA >60 05/29/2022 05:25 PM    LABGLOM >60 12/29/2022 06:02 AM    GLUCOSE 148 12/29/2022 06:02 AM    PROT 6.0 09/03/2020 05:00 AM    CALCIUM 7.6 12/29/2022 06:02 AM    BILITOT 0.56 09/03/2020 05:00 AM    ALKPHOS 67 09/03/2020 05:00 AM    AST 17 09/03/2020 05:00 AM    ALT 19 09/03/2020 05:00 AM       POC Tests: No results for input(s): POCGLU, POCNA, POCK, POCCL, POCBUN, POCHEMO, POCHCT in the last 72 hours.     Coags:   Lab Results   Component Value Date/Time    PROTIME 10.6 12/25/2022 08:13 PM    INR 1.0 12/25/2022 08:13 PM    APTT 29.8 12/29/2022 09:35 AM       HCG (If Applicable): No results found for: PREGTESTUR, PREGSERUM, HCG, HCGQUANT     ABGs: No results found for: PHART, PO2ART, JIP8PPP, ZOA8MET, BEART, T3UITNOT     Type & Screen (If Applicable):  No results found for: LABABO, LABRH    Drug/Infectious Status (If Applicable):  No results found for: HIV, HEPCAB    COVID-19 Screening (If Applicable):   Lab Results   Component Value Date/Time    COVID19 DETECTED 05/29/2022 05:13 PM           Anesthesia Evaluation  Patient summary reviewed and Nursing notes reviewed  Airway: Mallampati: III  TM distance: >3 FB     Mouth opening: > = 3 FB   Dental: normal exam         Pulmonary:                              Cardiovascular:    (+) hypertension:, pacemaker: pacemaker and AICD, past MI:, CAD:, CABG/stent:, CHF:,                ROS comment: galdino-infarct ischemia and the patient is planned for cardiac cath     Neuro/Psych:   (+) neuromuscular disease (neuropathy):,             GI/Hepatic/Renal:   (+) GERD:,          ROS comment: melena. Endo/Other:                      ROS comment: 2 drinks per week Abdominal:             Vascular:   + PVD, aortic or cerebral, . Other Findings:             Anesthesia Plan      MAC     ASA 4       Induction: intravenous. Anesthetic plan and risks discussed with patient. Plan discussed with CRNA.                     Cathie Peng MD   12/29/2022

## 2022-12-29 NOTE — PROGRESS NOTES
Occupational [address] [employer]  Occupational Therapy Not Seen Note    DATE: 2022    NAME: Verner Beer  MRN: [id number]   : 1940      Patient not seen this date for Occupational Therapy due to:    Surgery/Procedure: EGD    Next Scheduled Treatment:     Electronically signed by EMILY Loaiza on 2022 at 4:02 PM

## 2022-12-29 NOTE — OP NOTE
EGD      Patient:   Nikki Valiente    :    1940    Facility:   [address] [place] [address]   Referring/PCP: Kristian Mohr MD    Procedure:   Esophagogastroduodenoscopy with control of bleeding  Date:     2022   Endoscopist:  Pablo Ferguson MD, [facility]    Indication:   Melena, anemia of acute blood loss, elevated BUN or creatinine ratio    Postprocedure diagnosis:   Actively bleeding duodenal ulcer-injected with epinephrine, treated with bipolar probe and clips with complete control of bleeding    Anesthesia:  MAC    Complications: None    EBL: None from the procedure    Specimen: None    Description of Procedure:  Informed consent was obtained from the patient after explanation of the procedure including indications, description of the procedure,  benefits and possible risks and complications of the procedure, and alternatives. Questions were answered. The patient's history was reviewed and a directed physical examination was performed prior to the procedure. Patient was monitored throughout the procedure with pulse oximetry and periodic assessment of vital signs. Patient was sedated as noted above. With the patient in the left lateral decubitus position, the Olympus videoendoscope was placed in the patient's mouth and under direct visualization passed into the esophagus. Visualization of the esophagus, stomach, and duodenum was performed during both introduction and withdrawal of the endoscope and retroflexed view of the proximal stomach was obtained. The scope was passed to the 2nd portion of the duodenum. The patient tolerated the procedure well and was taken to the recovery area in good condition. Findings[de-identified]   Esophagus: Normal  Stomach: Small sliding hiatal hernia. Stomach was otherwise normal.  Duodenum: There was a 10 mm duodenal ulcer in the bulb with active bleeding.   This was cauterized with bipolar probe at 13 W and injected with 2 cc of 1 is to 10,000 epinephrine and clipped with 2 metal resolution clips with complete control of bleeding. There was no active bleeding at the end of the procedure. The rest of the duodenum was otherwise normal.    Recommendations: Okay for liquid diet. PPI drip.     Electronically signed by Jose Jenkins MD, FACG  on 12/29/2022 at 6:06 PM

## 2022-12-29 NOTE — PROGRESS NOTES
PROGRESS NOTE          PATIENT NAME: Jorge L Faulkner. RECORD NO. [id number]  DATE: 12/29/2022    HD: # 4      Patient Active Problem List   Diagnosis    Right upper quadrant abdominal pain    CAD (coronary artery disease)    Hypertension    Skin cancer    BPH (benign prostatic hyperplasia)    Chronic systolic heart failure (HCC)    Hyperlipemia    Leukocytosis    Cholelithiasis with acute cholecystitis    Pre-syncope    Claudication in peripheral vascular disease (HCC)    Carotid stenosis, asymptomatic    Squamous cell carcinoma of skin of left upper arm    Encounter due to AICD at end of battery life-Change OUT 7-1-19    Malignant neoplasm of prostate ([address]    Chest pain    COVID    COVID-19    Closed fracture of neck of left femur (HCC)       DIAGNOSIS AND PLAN    Mechanical fall from standing with acute moderately displaced left femoral neck fracture  -Moderate to high risk per cardiology  -Left hip hemiarthroplasty 12/26  -Follow up PM&R recommendations       CAD s/p CABG and multiple TED's  ICM with history of pacemaker (first placed in 1995). ICD 2013. AICD 7/1/2019, CHF  -Appreciate Cardiology assistance  -EKG and ECHO reviewed  -Stress test galdino-infarct ischemia   Cardiology to discuss potential cardiac cath with patient   Troponin downtrended   Asymptomatic          Regular diet  I-S/deep brief  PT/OT  Dispo: PT/OT    Chief Complaint: \"I feel okay\"    SUBJECTIVE    Patient seen and examined at the bedside. Several loose bowel movements reported overnight. Patient is afebrile. Normotensive. Saturation greater than 95% on 2 L NC. Patient has no complaints at this time. Denies chest pain, shortness of breath, abdominal pain, leg pain. States that he was able to ambulate some yesterday. We will follow-up PM&R recommendations for evaluation for acute rehab.       OBJECTIVE  VITALS:   Vitals:    12/28/22 2049   BP: 133/67   Pulse: 76   Resp: 18   Temp: 98.8 °F (37.1 °C)   SpO2: 99%     Physical Exam  Vitals and nursing note reviewed. Constitutional:       Appearance: Normal appearance. HENT:      Head: Normocephalic and atraumatic. Nose: Nose normal.      Mouth/Throat:      Mouth: Mucous membranes are moist.      Pharynx: Oropharynx is clear. Eyes:      Extraocular Movements: Extraocular movements intact. Conjunctiva/sclera: Conjunctivae normal.   Cardiovascular:      Rate and Rhythm: Normal rate and regular rhythm. Pulmonary:      Effort: Pulmonary effort is normal. No respiratory distress. Breath sounds: No stridor. No wheezing. Abdominal:      General: There is no distension. Palpations: Abdomen is soft. Tenderness: There is no abdominal tenderness. Musculoskeletal:         General: No swelling or tenderness. Cervical back: Normal range of motion. Comments: Surgical dressing over left hip, clean, dry, intact. Skin:     General: Skin is warm and dry. Neurological:      General: No focal deficit present. Mental Status: He is alert and oriented to person, place, and time. 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 Infarct extends to involve the entire apex and small portions of the anterolateral and anterior septal wall. LVEF 37%. Risk stratification: Intermediate risk.          Karson Boyce DO  12/29/2022, 6:42 AM       Attestation signed by Shiela Hanson MD    I personally evaluated the patient and directed the medical decision making with Resident/RAMA after the physical/radiologic exam and laboratory values were reviewed and confirmed.       Anastacio Belle MD

## 2022-12-29 NOTE — CONSULTS
THE [facility] AT [facility]  Consultation Note     . REASON FOR CONSULTATION: Concern for GI bleed      HISTORY OF PRESENT ILLNESS:     This is a 79 y.o. male who presents with mechanical fall right side attempting to  trash in ED ultimately suffering a left hip fracture. The patient has past medical history of CAD s/p CABG and TED on ASA and Plavix, hypertension, hyperlipidemia, peripheral vascular disease, CHF. According to the patient, he was picking up his trash when his leg slipped out and he fell forward without hitting his head or losing consciousness. Imaging showed acute moderately displaced left femoral neck fracture. Orthopedic surgery was consulted. Cardiology was consulted regarding preop risk stratification considering CAD s/p CABG and multiple TED placement. Patient denied any numbness and tingling or sensory loss. Patient was updated on 12/26/2022 and left hip hemiarthroplasty was done. Patient was on heparin drip with aspirin and Brilinta and was recommended 1 month of chemical anticoagulation upon discharge. Due to increasing troponins, cardiology recommended stress test which shows galdino-infarct ischemia and the patient is planned for cardiac cath. During this admission, the patient had black tarry stools. According to him, he also has fullness in the abdomen. Patient passes 1-2 bottles a day usually but has been having dark tarry stools lately. He takes NSAIDs 3-4 times in a month for his back pain. According to him he has undergone EGD and colonoscopy previously. EGD was for reflux although he does not remember the results. His last colonoscopy was more than 10 years ago. Patient takes Plavix and Brilinta at home with aspirin. He does not report taking any long-term PPIs. During bedside evaluation, the patient was awake, alert, oriented X4. Under no acute distress. Patient was hemodynamically stable with blood pressure 135/71 mmHg and MAP of 83.   He was breathing comfortably on 2 L nasal cannula oxygen. Patient's latest labs show hemoglobin of 10 dropped from 11.0 yesterday. Patient's BUN is increased from 19-35. Cardiovascular examination revealed a irregular heart rate. Abdominal examination reveals no tenderness. There is no guarding, rigidity, organomegaly, scleral icterus, jaundice.       Previous GI history: Laparoscopic cholecystectomy in 2014, EGD, colonoscopy    PAST MEDICAL HISTORY:  Past Medical History:   Diagnosis Date    Acid reflux     resolved since s/p shona    Anxiety     Arthritis     back/ fingers    BPH (benign prostatic hypertrophy)     CAD (coronary artery disease)     Dr. Charlie hernandez/ [facility]    Cancer [facility])     face, ear, scalp and arms     Carotid stenosis     bilat    Cataracts, both eyes     CHF (congestive heart failure) ([address]     Dry skin     Elevated PSA     Examination of participant in clinical trial 4/27/39    For the life of the medtronic device    Hyperlipidemia     pt denies 11-27-19    Hypertension     PCP Dr. Dominic Fuentes/ last seen 9-2019    Left ventricular dysfunction     Macular degeneration     left    Myocardial infarction ([address] 11/17/88, 1/2013    Neuropathy     Pacemaker 1995-2013    X 5    Panic attacks     PVD (peripheral vascular disease) ([address]     Sinus problem     Snores     Vasovagal near syncope     Wears glasses      Past Surgical History:   Procedure Laterality Date    CARDIAC CATHETERIZATION      several/ stents X 3/ angioplasty    CARDIAC DEFIBRILLATOR PLACEMENT  2013    Up graded PPM to ICD    [address]    double bypass [address], LAPAROSCOPIC  03/03/2014    [facility]    COLONOSCOPY      EYE SURGERY Bilateral     cataracts    HIP ARTHROPLASTY Left 12/26/2022    HIP HEMIARTHROPLASTY,  DEPUY    HIP SURGERY Left 12/26/2022    HIP HEMIARTHROPLASTY,  DEPUY performed by Verna Toribio DO at [address] [place] [address], 2001, 2008, 2013, 7/1/2019 7/1/2019 is AICD    PACEMAKER PLACEMENT      pacer/ AICD/ Newest 7-1-19,medtronic aicd last check 8/20/19. pacemaker x5. PROSTATE BIOPSY  09/24/2019    as local    PROSTATE BIOPSY N/A 09/24/2019    PROSTATE BIOPSY WITH ULTRASOUND (OFFICE NOTIFIED DEPT) performed by Abdulkadir Patten MD at [address]:  Allergies   Allergen Reactions    Percocet [Oxycodone-Acetaminophen] Other (See Comments)     Panic attack       HOME MEDICATIONS:  Prior to Admission medications    Medication Sig Start Date End Date Taking? Authorizing Provider   vitamin D (ERGOCALCIFEROL) 1.25 MG (69378 UT) CAPS capsule Take 1 capsule by mouth once a week for 8 doses 12/26/22 2/14/23 Yes Jocy Tao DO   gabapentin (NEURONTIN) 600 MG tablet Take 1 tablet by mouth 2 times daily for 90 days. 12/20/22 3/20/23  Soledad Reyez DPM   Benzocaine-Menthol (SORE THROAT LOZENGES) 6-10 MG LOZG lozenge Take 1 lozenge by mouth every 2 hours as needed for Sore Throat 5/29/22   Bella Sexton APRN - CNP   meloxicam [facility]) 15 MG tablet  12/3/21   Historical Provider, MD   bicalutamide (CASODEX) 50 MG chemo tablet TAKE ONE TABLET BY MOUTH DAILY 8/16/21   Aaron Choe MD   clopidogrel (PLAVIX) 75 MG tablet Take 75 mg by mouth daily    Historical Provider, MD   ticagrelor (BRILINTA) 90 MG TABS tablet Take 1 tablet by mouth 2 times daily 9/3/20   Lesley Adams, JEANCARLOS   Multiple Vitamins-Minerals (THERAPEUTIC MULTIVITAMIN-MINERALS) tablet Take 1 tablet by mouth daily    Historical Provider, MD   ammonium lactate (LAC-HYDRIN) 12 % cream Apply topically as needed. 11/6/17   Tyra Elmore DPM   nitroGLYCERIN (NITROSTAT) 0.4 MG SL tablet Place 1 tablet under the tongue every 5 minutes as needed for Chest pain up to max of 3 total doses.  If no relief after 1 dose, call 911. 1/27/17   Bj Bates MD   fluticasone [facility]) 50 MCG/ACT nasal spray 1 spray by Nasal route daily as needed     Historical Provider, MD   sertraline (ZOLOFT) 25 MG tablet Take 25 mg by mouth daily. Historical Provider, MD   amLODIPine (NORVASC) 10 MG tablet Take 10 mg by mouth daily. Historical Provider, MD   furosemide (LASIX) 40 MG tablet Take 40 mg by mouth daily. Historical Provider, MD   aspirin EC 81 MG EC tablet Take 1 tablet by mouth daily. Patient taking differently: Take 81 mg by mouth daily Pt. Instructed to stop 7-10 days pre-op/ He did not do this 7/20/13   Mandie Vasques MD   simvastatin (ZOCOR) 40 MG tablet Take 20 mg by mouth nightly     Historical Provider, MD   alfuzosin (UROXATRAL) 10 MG SR tablet Take 10 mg by mouth daily. Historical Provider, MD   lisinopril (PRINIVIL;ZESTRIL) 40 MG tablet Take 40 mg by mouth daily. Historical Provider, MD   metoprolol (TOPROL-XL) 100 MG XL tablet Take 100 mg by mouth daily. Historical Provider, MD     .  CURRENT MEDICATIONS:  Scheduled Meds:   furosemide  40 mg Oral Daily    amLODIPine  10 mg Oral Daily    metoprolol succinate  100 mg Oral Daily    sertraline  25 mg Oral Daily    atorvastatin  10 mg Oral Daily    gabapentin  600 mg Oral BID    aspirin  81 mg Oral Daily    [Held by provider] ticagrelor  90 mg Oral BID    sodium chloride flush  5-40 mL IntraVENous 2 times per day    acetaminophen  1,000 mg Oral 3 times per day    methocarbamol  750 mg Oral TID     . Continuous Infusions:   [Held by provider] heparin (PORCINE) Infusion Stopped (12/29/22 2306)    sodium chloride 25 mL/hr at 12/27/22 1618     . PRN Meds:polyethylene glycol, sodium chloride flush, sodium chloride flush, nitroGLYCERIN, heparin (porcine), heparin (porcine), fluticasone, melatonin, oxyCODONE, sodium chloride flush, sodium chloride, ondansetron **OR** ondansetron  .   SOCIAL HISTORY:     Social History     Socioeconomic History    Marital status:      Spouse name: Not on file    Number of children: Not on file    Years of education: Not on file    Highest education level: Not on file   Occupational History    Not on file   Tobacco Use    Smoking status: Never    Smokeless tobacco: Never   Vaping Use    Vaping Use: Never used   Substance and Sexual Activity    Alcohol use: Yes     Alcohol/week: 2.0 standard drinks     Types: 2 Cans of beer per week     Comment:  weekly    Drug use: No    Sexual activity: Not on file   Other Topics Concern    Not on file   Social History Narrative    Not on file     Social Determinants of Health     Financial Resource Strain: Not on file   Food Insecurity: Not on file   Transportation Needs: Not on file   Physical Activity: Not on file   Stress: Not on file   Social Connections: Not on file   Intimate Partner Violence: Not on file   Housing Stability: Not on file       FAMILY HISTORY:   Family History   Problem Relation Age of Onset    High Blood Pressure Mother     Cancer Mother     High Blood Pressure Father     Diabetes Sister     Cancer Sister     High Blood Pressure Sister     Heart Disease Brother     High Blood Pressure Brother        REVIEW OF SYSTEMS:     Constitutional: No fever, no chills, no lethargy, no weakness. HEENT:  No headache, otalgia, itchy eyes, nasal discharge or sore throat. Cardiac:  No chest pain, dyspnea, orthopnea or PND. Chest:   No cough, phlegm or wheezing. Abdomen:  No abdominal pain, nausea or vomiting. Neuro:  No focal weakness, abnormal movements or seizure like activity. Skin:   No rashes, no itching. :   No hematuria, no pyuria, no dysuria, no flank pain. Extremities:  No swelling or joint pains. ROS was otherwise negative except as mentioned in the [address] PHYSICAL EXAM:    /71   Pulse 82   Temp 97.7 °F (36.5 °C)   Resp 17   Ht 5' 7\" (1.702 m)   Wt 182 lb (82.6 kg)   SpO2 95%   BMI 28.51 kg/m²   . TMAX[24]    General: Well developed, Well nourished, No apparent distress  Head:  Normocephalic, Atraumatic  EENT: EOMI, Sclera not icteric, Oropharynx moist  Neck:  Supple, Trachea midline  Lungs:CTA Bilaterally  Heart: RRR, No murmur, No rub, No gallop, PMI nondisplaced. Abdomen:Soft, Non tender, Not distended, BS WNL,  No masses. Ext:No clubbing. No cyanosis. No edema. Skin: No rashes. No jaundice. No stigmata of liver disease. Neuro:  A&O x Three, No focal neurological deficits    LABS and IMAGING:     Hemotological labs:   ANEMIA STUDIES  Recent Labs     12/28/22  1225   [id number]   FOLATE 15.5       CBC  Recent Labs     12/26/22  1915 12/27/22  1105 12/28/22  0312 12/29/22  0602   WBC 15.8* 12.9* 11.1 10.3   HGB 13.5 12.3* 11.0* 10.0*   MCV 85.6 87.7 91.2 89.5   RDW 13.3 13.3 13.6 13.6    188 178 252       PT/INR  No results for input(s): PROTIME, INR in the last 72 hours. BMP  Recent Labs     12/27/22  1027 12/28/22  0312 12/29/22  0602    135 136   K 3.9 3.9 3.5*   CL 99 99 99   CO2 27 25 29   BUN 17 19 35*   CREATININE 0.93 0.89 0.62*   GLUCOSE 150* 131* 148*   CALCIUM 8.2* 7.8* 7.6*       LIVER WORK UP  Hepatitis Functional Panel:  Recent Labs     12/28/22  1225   LABALBU 2.9*       AMYLASE/LIPASE/AMMONIA  No results for input(s): AMYLASE, LIPASE, AMMONIA in the last 72 hours. Acute Hepatitis Panel   No results found for: HEPBSAG, HEPCAB, HEPBIGM, HEPAIGM    HCV Genotype   No results found for: HEPATITISCGENOTYPE    HCV Quantitative   No results found for: HCVQNT    Metabolic work up:  Ceruloplasmin  AA work up:  Alpha 1 antitrypsin   No results found for: A1A  AMA:  No results found for: MITOAB    ASMA:  No results found for: SMOOTHMUSCAB    ANCA:    ALBINO:  No results found for: ALBINO    Anti - Liver/Kidney Ab:  No results found for: LIVER-KIDNEYMICROSOMALAB    Ceruloplasmin:  No results found for: CERULOPLSM    Celiac panel:  No results found for: TISSTRNTIIGG, TTGIGA, IGA    Cancer Markers:  CEA:    No results for input(s): CEA in the last 72 hours. Ca 125:   No results for input(s):  in the last 72 hours. Ca 19-9:     Invalid input(s):   AFP: No results for input(s): AFP in the last 72 hours.      ASSESSMENT and PLAN:     Anemia secondary to upper GI bleed possibly due to bleeding ulcers:  -Patient hemoglobin down-trending  -BUN level up-trending  -Reported having dark tarry stools lately  -Patient has history of GERD  -Patient kept n.p.o.; Heparin had  -We will plan for EGD today to assess for upper GI bleed    History of CAD s/p CABG and multiple TED placement:  -Cardiology following  -Latest stress test shows galdino-infarct ischemia  -Cardiology planning to do cardiac cath  -Awaiting GI clearance  -We will go ahead with EGD to assess for upper GI bleed    Left femur neck fracture s/p left hemiarthroplasty on 12/26/2022:  -Moderate risk as per cardiology  -PM&R following  -Management as per primary        This plan was formulated in collaboration with Dr. Shiva Berry attending attestation    Electronically signed by:  Eder Allen MD [address] [facility]  [phone]  12/29/2022    12:08 PM

## 2022-12-29 NOTE — PROGRESS NOTES
[facility]   Progress Note                   Date:   12/29/2022  Patient name: Jeni Alcantar  Date of admission:  12/25/2022  7:27 PM  MRN:   [id number]  YOB: 1940  PCP: Sami Ramirez MD    Reason for Admission: Closed fracture of neck of left femur, initial encounter ([facility] [S72.002A]  Closed left hip fracture, initial encounter ([facility] [S72.002A]    Subjective:       Clinical Changes / Abnormalities:Pt seen and examined in the stress lab. No further NSVT. Pain resolved. Pt states that he is feeling much better today       Medications:   Scheduled Meds:   potassium chloride  40 mEq Oral Once    furosemide  40 mg Oral Daily    amLODIPine  10 mg Oral Daily    metoprolol succinate  100 mg Oral Daily    sertraline  25 mg Oral Daily    atorvastatin  10 mg Oral Daily    gabapentin  600 mg Oral BID    aspirin  81 mg Oral Daily    ticagrelor  90 mg Oral BID    sodium chloride flush  5-40 mL IntraVENous 2 times per day    acetaminophen  1,000 mg Oral 3 times per day    methocarbamol  750 mg Oral TID     Continuous Infusions:   [Held by provider] heparin (PORCINE) Infusion Stopped (12/29/22 6315)    sodium chloride 25 mL/hr at 12/27/22 1618     CBC:   Recent Labs     12/27/22  1105 12/28/22  0312 12/29/22  0602   WBC 12.9* 11.1 10.3   HGB 12.3* 11.0* 10.0*    178 252       BMP:    Recent Labs     12/27/22  1027 12/28/22  0312 12/29/22  0602    135 136   K 3.9 3.9 3.5*   CL 99 99 99   CO2 27 25 29   BUN 17 19 35*   CREATININE 0.93 0.89 0.62*   GLUCOSE 150* 131* 148*       Hepatic: No results for input(s): AST, ALT, ALB, BILITOT, ALKPHOS in the last 72 hours. Troponin:   Recent Labs     12/27/22  1755 12/27/22  2042 12/28/22  0312   TROPHS 68* 64* 50*       BNP: No results for input(s): BNP in the last 72 hours. Lipids: No results for input(s): CHOL, HDL in the last 72 hours.     Invalid input(s): LDLCALCU  INR:   No results for input(s): INR in the last 72 hours.      Objective:   Vitals: /71   Pulse 72   Temp 97.7 °F (36.5 °C)   Resp 17   Ht 5' 7\" (1.702 m)   Wt 182 lb (82.6 kg)   SpO2 95%   BMI 28.51 kg/m²   General appearance: alert and cooperative with exam  HEENT: Head: Normocephalic, no lesions, without obvious abnormality. Neck: no JVD, trachea midline, no adenopathy  Lungs: Clear to auscultation  Heart: Regular rate and rhythm, s1/s2 auscultated, no murmurs  Abdomen: soft, non-tender, bowel sounds active  Extremities: no edema  Neurologic: not done        Assessment / Acute Cardiac Problems:   S/p fall suffered from left hip fracture  Preop cardiac risk stratification for same  Ischemic cardiomyopathy with improved ejection fraction on last nuclear stress test to 49% 2020 currently compensated with no signs of volume overload. ICD interrogation October 2022 functioning normally 96% paced in the atrium with 5 years of battery longevity  CAD status post CABG and stenting as detailed above with patent LIMA to the LAD and stenting to OM 2 and OM 3. Hypertension  Hyperlipidemia    Patient Active Problem List:     Right upper quadrant abdominal pain     CAD (coronary artery disease)     Hypertension     Skin cancer     BPH (benign prostatic hyperplasia)     Chronic systolic heart failure (HCC)     Hyperlipemia     Leukocytosis     Cholelithiasis with acute cholecystitis     Pre-syncope     Claudication in peripheral vascular disease (HCC)     Carotid stenosis, asymptomatic     Squamous cell carcinoma of skin of left upper arm     Encounter due to AICD at end of battery life-Change OUT 7-1-19     Malignant neoplasm of prostate ([address]     Chest pain     COVID     COVID-19     Closed fracture of neck of left femur ([address]    1. Coronary artery disease. He had prior bypass, LIMA-LAD. last catheterization 2009 showed patent LIMA to LAD, required drug-eluting stent to diagonal, left circumflex and LPDA for significant stenosis.   2. Ischemic cardiomyopathy with EF of 20-25% s/p Dual Chamber ICD in situ. Checks normal function with very short runs of Afib.  4. Obesity. 5. Vasodepressor syncope. 6. Echocardiogram 05/23/2019, showed reduced LV systolic function with an EF of 25% with reduced LV diastolic compliance, trace to mild AI, trace to mild MR, trace to mild TR noted. 7. CATH 9/2/2020 LM 10-20% LAD proximal 90% LCx OM2 99% TED OM3 80% TED. RCA 80% proximal and is small nondominant. 9. STRESS 9/2/2020 reversible ischemia lateral wall. Infarct anterior lateral, and apical wall. Hypokinesis apical anterior and septal wall. LVEF 49%        ECHO 12/25/22  Summary  Severe LV systolic dysfunction, with EF 20-25%. Reduced LV diastolic compliance. Mild aortic insufficiency. Mild mitral regurgitation. Mild tricuspid regurgitation. 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 Infarct extends to involve the entire apex and   small portions of the anterolateral and anterior septal wall. LVEF 37%. Risk stratification: Intermediate risk. Plan of Treatment:   S/p OR. ECHO reviewed. Has AICD in situ  NSVT x 1 today per Device interrogation. NSR now. Continue BB. Will check BMP/Mag   CP secondary to NSVT. No EKG changes. Trops peaked at 75, now trending down. Stress test reviewed and shows galdino-infarct ischemia. Reviewed with Dr. Nyasia Carmona and plan for cardiac cath today.   Risks and benefits reviewed and pt is agreeable to proceed    Electronically signed by RAVI Cheng - CNP on 12/29/2022 at 8:30 AM  [address]  [phone]

## 2022-12-29 NOTE — DISCHARGE INSTR - COC
Continuity of Care Form    Patient Name: Galo Ruiz   :  1940  MRN:  [id number]    Admit date:  2022  Discharge date:  ***    Code Status Order: Full Code   Advance Directives:     Admitting Physician:  Shea iRzo MD  PCP: Briana Fuentes MD    Discharging Nurse: ***  Discharging Hospital Unit/Room#: 0329/0329-02  Discharging Unit Phone Number: ***    Emergency Contact:   Extended Emergency Contact Information  Primary Emergency Contact: Nafisa Ruiz  Address: [address] [place] [address]  Home Phone: [phone]  Relation: Spouse    Past Surgical History:  Past Surgical History:   Procedure Laterality Date    CARDIAC CATHETERIZATION      several/ stents X 3/ angioplasty    CARDIAC DEFIBRILLATOR PLACEMENT      Up graded PPM to ICD    CARDIAC SURGERY      double bypass     CATARACT REMOVAL      CHOLECYSTECTOMY, LAPAROSCOPIC  2014    [facility]    COLONOSCOPY      EYE SURGERY Bilateral     cataracts    HIP ARTHROPLASTY Left 2022    HIP HEMIARTHROPLASTY,  DEPUY    HIP SURGERY Left 2022    HIP HEMIARTHROPLASTY,  DEPUY performed by Ace Liz DO at [facility] OR    PACEMAKER INSERTION  , , , , 2019 is AICD    PACEMAKER PLACEMENT      pacer/ AICD/ Newest 19,medtronic aicd last check 19. pacemaker x5.    PROSTATE BIOPSY  2019    as local    PROSTATE BIOPSY N/A 2019    PROSTATE BIOPSY WITH ULTRASOUND (OFFICE NOTIFIED DEPT) performed by Mauro Mistry MD at [facility] OR       Immunization History:     There is no immunization history on file for this patient.    Active Problems:  Patient Active Problem List   Diagnosis Code    Right upper quadrant abdominal pain R10.11    CAD (coronary artery disease) I25.10    Hypertension I10    Skin cancer C44.90    BPH (benign prostatic hyperplasia) N40.0    Chronic systolic heart failure (HCC) I50.22    Hyperlipemia E78.5    Leukocytosis D72.829    Cholelithiasis with  acute cholecystitis K80.00    Pre-syncope R55    Claudication in peripheral vascular disease (HCC) I73.9    Carotid stenosis, asymptomatic I65.29    Squamous cell carcinoma of skin of left upper arm C44.629    Encounter due to AICD at end of battery life-Change OUT 19 Z45.02    Malignant neoplasm [facility] prostate ([facility] C61    Chest pain R07.9    COVID U07.1    COVID-19 U07.1    Closed fracture of neck of left femur ([facility] S72.002A       Isolation/Infection:   Isolation            No Isolation          Patient Infection Status       Infection Onset Added Last Indicated Last Indicated By Review Planned Expiration Resolved Resolved By    None active    Resolved    COVID-19 22 COVID-19, Rapid   22     COVID-19 (Rule Out) 22 COVID-19, Rapid (Ordered)   22 Rule-Out Test Resulted            Nurse Assessment:  Last Vital Signs: /71   Pulse 72   Temp 97.7 °F (36.5 °C)   Resp 17   Ht 5' 7\" (1.702 m)   Wt 182 lb (82.6 kg)   SpO2 95%   BMI 28.51 kg/m²     Last documented pain score (0-10 scale): Pain Level: 4  Last Weight:   Wt Readings from Last 1 Encounters:   22 182 lb (82.6 kg)     Mental Status:  oriented, alert, coherent, logical, thought processes intact, and able to concentrate and follow conversation    IV Access:  - None    Nursing Mobility/ADLs:  Walking   Assisted  Transfer  Assisted  Bathing  Assisted  Dressing  Assisted  Toileting  Assisted  Feeding  Independent  Med Admin  Assisted  Med Delivery   whole    Wound Care Documentation and Therapy:  Incision 22 Hip Left;Lateral (Active)   Dressing Status Clean;Dry; Intact 22   Dressing/Treatment Foam impregnated with Ag 22   Drainage Amount None 22   Odor None 22   Number of days: 2        Elimination:  Continence:    Bowel: Yes  Bladder: Yes  Urinary Catheter: None   Colostomy/Ileostomy/Ileal Conduit: No       Date of Last BM: 1/10/2022    No intake or output data in the 24 hours ending 12/29/22 0827  No intake/output data recorded. Safety Concerns:     History of Falls (last 30 days) and At Risk for Falls    Impairments/Disabilities:      None    Nutrition Therapy:  Current Nutrition Therapy:   - Oral Diet:  General, Low Fat, and low chol, high fiber/CARRIE    Routes of Feeding: Oral  Liquids: No Restrictions  Daily Fluid Restriction: no  Last Modified Barium Swallow with Video (Video Swallowing Test): not done    Treatments at the Time of Hospital Discharge:   Respiratory Treatments: none  Oxygen Therapy:  is on oxygen at 2 L/min per nasal cannula. Ventilator:    - No ventilator support    Rehab Therapies: Physical Therapy and Occupational Therapy  Weight Bearing Status/Restrictions: No weight bearing restrictions  Other Medical Equipment (for information only, NOT a DME order):  sarastedy  Other Treatments: none    Patient's personal belongings (please select all that are sent with patient):  Glasses    RN SIGNATURE:  Electronically signed by Kendall Rowley RN on 1/11/23 at 10:20 AM EST    CASE MANAGEMENT/SOCIAL WORK SECTION    Inpatient Status Date: ***    Readmission Risk Assessment Score:  Readmission Risk              Risk of Unplanned Readmission:  14           Discharging to Facility/ Agency   Name: [facility]  Address: [address] [place] [address]. [place], [place]  Phone: [phone]    / signature: Electronically signed by Emy Landry RN on 1/11/23 at 10:46 AM EST    PHYSICIAN SECTION    Prognosis: Good    Condition at Discharge: Stable    Rehab Potential (if transferring to Rehab): Good    Recommended Labs or Other Treatments After Discharge: pt/ot    Physician Certification: I certify the above information and transfer of Cain Porter  is necessary for the continuing treatment of the diagnosis listed and that he requires [facility] for less 30 days.      Update Admission H&P: No change in H&P    PHYSICIAN SIGNATURE:  Electronically signed by Amisha Thompson DO on 1/11/2023 at 12:16 PM

## 2022-12-30 PROBLEM — K26.9 DUODENAL ULCER: Status: ACTIVE | Noted: 2022-12-30

## 2022-12-30 LAB
ABSOLUTE EOS #: 0.1 K/UL (ref 0–0.4)
ABSOLUTE IMMATURE GRANULOCYTE: 0 K/UL (ref 0–0.3)
ABSOLUTE LYMPH #: 1.18 K/UL (ref 1–4.8)
ABSOLUTE MONO #: 0.88 K/UL (ref 0.1–0.8)
ANION GAP SERPL CALCULATED.3IONS-SCNC: 6 MMOL/L (ref 9–17)
BASOPHILS # BLD: 0 % (ref 0–2)
BASOPHILS ABSOLUTE: 0 K/UL (ref 0–0.2)
BUN BLDV-MCNC: 33 MG/DL (ref 8–23)
CALCIUM SERPL-MCNC: 7.8 MG/DL (ref 8.6–10.4)
CHLORIDE BLD-SCNC: 100 MMOL/L (ref 98–107)
CO2: 28 MMOL/L (ref 20–31)
CREAT SERPL-MCNC: 0.75 MG/DL (ref 0.7–1.2)
EOSINOPHILS RELATIVE PERCENT: 1 % (ref 1–4)
GFR SERPL CREATININE-BSD FRML MDRD: >60 ML/MIN/1.73M2
GLUCOSE BLD-MCNC: 136 MG/DL (ref 70–99)
HCT VFR BLD CALC: 27.4 % (ref 40.7–50.3)
HEMOGLOBIN: 8.8 G/DL (ref 13–17)
IMMATURE GRANULOCYTES: 0 %
LYMPHOCYTES # BLD: 12 % (ref 24–44)
MCH RBC QN AUTO: 29.5 PG (ref 25.2–33.5)
MCHC RBC AUTO-ENTMCNC: 32.1 G/DL (ref 28.4–34.8)
MCV RBC AUTO: 91.9 FL (ref 82.6–102.9)
MONOCYTES # BLD: 9 % (ref 1–7)
MORPHOLOGY: NORMAL
NRBC AUTOMATED: 0 PER 100 WBC
PARTIAL THROMBOPLASTIN TIME: 24 SEC (ref 20.5–30.5)
PARTIAL THROMBOPLASTIN TIME: 27.8 SEC (ref 20.5–30.5)
PDW BLD-RTO: 13.9 % (ref 11.8–14.4)
PLATELET # BLD: 217 K/UL (ref 138–453)
PMV BLD AUTO: 10.8 FL (ref 8.1–13.5)
POTASSIUM SERPL-SCNC: 4 MMOL/L (ref 3.7–5.3)
RBC # BLD: 2.98 M/UL (ref 4.21–5.77)
SEG NEUTROPHILS: 78 % (ref 36–66)
SEGMENTED NEUTROPHILS ABSOLUTE COUNT: 7.64 K/UL (ref 1.8–7.7)
SODIUM BLD-SCNC: 134 MMOL/L (ref 135–144)
WBC # BLD: 9.8 K/UL (ref 3.5–11.3)

## 2022-12-30 PROCEDURE — 6370000000 HC RX 637 (ALT 250 FOR IP): Performed by: INTERNAL MEDICINE

## 2022-12-30 PROCEDURE — 85025 COMPLETE CBC W/AUTO DIFF WBC: CPT

## 2022-12-30 PROCEDURE — 6360000002 HC RX W HCPCS: Performed by: INTERNAL MEDICINE

## 2022-12-30 PROCEDURE — 97530 THERAPEUTIC ACTIVITIES: CPT

## 2022-12-30 PROCEDURE — C9113 INJ PANTOPRAZOLE SODIUM, VIA: HCPCS | Performed by: INTERNAL MEDICINE

## 2022-12-30 PROCEDURE — 2580000003 HC RX 258: Performed by: INTERNAL MEDICINE

## 2022-12-30 PROCEDURE — 99232 SBSQ HOSP IP/OBS MODERATE 35: CPT | Performed by: INTERNAL MEDICINE

## 2022-12-30 PROCEDURE — 97110 THERAPEUTIC EXERCISES: CPT

## 2022-12-30 PROCEDURE — 85730 THROMBOPLASTIN TIME PARTIAL: CPT

## 2022-12-30 PROCEDURE — 80048 BASIC METABOLIC PNL TOTAL CA: CPT

## 2022-12-30 PROCEDURE — 36415 COLL VENOUS BLD VENIPUNCTURE: CPT

## 2022-12-30 PROCEDURE — 1200000000 HC SEMI PRIVATE

## 2022-12-30 PROCEDURE — 97116 GAIT TRAINING THERAPY: CPT

## 2022-12-30 PROCEDURE — 99222 1ST HOSP IP/OBS MODERATE 55: CPT | Performed by: STUDENT IN AN ORGANIZED HEALTH CARE EDUCATION/TRAINING PROGRAM

## 2022-12-30 RX ADMIN — GABAPENTIN 600 MG: 300 CAPSULE ORAL at 08:32

## 2022-12-30 RX ADMIN — GABAPENTIN 600 MG: 300 CAPSULE ORAL at 20:39

## 2022-12-30 RX ADMIN — ATORVASTATIN CALCIUM 10 MG: 10 TABLET, FILM COATED ORAL at 08:32

## 2022-12-30 RX ADMIN — SODIUM CHLORIDE, PRESERVATIVE FREE 10 ML: 5 INJECTION INTRAVENOUS at 08:32

## 2022-12-30 RX ADMIN — AMLODIPINE BESYLATE 10 MG: 10 TABLET ORAL at 08:32

## 2022-12-30 RX ADMIN — SODIUM CHLORIDE 8 MG/HR: 9 INJECTION, SOLUTION INTRAVENOUS at 21:33

## 2022-12-30 RX ADMIN — ACETAMINOPHEN 1000 MG: 500 TABLET, FILM COATED ORAL at 14:10

## 2022-12-30 RX ADMIN — METOPROLOL SUCCINATE 100 MG: 100 TABLET, FILM COATED, EXTENDED RELEASE ORAL at 08:31

## 2022-12-30 RX ADMIN — METHOCARBAMOL TABLETS 750 MG: 750 TABLET, COATED ORAL at 08:32

## 2022-12-30 RX ADMIN — SODIUM CHLORIDE, PRESERVATIVE FREE 10 ML: 5 INJECTION INTRAVENOUS at 20:39

## 2022-12-30 RX ADMIN — ACETAMINOPHEN 1000 MG: 500 TABLET, FILM COATED ORAL at 20:39

## 2022-12-30 RX ADMIN — SODIUM CHLORIDE 8 MG/HR: 9 INJECTION, SOLUTION INTRAVENOUS at 09:34

## 2022-12-30 RX ADMIN — SERTRALINE 25 MG: 25 TABLET, FILM COATED ORAL at 08:31

## 2022-12-30 RX ADMIN — METHOCARBAMOL TABLETS 750 MG: 750 TABLET, COATED ORAL at 14:10

## 2022-12-30 RX ADMIN — METHOCARBAMOL TABLETS 750 MG: 750 TABLET, COATED ORAL at 20:39

## 2022-12-30 RX ADMIN — FUROSEMIDE 40 MG: 40 TABLET ORAL at 08:32

## 2022-12-30 ASSESSMENT — ENCOUNTER SYMPTOMS
SINUS PAIN: 0
DIARRHEA: 1
SHORTNESS OF BREATH: 0
APNEA: 0
BLOOD IN STOOL: 1
COLOR CHANGE: 0
CHEST TIGHTNESS: 0
EYE PAIN: 0

## 2022-12-30 ASSESSMENT — PAIN SCALES - GENERAL: PAINLEVEL_OUTOF10: 3

## 2022-12-30 NOTE — PROGRESS NOTES
[facility]   Progress Note                   Date:   12/30/2022  Patient name: Majo Tristan  Date of admission:  12/25/2022  7:27 PM  MRN:   [id number]  YOB: 1940  PCP: Yolanda Zabala MD    Reason for Admission: Closed fracture of neck of left femur, initial encounter ([facility] [S72.002A]  Closed left hip fracture, initial encounter ([facility] [S72.002A]    Subjective:       Clinical Changes / Abnormalities: Pt seen and examined in room with family. He denies chest pain, SOB, dizziness and palpitations. He states he continues to feel better. Labs/vitals/tele reviewed. Discussed with RN, no acute CV issues/concerns overnight. Continues to have dark colored stool. Medications:   Scheduled Meds:   [START ON 1/1/2023] pantoprazole  40 mg Oral BID AC    furosemide  40 mg Oral Daily    amLODIPine  10 mg Oral Daily    metoprolol succinate  100 mg Oral Daily    sertraline  25 mg Oral Daily    atorvastatin  10 mg Oral Daily    gabapentin  600 mg Oral BID    [Held by provider] aspirin  81 mg Oral Daily    [Held by provider] ticagrelor  90 mg Oral BID    sodium chloride flush  5-40 mL IntraVENous 2 times per day    acetaminophen  1,000 mg Oral 3 times per day    methocarbamol  750 mg Oral TID     Continuous Infusions:   pantoprazole 8 mg/hr (12/30/22 0934)    [Held by provider] heparin (PORCINE) Infusion Stopped (12/29/22 0651)     CBC:   Recent Labs     12/28/22 0312 12/29/22  0602 12/29/22 1959 12/30/22  0338   WBC 11.1 10.3  --  9.8   HGB 11.0* 10.0* 9.5* 8.8*    252  --  217       BMP:    Recent Labs     12/28/22  0312 12/29/22  0602 12/30/22  0338    136 134*   K 3.9 3.5* 4.0   CL 99 99 100   CO2 25 29 28   BUN 19 35* 33*   CREATININE 0.89 0.62* 0.75   GLUCOSE 131* 148* 136*       Hepatic: No results for input(s): AST, ALT, ALB, BILITOT, ALKPHOS in the last 72 hours.   Troponin:   Recent Labs     12/27/22  1755 12/27/22  2042 12/28/22  0312   TROPHS 68* 64* 50*       BNP: No results for input(s): BNP in the last 72 hours. Lipids: No results for input(s): CHOL, HDL in the last 72 hours. Invalid input(s): LDLCALCU  INR:   No results for input(s): INR in the last 72 hours. Objective:   Vitals: /62   Pulse 77   Temp 98.8 °F (37.1 °C)   Resp 18   Ht 5' 7\" (1.702 m)   Wt 182 lb (82.6 kg)   SpO2 99%   BMI 28.51 kg/m²   General appearance: alert and cooperative with exam  HEENT: Head: Normocephalic, no lesions, without obvious abnormality. Neck: no JVD, trachea midline, no adenopathy  Lungs: Clear to auscultation  Heart: Regular rate and rhythm, s1/s2 auscultated, no murmurs  Abdomen: soft, non-tender, bowel sounds active  Extremities: no edema  Neurologic: not done        Assessment / Acute Cardiac Problems:   S/p fall suffered from left hip fracture  Preop cardiac risk stratification for same  Ischemic cardiomyopathy with improved ejection fraction on last nuclear stress test to 49% 2020 currently compensated with no signs of volume overload. ICD interrogation October 2022 functioning normally 96% paced in the atrium with 5 years of battery longevity  CAD status post CABG and stenting as detailed above with patent LIMA to the LAD and stenting to OM 2 and OM 3. Hypertension  Hyperlipidemia    Patient Active Problem List:     Right upper quadrant abdominal pain     CAD (coronary artery disease)     Hypertension     Skin cancer     BPH (benign prostatic hyperplasia)     Chronic systolic heart failure (HCC)     Hyperlipemia     Leukocytosis     Cholelithiasis with acute cholecystitis     Pre-syncope     Claudication in peripheral vascular disease (HCC)     Carotid stenosis, asymptomatic     Squamous cell carcinoma of skin of left upper arm     Encounter due to AICD at end of battery life-Change OUT 7-1-19     Malignant neoplasm of prostate ([address]     Chest pain     COVID     COVID-19     Closed fracture of neck of left femur ([address]    1. Coronary artery disease.  He had prior bypass, LIMA-LAD. last catheterization 2009 showed patent LIMA to LAD, required drug-eluting stent to diagonal, left circumflex and LPDA for significant stenosis. 2. Ischemic cardiomyopathy with EF of 20-25% s/p Dual Chamber ICD in situ. Checks normal function with very short runs of Afib.  4. Obesity. 5. Vasodepressor syncope. 6. Echocardiogram 05/23/2019, showed reduced LV systolic function with an EF of 25% with reduced LV diastolic compliance, trace to mild AI, trace to mild MR, trace to mild TR noted. 7. CATH 9/2/2020 LM 10-20% LAD proximal 90% LCx OM2 99% TED OM3 80% TED. RCA 80% proximal and is small nondominant. 9. STRESS 9/2/2020 reversible ischemia lateral wall. Infarct anterior lateral, and apical wall. Hypokinesis apical anterior and septal wall. LVEF 49%        ECHO 12/25/22  Summary  Severe LV systolic dysfunction, with EF 20-25%. Reduced LV diastolic compliance. Mild aortic insufficiency. Mild mitral regurgitation. Mild tricuspid regurgitation. 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 Infarct extends to involve the entire apex and   small portions of the anterolateral and anterior septal wall. LVEF 37%. Risk stratification: Intermediate risk. Plan of Treatment:   S/p OR. ECHO reviewed. Has AICD in situ  NSR now. Continue BB. CP secondary to NSVT. No EKG changes. Trops peaked at 75, now trending down. Stress test reviewed and shows galdino-infarct ischemia. Reviewed with Dr. Alfonso Mata and plan for cardiac cath once cleared by GI. Risks and benefits reviewed and pt is agreeable to proceed. Await GI clearance. Continued black tarry stools this am.   Keep K > 4 and Mg > 2.      Electronically signed by RAVI Jaquez CNP on 12/30/2022 at 2:07 PM  [address]  [phone]

## 2022-12-30 NOTE — PROGRESS NOTES
PROGRESS NOTE          PATIENT NAME: Jorge L Faulkner. RECORD NO. [id number]  DATE: 12/30/2022    HD: # 5      Patient Active Problem List   Diagnosis    Right upper quadrant abdominal pain    CAD (coronary artery disease)    Hypertension    Skin cancer    BPH (benign prostatic hyperplasia)    Chronic systolic heart failure (HCC)    Hyperlipemia    Leukocytosis    Cholelithiasis with acute cholecystitis    Pre-syncope    Claudication in peripheral vascular disease (HCC)    Carotid stenosis, asymptomatic    Squamous cell carcinoma of skin of left upper arm    Encounter due to AICD at end of battery life-Change OUT 7-1-19    Malignant neoplasm of prostate ([facility]    Chest pain    COVID    COVID-19    Closed fracture of neck of left femur (HCC)       DIAGNOSIS AND PLAN    Mechanical fall from standing with acute moderately displaced left femoral neck fracture  -Moderate to high risk per cardiology  -Left hip hemiarthroplasty 12/26  -Follow up PM&R recommendations        CAD s/p CABG and multiple TED's  ICM with history of pacemaker (first placed in 1995). ICD 2013. AICD 7/1/2019, CHF  -Appreciate Cardiology assistance  -EKG and ECHO reviewed  -Stress test galdino-infarct ischemia              Cardiology to discuss potential cardiac cath with patient              Troponin downtrended              Asymptomatic    Upper GI bleed  -Bleeding duodenal ulcer visualized on EGD, clipped and cauterized  -Hgb 8.8 this AM, continue to trend  -Protonix gtt, per GI  -Hold anticoagulation for now  -Pt asymptomatic           Clear liquid diet with supplements  I-S/deep brief  PT/OT  Dispo: PT/OT    Chief Complaint: \"I feel ok\"    SUBJECTIVE    Patient seen and examined at the bedside. No acute overnight events. Afebrile. Normotensive. Denies abdominal pain, nausea, vomiting. Bleeding duodenal ulcer cauterized by GI yesterday. Tolerating clear liquid diet.      OBJECTIVE  VITALS:   Vitals:    12/30/22 1135   BP: 120/62   Pulse: 77 Resp: 18   Temp: 98.8 °F (37.1 °C)   SpO2: 99%     Vitals and nursing note reviewed. Constitutional:       Appearance: Normal appearance. HENT:      Head: Normocephalic and atraumatic. Nose: Nose normal.      Mouth/Throat:      Mouth: Mucous membranes are moist.      Pharynx: Oropharynx is clear. Eyes:      Extraocular Movements: Extraocular movements intact. Conjunctiva/sclera: Conjunctivae normal.   Cardiovascular:      Rate and Rhythm: Normal rate and regular rhythm. Pulmonary:      Effort: Pulmonary effort is normal. No respiratory distress. Breath sounds: No stridor. No wheezing. Abdominal:      General: There is no distension. Palpations: Abdomen is soft. Tenderness: There is no abdominal tenderness. Musculoskeletal:         General: No swelling or tenderness. Cervical back: Normal range of motion. Comments: Surgical dressing over left hip, saturated  Skin:     General: Skin is warm and dry. Neurological:      General: No focal deficit present. Mental Status: He is alert and oriented to person, place, and time. LAB:  CBC:   Recent Labs     12/28/22  0312 12/29/22  0602 12/29/22 1959 12/30/22  0338   WBC 11.1 10.3  --  9.8   HGB 11.0* 10.0* 9.5* 8.8*   HCT 34.4* 29.8* 28.6* 27.4*   MCV 91.2 89.5  --  91.9    252  --  217     BMP:   Recent Labs     12/28/22 0312 12/29/22  0602 12/30/22  0338    136 134*   K 3.9 3.5* 4.0   CL 99 99 100   CO2 25 29 28   BUN 19 35* 33*   CREATININE 0.89 0.62* 0.75   GLUCOSE 131* 148* 136*       RADIOLOGY:  No results found.        Naomi Morris,   12/30/2022, 2:16 PM

## 2022-12-30 NOTE — PLAN OF CARE
Problem: Discharge Planning  Goal: Discharge to home or other facility with appropriate resources  12/30/2022 1216 by Bea Funez RN  Outcome: Progressing  Flowsheets (Taken 12/30/2022 0800)  Discharge to home or other facility with appropriate resources: Identify barriers to discharge with patient and caregiver  12/30/2022 0605 by Ralph Adkins RN  Outcome: Progressing     Problem: Pain  Goal: Verbalizes/displays adequate comfort level or baseline comfort level  12/30/2022 1216 by Bea Funez RN  Outcome: Progressing  12/30/2022 0605 by Ralph Adkins RN  Outcome: Progressing     Problem: Safety - Adult  Goal: Free from fall injury  12/30/2022 1216 by Bea Funez RN  Outcome: Progressing  12/30/2022 0605 by Ralph Adkins RN  Outcome: Progressing     Problem: ABCDS Injury Assessment  Goal: Absence of physical injury  12/30/2022 1216 by Bea Funez RN  Outcome: Progressing  12/30/2022 0605 by Ralph Adkins RN  Outcome: Progressing     Problem: Skin/Tissue Integrity  Goal: Absence of new skin breakdown  Description: 1.  Monitor for areas of redness and/or skin breakdown  2.  Assess vascular access sites hourly  3.  Every 4-6 hours minimum:  Change oxygen saturation probe site  4.  Every 4-6 hours:  If on nasal continuous positive airway pressure, respiratory therapy assess nares and determine need for appliance change or resting period.  12/30/2022 1216 by Bea Funez RN  Outcome: Progressing  12/30/2022 0605 by Ralph Adkins RN  Outcome: Progressing     Problem: Chronic Conditions and Co-morbidities  Goal: Patient's chronic conditions and co-morbidity symptoms are monitored and maintained or improved  12/30/2022 1216 by Bea Funez RN  Outcome: Progressing  12/30/2022 0605 by Ralph Adkins RN  Outcome: Progressing

## 2022-12-30 NOTE — PROGRESS NOTES
Physical Therapy  Facility/Department: [facility] RENAL//MED SURG  Physical Therapy Daily Treatment Note  Name: Jodee Hair  : 1940  MRN: [id number]  Date of Service: 2022    Discharge Recommendations:  Patient would benefit from continued therapy after discharge   PT Equipment Recommendations  Equipment Needed: No      Patient Diagnosis(es): The encounter diagnosis was Closed fracture of neck of left femur, initial encounter ([facility] Past Medical History:  has a past medical history of Acid reflux, Anxiety, Arthritis, BPH (benign prostatic hypertrophy), CAD (coronary artery disease), Cancer ([facility] Carotid stenosis, Cataracts, both eyes, CHF (congestive heart failure) ([facility] Dry skin, Elevated PSA, Examination of participant in clinical trial, Hyperlipidemia, Hypertension, Left ventricular dysfunction, Macular degeneration, Myocardial infarction ([facility] Neuropathy, Pacemaker, Panic attacks, PVD (peripheral vascular disease) ([facility] Sinus problem, Snores, Vasovagal near syncope, and Wears glasses. Past Surgical History:  has a past surgical history that includes Pacemaker insertion (, , , , 2019); Cholecystectomy, laparoscopic (2014); eye surgery (Bilateral); Cardiac defibrillator placement (); Cardiac surgery (); Cardiac catheterization; Colonoscopy; Prostate Biopsy (2019); Prostate biopsy (N/A, 2019); pacemaker placement; Cataract removal; Hip Arthroplasty (Left, 2022); hip surgery (Left, 2022); and Esophagogastroduodenoscopy (2022). Assessment   Body Structures, Functions, Activity Limitations Requiring Skilled Therapeutic Intervention: Decreased functional mobility ; Decreased strength;Decreased endurance;Decreased balance; Increased pain;Decreased coordination  Assessment: Pt required modA for supine to sit for trunk stability due to posterior lean. Pt presents with mobility deficits requiring maxA to perform sit<>stand transfer.  Pt ambulated 5 ft. from EOB to bedside recliner with CGA while using a RW. Pt would be unsafe to return to prior living arrangements upon discharge due to high fall risk. Pt would benefit from additional intense PT upon discharge and is expected to be able to tolerate 3 hours of therapy per day. Therapy Prognosis: Good  Decision Making: Medium Complexity  Requires PT Follow-Up: Yes  Activity Tolerance  Activity Tolerance: Patient tolerated treatment well     Plan   Physcial Therapy Plan  General Plan: 6-7 times per week  Current Treatment Recommendations: Strengthening, Balance training, Functional mobility training, Transfer training, Gait training, Endurance training, Safety education & training, Patient/Caregiver education & training, Therapeutic activities, Equipment evaluation, education, & procurement, Home exercise program, Stair training  Safety Devices  Type of Devices: Gait belt, Left in chair, Call light within reach, Nurse notified, Chair alarm in place  Restraints  Restraints Initially in Place: No     Restrictions  Restrictions/Precautions  Restrictions/Precautions: Weight Bearing, Fall Risk  Required Braces or Orthoses?: No  Lower Extremity Weight Bearing Restrictions  Left Lower Extremity Weight Bearing: Weight Bearing As Tolerated  Position Activity Restriction  Hip Precautions: No hip flexion > 90 degrees, No ADduction, Posterior hip precautions, No hip internal rotation  Other position/activity restrictions: S/p: L hip hemiarthoplasty 12/26/2022     Subjective   General  Chart Reviewed: Yes  Response To Previous Treatment: Patient with no complaints from previous session. Family / Caregiver Present: No  Follows Commands: Within Functional Limits  General Comment  Comments: Pt retired to sitting upright in chair post PT. Subjective  Subjective: Pt supine in bed and agreeable to therapy, RN agreeable to therapy. Pt pleasant and cooperative throughout today's session.   Pt denies pain at rest. Cognition   Orientation  Overall Orientation Status: Within Functional Limits  Cognition  Overall Cognitive Status: WFL     Objective   Bed mobility  Supine to Sit: Moderate assistance  Sit to Supine: Unable to assess  Scooting: Minimal assistance  Bed Mobility Comments: HOB elevated ~30 degrees without use of handrails. Increased time/effort to perform. Transfers  Sit to Stand: Maximum Assistance  Stand to Sit: Moderate Assistance  Ambulation  Surface: Level tile  Device: Rolling Walker  Assistance: Contact guard assistance  Quality of Gait: mildly unsteady, step-to pattern, decreased LLE stance phase. no true LOB. Gait Deviations: Slow Sunshine;Decreased step length;Decreased step height  Distance: 5 ft. More Ambulation?: No  Stairs/Curb  Stairs?: No     Balance  Posture: Fair  Sitting - Static: Fair;+  Sitting - Dynamic: Fair  Standing - Static: Fair  Standing - Dynamic: Fair;-  Comments: standing balance assessed while using a RW  A/AROM Exercises: AP's x20 BLE, LAQs x20 BLE. AM-PAC Score  AM-PAC Inpatient Mobility Raw Score : 12 (12/30/22 1024)  AM-PAC Inpatient T-Scale Score : 35.33 (12/30/22 1024)  Mobility Inpatient CMS 0-100% Score: 68.66 (12/30/22 1024)  Mobility Inpatient CMS G-Code Modifier : CL (12/30/22 1024)         Goals  Short Term Goals  Time Frame for Short Term Goals: 14 visits  Short Term Goal 1: Pt will ambulate 150 feet with a RW and SBA. Short Term Goal 2: Pt will demonstrate good- standing balance to decrease fall risk. Short Term Goal 3: Pt will negotiate 3 stairs with bilateral handrails and SBA to allow the pt to enter prior living arrangements. Short Term Goal 4: Pt will perform sit<>stand transfer with CGA. Short Term Goal 5: Pt will perform bed mobility with CGA while maintaining posterior hip precautions.   Additional Goals?: No         Therapy Time   Individual Concurrent Group Co-treatment   Time In 0905         Time Out 0945         Minutes 40         Timed Code Treatment Minutes: [address], [place]

## 2022-12-30 NOTE — CONSULTS
[facility]  Office: [address], DO, Aman Moreland, DO, Anyi Mai, DO, Brucemercedes Blounts Blood, DO, Amandeep Sweeney MD, Porter Summers MD, Sarah Wylie MD, Darylene Sparks, MD,  Surinder Tolentino MD, Jose Romo MD, Shorty Bridges, DO, Sarbjit Macario MD,  Matteo Smith MD, Anselmo Millan MD, Keturah Fortune, DO, Tamara Baker MD, Robert Aragon MD, Natalia Smith, DO, Stark Mcardle, MD, Sebastian Hoff MD, Anju Mcintyre MD, Hillis Kocher, MD, Manuel Pablo, DO, Missy Sánchez MD, Chay Israel MD, Win Kerr, CNP,  Cristel Oseguera, CNP, Suman Kim, CNP, Justen Richardson, CNP,  Montserrat Salas, [facility], Aggie Onofre, CNP, Josie Noble, CNP, Candee Dakin, CNP, Renetta Cerda, CNP, Emely Overton, CNP, Ginny Bartholomew PA-C, Evangelista Samano, CNS, Thuan Adam, CNP, Yazan Pack, CNP         [address] / HISTORY AND PHYSICAL EXAMINATION            Date:   12/30/2022  Patient name:  Marisela Eldridge  Date of admission:  12/25/2022  7:27 PM  MRN:   [id number]  Account:  [de-identified]  YOB: 1940  PCP:    Carmen Posadas MD  Room:   [address] [place], [place] [id number]-  Code Status:    Full Code    Physician Requesting Consult: Jason De Souza,Franci    Reason for Consult:      Chief Complaint:     Chief Complaint   Patient presents with    Fall       History Obtained From:     patient, electronic medical record    History of Present Illness: This is a 50-year-old male past medical history of neuropathy, AICD in place, peripheral vascular disease, coronary artery disease, history of diastolic CHF, prostate cancer status postradiation therapy, arthritis, bilateral carotid stenosis presented to the ER on 12/25/2022 after patient had a fall denied any loss of consciousness. Patient was admitted to trauma surgery service.   Cardiology was consulted for medical clearance was moderate to high risk for any OR procedure due to history of significant coronary artery disease and LAD. Patient underwent left hip hemiarthroplasty with orthopedic surgery on 12/26/2022, afterwards was noted to have episodes of nonsustained V. tach. Underwent stress test on 12/28/2022 showing concern of galdino-infarct ischemia and plan for cardiac catheterization however was held due to patient having black tarry stools on 12/29/2022 patient underwent EGD with GI found to have 10 mm duodenal ulcer with active bleeding that was cauterized and injected with epinephrine and clipped. Patient currently on clear liquid diet and plan for cardiac catheterization when cleared by GI. Patient continues on Protonix drip at this time      Echo 12/26/22:  CONCLUSIONS     Summary  Severe LV systolic dysfunction, with EF 20-25%. Reduced LV diastolic compliance. Mild aortic insufficiency. Mild mitral regurgitation. Mild tricuspid regurgitation. 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 Infarct extends to involve the entire apex and   small portions of the anterolateral and anterior septal wall. LVEF 37%. Risk stratification: Intermediate risk. EGD 12/29/22:  Findings[de-identified]   Esophagus: Normal  Stomach: Small sliding hiatal hernia. Stomach was otherwise normal.  Duodenum: There was a 10 mm duodenal ulcer in the bulb with active bleeding.   This was cauterized with bipolar probe at 13 W and injected with 2 cc of 1 is to 10,000 epinephrine and clipped with 2 metal resolution clips with complete control of bleeding. There was no active bleeding at the end of the procedure. The rest of the duodenum was otherwise normal.     Recommendations: Okay for liquid diet. PPI drip.     Past Medical History:     Past Medical History:   Diagnosis Date    Acid reflux     resolved since s/p shona    Anxiety     Arthritis     back/ fingers    BPH (benign prostatic hypertrophy)     CAD (coronary artery disease)     Dr. Saad hernandez/ [facility]    [facility]cer [facility])     face, ear, scalp and arms     Carotid stenosis     bilat    Cataracts, both eyes     CHF (congestive heart failure) ([address]     Dry skin     Elevated PSA     Examination of participant in clinical trial 4/27/39    For the life of the medtronic device    Hyperlipidemia     pt denies 11-27-19    Hypertension     PCP Dr. Kelly Fuentes/ last seen 9-2019    Left ventricular dysfunction     Macular degeneration     left    Myocardial infarction ([facility] 11/17/88, 1/2013    Neuropathy     Pacemaker 1995-2013    X 5    Panic attacks     PVD (peripheral vascular disease) ([facility]     Sinus problem     Snores     Vasovagal near syncope     Wears glasses         Past Surgical History:     Past Surgical History:   Procedure Laterality Date    CARDIAC CATHETERIZATION      several/ stents X 3/ angioplasty    CARDIAC DEFIBRILLATOR PLACEMENT  2013    Up graded PPM to ICD    [address]    double bypass [address], LAPAROSCOPIC  03/03/2014    [facility]    COLONOSCOPY      ESOPHAGOGASTRODUODENOSCOPY  12/29/2022    CONTROL HEMORRHAGE    EYE SURGERY Bilateral     cataracts    HIP ARTHROPLASTY Left 12/26/2022    HIP HEMIARTHROPLASTY,  DEPUY    HIP SURGERY Left 12/26/2022    HIP HEMIARTHROPLASTY,  DEPUY performed by Deven Lee DO at [address] [place] [address], 2001, 2008, 2013, 7/1/2019 7/1/2019 is AICD    PACEMAKER PLACEMENT      pacer/ AICD/ Newest 7-1-19,medtronic aicd last check 8/20/19. pacemaker x5. PROSTATE BIOPSY  09/24/2019    as local    PROSTATE BIOPSY N/A 09/24/2019    PROSTATE BIOPSY WITH ULTRASOUND (OFFICE NOTIFIED DEPT) performed by Jailene Osullivan MD at [facility] N/A 12/29/2022    EGD CONTROL HEMORRHAGE performed by Roz Mo MD at [place] [address]        Medications Prior to Admission:     Prior to Admission medications    Medication Sig Start Date End Date Taking? Authorizing Provider   vitamin D (ERGOCALCIFEROL) 1.25 MG (11036 UT) CAPS capsule Take 1 capsule by mouth once a week for 8 doses 12/26/22 2/14/23 Yes Jocy Tao,    gabapentin (NEURONTIN) 600 MG tablet Take 1 tablet by mouth 2 times daily for 90 days. 12/20/22 3/20/23  Calli Madrigal DPM   Benzocaine-Menthol (SORE THROAT LOZENGES) 6-10 MG LOZG lozenge Take 1 lozenge by mouth every 2 hours as needed for Sore Throat 5/29/22   RAVI Rapp - CNP   meloxicam LONI YOUNG [facility]) 15 MG tablet  12/3/21   Historical Provider, MD   bicalutamide (CASODEX) 50 MG chemo tablet TAKE ONE TABLET BY MOUTH DAILY 8/16/21   Rachel Hughes MD   clopidogrel (PLAVIX) 75 MG tablet Take 75 mg by mouth daily    Historical Provider, MD   ticagrelor (BRILINTA) 90 MG TABS tablet Take 1 tablet by mouth 2 times daily 9/3/20   Vi Casas, JEANCARLOS   Multiple Vitamins-Minerals (THERAPEUTIC MULTIVITAMIN-MINERALS) tablet Take 1 tablet by mouth daily    Historical Provider, MD   ammonium lactate (LAC-HYDRIN) 12 % cream Apply topically as needed. 11/6/17   Elyse Severance, DPM   nitroGLYCERIN (NITROSTAT) 0.4 MG SL tablet Place 1 tablet under the tongue every 5 minutes as needed for Chest pain up to max of 3 total doses. If no relief after 1 dose, call 911. 1/27/17   Nina Lua MD   fluticasone [facility]) 50 MCG/ACT nasal spray 1 spray by Nasal route daily as needed     Historical Provider, MD   sertraline (ZOLOFT) 25 MG tablet Take 25 mg by mouth daily.     Historical Provider, MD amLODIPine (NORVASC) 10 MG tablet Take 10 mg by mouth daily. Historical Provider, MD   furosemide (LASIX) 40 MG tablet Take 40 mg by mouth daily. Historical Provider, MD   aspirin EC 81 MG EC tablet Take 1 tablet by mouth daily. Patient taking differently: Take 81 mg by mouth daily Pt. Instructed to stop 7-10 days pre-op/ He did not do this 7/20/13   Charles Padgett MD   simvastatin (ZOCOR) 40 MG tablet Take 20 mg by mouth nightly     Historical Provider, MD   alfuzosin (UROXATRAL) 10 MG SR tablet Take 10 mg by mouth daily. Historical Provider, MD   lisinopril (PRINIVIL;ZESTRIL) 40 MG tablet Take 40 mg by mouth daily. Historical Provider, MD   metoprolol (TOPROL-XL) 100 MG XL tablet Take 100 mg by mouth daily. Historical Provider, MD        Allergies:     Percocet [oxycodone-acetaminophen]    Social History:     Tobacco:    reports that he has never smoked. He has never used smokeless tobacco.  Alcohol:      reports current alcohol use of about 2.0 standard drinks per week. Drug Use:  reports no history of drug use. Family History:     Family History   Problem Relation Age of Onset    High Blood Pressure Mother     Cancer Mother     High Blood Pressure Father     Diabetes Sister     Cancer Sister     High Blood Pressure Sister     Heart Disease Brother     High Blood Pressure Brother        Review of Systems:     Positive and Negative as described in HPI. Review of Systems   Constitutional:  Positive for fatigue. Negative for activity change and fever. HENT:  Negative for congestion and sinus pain. Eyes:  Negative for pain. Respiratory:  Negative for apnea, chest tightness and shortness of breath. Cardiovascular:  Negative for chest pain, palpitations and leg swelling. Gastrointestinal:  Positive for blood in stool and diarrhea. Genitourinary:  Positive for frequency. Negative for difficulty urinating. Musculoskeletal:  Positive for arthralgias and myalgias.    Skin:  Negative for color change and wound. Neurological:  Positive for weakness. Negative for dizziness. Psychiatric/Behavioral:  Negative for agitation and hallucinations. Physical Exam:     /62   Pulse 77   Temp 98.8 °F (37.1 °C)   Resp 18   Ht 5' 7\" (1.702 m)   Wt 182 lb (82.6 kg)   SpO2 99%   BMI 28.51 kg/m²   Temp (24hrs), Av.7 °F (36.5 °C), Min:97.1 °F (36.2 °C), Max:98.8 °F (37.1 °C)    No results for input(s): POCGLU in the last 72 hours. No intake or output data in the 24 hours ending 22 1711    Physical Exam  Constitutional:       Appearance: He is obese. He is not diaphoretic. HENT:      Head: Normocephalic and atraumatic. Right Ear: External ear normal.      Left Ear: External ear normal.      Nose: No congestion. Mouth/Throat:      Mouth: Mucous membranes are moist.   Eyes:      Pupils: Pupils are equal, round, and reactive to light. Cardiovascular:      Rate and Rhythm: Normal rate and regular rhythm. Heart sounds: Murmur heard. Pulmonary:      Breath sounds: No wheezing or rales. Abdominal:      Palpations: Abdomen is soft. Comments: Mild distention, mild tenderness to epigastric area, no fluid wave appreciated   Musculoskeletal:      Cervical back: Neck supple. Right lower leg: No edema. Left lower leg: No edema. Skin:     General: Skin is dry. Capillary Refill: Capillary refill takes less than 2 seconds. Coloration: Skin is pale. Neurological:      General: No focal deficit present. Mental Status: He is alert and oriented to person, place, and time.    Psychiatric:         Mood and Affect: Mood normal.         Behavior: Behavior normal.       Investigations:      Laboratory Testing:  Recent Results (from the past 24 hour(s))   Hemoglobin and Hematocrit    Collection Time: 22  7:59 PM   Result Value Ref Range    Hemoglobin 9.5 (L) 13.0 - 17.0 g/dL    Hematocrit 28.6 (L) 40.7 - 50.3 %   APTT    Collection Time: 22 3:38 AM   Result Value Ref Range    PTT 24.0 20.5 - 30.5 sec   CBC with Auto Differential    Collection Time: 12/30/22  3:38 AM   Result Value Ref Range    WBC 9.8 3.5 - 11.3 k/uL    RBC 2.98 (L) 4.21 - 5.77 m/uL    Hemoglobin 8.8 (L) 13.0 - 17.0 g/dL    Hematocrit 27.4 (L) 40.7 - 50.3 %    MCV 91.9 82.6 - 102.9 fL    MCH 29.5 25.2 - 33.5 pg    MCHC 32.1 28.4 - 34.8 g/dL    RDW 13.9 11.8 - 14.4 %    Platelets 016 029 - 314 k/uL    MPV 10.8 8.1 - 13.5 fL    NRBC Automated 0.0 0.0 per 100 WBC    Immature Granulocytes 0 0 %    Seg Neutrophils 78 (H) 36 - 66 %    Lymphocytes 12 (L) 24 - 44 %    Monocytes 9 (H) 1 - 7 %    Eosinophils % 1 1 - 4 %    Basophils 0 0 - 2 %    Absolute Immature Granulocyte 0.00 0.00 - 0.30 k/uL    Segs Absolute 7.64 1.8 - 7.7 k/uL    Absolute Lymph # 1.18 1.0 - 4.8 k/uL    Absolute Mono # 0.88 (H) 0.1 - 0.8 k/uL    Absolute Eos # 0.10 0.0 - 0.4 k/uL    Basophils Absolute 0.00 0.0 - 0.2 k/uL    Morphology Normal    Basic Metabolic Panel    Collection Time: 12/30/22  3:38 AM   Result Value Ref Range    Glucose 136 (H) 70 - 99 mg/dL    BUN 33 (H) 8 - 23 mg/dL    Creatinine 0.75 0.70 - 1.20 mg/dL    Est, Glom Filt Rate >60 >60 mL/min/1.73m2    Calcium 7.8 (L) 8.6 - 10.4 mg/dL    Sodium 134 (L) 135 - 144 mmol/L    Potassium 4.0 3.7 - 5.3 mmol/L    Chloride 100 98 - 107 mmol/L    CO2 28 20 - 31 mmol/L    Anion Gap 6 (L) 9 - 17 mmol/L   APTT    Collection Time: 12/30/22 10:28 AM   Result Value Ref Range    PTT 27.8 20.5 - 30.5 sec       Imaging/[employer]:  XR HAND LEFT (MIN 3 VIEWS)    Result Date: 12/25/2022  1. No acute osseous abnormality. 2. Severe degenerative changes of the wrist and hand with small central erosions in the interphalangeal joints compatible with erosive osteoarthritis. XR HIP LEFT (1 VIEW)    Result Date: 12/26/2022  Status post left total hip arthroplasty in a patient with acute cervical neck fracture. Hardware alignment is satisfactory.   No apparent hardware complication. Expected postoperative soft tissue changes are noted. XR FEMUR LEFT (MIN 2 VIEWS)    Result Date: 12/25/2022  1. Acute moderately displaced left femoral neck fracture. 2. No acute osseous abnormality of the left knee. XR KNEE LEFT (3 VIEWS)    Result Date: 12/25/2022  1. Acute moderately displaced left femoral neck fracture. 2. No acute osseous abnormality of the left knee. XR CHEST PORTABLE    Result Date: 12/25/2022  Enlarged cardiac silhouette and mild pulmonary vascular congestion. 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 Infarct extends to involve the entire apex and small portions of the anterolateral and anterior septal wall. LVEF 37%. Risk stratification: Intermediate risk. XR HIP 2-3 VW W PELVIS LEFT    Result Date: 12/26/2022  Recent postop changes left hip arthroplasty     XR HIP 2-3 VW W PELVIS LEFT    Result Date: 12/25/2022  1. Acute moderately displaced left femoral neck fracture. 2. No acute osseous abnormality of the left knee. Assessment :      Hospital Problems             Last Modified POA    * (Principal) Closed fracture of neck of left femur ([address] 12/26/2022 Yes       Plan:     Left femur neck fracture. Appreciate orthopedic and general surgery conditions. Continue pain control. Recommended for anticoagulation for least 1 month postoperatively  Duodenal ulcer with active bleed status post EGD cauterization, clip, epinephrine on 12/29/2022. Clear liquid diet continue Protonix drip. Trend hemoglobin hematocrit keep hemoglobin above 8  History of combined systolic diastolic heart failure with abnormal stress test  concerning for ischemia. Appreciate cardiology recommendations. Plan for cardiac catheterization once cleared by gastroenterology.   Continue Norvasc, Lipitor, Lasix, Toprol- mg  Depression continue Zoloft  Anticoagulation is held due to GI bleed  History of prostate cancer status postradiation therapy, continue to monitor intake and output, currently urinating well has condom catheter in place  Thank you for the consult. We will continue to follow along.         Consultations:   IP CONSULT TO TRAUMA SURGERY  IP CONSULT TO ORTHOPEDIC SURGERY  IP CONSULT TO CARDIOLOGY  IP CONSULT TO PHYSICAL MEDICINE REHAB  IP CONSULT TO GI  IP CONSULT TO INTERNAL MEDICINE      John Lu MD  12/30/2022  5:11 PM    Copy sent to Dr. Raad Andrade MD

## 2022-12-30 NOTE — PROGRESS NOTES
[facility]. [facility]   Gastroenterology Progress Note    Jaicnto Pearce is a 79 y.o. male patient. Hospitalization Day:5      Chief consult reason:   Concern for GI bleed      Subjective:    -Patient examined at bedside. Patient was awake, alert, oriented X4. Was feeling little fatigued.  -Patient was hemodynamically stable with blood pressure 124/67 mmHg and MAP of 82. -Was breathing on 3 L nasal cannula. Reported having shortness of breath while talking.   -Add upper EGD on 2022.  10 mm duodenal ulcer in the bulb with active bleeding was found which was cauterized with bipolar probe at 15 water and injected with 2 cc of 1 is to 10,000 epinephrine and clipped with 2 metal resolution clips with complete control of bleeding.  -Latest hemoglobin count 8.8. BUN decreased from 35-33    VITALS:  /67   Pulse 75   Temp 97.3 °F (36.3 °C)   Resp 17   Ht 5' 7\" (1.702 m)   Wt 182 lb (82.6 kg)   SpO2 98%   BMI 28.51 kg/m²   TEMPERATURE:  Current - Temp: 97.3 °F (36.3 °C); Max - Temp  Av.5 °F (36.4 °C)  Min: 97.1 °F (36.2 °C)  Max: 98.1 °F (36.7 °C)    History of present illness: This is a 79 y.o. male who presents with mechanical fall right side attempting to  trash in ED ultimately suffering a left hip fracture. The patient has past medical history of CAD s/p CABG and TED on ASA and Plavix, hypertension, hyperlipidemia, peripheral vascular disease, CHF. According to the patient, he was picking up his trash when his leg slipped out and he fell forward without hitting his head or losing consciousness. Imaging showed acute moderately displaced left femoral neck fracture. Orthopedic surgery was consulted. Cardiology was consulted regarding preop risk stratification considering CAD s/p CABG and multiple TED placement. Patient denied any numbness and tingling or sensory loss. Patient was updated on 2022 and left hip hemiarthroplasty was done.   Patient was on heparin drip with aspirin and Brilinta and was recommended 1 month of chemical anticoagulation upon discharge. Due to increasing troponins, cardiology recommended stress test which shows galdino-infarct ischemia and the patient is planned for cardiac cath. During this admission, the patient had black tarry stools. According to him, he also has fullness in the abdomen. Patient passes 1-2 bottles a day usually but has been having dark tarry stools lately. He takes NSAIDs 3-4 times in a month for his back pain. According to him he has undergone EGD and colonoscopy previously. EGD was for reflux although he does not remember the results. His last colonoscopy was more than 10 years ago. Patient takes Plavix and Brilinta at home with aspirin. He does not report taking any long-term PPIs. During bedside evaluation, the patient was awake, alert, oriented X4. Under no acute distress. Patient was hemodynamically stable with blood pressure 135/71 mmHg and MAP of 83. He was breathing comfortably on 2 L nasal cannula oxygen. Patient's latest labs show hemoglobin of 10 dropped from 11.0 yesterday. Patient's BUN is increased from 19-35. Cardiovascular examination revealed a irregular heart rate. Abdominal examination reveals no tenderness. There is no guarding, rigidity, organomegaly, scleral icterus, jaundice. Physical Assessment:  General appearance:  alert, cooperative and no distress  Mental Status:  oriented to person, place and time and normal affect  Lungs:  clear to auscultation bilaterally, normal effort  Heart:  regular rate and rhythm, no murmur  Abdomen:  soft, nontender, nondistended, normal bowel sounds, no masses, hepatomegaly, splenomegaly  Extremities:  no edema, redness, tenderness in the calves  Skin:  no gross lesions, rashes, induration    Data Review:    Labs and Imaging:   XR HAND LEFT (MIN 3 VIEWS)    Result Date: 12/25/2022  1. No acute osseous abnormality.  2. Severe degenerative changes of the wrist and hand with small central erosions in the interphalangeal joints compatible with erosive osteoarthritis. XR HIP LEFT (1 VIEW)    Result Date: 12/26/2022  Status post left total hip arthroplasty in a patient with acute cervical neck fracture. Hardware alignment is satisfactory. No apparent hardware complication. Expected postoperative soft tissue changes are noted. XR FEMUR LEFT (MIN 2 VIEWS)    Result Date: 12/25/2022  1. Acute moderately displaced left femoral neck fracture. 2. No acute osseous abnormality of the left knee. XR KNEE LEFT (3 VIEWS)    Result Date: 12/25/2022  1. Acute moderately displaced left femoral neck fracture. 2. No acute osseous abnormality of the left knee. XR CHEST PORTABLE    Result Date: 12/25/2022  Enlarged cardiac silhouette and mild pulmonary vascular congestion. 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 Infarct extends to involve the entire apex and small portions of the anterolateral and anterior septal wall. LVEF 37%. Risk stratification: Intermediate risk. XR HIP 2-3 VW W PELVIS LEFT    Result Date: 12/26/2022  Recent postop changes left hip arthroplasty     XR HIP 2-3 VW W PELVIS LEFT    Result Date: 12/25/2022  1. Acute moderately displaced left femoral neck fracture. 2. No acute osseous abnormality of the left knee.        CBC:  Recent Labs     12/27/22  1105 12/28/22  0312 12/29/22  0602 12/29/22  1959 12/30/22  0338   WBC 12.9* 11.1 10.3  --  9.8   HGB 12.3* 11.0* 10.0* 9.5* 8.8*   MCV 87.7 91.2 89.5  --  91.9   RDW 13.3 13.6 13.6  --  13.9    178 252  --  217       ANEMIA STUDIES:  Recent Labs     12/28/22  1225   [id number] 357   FOLATE 15.5       BMP:  Recent Labs     12/28/22  0312 12/29/22  0602 12/30/22  0338    136 134*   K 3.9 3.5* 4.0   CL 99 99 100   CO2 25 29 28   BUN 19 35* 33*   CREATININE 0.89 0.62* 0.75   GLUCOSE 131* 148* 136*   CALCIUM 7.8* 7.6* 7.8*       LFTS:  Recent Labs     12/28/22  1225   LABALBU 2.9*       Amylase/Lipase and Ammonia:  No results for input(s): AMYLASE, LIPASE, AMMONIA in the last 72 hours. Acute Hepatitis Panel:  No results found for: HEPBSAG, HEPCAB, HEPBIGM, HEPAIGM    HCV Genotype:  No results found for: HEPATITISCGENOTYPE    HCV Quantitative:  No results found for: HCVQNT    LIVER WORK UP:    AFP  No results found for: AFP    Alpha 1 antitrypsin   No results found for: A1A    ALBINO  No results found for: ALBINO    AMA  No results found for: MITOAB    ASMA  No results found for: SMOOTHMUSCAB    PT/INR  No results for input(s): PROTIME, INR in the last 72 hours. Cancer Markers:  CEA:  No results for input(s): CEA in the last 72 hours. Ca 125:  No results for input(s):  in the last 72 hours. Ca 19-9:   Invalid input(s):   AFP: No results for input(s): AFP in the last 72 hours. Lactic acid:Invalid input(s): LACTIC ACID    Radiology Review:    No results found. Principal Problem:    Closed fracture of neck of left femur ([address]  Resolved Problems:    * No resolved hospital problems. *       Assessment and plan:     Anemia secondary to upper GI bleed possibly due to bleeding ulcers:  -Patient hemoglobin down-trending  -BUN level up-trending  -Reported having dark tarry stools lately  -Patient has history of GERD  - Had upper EGD on 12/29/2022.  10 mm duodenal ulcer in the bulb with active bleeding was found which was cauterized with bipolar probe at 15 water and injected with 2 cc of 1 is to 10,000 epinephrine and clipped with 2 metal resolution clips with complete control of bleeding.  -Hemoglobin count 8.8 with BUN decreased from 35-33  -Patient on Protonix drip with Protonix 40 mg oral 2 times daily before meals; continue current treatment   -Continue to monitor H&H    History of CAD s/p CABG and multiple TED placement:  -Cardiology following  -Latest stress test shows galdino-infarct ischemia  -Cardiology planning to do cardiac cath     Left femur neck fracture s/p left hemiarthroplasty on 12/26/2022:  -Moderate risk as per cardiology  -PM&R following  -Management as per primary    This plan was formulated in collaboration with Dr. Brodie Perez MD    Awaiting attending attestation. Thank you for allowing me to participate in the care of your patient. Please feel free to contact me with any questions or concerns. [address] [facility] Gastroenterology   Raquel Escobar MD PGY-1  12/30/2022 10:59 AM    This note was created with the assistance of a speech-recognition program.  Although the intention is to generate a document that actually reflects the content of the visit, no guarantees can be provided that every mistake has been identified and corrected by editing.

## 2022-12-30 NOTE — PLAN OF CARE
Orthopedic Surgery Brief Plan of Care Note    Paged regarding Left Hip surgical incision dressing. Optifoam dressing saturated. I evaluated the patient and removed the surgical optifoam dressing. Clinical media was uploaded. No concern for dehiscence. No erythema or purulent drainage noted. Optifoam saturated with serosanguinous fluid. New optifoam dressing reapplied to incision. Plan to leave this in place unless saturated until outpatient follow up appointment with Dr. Wendi Moran. Please perfect serve orthopedic surgery resident on call with questions or concerns.          Gelacio Meek MD  Orthopedic Surgery Resident, PGY-1  [id number] [facility]

## 2022-12-30 NOTE — ANESTHESIA POSTPROCEDURE EVALUATION
Department of Anesthesiology  Postprocedure Note    Patient: Tori Anderson  MRN: [id number]  YOB: 1940  Date of evaluation: 12/29/2022      Procedure Summary     Date: 12/29/22 Room / Location: [address]    Anesthesia Start: 5958 Anesthesia Stop: 1817    Procedure: EGD CONTROL HEMORRHAGE Diagnosis:       Melena      (MELENA)    Surgeons: Ben Dunne MD Responsible Provider: Panfilo Villar MD    Anesthesia Type: MAC ASA Status: 4          Anesthesia Type: No value filed.     Ritu Phase I: Ritu Score: 7    Ritu Phase II:        Anesthesia Post Evaluation    Patient location during evaluation: bedside  Patient participation: complete - patient participated  Level of consciousness: awake  Airway patency: patent  Nausea & Vomiting: no nausea and no vomiting  Complications: no  Cardiovascular status: blood pressure returned to baseline  Respiratory status: acceptable  Hydration status: euvolemic  Comments: /75   Pulse 84   Temp 97.4 °F (36.3 °C)   Resp 21   Ht 5' 7\" (1.702 m)   Wt 182 lb (82.6 kg)   SpO2 99%   BMI 28.51 kg/m²

## 2022-12-31 LAB
ABSOLUTE EOS #: 0.43 K/UL (ref 0–0.4)
ABSOLUTE IMMATURE GRANULOCYTE: 0.21 K/UL (ref 0–0.3)
ABSOLUTE LYMPH #: 0.64 K/UL (ref 1–4.8)
ABSOLUTE MONO #: 0.96 K/UL (ref 0.1–0.8)
ANION GAP SERPL CALCULATED.3IONS-SCNC: 10 MMOL/L (ref 9–17)
ANION GAP SERPL CALCULATED.3IONS-SCNC: 12 MMOL/L (ref 9–17)
BASOPHILS # BLD: 1 % (ref 0–2)
BASOPHILS ABSOLUTE: 0.11 K/UL (ref 0–0.2)
BUN BLDV-MCNC: 16 MG/DL (ref 8–23)
BUN BLDV-MCNC: 22 MG/DL (ref 8–23)
CALCIUM SERPL-MCNC: 7.5 MG/DL (ref 8.6–10.4)
CALCIUM SERPL-MCNC: 7.8 MG/DL (ref 8.6–10.4)
CHLORIDE BLD-SCNC: 97 MMOL/L (ref 98–107)
CHLORIDE BLD-SCNC: 98 MMOL/L (ref 98–107)
CO2: 27 MMOL/L (ref 20–31)
CO2: 27 MMOL/L (ref 20–31)
CREAT SERPL-MCNC: 0.71 MG/DL (ref 0.7–1.2)
CREAT SERPL-MCNC: 0.79 MG/DL (ref 0.7–1.2)
EOSINOPHILS RELATIVE PERCENT: 4 % (ref 1–4)
GFR SERPL CREATININE-BSD FRML MDRD: >60 ML/MIN/1.73M2
GFR SERPL CREATININE-BSD FRML MDRD: >60 ML/MIN/1.73M2
GLUCOSE BLD-MCNC: 124 MG/DL (ref 70–99)
GLUCOSE BLD-MCNC: 92 MG/DL (ref 70–99)
HCT VFR BLD CALC: 27 % (ref 40.7–50.3)
HEMOGLOBIN: 8.7 G/DL (ref 13–17)
IMMATURE GRANULOCYTES: 2 %
LYMPHOCYTES # BLD: 6 % (ref 24–44)
MAGNESIUM: 2 MG/DL (ref 1.6–2.6)
MCH RBC QN AUTO: 29.3 PG (ref 25.2–33.5)
MCHC RBC AUTO-ENTMCNC: 32.2 G/DL (ref 28.4–34.8)
MCV RBC AUTO: 90.9 FL (ref 82.6–102.9)
MONOCYTES # BLD: 9 % (ref 1–7)
MORPHOLOGY: NORMAL
NRBC AUTOMATED: 0 PER 100 WBC
PDW BLD-RTO: 13.9 % (ref 11.8–14.4)
PHOSPHORUS: 3.6 MG/DL (ref 2.5–4.5)
PLATELET # BLD: 224 K/UL (ref 138–453)
PMV BLD AUTO: 10.7 FL (ref 8.1–13.5)
POTASSIUM SERPL-SCNC: 3 MMOL/L (ref 3.7–5.3)
POTASSIUM SERPL-SCNC: 3.7 MMOL/L (ref 3.7–5.3)
RBC # BLD: 2.97 M/UL (ref 4.21–5.77)
REASON FOR REJECTION: NORMAL
SEG NEUTROPHILS: 78 % (ref 36–66)
SEGMENTED NEUTROPHILS ABSOLUTE COUNT: 8.35 K/UL (ref 1.8–7.7)
SODIUM BLD-SCNC: 134 MMOL/L (ref 135–144)
SODIUM BLD-SCNC: 137 MMOL/L (ref 135–144)
WBC # BLD: 10.7 K/UL (ref 3.5–11.3)
ZZ NTE CLEAN UP: ORDERED TEST: NORMAL
ZZ NTE WITH NAME CLEAN UP: SPECIMEN SOURCE: NORMAL

## 2022-12-31 PROCEDURE — 6370000000 HC RX 637 (ALT 250 FOR IP)

## 2022-12-31 PROCEDURE — 6370000000 HC RX 637 (ALT 250 FOR IP): Performed by: INTERNAL MEDICINE

## 2022-12-31 PROCEDURE — 97110 THERAPEUTIC EXERCISES: CPT

## 2022-12-31 PROCEDURE — 99232 SBSQ HOSP IP/OBS MODERATE 35: CPT | Performed by: STUDENT IN AN ORGANIZED HEALTH CARE EDUCATION/TRAINING PROGRAM

## 2022-12-31 PROCEDURE — 85025 COMPLETE CBC W/AUTO DIFF WBC: CPT

## 2022-12-31 PROCEDURE — 76937 US GUIDE VASCULAR ACCESS: CPT

## 2022-12-31 PROCEDURE — 6370000000 HC RX 637 (ALT 250 FOR IP): Performed by: STUDENT IN AN ORGANIZED HEALTH CARE EDUCATION/TRAINING PROGRAM

## 2022-12-31 PROCEDURE — 2700000000 HC OXYGEN THERAPY PER DAY

## 2022-12-31 PROCEDURE — 80048 BASIC METABOLIC PNL TOTAL CA: CPT

## 2022-12-31 PROCEDURE — 2580000003 HC RX 258: Performed by: INTERNAL MEDICINE

## 2022-12-31 PROCEDURE — 97116 GAIT TRAINING THERAPY: CPT

## 2022-12-31 PROCEDURE — 2709999900 HC NON-CHARGEABLE SUPPLY

## 2022-12-31 PROCEDURE — 1200000000 HC SEMI PRIVATE

## 2022-12-31 PROCEDURE — 83735 ASSAY OF MAGNESIUM: CPT

## 2022-12-31 PROCEDURE — C9113 INJ PANTOPRAZOLE SODIUM, VIA: HCPCS | Performed by: INTERNAL MEDICINE

## 2022-12-31 PROCEDURE — 36415 COLL VENOUS BLD VENIPUNCTURE: CPT

## 2022-12-31 PROCEDURE — 6360000002 HC RX W HCPCS: Performed by: INTERNAL MEDICINE

## 2022-12-31 PROCEDURE — 84100 ASSAY OF PHOSPHORUS: CPT

## 2022-12-31 PROCEDURE — 94761 N-INVAS EAR/PLS OXIMETRY MLT: CPT

## 2022-12-31 RX ORDER — POTASSIUM CHLORIDE 20 MEQ/1
60 TABLET, EXTENDED RELEASE ORAL ONCE
Status: COMPLETED | OUTPATIENT
Start: 2022-12-31 | End: 2022-12-31

## 2022-12-31 RX ORDER — METHOCARBAMOL 500 MG/1
500 TABLET, FILM COATED ORAL 3 TIMES DAILY
Status: DISCONTINUED | OUTPATIENT
Start: 2022-12-31 | End: 2023-01-11 | Stop reason: HOSPADM

## 2022-12-31 RX ADMIN — FUROSEMIDE 40 MG: 40 TABLET ORAL at 09:37

## 2022-12-31 RX ADMIN — METHOCARBAMOL TABLETS 500 MG: 500 TABLET, COATED ORAL at 20:46

## 2022-12-31 RX ADMIN — SODIUM CHLORIDE, PRESERVATIVE FREE 10 ML: 5 INJECTION INTRAVENOUS at 09:38

## 2022-12-31 RX ADMIN — AMLODIPINE BESYLATE 10 MG: 10 TABLET ORAL at 09:37

## 2022-12-31 RX ADMIN — POTASSIUM CHLORIDE 60 MEQ: 1500 TABLET, EXTENDED RELEASE ORAL at 10:53

## 2022-12-31 RX ADMIN — Medication 3 MG: at 20:46

## 2022-12-31 RX ADMIN — GABAPENTIN 600 MG: 300 CAPSULE ORAL at 09:36

## 2022-12-31 RX ADMIN — METHOCARBAMOL TABLETS 750 MG: 750 TABLET, COATED ORAL at 09:37

## 2022-12-31 RX ADMIN — ATORVASTATIN CALCIUM 10 MG: 10 TABLET, FILM COATED ORAL at 09:37

## 2022-12-31 RX ADMIN — METHOCARBAMOL TABLETS 750 MG: 750 TABLET, COATED ORAL at 15:02

## 2022-12-31 RX ADMIN — METOPROLOL SUCCINATE 100 MG: 100 TABLET, FILM COATED, EXTENDED RELEASE ORAL at 09:36

## 2022-12-31 RX ADMIN — ACETAMINOPHEN 1000 MG: 500 TABLET, FILM COATED ORAL at 20:46

## 2022-12-31 RX ADMIN — GABAPENTIN 600 MG: 300 CAPSULE ORAL at 20:41

## 2022-12-31 RX ADMIN — SERTRALINE 25 MG: 25 TABLET, FILM COATED ORAL at 09:36

## 2022-12-31 RX ADMIN — ACETAMINOPHEN 1000 MG: 500 TABLET, FILM COATED ORAL at 15:02

## 2022-12-31 RX ADMIN — SODIUM CHLORIDE 8 MG/HR: 9 INJECTION, SOLUTION INTRAVENOUS at 06:23

## 2022-12-31 ASSESSMENT — PAIN SCALES - GENERAL: PAINLEVEL_OUTOF10: 2

## 2022-12-31 NOTE — PROGRESS NOTES
[facility]  Office: [address], DO, Radha Hammer, DO, Amy Manley, DO, Colt Lopez Blood, DO, Masoud Neff MD, Sera Haney MD, Clyde Ibrahim MD, Fidencio Viramontes MD,  Jas Concepcion MD, Bishop Pang MD, Janie Rabago, DO, Rhona Verma MD,  Duglas Baires MD, Chano Moya MD, Alisson Ferguson DO, Brenda Rivera MD, Eduardo Lowe MD, Eunice Moreno DO, Selene Thrasher MD, Ruth May MD, Anish Escobar MD, Dani Benjamin MD, Oliver Diaz DO, Antonio Veliz MD, Jacque Martin MD, Kamaljit Leung, CNP,  Jony Ratliff, CNP, Kaela Pisano, CNP, Aleta Casas, CNP,  Mccoy Baumgarten, [facility], Oneil Donnelly, CNP, Radha Gilman, CNP, George Ogden, CNP, Diana Turner, CNP, Libra Briseno, CNP, Isamar Valenzuela PAVladC, Mclean Emery, CNS, Jeanne Landa [facility], [facility], [address] [place] [address]    Progress Note    12/31/2022    12:52 PM    Name:   Derik Menendez  MRN:     [id number]     Acct:      [de-identified]   Room:   [address] [place] [address] Day:  6  Admit Date:  12/25/2022  7:27 PM    PCP:   Yadira Rosario MD  Code Status:  Full Code    Subjective:     C/C:   Chief Complaint   Patient presents with    Fall     Interval History Status: improved. Patient seen and examined. Working with physical therapy. Overall feeling well. Dark tarry stools have been clearing up now. Has been having some green bowel movements. Hemoglobin stable compared to yesterday 8.8-8.7. Per patient has energy is improving. Slight hyper bow kalemia was replaced with 60 mill equivalents this morning. Brief History:      This is a 79-year-old male past medical history of neuropathy, AICD in place, peripheral vascular disease, coronary artery disease, history of diastolic CHF, prostate cancer status postradiation therapy, arthritis, bilateral carotid stenosis presented to the ER on 12/25/2022 after patient had a fall denied any loss of consciousness. Patient was admitted to trauma surgery service. Cardiology was consulted for medical clearance was moderate to high risk for any OR procedure due to history of significant coronary artery disease and LAD. Patient underwent left hip hemiarthroplasty with orthopedic surgery on 12/26/2022, afterwards was noted to have episodes of nonsustained V. tach. Underwent stress test on 12/28/2022 showing concern of galdino-infarct ischemia and plan for cardiac catheterization however was held due to patient having black tarry stools on 12/29/2022 patient underwent EGD with GI found to have 10 mm duodenal ulcer with active bleeding that was cauterized and injected with epinephrine and clipped. Patient currently on clear liquid diet and plan for cardiac catheterization when cleared by GI. Patient continues on Protonix drip at this time        Echo 12/26/22:  CONCLUSIONS     Summary  Severe LV systolic dysfunction, with EF 20-25%. Reduced LV diastolic compliance. Mild aortic insufficiency. Mild mitral regurgitation. Mild tricuspid regurgitation. 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 Infarct extends to involve the entire apex and   small portions of the anterolateral and anterior septal wall. LVEF 37%. Risk stratification: Intermediate risk. EGD 12/29/22:  Findings[de-identified]   Esophagus: Normal  Stomach: Small sliding hiatal hernia.   Stomach was otherwise normal.  Duodenum: There was a 10 mm duodenal ulcer in the bulb with active bleeding. This was cauterized with bipolar probe at 13 W and injected with 2 cc of 1 is to 10,000 epinephrine and clipped with 2 metal resolution clips with complete control of bleeding. There was no active bleeding at the end of the procedure. The rest of the duodenum was otherwise normal.     Recommendations: Okay for liquid diet. PPI drip. Review of Systems:     Review of Systems   Constitutional:  Positive for fatigue. Negative for activity change and fever. HENT:  Negative for congestion and sinus pain. Eyes:  Negative for pain. Respiratory:  Negative for apnea, chest tightness and shortness of breath. Cardiovascular:  Negative for chest pain, palpitations and leg swelling. Gastrointestinal:  Negative for blood in stool and diarrhea. Genitourinary:  Negative for difficulty urinating and frequency. Musculoskeletal:  Positive for arthralgias and myalgias. Skin:  Negative for color change and wound. Neurological:  Positive for weakness. Negative for dizziness. Psychiatric/Behavioral:  Negative for agitation and hallucinations. Medications: Allergies:     Allergies   Allergen Reactions    Percocet [Oxycodone-Acetaminophen] Other (See Comments)     Panic attack       Current Meds:   Scheduled Meds:    [Held by provider] pantoprazole  40 mg Oral BID AC    furosemide  40 mg Oral Daily    amLODIPine  10 mg Oral Daily    metoprolol succinate  100 mg Oral Daily    sertraline  25 mg Oral Daily    atorvastatin  10 mg Oral Daily    gabapentin  600 mg Oral BID    [Held by provider] aspirin  81 mg Oral Daily    [Held by provider] ticagrelor  90 mg Oral BID    sodium chloride flush  5-40 mL IntraVENous 2 times per day    acetaminophen  1,000 mg Oral 3 times per day    methocarbamol  750 mg Oral TID     Continuous Infusions:    pantoprazole 8 mg/hr (12/31/22 8794)    [Held by provider] heparin (PORCINE) Infusion Stopped (22 0651)     PRN Meds: polyethylene glycol, sodium chloride flush, sodium chloride flush, nitroGLYCERIN, [Held by provider] heparin (porcine), [Held by provider] heparin (porcine), fluticasone, melatonin, oxyCODONE, sodium chloride flush, ondansetron **OR** ondansetron    Data:     Past Medical History:   has a past medical history of Acid reflux, Anxiety, Arthritis, BPH (benign prostatic hypertrophy), CAD (coronary artery disease), Cancer ([address] Carotid stenosis, Cataracts, both eyes, CHF (congestive heart failure) ([address] Dry skin, Elevated PSA, Examination of participant in clinical trial, Hyperlipidemia, Hypertension, Left ventricular dysfunction, Macular degeneration, Myocardial infarction ([address] Neuropathy, Pacemaker, Panic attacks, PVD (peripheral vascular disease) ([address] Sinus problem, Snores, Vasovagal near syncope, and Wears glasses. Social History:   reports that he has never smoked. He has never used smokeless tobacco. He reports current alcohol use of about 2.0 standard drinks per week. He reports that he does not use drugs. Family History:   Family History   Problem Relation Age of Onset    High Blood Pressure Mother     Cancer Mother     High Blood Pressure Father     Diabetes Sister     Cancer Sister     High Blood Pressure Sister     Heart Disease Brother     High Blood Pressure Brother        Vitals:  BP (!) 141/68   Pulse 78   Temp 98 °F (36.7 °C) (Oral)   Resp 16   Ht 5' 7\" (1.702 m)   Wt 182 lb (82.6 kg)   SpO2 96%   BMI 28.51 kg/m²   Temp (24hrs), Av.9 °F (36.6 °C), Min:97.5 °F (36.4 °C), Max:98.2 °F (36.8 °C)    No results for input(s): POCGLU in the last 72 hours. I/O (24Hr):   No intake or output data in the 24 hours ending 22 1252    Labs:  Hematology:  Recent Labs     22  0602 22  1959 22  0338 22  0725   WBC 10.3  --  9.8 10.7   RBC 3.33*  --  2.98* 2.97*   HGB 10.0* 9.5* 8.8* 8.7*   HCT 29.8* 28.6* 27.4* 27.0*   MCV 89.5  -- 91.9 90.9   MCH 30.0  --  29.5 29.3   MCHC 33.6  --  32.1 32.2   RDW 13.6  --  13.9 13.9     --  217 224   MPV 10.7  --  10.8 10.7     Chemistry:  Recent Labs     12/29/22  0602 12/30/22  0338 12/31/22  0725    134* 137   K 3.5* 4.0 3.0*   CL 99 100 98   CO2 29 28 27   GLUCOSE 148* 136* 124*   BUN 35* 33* 22   CREATININE 0.62* 0.75 0.79   MG  --   --  2.0   ANIONGAP 8* 6* 12   LABGLOM >60 >60 >60   CALCIUM 7.6* 7.8* 7.8*   PHOS  --   --  3.6   No results for input(s): PROT, LABALBU, LABA1C, C9TMJBR, X5NMZIF, FT4, TSH, AST, ALT, LDH, GGT, ALKPHOS, LABGGT, BILITOT, BILIDIR, AMMONIA, AMYLASE, LIPASE, LACTATE, CHOL, HDL, LDLCHOLESTEROL, CHOLHDLRATIO, TRIG, VLDL, WAE78VF, PHENYTOIN, PHENYF, URICACID, POCGLU in the last 72 hours. ABG:  Lab Results   Component Value Date/Time    POCPH 7.35 03/04/2014 04:57 PM    POCPCO2 63 03/04/2014 04:57 PM    POCPO2 103 03/04/2014 04:57 PM    POCHCO3 35.2 03/04/2014 04:57 PM    NBEA NOT REPORTED 03/04/2014 04:57 PM    PBEA 10 03/04/2014 04:57 PM    ZDM8SVF 37 03/04/2014 04:57 PM    XPHM7ZFG 97 03/04/2014 04:57 PM    FIO2 NO SAMPLE RECEIVED 12/25/2022 08:13 PM     Lab Results   Component Value Date/Time    SPECIAL NOT REPORTED 07/28/2014 05:19 PM     Lab Results   Component Value Date/Time    CULTURE NO GROWTH 03/02/2014 05:00 PM    CULTURE  03/02/2014 05:00 PM     [employer] [id number] [facility] 3 [phone]       Radiology:  XR HAND LEFT (MIN 3 VIEWS)    Result Date: 12/25/2022  1. No acute osseous abnormality. 2. Severe degenerative changes of the wrist and hand with small central erosions in the interphalangeal joints compatible with erosive osteoarthritis. XR HIP LEFT (1 VIEW)    Result Date: 12/26/2022  Status post left total hip arthroplasty in a patient with acute cervical neck fracture. Hardware alignment is satisfactory. No apparent hardware complication. Expected postoperative soft tissue changes are noted.      XR FEMUR LEFT (MIN 2 VIEWS)    Result Date: 12/25/2022  1. Acute moderately displaced left femoral neck fracture. 2. No acute osseous abnormality of the left knee. XR KNEE LEFT (3 VIEWS)    Result Date: 12/25/2022  1. Acute moderately displaced left femoral neck fracture. 2. No acute osseous abnormality of the left knee. XR CHEST PORTABLE    Result Date: 12/25/2022  Enlarged cardiac silhouette and mild pulmonary vascular congestion. 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 Infarct extends to involve the entire apex and small portions of the anterolateral and anterior septal wall. LVEF 37%. Risk stratification: Intermediate risk. XR HIP 2-3 VW W PELVIS LEFT    Result Date: 12/26/2022  Recent postop changes left hip arthroplasty     XR HIP 2-3 VW W PELVIS LEFT    Result Date: 12/25/2022  1. Acute moderately displaced left femoral neck fracture. 2. No acute osseous abnormality of the left knee. Physical Examination:        Physical Exam  Constitutional:       Appearance: He is obese. He is not diaphoretic. HENT:      Head: Normocephalic and atraumatic. Right Ear: External ear normal.      Left Ear: External ear normal.      Nose: No congestion. Mouth/Throat:      Mouth: Mucous membranes are moist.   Eyes:      Pupils: Pupils are equal, round, and reactive to light. Cardiovascular:      Rate and Rhythm: Normal rate and regular rhythm. Heart sounds: Murmur heard. Pulmonary:      Breath sounds: No wheezing or rales. Abdominal:      Palpations: Abdomen is soft. Comments: Mild distention, mild tenderness to epigastric area, no fluid wave appreciated   Musculoskeletal:      Cervical back: Neck supple. Right lower leg: No edema. Left lower leg: No edema. Skin:     General: Skin is dry. Capillary Refill: Capillary refill takes less than 2 seconds. Coloration: Skin is pale. Neurological:      General: No focal deficit present. Mental Status: He is alert and oriented to person, place, and time. Psychiatric:         Mood and Affect: Mood normal.         Behavior: Behavior normal.       Assessment:        Hospital Problems             Last Modified POA    * (Principal) Closed fracture of neck of left femur ([facility] 12/26/2022 Yes    Duodenal ulcer 12/30/2022 Yes    CAD (coronary artery disease) 12/30/2022 Yes    Hypertension 12/30/2022 Yes    BPH (benign prostatic hyperplasia) 12/30/2022 Yes    Chronic systolic heart failure ([address] (Chronic) 12/30/2022 Yes    Claudication in peripheral vascular disease ([facility] 12/30/2022 Yes    Malignant neoplasm of prostate ([facility] 12/30/2022 Yes       Plan:        Left femur neck fracture. Appreciate orthopedic and general surgery conditions. Continue pain control. Recommended for anticoagulation for least 1 month postoperatively per orhtopedic surgery  Duodenal ulcer with active bleed status post EGD cauterization, clip, epinephrine on 12/29/2022. Clear liquid diet continue Protonix drip. Trend hemoglobin hematocrit keep hemoglobin above 8, plan to have IV Protonix continue until Sunday, 1/1/2023  Will try to advance diet  Hypokalemia, recheck BMP  History of combined systolic diastolic heart failure with abnormal stress test  concerning for ischemia. Appreciate cardiology recommendations. Plan for cardiac catheterization once cleared by gastroenterology. Continue Norvasc, Lipitor, Lasix, Toprol- mg,   Depression continue Zoloft  Anticoagulation is held due to GI bleed  History of prostate cancer status postradiation therapy, continue to monitor intake and output  Discussed with trauma surgery, will take over as primary  Discharge planning, appreciate PM&R recommendations, still awaiting cardiac catheterization.     Theresa oLco MD  12/31/2022  12:52 PM

## 2022-12-31 NOTE — PROGRESS NOTES
Physical Therapy  Facility/Department: [facility] RENAL//MED SURG  Physical Therapy Daily Treatment Note    Name: Sebastian Gonsales  : 1940  MRN: [id number]  Date of Service: 2022    Discharge Recommendations:  Patient would benefit from continued therapy after discharge   PT Equipment Recommendations  Equipment Needed: No      Patient Diagnosis(es): The encounter diagnosis was Closed fracture of neck of left femur, initial encounter ([facility] [address] Past Medical History:  has a past medical history of Acid reflux, Anxiety, Arthritis, BPH (benign prostatic hypertrophy), CAD (coronary artery disease), Cancer ([address] Carotid stenosis, Cataracts, both eyes, CHF (congestive heart failure) ([facility] [address] Dry skin, Elevated PSA, Examination of participant in clinical trial, Hyperlipidemia, Hypertension, Left ventricular dysfunction, Macular degeneration, Myocardial infarction ([facility] [address] Neuropathy, Pacemaker, Panic attacks, PVD (peripheral vascular disease) ([facility] [address] Sinus problem, Snores, Vasovagal near syncope, and Wears glasses. Past Surgical History:  has a past surgical history that includes Pacemaker insertion (, , , , 2019); Cholecystectomy, laparoscopic (2014); eye surgery (Bilateral); Cardiac defibrillator placement (); Cardiac surgery (); Cardiac catheterization; Colonoscopy; Prostate Biopsy (2019); Prostate biopsy (N/A, 2019); pacemaker placement; Cataract removal; Hip Arthroplasty (Left, 2022); hip surgery (Left, 2022); Esophagogastroduodenoscopy (2022); and Upper gastrointestinal endoscopy (N/A, 2022). Assessment   Body Structures, Functions, Activity Limitations Requiring Skilled Therapeutic Intervention: Decreased functional mobility ; Decreased strength;Decreased endurance;Decreased balance; Increased pain;Decreased coordination  Assessment: Pt required maxA for bed mobility and modA to perform transfers with RW.  Pt ambulated ~6ft to chair with RW and Damon Smith needing cueing for safe RW management. Pt would be unsafe to return to prior living arrangements as he is a high fall risk. Recommending continued PT to address deficits and maximize safety and independence with mobility. Therapy Prognosis: Good  Requires PT Follow-Up: Yes  Activity Tolerance  Activity Tolerance: Patient tolerated treatment well;Patient limited by endurance     Plan   Physcial Therapy Plan  General Plan: 6-7 times per week  Current Treatment Recommendations: Strengthening, Balance training, Functional mobility training, Transfer training, Gait training, Endurance training, Safety education & training, Patient/Caregiver education & training, Therapeutic activities, Equipment evaluation, education, & procurement, Home exercise program, Stair training  Safety Devices  Type of Devices: Gait belt, Left in chair, Call light within reach, Nurse notified, Chair alarm in place, Patient at risk for falls  Restraints  Restraints Initially in Place: No     Restrictions  Restrictions/Precautions  Restrictions/Precautions: Weight Bearing, Fall Risk  Required Braces or Orthoses?: No  Lower Extremity Weight Bearing Restrictions  Left Lower Extremity Weight Bearing: Weight Bearing As Tolerated  Position Activity Restriction  Hip Precautions: No hip flexion > 90 degrees, No ADduction, Posterior hip precautions, No hip internal rotation  Other position/activity restrictions: S/p: L hip hemiarthoplasty 12/26/2022     Subjective   General  Chart Reviewed: Yes  Patient assessed for rehabilitation services?: Yes  Response To Previous Treatment: Patient with no complaints from previous session. Family / Caregiver Present: No  Follows Commands: Within Functional Limits  General Comment  Comments: Pt retired to chair at end of session with call light in reach. Subjective  Subjective: Pt and RN agreeable to PT this afternoon. PT supine in bed upon arrival with no c/o pain. Pt pleasant and cooperative throughout session. Cognition   Orientation  Overall Orientation Status: Within Functional Limits  Cognition  Overall Cognitive Status: WFL     Objective   Bed mobility  Supine to Sit: Maximum assistance  Sit to Supine: Unable to assess  Scooting: Maximal assistance  Bed Mobility Comments: HOB elevated, required assistance with trunk and LE progression, increased time/effort required. Transfers  Sit to Stand: Moderate Assistance  Stand to Sit: Moderate Assistance  Comment: RW used for transfers, pt insisting on pulling up on RW despite cueing for hand placement. Ambulation  Surface: Level tile  Device: Rolling Walker  Assistance: Moderate assistance  Quality of Gait: mildly unsteady, step-to pattern, decreased LLE stance phase. no true LOB. Gait Deviations: Slow Sunshine;Decreased step length;Decreased step height  Distance: 6ft to chair  Comments: Pt ambulated a short distance to chair, requiring cueing for safe RW management as pt tends to keep RW too far out in front of him. Pt with difficulty advancing LEs. Pt declining further ambulation distance stating \"I feel like I just ran a marathon\". More Ambulation?: No  Stairs/Curb  Stairs?: No     Balance  Posture: Fair  Sitting - Static: Fair;+  Sitting - Dynamic: Fair  Standing - Static: Fair  Standing - Dynamic: Fair;-  Comments: standing balance assessed while using a RW    Exercise Treatment:  Seated LE exercise program: Long Arc Quads, hip abduction/adduction, heel/toe raises, and marches. Reps: x10  Comments: Pt performed while seated in chair. AM-PAC Score  AM-PAC Inpatient Mobility Raw Score : 12 (12/31/22 1423)  AM-PAC Inpatient T-Scale Score : 35.33 (12/31/22 1423)  Mobility Inpatient CMS 0-100% Score: 68.66 (12/31/22 1423)  Mobility Inpatient CMS G-Code Modifier : CL (12/31/22 1423)       Goals  Short Term Goals  Time Frame for Short Term Goals: 14 visits  Short Term Goal 1: Pt will ambulate 150 feet with a RW and SBA.   Short Term Goal 2: Pt will demonstrate good- standing balance to decrease fall risk. Short Term Goal 3: Pt will negotiate 3 stairs with bilateral handrails and SBA to allow the pt to enter prior living arrangements. Short Term Goal 4: Pt will perform sit<>stand transfer with CGA. Short Term Goal 5: Pt will perform bed mobility with CGA while maintaining posterior hip precautions. Additional Goals?: No       Education  Patient Education  Education Given To: Patient  Education Provided: Role of Therapy;Transfer Training;Plan of Care; Fall Prevention Strategies;Precautions  Education Provided Comments: Educated on posterior hip precautions.   Education Method: Verbal  Barriers to Learning: None  Education Outcome: Verbalized understanding;Continued education needed      Therapy Time   Individual Concurrent Group Co-treatment   Time In 7245         Time Out 1307         Minutes 30         Timed Code Treatment Minutes: 6401 Kuldeep Gonsalez PTA

## 2022-12-31 NOTE — PROCEDURES
Extended Dwell peripheral catheter insertion note:    Reason for placement:  Device inserted - 20g 8cm extended dwell Catheter  Ultrasound preassessment done. Target vessel is left vein cephalic. Vessel depth = 1.5 cm. Vein measures 0.31 cm. CVR is 12.1%. (Preffered area based CVR is less than 20%). Placed using ANTT fashion, US prepared with sterile probe cover. Visualization of needle entry into vessel, 1 attempt using AST technique. Total 8cm inserted, 0cm external.   Easy aspiration of dark non-pulsating blood. Flushes easily with 10ml of 0.9 NS.   CHG-TSM dressing placed. Injection cap and swab cap applied. No bleeding or hematoma noted. Estimated blood loss = 0ml. Instructions given on insertion and care. RN aware no lab draws without a physician order, line is power injectable, keep clamped when not in use, pulsatile 10ml NS flush every 8 hours when not in use or after intermittent use with medication. Line ready for use.     Lot # = [id number]   Expires =  2024-06-30     - [place] [employer]  Time out - 1620  Start time - 0404  End time - 2775

## 2022-12-31 NOTE — PLAN OF CARE

## 2022-12-31 NOTE — PROGRESS NOTES
PROGRESS NOTE          PATIENT NAME: Jorge L Faulkner. RECORD NO. [id number]  DATE: 12/31/2022    HD: # 6      Patient Active Problem List   Diagnosis    Right upper quadrant abdominal pain    CAD (coronary artery disease)    Hypertension    Skin cancer    BPH (benign prostatic hyperplasia)    Chronic systolic heart failure (HCC)    Hyperlipemia    Leukocytosis    Cholelithiasis with acute cholecystitis    Pre-syncope    Claudication in peripheral vascular disease (HCC)    Carotid stenosis, asymptomatic    Squamous cell carcinoma of skin of left upper arm    Encounter due to AICD at end of battery life-Change OUT 7-1-19    Malignant neoplasm of prostate ([facility] [address]    Chest pain    COVID    COVID-19    Closed fracture of neck of left femur (HCC)    Duodenal ulcer       DIAGNOSIS AND PLAN    Mechanical fall from standing with acute moderately displaced left femoral neck fracture  -Moderate to high risk per cardiology  -Left hip hemiarthroplasty 12/26  -Follow up PM&R recommendations     CAD s/p CABG and multiple TED's  ICM with history of pacemaker (first placed in 1995). ICD 2013. AICD 7/1/2019, CHF  -Appreciate Cardiology assistance  -EKG and ECHO reviewed  -Stress test galdino-infarct ischemia  -Plan for cardiac cath when cleared by GI. -Aspirin, Brilinta, and heparin gtt held  -Currently on amlodipine, statin, Lasix, and metoprolol    Upper GI bleed  -Bleeding duodenal ulcer visualized on EGD, clipped and cauterized  -Protonix gtt  -Monitor for signs of bleeding  -Advance to regular diet    -Encourage PT OT, deep breathing, I-S use, and ambulation. Dispo: PM&R consulted. Working on acute inpatient rehab once stable. Given patient's ongoing medical issues requiring hospitalization, we have asked internal medicine to take over primary. From a trauma standpoint, he is stable for discharge. Chief Complaint: \"I feel ok\"    SUBJECTIVE    Patient seen and evaluated. Hemoglobin this morning 8.7 from 8.8.   GI bleeding appears to be resolving status post duodenal ulcer cauterization on 12/29. Afebrile, normal sinus rhythm, hemodynamically stable, and satting greater than 95% on 2 L nasal cannula. Tolerating clear liquid diet. OBJECTIVE  VITALS:   Vitals:    12/31/22 0918   BP: (!) 141/68   Pulse: 78   Resp: 16   Temp: 98 °F (36.7 °C)   SpO2: 96%     Vitals and nursing note reviewed. Constitutional:       Appearance: Normal appearance. HENT:      Head: Normocephalic and atraumatic. Nose: Nose normal.      Mouth/Throat:      Mouth: Mucous membranes are moist.      Pharynx: Oropharynx is clear. Eyes:      Extraocular Movements: Extraocular movements intact. Conjunctiva/sclera: Conjunctivae normal.   Cardiovascular:      Rate and Rhythm: Normal rate and regular rhythm. Pulmonary:      Effort: Pulmonary effort is normal. No respiratory distress. Breath sounds: No stridor. No wheezing. Abdominal:      General: There is no distension. Palpations: Abdomen is soft. Tenderness: There is no abdominal tenderness. Musculoskeletal:         General: No swelling or tenderness. Cervical back: Normal range of motion. Comments: Surgical dressing over left hip clean dry and intact  Skin:     General: Skin is warm and dry. Neurological:      General: No focal deficit present. Mental Status: He is alert and oriented to person, place, and time. LAB:  CBC:   Recent Labs     12/29/22 0602 12/29/22 1959 12/30/22 0338 12/31/22  0725   WBC 10.3  --  9.8 10.7   HGB 10.0* 9.5* 8.8* 8.7*   HCT 29.8* 28.6* 27.4* 27.0*   MCV 89.5  --  91.9 90.9     --  217 224     BMP:   Recent Labs     12/29/22 0602 12/30/22  0338 12/31/22  0725    134* 137   K 3.5* 4.0 3.0*   CL 99 100 98   CO2 29 28 27   BUN 35* 33* 22   CREATININE 0.62* 0.75 0.79   GLUCOSE 148* 136* 124*       RADIOLOGY:  No results found.        Wai Blackburn MD  12/31/2022, 12:06 PM

## 2022-12-31 NOTE — PLAN OF CARE
Problem: Discharge Planning  Goal: Discharge to home or other facility with appropriate resources  12/31/2022 0419 by Tahira Chen  Outcome: Progressing  12/31/2022 0417 by Tahira Chen  Outcome: Progressing     Problem: Pain  Goal: Verbalizes/displays adequate comfort level or baseline comfort level  12/31/2022 0419 by Tahira Chen  Outcome: Progressing  12/31/2022 0417 by Tahira Chen  Outcome: Progressing     Problem: Safety - Adult  Goal: Free from fall injury  12/31/2022 0419 by Tahira Chen  Outcome: Progressing  12/31/2022 0417 by Tahira Chen  Outcome: Progressing     Problem: ABCDS Injury Assessment  Goal: Absence of physical injury  12/31/2022 0419 by Tahira Chen  Outcome: Progressing  12/31/2022 0417 by Tahira Chen  Outcome: Progressing     Problem: Skin/Tissue Integrity  Goal: Absence of new skin breakdown  Description: 1.  Monitor for areas of redness and/or skin breakdown  2.  Assess vascular access sites hourly  3.  Every 4-6 hours minimum:  Change oxygen saturation probe site  4.  Every 4-6 hours:  If on nasal continuous positive airway pressure, respiratory therapy assess nares and determine need for appliance change or resting period.  12/31/2022 0419 by Tahira Chen  Outcome: Progressing  12/31/2022 0417 by Tahira Chen  Outcome: Progressing     Problem: Chronic Conditions and Co-morbidities  Goal: Patient's chronic conditions and co-morbidity symptoms are monitored and maintained or improved  12/31/2022 0419 by Tahira Chen  Outcome: Progressing  12/31/2022 0417 by Tahira Chen  Outcome: Progressing

## 2022-12-31 NOTE — PROGRESS NOTES
[facility]   Progress Note                   Date:   12/31/2022  Patient name: Akshat Brock  Date of admission:  12/25/2022  7:27 PM  MRN:   [id number]  YOB: 1940  PCP: Mark Gavin MD    Reason for Admission: Closed fracture of neck of left femur, initial encounter ([facility] [S72.002A]  Closed left hip fracture, initial encounter ([facility] [S72.002A]    Subjective:       Clinical Changes / Abnormalities: Pt seen and examined in room with family. He denies chest pain, SOB, dizziness and palpitations. He states he continues to feel better. Breathing has improved since yesterday. Labs/vitals/tele reviewed. Discussed with RN, no acute CV issues/concerns overnight. Medications:   Scheduled Meds:   [Held by provider] pantoprazole  40 mg Oral BID AC    furosemide  40 mg Oral Daily    amLODIPine  10 mg Oral Daily    metoprolol succinate  100 mg Oral Daily    sertraline  25 mg Oral Daily    atorvastatin  10 mg Oral Daily    gabapentin  600 mg Oral BID    [Held by provider] aspirin  81 mg Oral Daily    [Held by provider] ticagrelor  90 mg Oral BID    sodium chloride flush  5-40 mL IntraVENous 2 times per day    acetaminophen  1,000 mg Oral 3 times per day    methocarbamol  750 mg Oral TID     Continuous Infusions:   pantoprazole 8 mg/hr (12/31/22 0623)    [Held by provider] heparin (PORCINE) Infusion Stopped (12/29/22 0651)     CBC:   Recent Labs     12/29/22  0602 12/29/22 1959 12/30/22  0338 12/31/22  0725   WBC 10.3  --  9.8 10.7   HGB 10.0* 9.5* 8.8* 8.7*     --  217 224       BMP:    Recent Labs     12/29/22  0602 12/30/22  0338 12/31/22  0725    134* 137   K 3.5* 4.0 3.0*   CL 99 100 98   CO2 29 28 27   BUN 35* 33* 22   CREATININE 0.62* 0.75 0.79   GLUCOSE 148* 136* 124*       Hepatic: No results for input(s): AST, ALT, ALB, BILITOT, ALKPHOS in the last 72 hours. Troponin: No results for input(s): TROPHS in the last 72 hours.     BNP: No results for input(s): BNP in the last 72 hours. Lipids: No results for input(s): CHOL, HDL in the last 72 hours. Invalid input(s): LDLCALCU  INR:   No results for input(s): INR in the last 72 hours. Objective:   Vitals: BP (!) 141/68   Pulse 78   Temp 98 °F (36.7 °C) (Oral)   Resp 16   Ht 5' 7\" (1.702 m)   Wt 182 lb (82.6 kg)   SpO2 96%   BMI 28.51 kg/m²   General appearance: alert and cooperative with exam  HEENT: Head: Normocephalic, no lesions, without obvious abnormality. Neck: no JVD, trachea midline, no adenopathy  Lungs: Diminished to auscultation, 2 L via NC without distress. Heart: Regular rate and rhythm, s1/s2 auscultated, no murmurs  Abdomen: soft, non-tender, bowel sounds active  Extremities: no edema  Neurologic: not done        Assessment / Acute Cardiac Problems:   S/p fall suffered from left hip fracture  Preop cardiac risk stratification for same  Ischemic cardiomyopathy with improved ejection fraction on last nuclear stress test to 49% 2020 currently compensated with no signs of volume overload. ICD interrogation October 2022 functioning normally 96% paced in the atrium with 5 years of battery longevity  CAD status post CABG and stenting as detailed above with patent LIMA to the LAD and stenting to OM 2 and OM 3. Hypertension  Hyperlipidemia    Patient Active Problem List:     Right upper quadrant abdominal pain     CAD (coronary artery disease)     Hypertension     Skin cancer     BPH (benign prostatic hyperplasia)     Chronic systolic heart failure (HCC)     Hyperlipemia     Leukocytosis     Cholelithiasis with acute cholecystitis     Pre-syncope     Claudication in peripheral vascular disease (HCC)     Carotid stenosis, asymptomatic     Squamous cell carcinoma of skin of left upper arm     Encounter due to AICD at end of battery life-Change OUT 7-1-19     Malignant neoplasm of prostate ([address]     Chest pain     COVID     COVID-19     Closed fracture of neck of left femur ([address]    1. Coronary artery disease. He had prior bypass, LIMA-LAD. last catheterization 2009 showed patent LIMA to LAD, required drug-eluting stent to diagonal, left circumflex and LPDA for significant stenosis. 2. Ischemic cardiomyopathy with EF of 20-25% s/p Dual Chamber ICD in situ. Checks normal function with very short runs of Afib.  4. Obesity. 5. Vasodepressor syncope. 6. Echocardiogram 05/23/2019, showed reduced LV systolic function with an EF of 25% with reduced LV diastolic compliance, trace to mild AI, trace to mild MR, trace to mild TR noted. 7. CATH 9/2/2020 LM 10-20% LAD proximal 90% LCx OM2 99% TED OM3 80% TED. RCA 80% proximal and is small nondominant. 9. STRESS 9/2/2020 reversible ischemia lateral wall. Infarct anterior lateral, and apical wall. Hypokinesis apical anterior and septal wall. LVEF 49%        ECHO 12/25/22  Summary  Severe LV systolic dysfunction, with EF 20-25%. Reduced LV diastolic compliance. Mild aortic insufficiency. Mild mitral regurgitation. Mild tricuspid regurgitation. 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 Infarct extends to involve the entire apex and   small portions of the anterolateral and anterior septal wall. LVEF 37%. Risk stratification: Intermediate risk. Plan of Treatment:   S/p OR. ECHO reviewed. Has AICD in situ  NSR now. Continue BB. CP secondary to NSVT. No EKG changes. Trops peaked at 75, now trending down. AC held due to concern for GI bleed   Stress test reviewed and shows galdino-infarct ischemia. Reviewed with Dr. Neomia Romberg and plan for cardiac cath once cleared by GI. Risks and benefits reviewed and pt is agreeable to proceed. Await GI clearance. Keep K > 4 and Mg > 2. K 3.0 this am, will replace. Discussed with RN.      Electronically signed by RAVI Holbrook CNP on 12/31/2022 at 9:52 AM  [address]  [phone]

## 2022-12-31 NOTE — PLAN OF CARE

## 2023-01-01 LAB
ABSOLUTE EOS #: 0.18 K/UL (ref 0–0.44)
ABSOLUTE IMMATURE GRANULOCYTE: 0.09 K/UL (ref 0–0.3)
ABSOLUTE LYMPH #: 0.63 K/UL (ref 1.1–3.7)
ABSOLUTE MONO #: 0.81 K/UL (ref 0.1–1.2)
ANION GAP SERPL CALCULATED.3IONS-SCNC: 8 MMOL/L (ref 9–17)
BASOPHILS # BLD: 0 % (ref 0–2)
BASOPHILS ABSOLUTE: 0 K/UL (ref 0–0.2)
BUN BLDV-MCNC: 16 MG/DL (ref 8–23)
CALCIUM SERPL-MCNC: 7.4 MG/DL (ref 8.6–10.4)
CHLORIDE BLD-SCNC: 100 MMOL/L (ref 98–107)
CO2: 29 MMOL/L (ref 20–31)
CREAT SERPL-MCNC: 0.77 MG/DL (ref 0.7–1.2)
EOSINOPHILS RELATIVE PERCENT: 2 % (ref 1–4)
GFR SERPL CREATININE-BSD FRML MDRD: >60 ML/MIN/1.73M2
GLUCOSE BLD-MCNC: 140 MG/DL (ref 70–99)
HCT VFR BLD CALC: 22.7 % (ref 40.7–50.3)
HCT VFR BLD CALC: 24.7 % (ref 40.7–50.3)
HEMOGLOBIN: 7.4 G/DL (ref 13–17)
HEMOGLOBIN: 8.2 G/DL (ref 13–17)
IMMATURE GRANULOCYTES: 1 %
LYMPHOCYTES # BLD: 7 % (ref 24–43)
MAGNESIUM: 1.8 MG/DL (ref 1.6–2.6)
MCH RBC QN AUTO: 29.5 PG (ref 25.2–33.5)
MCHC RBC AUTO-ENTMCNC: 32.6 G/DL (ref 28.4–34.8)
MCV RBC AUTO: 90.4 FL (ref 82.6–102.9)
MONOCYTES # BLD: 9 % (ref 3–12)
MORPHOLOGY: NORMAL
NRBC AUTOMATED: 0 PER 100 WBC
PDW BLD-RTO: 13.9 % (ref 11.8–14.4)
PHOSPHORUS: 3.6 MG/DL (ref 2.5–4.5)
PLATELET # BLD: 207 K/UL (ref 138–453)
PMV BLD AUTO: 10.6 FL (ref 8.1–13.5)
POTASSIUM SERPL-SCNC: 3.8 MMOL/L (ref 3.7–5.3)
RBC # BLD: 2.51 M/UL (ref 4.21–5.77)
SEG NEUTROPHILS: 81 % (ref 36–65)
SEGMENTED NEUTROPHILS ABSOLUTE COUNT: 7.29 K/UL (ref 1.5–8.1)
SODIUM BLD-SCNC: 137 MMOL/L (ref 135–144)
WBC # BLD: 9 K/UL (ref 3.5–11.3)

## 2023-01-01 PROCEDURE — 86900 BLOOD TYPING SEROLOGIC ABO: CPT

## 2023-01-01 PROCEDURE — 80048 BASIC METABOLIC PNL TOTAL CA: CPT

## 2023-01-01 PROCEDURE — 6360000002 HC RX W HCPCS: Performed by: INTERNAL MEDICINE

## 2023-01-01 PROCEDURE — 6370000000 HC RX 637 (ALT 250 FOR IP): Performed by: STUDENT IN AN ORGANIZED HEALTH CARE EDUCATION/TRAINING PROGRAM

## 2023-01-01 PROCEDURE — 99232 SBSQ HOSP IP/OBS MODERATE 35: CPT | Performed by: INTERNAL MEDICINE

## 2023-01-01 PROCEDURE — P9016 RBC LEUKOCYTES REDUCED: HCPCS

## 2023-01-01 PROCEDURE — 6370000000 HC RX 637 (ALT 250 FOR IP): Performed by: INTERNAL MEDICINE

## 2023-01-01 PROCEDURE — 36430 TRANSFUSION BLD/BLD COMPNT: CPT

## 2023-01-01 PROCEDURE — 99232 SBSQ HOSP IP/OBS MODERATE 35: CPT | Performed by: STUDENT IN AN ORGANIZED HEALTH CARE EDUCATION/TRAINING PROGRAM

## 2023-01-01 PROCEDURE — 83735 ASSAY OF MAGNESIUM: CPT

## 2023-01-01 PROCEDURE — 36415 COLL VENOUS BLD VENIPUNCTURE: CPT

## 2023-01-01 PROCEDURE — 85025 COMPLETE CBC W/AUTO DIFF WBC: CPT

## 2023-01-01 PROCEDURE — 2580000003 HC RX 258: Performed by: INTERNAL MEDICINE

## 2023-01-01 PROCEDURE — 85014 HEMATOCRIT: CPT

## 2023-01-01 PROCEDURE — C9113 INJ PANTOPRAZOLE SODIUM, VIA: HCPCS | Performed by: INTERNAL MEDICINE

## 2023-01-01 PROCEDURE — 85018 HEMOGLOBIN: CPT

## 2023-01-01 PROCEDURE — 86901 BLOOD TYPING SEROLOGIC RH(D): CPT

## 2023-01-01 PROCEDURE — 86850 RBC ANTIBODY SCREEN: CPT

## 2023-01-01 PROCEDURE — 86920 COMPATIBILITY TEST SPIN: CPT

## 2023-01-01 PROCEDURE — 1200000000 HC SEMI PRIVATE

## 2023-01-01 PROCEDURE — 84100 ASSAY OF PHOSPHORUS: CPT

## 2023-01-01 PROCEDURE — 30233N1 TRANSFUSION OF NONAUTOLOGOUS RED BLOOD CELLS INTO PERIPHERAL VEIN, PERCUTANEOUS APPROACH: ICD-10-PCS | Performed by: STUDENT IN AN ORGANIZED HEALTH CARE EDUCATION/TRAINING PROGRAM

## 2023-01-01 RX ORDER — SODIUM CHLORIDE 9 MG/ML
INJECTION, SOLUTION INTRAVENOUS PRN
Status: DISCONTINUED | OUTPATIENT
Start: 2023-01-01 | End: 2023-01-11 | Stop reason: HOSPADM

## 2023-01-01 RX ADMIN — SODIUM CHLORIDE 8 MG/HR: 9 INJECTION, SOLUTION INTRAVENOUS at 04:32

## 2023-01-01 RX ADMIN — METHOCARBAMOL TABLETS 500 MG: 500 TABLET, COATED ORAL at 20:05

## 2023-01-01 RX ADMIN — ACETAMINOPHEN 1000 MG: 500 TABLET, FILM COATED ORAL at 14:05

## 2023-01-01 RX ADMIN — GABAPENTIN 600 MG: 300 CAPSULE ORAL at 20:05

## 2023-01-01 RX ADMIN — ACETAMINOPHEN 1000 MG: 500 TABLET, FILM COATED ORAL at 21:54

## 2023-01-01 RX ADMIN — ACETAMINOPHEN 1000 MG: 500 TABLET, FILM COATED ORAL at 04:39

## 2023-01-01 RX ADMIN — ATORVASTATIN CALCIUM 10 MG: 10 TABLET, FILM COATED ORAL at 10:05

## 2023-01-01 RX ADMIN — METHOCARBAMOL TABLETS 500 MG: 500 TABLET, COATED ORAL at 10:04

## 2023-01-01 RX ADMIN — GABAPENTIN 600 MG: 300 CAPSULE ORAL at 10:04

## 2023-01-01 RX ADMIN — AMLODIPINE BESYLATE 10 MG: 10 TABLET ORAL at 10:05

## 2023-01-01 RX ADMIN — SERTRALINE 25 MG: 25 TABLET, FILM COATED ORAL at 10:04

## 2023-01-01 RX ADMIN — METOPROLOL SUCCINATE 100 MG: 100 TABLET, FILM COATED, EXTENDED RELEASE ORAL at 10:04

## 2023-01-01 RX ADMIN — METHOCARBAMOL TABLETS 500 MG: 500 TABLET, COATED ORAL at 14:05

## 2023-01-01 RX ADMIN — FUROSEMIDE 40 MG: 40 TABLET ORAL at 10:05

## 2023-01-01 ASSESSMENT — PAIN SCALES - GENERAL
PAINLEVEL_OUTOF10: 0
PAINLEVEL_OUTOF10: 2

## 2023-01-01 ASSESSMENT — ENCOUNTER SYMPTOMS
CHEST TIGHTNESS: 0
EYE PAIN: 0
DIARRHEA: 0
APNEA: 0
CONSTIPATION: 1
SHORTNESS OF BREATH: 0
COLOR CHANGE: 0
BLOOD IN STOOL: 0
SINUS PAIN: 0

## 2023-01-01 NOTE — PROGRESS NOTES
[facility]  Office: [address], DO, Radha Hammer, DO, Amy Manley, DO, Colt Lopez Blood, DO, Masoud Neff MD, Sera Haney MD, Clyde Ibrahim MD, Fidencio Viramontes MD,  Jas Concepcion MD, Bishop Pang MD, Janie Rabago, DO, Rhona Verma MD,  Duglas Baires MD, Chano Moya MD, Alisson Ferguson DO, Brenda Rivera MD, Eduardo Lowe MD, Eunice Moreno, DO, Selene Thrasher MD, Ruth May MD, Anish Escobar MD, Dani Benjamin MD, Oliver Diaz DO, Antonio Veliz MD, Jacque Martin MD, Kamaljit Leung, CNP,  Jony Ratliff, CNP, Kaela Pisano, CNP, Aleta Casas, CNP,  Mccoy Baumgarten, [facility], Oneil Donnelly, CNP, Radha Gilman, CNP, George Ogden, CNP, Diana Turner, CNP, Libra Briseno, CNP, Isamar Valenzuela, PA-C, [facility], CNS, Jeanne Landa, [facility], [facility], [address] [place] [place] [address]    Progress Note    1/1/2023    8:24 AM    Name:   Derik Menendez  MRN:     [id number]     Acct:      [de-identified]   Room:   [address] [place] [address] Day:  7  Admit Date:  12/25/2022  7:27 PM    PCP:   Yadira Rosario MD  Code Status:  Full Code    Subjective:     C/C:   Chief Complaint   Patient presents with    Fall     Interval History Status: improved. Patient seen and examined. Hb dropped. BP stable. , feels better than before, no bowel movement in past 24 hours. Brief History: This is a 79-year-old male past medical history of neuropathy, AICD in place, peripheral vascular disease, coronary artery disease, history of diastolic CHF, prostate cancer status postradiation therapy, arthritis, bilateral carotid stenosis presented to the ER on 12/25/2022 after patient had a fall denied any loss of consciousness. Patient was admitted to trauma surgery service.   Cardiology was consulted for medical clearance was moderate to high risk for any OR procedure due to history of significant coronary artery disease and LAD.  Patient underwent left hip hemiarthroplasty with orthopedic surgery on 12/26/2022, afterwards was noted to have episodes of nonsustained V. tach. Underwent stress test on 12/28/2022 showing concern of galdino-infarct ischemia and plan for cardiac catheterization however was held due to patient having black tarry stools on 12/29/2022 patient underwent EGD with GI found to have 10 mm duodenal ulcer with active bleeding that was cauterized and injected with epinephrine and clipped. Patient currently on clear liquid diet and plan for cardiac catheterization when cleared by GI. Patient continues on Protonix drip at this time        Echo 12/26/22:  CONCLUSIONS     Summary  Severe LV systolic dysfunction, with EF 20-25%. Reduced LV diastolic compliance. Mild aortic insufficiency. Mild mitral regurgitation. Mild tricuspid regurgitation. 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 Infarct extends to involve the entire apex and   small portions of the anterolateral and anterior septal wall. LVEF 37%. Risk stratification: Intermediate risk. EGD 12/29/22:  Findings[de-identified]   Esophagus: Normal  Stomach: Small sliding hiatal hernia. Stomach was otherwise normal.  Duodenum: There was a 10 mm duodenal ulcer in the bulb with active bleeding.   This was cauterized with bipolar probe at 13 W and injected with 2 cc of 1 is to 10,000 epinephrine and clipped with 2 metal resolution clips with complete control of bleeding. There was no active bleeding at the end of the procedure. The rest of the duodenum was otherwise normal.     Recommendations: Okay for liquid diet. PPI drip. Review of Systems:     Review of Systems   Constitutional:  Negative for activity change, fatigue and fever. HENT:  Negative for congestion and sinus pain. Eyes:  Negative for pain. Respiratory:  Negative for apnea, chest tightness and shortness of breath. Cardiovascular:  Negative for chest pain, palpitations and leg swelling. Gastrointestinal:  Positive for constipation. Negative for blood in stool and diarrhea. Genitourinary:  Negative for difficulty urinating and frequency. Musculoskeletal:  Positive for myalgias. Negative for arthralgias. Skin:  Negative for color change and wound. Neurological:  Positive for weakness. Negative for dizziness. Psychiatric/Behavioral:  Negative for agitation and hallucinations. Medications: Allergies:     Allergies   Allergen Reactions    Percocet [Oxycodone-Acetaminophen] Other (See Comments)     Panic attack       Current Meds:   Scheduled Meds:    methocarbamol  500 mg Oral TID    [Held by provider] pantoprazole  40 mg Oral BID AC    furosemide  40 mg Oral Daily    amLODIPine  10 mg Oral Daily    metoprolol succinate  100 mg Oral Daily    sertraline  25 mg Oral Daily    atorvastatin  10 mg Oral Daily    gabapentin  600 mg Oral BID    [Held by provider] aspirin  81 mg Oral Daily    [Held by provider] ticagrelor  90 mg Oral BID    sodium chloride flush  5-40 mL IntraVENous 2 times per day    acetaminophen  1,000 mg Oral 3 times per day     Continuous Infusions:    sodium chloride      pantoprazole 8 mg/hr (01/01/23 0432)    [Held by provider] heparin (PORCINE) Infusion Stopped (12/29/22 0651)     PRN Meds: sodium chloride, polyethylene glycol, sodium chloride flush, sodium chloride flush, nitroGLYCERIN, [Held by provider] heparin (porcine), [Held by provider] heparin (porcine), fluticasone, melatonin, oxyCODONE, sodium chloride flush, ondansetron **OR** ondansetron    Data:     Past Medical History:   has a past medical history of Acid reflux, Anxiety, Arthritis, BPH (benign prostatic hypertrophy), CAD (coronary artery disease), Cancer ([facility] [place] Carotid stenosis, Cataracts, both eyes, CHF (congestive heart failure) ([facility] [place] [facility] Dry skin, Elevated PSA, Examination of participant in clinical trial, Hyperlipidemia, Hypertension, Left ventricular dysfunction, Macular degeneration, Myocardial infarction ([facility] Neuropathy, Pacemaker, Panic attacks, PVD (peripheral vascular disease) ([facility] [place] [facility] Sinus problem, Snores, Vasovagal near syncope, and Wears glasses. Social History:   reports that he has never smoked. He has never used smokeless tobacco. He reports current alcohol use of about 2.0 standard drinks per week. He reports that he does not use drugs. Family History:   Family History   Problem Relation Age of Onset    High Blood Pressure Mother     Cancer Mother     High Blood Pressure Father     Diabetes Sister     Cancer Sister     High Blood Pressure Sister     Heart Disease Brother     High Blood Pressure Brother        Vitals:  /74   Pulse 78   Temp 97.5 °F (36.4 °C) (Oral)   Resp 16   Ht 5' 7\" (1.702 m)   Wt 185 lb 8 oz (84.1 kg)   SpO2 98%   BMI 29.05 kg/m²   Temp (24hrs), Av.8 °F (36.6 °C), Min:97.5 °F (36.4 °C), Max:98 °F (36.7 °C)    No results for input(s): POCGLU in the last 72 hours. I/O (24Hr):     Intake/Output Summary (Last 24 hours) at 2023 0824  Last data filed at 2023 0439  Gross per 24 hour   Intake --   Output 150 ml   Net -150 ml       Labs:  Hematology:  Recent Labs     22  0338 22  0725 23  0634   WBC 9.8 10.7 9.0   RBC 2.98* 2.97* 2.51*   HGB 8.8* 8.7* 7.4*   HCT 27.4* 27.0* 22.7*   MCV 91.9 90.9 90.4   MCH 29.5 29.3 29.5   MCHC 32.1 32.2 32.6   RDW 13.9 13.9 13.9    224 207   MPV 10.8 10.7 10.6 Chemistry:  Recent Labs     12/31/22  0725 12/31/22  1739 01/01/23  0634    134* 137   K 3.0* 3.7 3.8   CL 98 97* 100   CO2 27 27 29   GLUCOSE 124* 92 140*   BUN 22 16 16   CREATININE 0.79 0.71 0.77   MG 2.0  --  1.8   ANIONGAP 12 10 8*   LABGLOM >60 >60 >60   CALCIUM 7.8* 7.5* 7.4*   PHOS 3.6  --  3.6     No results for input(s): PROT, LABALBU, LABA1C, P2SBOKU, V9TLYEJ, FT4, TSH, AST, ALT, LDH, GGT, ALKPHOS, LABGGT, BILITOT, BILIDIR, AMMONIA, AMYLASE, LIPASE, LACTATE, CHOL, HDL, LDLCHOLESTEROL, CHOLHDLRATIO, TRIG, VLDL, SPF23TR, PHENYTOIN, PHENYF, URICACID, POCGLU in the last 72 hours. ABG:  Lab Results   Component Value Date/Time    POCPH 7.35 03/04/2014 04:57 PM    POCPCO2 63 03/04/2014 04:57 PM    POCPO2 103 03/04/2014 04:57 PM    POCHCO3 35.2 03/04/2014 04:57 PM    NBEA NOT REPORTED 03/04/2014 04:57 PM    PBEA 10 03/04/2014 04:57 PM    DHW0HGL 37 03/04/2014 04:57 PM    ORBC5ASI 97 03/04/2014 04:57 PM    FIO2 NO SAMPLE RECEIVED 12/25/2022 08:13 PM     Lab Results   Component Value Date/Time    SPECIAL NOT REPORTED 07/28/2014 05:19 PM     Lab Results   Component Value Date/Time    CULTURE NO GROWTH 03/02/2014 05:00 PM    CULTURE  03/02/2014 05:00 PM     Aly Schwab [id number] [facility] 3 [phone]       Radiology:  XR HAND LEFT (MIN 3 VIEWS)    Result Date: 12/25/2022  1. No acute osseous abnormality. 2. Severe degenerative changes of the wrist and hand with small central erosions in the interphalangeal joints compatible with erosive osteoarthritis. XR HIP LEFT (1 VIEW)    Result Date: 12/26/2022  Status post left total hip arthroplasty in a patient with acute cervical neck fracture. Hardware alignment is satisfactory. No apparent hardware complication. Expected postoperative soft tissue changes are noted. XR FEMUR LEFT (MIN 2 VIEWS)    Result Date: 12/25/2022  1. Acute moderately displaced left femoral neck fracture. 2. No acute osseous abnormality of the left knee. XR KNEE LEFT (3 VIEWS)    Result Date: 12/25/2022  1. Acute moderately displaced left femoral neck fracture. 2. No acute osseous abnormality of the left knee. XR CHEST PORTABLE    Result Date: 12/25/2022  Enlarged cardiac silhouette and mild pulmonary vascular congestion. 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 Infarct extends to involve the entire apex and small portions of the anterolateral and anterior septal wall. LVEF 37%. Risk stratification: Intermediate risk. XR HIP 2-3 VW W PELVIS LEFT    Result Date: 12/26/2022  Recent postop changes left hip arthroplasty     XR HIP 2-3 VW W PELVIS LEFT    Result Date: 12/25/2022  1. Acute moderately displaced left femoral neck fracture. 2. No acute osseous abnormality of the left knee. Physical Examination:        Physical Exam  Constitutional:       Appearance: He is obese. He is not diaphoretic. HENT:      Head: Normocephalic and atraumatic. Right Ear: External ear normal.      Left Ear: External ear normal.      Nose: No congestion. Mouth/Throat:      Mouth: Mucous membranes are moist.   Eyes:      Pupils: Pupils are equal, round, and reactive to light. Cardiovascular:      Rate and Rhythm: Normal rate and regular rhythm. Heart sounds: Murmur heard. Pulmonary:      Breath sounds: No wheezing or rales. Abdominal:      General: There is no distension. Palpations: Abdomen is soft. Tenderness: There is no abdominal tenderness. There is no guarding. Musculoskeletal:      Cervical back: Neck supple. Right lower leg: No edema. Left lower leg: No edema. Skin:     General: Skin is dry. Capillary Refill: Capillary refill takes less than 2 seconds. Coloration: Skin is pale. Neurological:      General: No focal deficit present.       Mental Status: He is alert and oriented to person, place, and time. Psychiatric:         Mood and Affect: Mood normal.         Behavior: Behavior normal.       Assessment:        Hospital Problems             Last Modified POA    * (Principal) Closed fracture of neck of left femur ([address] 12/26/2022 Yes    Duodenal ulcer 12/30/2022 Yes    CAD (coronary artery disease) 12/30/2022 Yes    Hypertension 12/30/2022 Yes    BPH (benign prostatic hyperplasia) 12/30/2022 Yes    Chronic systolic heart failure ([address] (Chronic) 12/30/2022 Yes    Claudication in peripheral vascular disease ([address] 12/30/2022 Yes    Malignant neoplasm of prostate ([facility] 12/30/2022 Yes     Plan:        Left femur neck fracture. Appreciate orthopedic and general surgery conditions. Continue pain control. Recommended for anticoagulation for least 1 month postoperatively per orhtopedic surgery  Duodenal ulcer with active bleed status post EGD cauterization, clip, epinephrine on 12/29/2022. Clear liquid diet continue Protonix drip. Trend hemoglobin hematocrit keep hemoglobin above 8, plan to have IV Protonix continue until Sunday  Transfuse 1 unit RBC to keep Hb above 8. History of combined systolic diastolic heart failure with abnormal stress test  concerning for ischemia. Appreciate cardiology recommendations. Plan for cardiac catheterization once cleared by gastroenterology.   Continue Norvasc, Lipitor, Lasix, Toprol- mg,   Depression continue Zoloft  Anticoagulation is held due to GI bleed  History of prostate cancer status postradiation therapy, continue to monitor intake and output  Discharge planning, appreciate PM&R recommendations, still awaiting cardiac catheterization potentially    Consuelo Melvin MD  1/1/2023  8:24 AM

## 2023-01-01 NOTE — CONSENT
Informed Consent for Blood Component Transfusion Note    I have discussed with the patient the rationale for blood component transfusion; its benefits in treating or preventing fatigue, organ damage, or death; and its risk which includes mild transfusion reactions, rare risk of blood borne infection, or more serious but rare reactions. I have discussed the alternatives to transfusion, including the risk and consequences of not receiving transfusion. The patient had an opportunity to ask questions and had agreed to proceed with transfusion of blood components.     Electronically signed by Dolly Mitchell MD on 1/1/23 at 8:24 AM EST

## 2023-01-01 NOTE — PLAN OF CARE
Problem: Discharge Planning  Goal: Discharge to home or other facility with appropriate resources  1/1/2023 0206 by Lori Tavares RN  Outcome: Progressing  12/31/2022 1844 by Heather Chaudhari RN  Outcome: Progressing     Problem: Pain  Goal: Verbalizes/displays adequate comfort level or baseline comfort level  1/1/2023 0206 by Lori Tavares RN  Outcome: Progressing  12/31/2022 1844 by Heather Chaudhari RN  Outcome: Progressing     Problem: Safety - Adult  Goal: Free from fall injury  1/1/2023 0206 by Lori Tavares RN  Outcome: Progressing  12/31/2022 1844 by Heather Chaudhari RN  Outcome: Progressing     Problem: ABCDS Injury Assessment  Goal: Absence of physical injury  1/1/2023 0206 by Lori Tavares RN  Outcome: Progressing  12/31/2022 1844 by Heather Chaudhari RN  Outcome: Progressing     Problem: Skin/Tissue Integrity  Goal: Absence of new skin breakdown  Description: 1. Monitor for areas of redness and/or skin breakdown  2. Assess vascular access sites hourly  3. Every 4-6 hours minimum:  Change oxygen saturation probe site  4. Every 4-6 hours:  If on nasal continuous positive airway pressure, respiratory therapy assess nares and determine need for appliance change or resting period.   1/1/2023 0206 by Lori Tavares RN  Outcome: Progressing  12/31/2022 1844 by Heather Chaudhari RN  Outcome: Progressing     Problem: Chronic Conditions and Co-morbidities  Goal: Patient's chronic conditions and co-morbidity symptoms are monitored and maintained or improved  1/1/2023 0206 by Lori Tavares RN  Outcome: Progressing  12/31/2022 1844 by Heather Chaudhari RN  Outcome: Progressing

## 2023-01-01 NOTE — PROGRESS NOTES
[facility]   Progress Note                   Date:   1/1/2023  Patient name: Eh Garduno  Date of admission:  12/25/2022  7:27 PM  MRN:   [id number]  YOB: 1940  PCP: Katelynn Robins MD    Reason for Admission: Closed fracture of neck of left femur, initial encounter ([facility] [S72.002A]  Closed left hip fracture, initial encounter ([facility] [S72.002A]    Subjective:       Clinical Changes / Abnormalities: Pt seen and examined in room. He denies chest pain, SOB, dizziness and palpitations. Labs/vitals/tele reviewed. Discussed with RN. Hgb 7.4, 1 unit PRBCs being transfused. Medications:   Scheduled Meds:   methocarbamol  500 mg Oral TID    [Held by provider] pantoprazole  40 mg Oral BID AC    furosemide  40 mg Oral Daily    amLODIPine  10 mg Oral Daily    metoprolol succinate  100 mg Oral Daily    sertraline  25 mg Oral Daily    atorvastatin  10 mg Oral Daily    gabapentin  600 mg Oral BID    [Held by provider] aspirin  81 mg Oral Daily    [Held by provider] ticagrelor  90 mg Oral BID    sodium chloride flush  5-40 mL IntraVENous 2 times per day    acetaminophen  1,000 mg Oral 3 times per day     Continuous Infusions:   sodium chloride      pantoprazole 8 mg/hr (01/01/23 0432)    [Held by provider] heparin (PORCINE) Infusion Stopped (12/29/22 0651)     CBC:   Recent Labs     12/30/22  0338 12/31/22  0725 01/01/23  0634   WBC 9.8 10.7 9.0   HGB 8.8* 8.7* 7.4*    224 207       BMP:    Recent Labs     12/31/22  0725 12/31/22  1739 01/01/23  0634    134* 137   K 3.0* 3.7 3.8   CL 98 97* 100   CO2 27 27 29   BUN 22 16 16   CREATININE 0.79 0.71 0.77   GLUCOSE 124* 92 140*       Hepatic: No results for input(s): AST, ALT, ALB, BILITOT, ALKPHOS in the last 72 hours. Troponin: No results for input(s): TROPHS in the last 72 hours. BNP: No results for input(s): BNP in the last 72 hours. Lipids: No results for input(s): CHOL, HDL in the last 72 hours.     Invalid input(s): LDLCALCU  INR:   No results for input(s): INR in the last 72 hours. Objective:   Vitals: BP (!) 133/58   Pulse 69   Temp 98.4 °F (36.9 °C)   Resp 18   Ht 5' 7\" (1.702 m)   Wt 185 lb 8 oz (84.1 kg)   SpO2 95%   BMI 29.05 kg/m²   General appearance: alert and cooperative with exam  HEENT: Head: Normocephalic, no lesions, without obvious abnormality. Neck: no JVD, trachea midline, no adenopathy  Lungs: Diminished to auscultation, 2 L via NC without distress. Heart: Regular rate and rhythm, s1/s2 auscultated, no murmurs  Abdomen: soft, non-tender, bowel sounds active  Extremities: no edema  Neurologic: not done        Assessment / Acute Cardiac Problems:   S/p fall suffered from left hip fracture  Preop cardiac risk stratification for same  Ischemic cardiomyopathy with improved ejection fraction on last nuclear stress test to 49% 2020 currently compensated with no signs of volume overload. ICD interrogation October 2022 functioning normally 96% paced in the atrium with 5 years of battery longevity  CAD status post CABG and stenting as detailed above with patent LIMA to the LAD and stenting to OM 2 and OM 3. Hypertension  Hyperlipidemia    Patient Active Problem List:     Right upper quadrant abdominal pain     CAD (coronary artery disease)     Hypertension     Skin cancer     BPH (benign prostatic hyperplasia)     Chronic systolic heart failure (HCC)     Hyperlipemia     Leukocytosis     Cholelithiasis with acute cholecystitis     Pre-syncope     Claudication in peripheral vascular disease (HCC)     Carotid stenosis, asymptomatic     Squamous cell carcinoma of skin of left upper arm     Encounter due to AICD at end of battery life-Change OUT 7-1-19     Malignant neoplasm of prostate ([address]     Chest pain     COVID     COVID-19     Closed fracture of neck of left femur ([address]    1. Coronary artery disease. He had prior bypass, LIMA-LAD.  last catheterization 2009 showed patent LIMA to LAD, required drug-eluting stent to diagonal, left circumflex and LPDA for significant stenosis. 2. Ischemic cardiomyopathy with EF of 20-25% s/p Dual Chamber ICD in situ. Checks normal function with very short runs of Afib.  4. Obesity. 5. Vasodepressor syncope. 6. Echocardiogram 05/23/2019, showed reduced LV systolic function with an EF of 25% with reduced LV diastolic compliance, trace to mild AI, trace to mild MR, trace to mild TR noted. 7. CATH 9/2/2020 LM 10-20% LAD proximal 90% LCx OM2 99% TED OM3 80% TED. RCA 80% proximal and is small nondominant. 9. STRESS 9/2/2020 reversible ischemia lateral wall. Infarct anterior lateral, and apical wall. Hypokinesis apical anterior and septal wall. LVEF 49%        ECHO 12/25/22  Summary  Severe LV systolic dysfunction, with EF 20-25%. Reduced LV diastolic compliance. Mild aortic insufficiency. Mild mitral regurgitation. Mild tricuspid regurgitation. 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 Infarct extends to involve the entire apex and   small portions of the anterolateral and anterior septal wall. LVEF 37%. Risk stratification: Intermediate risk. Plan of Treatment:   Hgb 7.4 this am. 1 unit PRBC given. AC held due to GI Bleed. Waiting GI clearance for cardiac cath. ECHO reviewed. Has AICD in situ  NSR now. Continue BB. CP secondary to NSVT. No EKG changes. Trops peaked at 75, now trending down. Stress test reviewed and shows galdino-infarct ischemia. Reviewed with Dr. Blair Mora and plan for cardiac cath once cleared by GI. Risks and benefits reviewed and pt is agreeable to proceed. Keep K > 4 and Mg > 2.      Electronically signed by RAVI Khan CNP on 1/1/2023 at 3:29 PM  [address]  [phone]

## 2023-01-02 LAB
ABO/RH: NORMAL
ABSOLUTE EOS #: 0.2 K/UL (ref 0–0.44)
ABSOLUTE IMMATURE GRANULOCYTE: 0.2 K/UL (ref 0–0.3)
ABSOLUTE LYMPH #: 0.59 K/UL (ref 1.1–3.7)
ABSOLUTE MONO #: 0.79 K/UL (ref 0.1–1.2)
ANION GAP SERPL CALCULATED.3IONS-SCNC: 7 MMOL/L (ref 9–17)
ANTIBODY SCREEN: NEGATIVE
ARM BAND NUMBER: NORMAL
BASOPHILS # BLD: 0 % (ref 0–2)
BASOPHILS ABSOLUTE: 0 K/UL (ref 0–0.2)
BLD PROD TYP BPU: NORMAL
BLOOD BANK BLOOD PRODUCT EXPIRATION DATE: NORMAL
BLOOD BANK ISBT PRODUCT BLOOD TYPE: 600
BLOOD BANK PRODUCT CODE: NORMAL
BLOOD BANK UNIT TYPE AND RH: NORMAL
BPU ID: NORMAL
BUN BLDV-MCNC: 12 MG/DL (ref 8–23)
CALCIUM SERPL-MCNC: 7.6 MG/DL (ref 8.6–10.4)
CHLORIDE BLD-SCNC: 95 MMOL/L (ref 98–107)
CO2: 30 MMOL/L (ref 20–31)
CREAT SERPL-MCNC: 0.7 MG/DL (ref 0.7–1.2)
CROSSMATCH RESULT: NORMAL
DISPENSE STATUS BLOOD BANK: NORMAL
EOSINOPHILS RELATIVE PERCENT: 2 % (ref 1–4)
EXPIRATION DATE: NORMAL
GFR SERPL CREATININE-BSD FRML MDRD: >60 ML/MIN/1.73M2
GLUCOSE BLD-MCNC: 139 MG/DL (ref 70–99)
HCT VFR BLD CALC: 24.2 % (ref 40.7–50.3)
HEMOGLOBIN: 8 G/DL (ref 13–17)
IMMATURE GRANULOCYTES: 2 %
LYMPHOCYTES # BLD: 6 % (ref 24–43)
MAGNESIUM: 1.9 MG/DL (ref 1.6–2.6)
MCH RBC QN AUTO: 29.5 PG (ref 25.2–33.5)
MCHC RBC AUTO-ENTMCNC: 33.1 G/DL (ref 28.4–34.8)
MCV RBC AUTO: 89.3 FL (ref 82.6–102.9)
MONOCYTES # BLD: 8 % (ref 3–12)
MORPHOLOGY: NORMAL
NRBC AUTOMATED: 0 PER 100 WBC
PDW BLD-RTO: 14.4 % (ref 11.8–14.4)
PHOSPHORUS: 3.3 MG/DL (ref 2.5–4.5)
PLATELET # BLD: 214 K/UL (ref 138–453)
PMV BLD AUTO: 10.9 FL (ref 8.1–13.5)
POTASSIUM SERPL-SCNC: 3.2 MMOL/L (ref 3.7–5.3)
RBC # BLD: 2.71 M/UL (ref 4.21–5.77)
SEG NEUTROPHILS: 82 % (ref 36–65)
SEGMENTED NEUTROPHILS ABSOLUTE COUNT: 8.12 K/UL (ref 1.5–8.1)
SODIUM BLD-SCNC: 132 MMOL/L (ref 135–144)
TRANSFUSION STATUS: NORMAL
UNIT DIVISION: 0
UNIT ISSUE DATE/TIME: NORMAL
WBC # BLD: 9.9 K/UL (ref 3.5–11.3)

## 2023-01-02 PROCEDURE — 97535 SELF CARE MNGMENT TRAINING: CPT

## 2023-01-02 PROCEDURE — 6370000000 HC RX 637 (ALT 250 FOR IP): Performed by: INTERNAL MEDICINE

## 2023-01-02 PROCEDURE — 2580000003 HC RX 258: Performed by: INTERNAL MEDICINE

## 2023-01-02 PROCEDURE — 85025 COMPLETE CBC W/AUTO DIFF WBC: CPT

## 2023-01-02 PROCEDURE — 1200000000 HC SEMI PRIVATE

## 2023-01-02 PROCEDURE — 6370000000 HC RX 637 (ALT 250 FOR IP): Performed by: STUDENT IN AN ORGANIZED HEALTH CARE EDUCATION/TRAINING PROGRAM

## 2023-01-02 PROCEDURE — 36415 COLL VENOUS BLD VENIPUNCTURE: CPT

## 2023-01-02 PROCEDURE — 83735 ASSAY OF MAGNESIUM: CPT

## 2023-01-02 PROCEDURE — 97116 GAIT TRAINING THERAPY: CPT

## 2023-01-02 PROCEDURE — 80048 BASIC METABOLIC PNL TOTAL CA: CPT

## 2023-01-02 PROCEDURE — 84100 ASSAY OF PHOSPHORUS: CPT

## 2023-01-02 PROCEDURE — 99232 SBSQ HOSP IP/OBS MODERATE 35: CPT | Performed by: FAMILY MEDICINE

## 2023-01-02 RX ADMIN — METHOCARBAMOL TABLETS 500 MG: 500 TABLET, COATED ORAL at 09:52

## 2023-01-02 RX ADMIN — SODIUM CHLORIDE, PRESERVATIVE FREE 10 ML: 5 INJECTION INTRAVENOUS at 21:17

## 2023-01-02 RX ADMIN — FUROSEMIDE 40 MG: 40 TABLET ORAL at 09:52

## 2023-01-02 RX ADMIN — PANTOPRAZOLE SODIUM 40 MG: 40 TABLET, DELAYED RELEASE ORAL at 11:49

## 2023-01-02 RX ADMIN — ACETAMINOPHEN 1000 MG: 500 TABLET, FILM COATED ORAL at 16:48

## 2023-01-02 RX ADMIN — SERTRALINE 25 MG: 25 TABLET, FILM COATED ORAL at 09:52

## 2023-01-02 RX ADMIN — METHOCARBAMOL TABLETS 500 MG: 500 TABLET, COATED ORAL at 21:17

## 2023-01-02 RX ADMIN — ACETAMINOPHEN 1000 MG: 500 TABLET, FILM COATED ORAL at 06:06

## 2023-01-02 RX ADMIN — ATORVASTATIN CALCIUM 10 MG: 10 TABLET, FILM COATED ORAL at 09:52

## 2023-01-02 RX ADMIN — METHOCARBAMOL TABLETS 500 MG: 500 TABLET, COATED ORAL at 16:48

## 2023-01-02 RX ADMIN — METOPROLOL SUCCINATE 100 MG: 100 TABLET, FILM COATED, EXTENDED RELEASE ORAL at 09:52

## 2023-01-02 RX ADMIN — GABAPENTIN 600 MG: 300 CAPSULE ORAL at 09:52

## 2023-01-02 RX ADMIN — SODIUM CHLORIDE, PRESERVATIVE FREE 10 ML: 5 INJECTION INTRAVENOUS at 09:52

## 2023-01-02 RX ADMIN — PANTOPRAZOLE SODIUM 40 MG: 40 TABLET, DELAYED RELEASE ORAL at 16:48

## 2023-01-02 RX ADMIN — ACETAMINOPHEN 1000 MG: 500 TABLET, FILM COATED ORAL at 21:16

## 2023-01-02 RX ADMIN — AMLODIPINE BESYLATE 10 MG: 10 TABLET ORAL at 09:52

## 2023-01-02 RX ADMIN — GABAPENTIN 600 MG: 300 CAPSULE ORAL at 21:17

## 2023-01-02 ASSESSMENT — ENCOUNTER SYMPTOMS
WHEEZING: 0
CHEST TIGHTNESS: 0
DIARRHEA: 0
BLOOD IN STOOL: 0
COUGH: 0
VOMITING: 0
ABDOMINAL PAIN: 0
CONSTIPATION: 0
SHORTNESS OF BREATH: 0
NAUSEA: 0

## 2023-01-02 ASSESSMENT — PAIN SCALES - GENERAL
PAINLEVEL_OUTOF10: 0
PAINLEVEL_OUTOF10: 0

## 2023-01-02 NOTE — PROGRESS NOTES
[facility]. [facility]   Gastroenterology Progress Note    Bobby Lantigua is a 79 y.o. male patient. Hospitalization Day:7      Chief consult reason:   Concern for GI bleed      Subjective:    -Patient examined at bedside. Patient was awake, alert, oriented X4. Was feeling little fatigued.  -Patient was hemodynamically stable with blood pressure 124/67 mmHg and MAP of 82. -Was breathing on 3 L nasal cannula. Reported having shortness of breath while talking. EGD on 2022 revealed a 10 mm duodenal bulb ulcer with active bleeding which was treated with epinephrine injection, bipolar cautery and application of 2 metal clips with complete control of bleeding. Hemoglobin has trended down to 7.4 today. Patient is hemodynamically stable without any evidence of melena or hematochezia    VITALS:  BP (!) 123/54   Pulse 71   Temp 98.4 °F (36.9 °C)   Resp 17   Ht 5' 7\" (1.702 m)   Wt 185 lb 8 oz (84.1 kg)   SpO2 93%   BMI 29.05 kg/m²   TEMPERATURE:  Current - Temp: 98.4 °F (36.9 °C); Max - Temp  Av.2 °F (36.8 °C)  Min: 97.5 °F (36.4 °C)  Max: 98.4 °F (36.9 °C)    History of present illness: This is a 79 y.o. male who presents with mechanical fall right side attempting to  trash in ED ultimately suffering a left hip fracture. The patient has past medical history of CAD s/p CABG and TED on ASA and Plavix, hypertension, hyperlipidemia, peripheral vascular disease, CHF. According to the patient, he was picking up his trash when his leg slipped out and he fell forward without hitting his head or losing consciousness. Imaging showed acute moderately displaced left femoral neck fracture. Orthopedic surgery was consulted. Cardiology was consulted regarding preop risk stratification considering CAD s/p CABG and multiple TED placement. Patient denied any numbness and tingling or sensory loss. Patient was updated on 2022 and left hip hemiarthroplasty was done.   Patient was on heparin drip with aspirin and Brilinta and was recommended 1 month of chemical anticoagulation upon discharge. Due to increasing troponins, cardiology recommended stress test which shows galdino-infarct ischemia and the patient is planned for cardiac cath. During this admission, the patient had black tarry stools. According to him, he also has fullness in the abdomen. Patient passes 1-2 bottles a day usually but has been having dark tarry stools lately. He takes NSAIDs 3-4 times in a month for his back pain. According to him he has undergone EGD and colonoscopy previously. EGD was for reflux although he does not remember the results. His last colonoscopy was more than 10 years ago. Patient takes Plavix and Brilinta at home with aspirin. He does not report taking any long-term PPIs. During bedside evaluation, the patient was awake, alert, oriented X4. Under no acute distress. Patient was hemodynamically stable with blood pressure 135/71 mmHg and MAP of 83. He was breathing comfortably on 2 L nasal cannula oxygen. Patient's latest labs show hemoglobin of 10 dropped from 11.0 yesterday. Patient's BUN is increased from 19-35. Cardiovascular examination revealed a irregular heart rate. Abdominal examination reveals no tenderness. There is no guarding, rigidity, organomegaly, scleral icterus, jaundice. Physical Assessment:  General appearance:  alert, cooperative and no distress  Mental Status:  oriented to person, place and time and normal affect  Lungs:  clear to auscultation bilaterally, normal effort  Heart:  regular rate and rhythm, no murmur  Abdomen:  soft, nontender, nondistended, normal bowel sounds, no masses, hepatomegaly, splenomegaly  Extremities:  no edema, redness, tenderness in the calves  Skin:  no gross lesions, rashes, induration    Data Review:    Labs and Imaging:   XR HAND LEFT (MIN 3 VIEWS)    Result Date: 12/25/2022  1. No acute osseous abnormality.  2. Severe degenerative changes of the wrist and hand with small central erosions in the interphalangeal joints compatible with erosive osteoarthritis. XR HIP LEFT (1 VIEW)    Result Date: 12/26/2022  Status post left total hip arthroplasty in a patient with acute cervical neck fracture. Hardware alignment is satisfactory. No apparent hardware complication. Expected postoperative soft tissue changes are noted. XR FEMUR LEFT (MIN 2 VIEWS)    Result Date: 12/25/2022  1. Acute moderately displaced left femoral neck fracture. 2. No acute osseous abnormality of the left knee. XR KNEE LEFT (3 VIEWS)    Result Date: 12/25/2022  1. Acute moderately displaced left femoral neck fracture. 2. No acute osseous abnormality of the left knee. XR CHEST PORTABLE    Result Date: 12/25/2022  Enlarged cardiac silhouette and mild pulmonary vascular congestion. 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 Infarct extends to involve the entire apex and small portions of the anterolateral and anterior septal wall. LVEF 37%. Risk stratification: Intermediate risk. XR HIP 2-3 VW W PELVIS LEFT    Result Date: 12/26/2022  Recent postop changes left hip arthroplasty     XR HIP 2-3 VW W PELVIS LEFT    Result Date: 12/25/2022  1. Acute moderately displaced left femoral neck fracture. 2. No acute osseous abnormality of the left knee. CBC:  Recent Labs     12/29/22  1959 12/30/22  0338 12/31/22  0725 01/01/23  0634   WBC  --  9.8 10.7 9.0   HGB 9.5* 8.8* 8.7* 7.4*   MCV  --  91.9 90.9 90.4   RDW  --  13.9 13.9 13.9   PLT  --  217 224 207         ANEMIA STUDIES:  No results for input(s): LABIRON, TIBC, FERRITIN, MOINRFOW31, FOLATE, OCCULTBLD in the last 72 hours.       BMP:  Recent Labs     12/31/22  0725 12/31/22  1739 01/01/23  0634    134* 137   K 3.0* 3.7 3.8   CL 98 97* 100   CO2 27 27 29   BUN 22 16 16 CREATININE 0.79 0.71 0.77   GLUCOSE 124* 92 140*   CALCIUM 7.8* 7.5* 7.4*         LFTS:  No results for input(s): ALKPHOS, ALT, AST, BILITOT, BILIDIR, LABALBU in the last 72 hours. Amylase/Lipase and Ammonia:  No results for input(s): AMYLASE, LIPASE, AMMONIA in the last 72 hours. Acute Hepatitis Panel:  No results found for: HEPBSAG, HEPCAB, HEPBIGM, HEPAIGM    HCV Genotype:  No results found for: HEPATITISCGENOTYPE    HCV Quantitative:  No results found for: HCVQNT    LIVER WORK UP:    AFP  No results found for: AFP    Alpha 1 antitrypsin   No results found for: A1A    ALBINO  No results found for: ALBINO    AMA  No results found for: MITOAB    ASMA  No results found for: SMOOTHMUSCAB    PT/INR  No results for input(s): PROTIME, INR in the last 72 hours. Cancer Markers:  CEA:  No results for input(s): CEA in the last 72 hours. Ca 125:  No results for input(s):  in the last 72 hours. Ca 19-9:   Invalid input(s):   AFP: No results for input(s): AFP in the last 72 hours. Lactic acid:Invalid input(s): LACTIC ACID    Radiology Review:    No results found. Principal Problem:    Closed fracture of neck of left femur (HCC)  Active Problems:    Duodenal ulcer    CAD (coronary artery disease)    Hypertension    BPH (benign prostatic hyperplasia)    Chronic systolic heart failure (HCC)    Claudication in peripheral vascular disease (HCC)    Malignant neoplasm of prostate ([facility] [address]  Resolved Problems:    * No resolved hospital problems. *       Assessment and plan:    Bleeding duodenal ulcer s/p EGD with triple Endo-therapy  No further episodes of melena or hematochezia. Patient hemodynamically stable.     Anemia secondary to upper GI bleed possibly due to bleeding ulcers:  -Patient hemoglobin down-trending due to the high risk lesion, small amount of oozing is expected  -BUN has normalized indicating no further active bleeding   -Changed to Protonix 40 mg oral 2 times daily before meals for 8 weeks then once daily thereafter as long as patient is on anticoagulation; continue current treatment  Start Carafate 1 g 4 times daily for 4 weeks   -Continue to monitor H&H and transfuse as clinically indicated. Prefer to keep hemoglobin well above 8 g/dL to avoid demand ischemia    History of CAD s/p CABG and multiple TED placement:  -Cardiology following  -Latest stress test shows galdino-infarct ischemia  -Cardiology planning to do cardiac cath  Anticoagulation may be resumed after 7 days following Endo-therapy ie ~1/6/2023. However if patient is having active MI may start ACE immediately. Antiplatelet therapy can be initiated now. GI will sign off. Please call for any questions or concerns     Thank you for allowing me to participate in the care of your patient. Please feel free to contact me with any questions or concerns. [address] [facility]   MD Tyrone   1/1/2023 7:19 PM    This note was created with the assistance of a speech-recognition program.  Although the intention is to generate a document that actually reflects the content of the visit, no guarantees can be provided that every mistake has been identified and corrected by editing.

## 2023-01-02 NOTE — PROGRESS NOTES
Comprehensive Nutrition Assessment    Type and Reason for Visit:  Initial    Nutrition Recommendations/Plan:   Continue diet and ONS as ordered. Continue to monitor intakes, weight, labs and follow up. Malnutrition Assessment:  Malnutrition Status:  No malnutrition (01/02/23 1341)    Context:  Acute Illness     Findings of the 6 clinical characteristics of malnutrition:  Energy Intake:  Mild decrease in energy intake (Comment)  Weight Loss:  No significant weight loss     Body Fat Loss:  No significant body fat loss     Muscle Mass Loss:  No significant muscle mass loss    Fluid Accumulation:  No significant fluid accumulation     Strength:  Not Performed    Nutrition Assessment:    Patient seen today for LOS. Patient admitted related to femur fx, it has also been identified that patient has a duodenal ulcer. Patient awake, alert and oriented at time of visit. Patient lunch tray on bedside table, consumed half a sandwich, ice cream, and a small bag of chips. Ensure clear on tray. Patient reported he had an upset stomach after breakfast, but was able to consume approximately 50-75% of lunch. Patient stated that he likes the ensure clears and does consume. Patient reported he had no questions at the time of visit. LBM 12/30. Recommend to continue with current plan of care. Nutrition Related Findings:    Labs/Meds reviewed Wound Type: None       Current Nutrition Intake & Therapies:    Average Meal Intake: 51-75%  Average Supplements Intake: 51-75%  ADULT ORAL NUTRITION SUPPLEMENT; Breakfast, Lunch, Dinner; Clear Liquid Oral Supplement  ADULT DIET; Regular    Anthropometric Measures:  Height: 5' 7\" (170.2 cm)  Ideal Body Weight (IBW): 148 lbs (67 kg)    Admission Body Weight: 182 lb (82.6 kg)  Current Body Weight: 195 lb 15.8 oz (88.9 kg),   IBW. Weight Source: Bed Scale  Current BMI (kg/m2): 30.7  BMI Categories: Obese Class 1 (BMI 30.0-34. 9)    Estimated Daily Nutrient Needs:  Energy Requirements Based On: Formula  Weight Used for Energy Requirements: Current  Energy (kcal/day): 2300kcal/day  Weight Used for Protein Requirements: Ideal  Protein (g/day): 80-100gmsPRO/day  Fluid (ml/day): Per MD    Nutrition Diagnosis:   No nutrition diagnosis at this time related to   as evidenced by      Nutrition Interventions:   Food and/or Nutrient Delivery: Continue Current Diet, Continue Oral Nutrition Supplement  Nutrition Education/Counseling: No recommendation at this time  Coordination of Nutrition Care: Continue to monitor while inpatient  Plan of Care discussed with: Patient    Goals:     Goals: Meet at least 75% of estimated needs, within 7 days, PO intake 50% or greater       Nutrition Monitoring and Evaluation:   Behavioral-Environmental Outcomes: None Identified  Food/Nutrient Intake Outcomes: Food and Nutrient Intake, Supplement Intake  Physical Signs/Symptoms Outcomes: Biochemical Data, Skin, Weight, GI Status, Fluid Status or Edema    Discharge Planning:    Continue Oral Nutrition Supplement, Continue current diet     Jennifer Levi RD  Contact: [phone]

## 2023-01-02 NOTE — PROGRESS NOTES
[facility]  Office: [address], DO, Renetta Goddard, DO, Clarke Pinadominguez, DO, John Cheli Blood, DO, Елена Gilbert MD, Khanh Rahman MD, Eliazar Goetz MD, Brain Mcfadden MD,  Bridgett Murillo MD, Armin Geronimo MD, Yadira Sawyer, DO, Dolly Mitchell MD,  Yung Harvey MD, Palak Dean MD, Loc Britton, DO, Verena hCew MD, Lera Mcardle, MD, Nedra Middleton, DO, Teo Willoughby MD, Berry Ku MD, Keith Lombard, MD, Minnie Thakur MD, Romana Quevedo, DO, Valerie Devries MD, Wilber Amanda MD, Radha Rodriguez, CNP,  Idalia Ervin, CNP, Tyra Lin, CNP, Nahun Field, CNP,  Kayla Boykin, [facility], Kavin Neff, CNP, Evelyn Morales, CNP, Santo Christensen, CNP, Oneyda Murcai, CNP, Laury Basurto, CNP, Shanda Garcia PA-C, Doug Zambrano, [place], Oral Gram, CNP, Clarissa Escalera, [address] [place] [address]    Progress Note    1/2/2023    10:06 AM    Name:   Augustine White  MRN:     [id number]     Acct:      [de-identified]   Room:   [address] [place] [address] Day:  8  Admit Date:  12/25/2022  7:27 PM    PCP:   Ardyce Hodgkins, MD  Code Status:  Full Code    Subjective:     C/C:   Chief Complaint   Patient presents with    Fall     Interval History Status: improved. Patient seen and examined at bedside, no acute events overnight. Continue to improve overall with better breathing. Patient denies any chest pain, shortness of breath, chills, fevers, nausea or vomiting.   Patient vitals, labs and all providers notes were reviewed,from overnight shift and morning updates were noted and discussed with the nurse      Brief History:     Per chart  This is a 79-year-old male past medical history of neuropathy, AICD in place, peripheral vascular disease, coronary artery disease, history of diastolic CHF, prostate cancer status postradiation therapy, arthritis, bilateral carotid stenosis presented to the ER on 12/25/2022 after patient had a fall denied any loss of consciousness. Patient was admitted to trauma surgery service. Cardiology was consulted for medical clearance was moderate to high risk for any OR procedure due to history of significant coronary artery disease and LAD. Patient underwent left hip hemiarthroplasty with orthopedic surgery on 12/26/2022, afterwards was noted to have episodes of nonsustained V. tach. Underwent stress test on 12/28/2022 showing concern of galidno-infarct ischemia and plan for cardiac catheterization however was held due to patient having black tarry stools on 12/29/2022 patient underwent EGD with GI found to have 10 mm duodenal ulcer with active bleeding that was cauterized and injected with epinephrine and clipped. Patient currently on clear liquid diet and plan for cardiac catheterization when cleared by GI. Patient continues on Protonix drip at this time       Review of Systems:     Review of Systems   Constitutional:  Positive for activity change, appetite change and fatigue. Negative for chills, diaphoresis and fever. HENT:  Negative for congestion. Eyes:  Negative for visual disturbance. Respiratory:  Negative for cough, chest tightness, shortness of breath and wheezing. Cardiovascular:  Negative for chest pain, palpitations and leg swelling. Gastrointestinal:  Negative for abdominal pain, blood in stool, constipation, diarrhea, nausea and vomiting. Genitourinary:  Negative for difficulty urinating. Neurological:  Positive for weakness. Negative for dizziness, light-headedness, numbness and headaches. All other systems reviewed and are negative. Medications: Allergies:     Allergies   Allergen Reactions    Percocet [Oxycodone-Acetaminophen] Other (See Comments)     Panic attack       Current Meds:   Scheduled Meds:    methocarbamol  500 mg Oral TID    [Held by provider] pantoprazole  40 mg Oral BID AC    furosemide  40 mg Oral Daily    amLODIPine  10 mg Oral Daily metoprolol succinate  100 mg Oral Daily    sertraline  25 mg Oral Daily    atorvastatin  10 mg Oral Daily    gabapentin  600 mg Oral BID    [Held by provider] aspirin  81 mg Oral Daily    [Held by provider] ticagrelor  90 mg Oral BID    sodium chloride flush  5-40 mL IntraVENous 2 times per day    acetaminophen  1,000 mg Oral 3 times per day     Continuous Infusions:    sodium chloride      [Held by provider] heparin (PORCINE) Infusion Stopped (12/29/22 0651)     PRN Meds: sodium chloride, polyethylene glycol, sodium chloride flush, sodium chloride flush, nitroGLYCERIN, [Held by provider] heparin (porcine), [Held by provider] heparin (porcine), fluticasone, melatonin, oxyCODONE, sodium chloride flush, ondansetron **OR** ondansetron    Data:     Past Medical History:   has a past medical history of Acid reflux, Anxiety, Arthritis, BPH (benign prostatic hypertrophy), CAD (coronary artery disease), Cancer ([facility]), Carotid stenosis, Cataracts, both eyes, CHF (congestive heart failure) ([facility]), Dry skin, Elevated PSA, Examination of participant in clinical trial, Hyperlipidemia, Hypertension, Left ventricular dysfunction, Macular degeneration, Myocardial infarction ([facility]), Neuropathy, Pacemaker, Panic attacks, PVD (peripheral vascular disease) ([facility]), Sinus problem, Snores, Vasovagal near syncope, and Wears glasses.    Social History:   reports that he has never smoked. He has never used smokeless tobacco. He reports current alcohol use of about 2.0 standard drinks per week. He reports that he does not use drugs.     Family History:   Family History   Problem Relation Age of Onset    High Blood Pressure Mother     Cancer Mother     High Blood Pressure Father     Diabetes Sister     Cancer Sister     High Blood Pressure Sister     Heart Disease Brother     High Blood Pressure Brother        Vitals:  BP (!) 159/91   Pulse 89   Temp 98.7 °F (37.1 °C) (Oral)   Resp 17   Ht 5' 7\" (1.702 m)   Wt 195 lb 15.8 oz (88.9 kg)   SpO2  93%   BMI 30.70 kg/m²   Temp (24hrs), Av.7 °F (37.1 °C), Min:98.4 °F (36.9 °C), Max:99.3 °F (37.4 °C)    No results for input(s): POCGLU in the last 72 hours. I/O (24Hr): Intake/Output Summary (Last 24 hours) at 2023 1006  Last data filed at 2023 1214  Gross per 24 hour   Intake 37.5 ml   Output 150 ml   Net -112.5 ml       Labs:  Hematology:  Recent Labs     22  0725 23  0634 23  1846 23  0718   WBC 10.7 9.0  --  9.9   RBC 2.97* 2.51*  --  2.71*   HGB 8.7* 7.4* 8.2* 8.0*   HCT 27.0* 22.7* 24.7* 24.2*   MCV 90.9 90.4  --  89.3   MCH 29.3 29.5  --  29.5   MCHC 32.2 32.6  --  33.1   RDW 13.9 13.9  --  14.4    207  --  214   MPV 10.7 10.6  --  10.9     Chemistry:  Recent Labs     22  0725 22  1739 23  0634 23  0718    134* 137 132*   K 3.0* 3.7 3.8 3.2*   CL 98 97* 100 95*   CO2 27 27 29 30   GLUCOSE 124* 92 140* 139*   BUN 22 16 16 12   CREATININE 0.79 0.71 0.77 0.70   MG 2.0  --  1.8 1.9   ANIONGAP 12 10 8* 7*   LABGLOM >60 >60 >60 >60   CALCIUM 7.8* 7.5* 7.4* 7.6*   PHOS 3.6  --  3.6 3.3   No results for input(s): PROT, LABALBU, LABA1C, S4UKMZB, E2BXNDG, FT4, TSH, AST, ALT, LDH, GGT, ALKPHOS, LABGGT, BILITOT, BILIDIR, AMMONIA, AMYLASE, LIPASE, LACTATE, CHOL, HDL, LDLCHOLESTEROL, CHOLHDLRATIO, TRIG, VLDL, CJL56KY, PHENYTOIN, PHENYF, URICACID, POCGLU in the last 72 hours.   ABG:  Lab Results   Component Value Date/Time    POCPH 7.35 2014 04:57 PM    POCPCO2 63 2014 04:57 PM    POCPO2 103 2014 04:57 PM    POCHCO3 35.2 2014 04:57 PM    NBEA NOT REPORTED 2014 04:57 PM    PBEA 10 2014 04:57 PM    RHR2XSX 37 2014 04:57 PM    UGRG4SBE 97 2014 04:57 PM    FIO2 NO SAMPLE RECEIVED 2022 08:13 PM     Lab Results   Component Value Date/Time    SPECIAL NOT REPORTED 2014 05:19 PM     Lab Results   Component Value Date/Time    CULTURE NO GROWTH 2014 05:00 PM    CULTURE  2014 05:00 PM [employer] [address] [facility] 3 [phone]       Radiology:  XR HIP LEFT (1 VIEW)    Result Date: 12/26/2022  Status post left total hip arthroplasty in a patient with acute cervical neck fracture. Hardware alignment is satisfactory. No apparent hardware complication. Expected postoperative soft tissue changes are noted. 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 Infarct extends to involve the entire apex and small portions of the anterolateral and anterior septal wall. LVEF 37%. Risk stratification: Intermediate risk. XR HIP 2-3 VW W PELVIS LEFT    Result Date: 12/26/2022  Recent postop changes left hip arthroplasty       Physical Examination:        Physical Exam  Vitals and nursing note reviewed. Constitutional:       General: He is not in acute distress. HENT:      Head: Normocephalic and atraumatic. Eyes:      Conjunctiva/sclera: Conjunctivae normal.      Pupils: Pupils are equal, round, and reactive to light. Cardiovascular:      Rate and Rhythm: Normal rate and regular rhythm. Heart sounds: No murmur heard. Pulmonary:      Effort: Pulmonary effort is normal. No accessory muscle usage or respiratory distress. Breath sounds: No stridor. No decreased breath sounds, wheezing, rhonchi or rales. Abdominal:      General: Bowel sounds are normal. There is no distension. Palpations: Abdomen is soft. Abdomen is not rigid. Tenderness: There is no abdominal tenderness. There is no guarding. Musculoskeletal:         General: No tenderness. Comments:  L sided surgery is in dressing, CDI   Skin:     General: Skin is warm and dry. Findings: No erythema, lesion or rash. Neurological:      Mental Status: He is alert and oriented to person, place, and time. Cranial Nerves: No cranial nerve deficit.       Motor: No seizure activity. Psychiatric:         Speech: Speech normal.         Behavior: Behavior normal. Behavior is cooperative. Assessment:        Hospital Problems             Last Modified POA    * (Principal) Closed fracture of neck of left femur ([address] 12/26/2022 Yes    Duodenal ulcer 12/30/2022 Yes    CAD (coronary artery disease) 12/30/2022 Yes    Hypertension 12/30/2022 Yes    BPH (benign prostatic hyperplasia) 12/30/2022 Yes    Chronic systolic heart failure ([address] (Chronic) 12/30/2022 Yes    Claudication in peripheral vascular disease ([address] 12/30/2022 Yes    Malignant neoplasm of prostate ([address] 12/30/2022 Yes       Plan:        Principal Problem:    Closed fracture of neck of left femur ([address]  Active Problems:    Duodenal ulcer    CAD (coronary artery disease)    Hypertension    BPH (benign prostatic hyperplasia)    Chronic systolic heart failure (HCC)    Claudication in peripheral vascular disease (HCC)    Malignant neoplasm of prostate ([address]  Resolved Problems:    * No resolved hospital problems. *    -S/p left hip arthroplasty 12/26  -Recommended for anticoagulation for least 1 month postoperatively per orhtopedic surgery  -Duodenal ulcer with active bleed status post EGD cauterization, clip, epinephrine on 12/29/2022. Diet advanced, GI recommending avoid anticoagulation unless the patient has active MRI and anticoagulation given his history   -GI okay for antiplatelets. -GI signed off.  -Transfused 1 unit RBC to keep Hb above 8.  -History of combined systolic diastolic heart failure with abnormal stress test  concerning for ischemia.   Appreciate cardiology plan after reviewing GI recommendations  -  Continue Norvasc, Lipitor, Lasix, Toprol- mg,   -Depression continue Zoloft  -Anticoagulation is held due to GI bleed  -History of prostate cancer status postradiation therapy, continue to monitor intake and output  -Discussed with the patient and the nurse    Luvenia Duverney, MD  1/2/2023  10:06 AM

## 2023-01-02 NOTE — PROGRESS NOTES
Occupational Therapy  Facility/Department: [facility] RENAL//MED SURG  Occupational Therapy Daily Treatment Note    Name: Jodee Hair  : 1940  MRN: [id number]  Date of Service: 2023    Discharge Recommendations:  Further therapy recommended at discharge. The patient should be able to tolerate at least 3 hours of therapy per day over 5 days or 15 hours over 7 days. This patient may benefit from a Physical Medicine and Rehab consult. Assessment   Performance deficits / Impairments: Decreased functional mobility ; Decreased ADL status; Decreased endurance;Decreased balance;Decreased high-level IADLs;Decreased safe awareness;Decreased strength  Prognosis: Good  REQUIRES OT FOLLOW-UP: Yes  Activity Tolerance  Activity Tolerance: Patient Tolerated treatment well        Plan   Occupational Therapy Plan  Times Per Week: 4-5 x/wk  Current Treatment Recommendations: Balance training, Functional mobility training, Endurance training, Pain management, Safety education & training, Patient/Caregiver education & training, Equipment evaluation, education, & procurement, Self-Care / ADL, Home management training     Restrictions  Restrictions/Precautions  Restrictions/Precautions: Weight Bearing, Fall Risk  Required Braces or Orthoses?: No  Lower Extremity Weight Bearing Restrictions  Left Lower Extremity Weight Bearing: Weight Bearing As Tolerated  Position Activity Restriction  Hip Precautions: No hip flexion > 90 degrees, No ADduction, Posterior hip precautions, No hip internal rotation  Other position/activity restrictions: S/p: L hip hemiarthoplasty 2022    Subjective   General  Chart Reviewed: Yes  Family / Caregiver Present: No  General Comment  Comments: Pt and RN agreeable to therapy this day. Pt supine in bed at start of session and retired to seated in chair at session end with call light in reach, chair alarm on, BLE elevated and all needs met.  Pt c/o 1/10 pain in mid back, denied need for pain intervention       Objective        Safety Devices  Type of Devices: Call light within reach;Gait belt;Left in chair;Patient at risk for falls;Nurse notified; Chair alarm in place  Restraints  Restraints Initially in Place: No  Balance  Sitting: With support (Min A static/dynamic sitting at EOB demo post/ R lateral LOB increasing to SBA For sitting balance after hip adjustmnet and repositioning)  Standing: With support (Pt tolerated approx 4 min static standing with RW demo 0 LOB required Min A for posterior sway/safety)  Gait  Overall Level of Assistance: Minimum assistance;Assist X2 (simulated household distance with RW requring assist on walker management)        ADL  Grooming: Modified independent   Grooming Skilled Clinical Factors: Face washing and oral care facilitated seated in chair. Pt demo 0 loss of , able to manipulate utensils  UE Bathing: Stand by assistance;Setup  UE Bathing Skilled Clinical Factors: faciltated seated in chair  LE Bathing: Maximum assistance;Setup;Verbal cueing; Increased time to complete  LE Bathing Skilled Clinical Factors: Seated/standing at chair with RW. Pt able to wash from thighs to knees required assist from knees to feet to maintain hip precautions and assist for galdino/post d/t decreased balance and activity tolerance  UE Dressing: Stand by assistance;Setup  UE Dressing Skilled Clinical Factors: To doff/don gown seated in chair  LE Dressing: Maximum assistance;Setup  LE Dressing Skilled Clinical Factors: To doff/don socks and brief seated/standing  Toileting: Maximum assistance;Setup; Increased time to complete  Toileting Skilled Clinical Factors: Max A for breif management standing and Mod A for urinal placement seated in chair  Additional Comments: Throughout session pt limited per fatigue, decreased balance and decreased strength.  Sx soap utilized appropriately during session     Activity Tolerance  Activity Tolerance: Patient tolerated treatment well;Patient limited by endurance  Bed mobility  Supine to Sit: Moderate assistance  Scooting: Minimal assistance  Bed Mobility Comments: HOB elevated, utilizing bed rails  Transfers  Sit to stand: Moderate assistance;2 Person assistance  Stand to sit: Minimal assistance;2 Person assistance  Transfer Comments: Mod A x2 sit>stand from low chair; Min A x2 sit>stand from EOB     Cognition  Overall Cognitive Status: WFL  Orientation  Overall Orientation Status: Within Functional Limits                  Education Given To: Patient  Education Provided: Role of Therapy; ADL Adaptive Strategies;Transfer Training;Precautions  Education Provided Comments: proper hand and foot placement; balance maintaince; walker management; hip precautions-F carry over  Education Method: Verbal  Education Outcome: Continued education needed          AM-PAC Score        AM-PAC Inpatient Daily Activity Raw Score: 17 (01/02/23 1424)  AM-PAC Inpatient ADL T-Scale Score : 37.26 (01/02/23 1424)  ADL Inpatient CMS 0-100% Score: 50.11 (01/02/23 1424)  ADL Inpatient CMS G-Code Modifier : CK (01/02/23 1424)        Goals  Short Term Goals  Time Frame for Short Term Goals: By discharge,pt will:  Short Term Goal 1: [de-identified] Min A for functional transfers with use of LRD for engagement in ADLs/IADLs  Short Term Goal 2: Demo CGA for functional mobility with use of LRD to increase functional independence with daily occupations  Short Term Goal 3: Demo Mod I for UB ADLs with adaptive tech utilized PRN  Short Term Goal 4: Demo Min A for LB ADLs and toileting tasks with use of AE and adaptive tech PRN  Short Term Goal 5: Demo 8 min dynamic standing and reaching outside TAM with unilateral hand release and SBA for improved balance during ADL/IADL participation       Therapy Time   Individual Concurrent Group Co-treatment   Time In 1031     1004   Time Out 1109     1031   Minutes 38     27   Timed Code Treatment Minutes: 47 Minutes (co tx with PTA)       Blair Riser, PEÑA/L

## 2023-01-02 NOTE — PLAN OF CARE
Problem: Discharge Planning  Goal: Discharge to home or other facility with appropriate resources  Outcome: Progressing     Problem: Pain  Goal: Verbalizes/displays adequate comfort level or baseline comfort level  Outcome: Progressing     Problem: Safety - Adult  Goal: Free from fall injury  Outcome: Progressing     Problem: ABCDS Injury Assessment  Goal: Absence of physical injury  Outcome: Progressing     Problem: Skin/Tissue Integrity  Goal: Absence of new skin breakdown  Description: 1. Monitor for areas of redness and/or skin breakdown  2. Assess vascular access sites hourly  3. Every 4-6 hours minimum:  Change oxygen saturation probe site  4. Every 4-6 hours:  If on nasal continuous positive airway pressure, respiratory therapy assess nares and determine need for appliance change or resting period.   Outcome: Progressing     Problem: Chronic Conditions and Co-morbidities  Goal: Patient's chronic conditions and co-morbidity symptoms are monitored and maintained or improved  Outcome: Progressing     Problem: Nutrition Deficit:  Goal: Optimize nutritional status  1/2/2023 1829 by Diaz Worthington RN  Outcome: Progressing  1/2/2023 1342 by Abraham Truong RD  Flowsheets (Taken 1/2/2023 1342)  Nutrient intake appropriate for improving, restoring, or maintaining nutritional needs:   Assess nutritional status and recommend course of action   Recommend appropriate diets, oral nutritional supplements, and vitamin/mineral supplements   Monitor oral intake, labs, and treatment plans

## 2023-01-02 NOTE — PROGRESS NOTES
Physical Therapy  Facility/Department: [facility] RENAL//MED SURG  Daily Treatment Note    Name: Deric Cerda  : 1940  MRN: [id number]  Date of Service: 2023    Discharge Recommendations:  Patient would benefit from continued therapy after discharge Further therapy recommended at discharge. The patient should be able to tolerate at least 3 hours of therapy per day over 5 days or 15 hours over 7 days. This patient may benefit from a Physical Medicine and Rehab consult. PT Equipment Recommendations  Equipment Needed:  (pt unsafe to ambulate without skilled assistance)      Patient Diagnosis(es): The encounter diagnosis was Closed fracture of neck of left femur, initial encounter ([facility] [address] Past Medical History:  has a past medical history of Acid reflux, Anxiety, Arthritis, BPH (benign prostatic hypertrophy), CAD (coronary artery disease), Cancer ([address] Carotid stenosis, Cataracts, both eyes, CHF (congestive heart failure) ([address] Dry skin, Elevated PSA, Examination of participant in clinical trial, Hyperlipidemia, Hypertension, Left ventricular dysfunction, Macular degeneration, Myocardial infarction ([address] Neuropathy, Pacemaker, Panic attacks, PVD (peripheral vascular disease) ([address] Sinus problem, Snores, Vasovagal near syncope, and Wears glasses. Past Surgical History:  has a past surgical history that includes Pacemaker insertion (, , , , 2019); Cholecystectomy, laparoscopic (2014); eye surgery (Bilateral); Cardiac defibrillator placement (); Cardiac surgery (); Cardiac catheterization; Colonoscopy; Prostate Biopsy (2019); Prostate biopsy (N/A, 2019); pacemaker placement; Cataract removal; Hip Arthroplasty (Left, 2022); hip surgery (Left, 2022); Esophagogastroduodenoscopy (2022); and Upper gastrointestinal endoscopy (N/A, 2022).     Assessment   Body Structures, Functions, Activity Limitations Requiring Skilled Therapeutic Intervention: Decreased functional mobility ; Decreased strength;Decreased endurance;Decreased balance; Increased pain;Decreased coordination  Assessment: Pt required modA for bed mobility and Taina-modA x 2 to perform transfers with RW. Pt ambulated 10ft to chair with RW and Taina x 2 needing cues for safe RW management, gait pattern and correct posture. Pt would be unsafe to return to prior living arrangements as he is a high fall risk. Recommending continued PT to address deficits and maximize safety and independence with mobility. Therapy Prognosis: Good  Activity Tolerance  Activity Tolerance: Patient tolerated treatment well;Patient limited by endurance     Plan   Physcial Therapy Plan  General Plan: 6-7 times per week  Current Treatment Recommendations: Strengthening, Balance training, Functional mobility training, Transfer training, Gait training, Endurance training, Safety education & training, Patient/Caregiver education & training, Therapeutic activities, Equipment evaluation, education, & procurement, Home exercise program, Stair training  Safety Devices  Type of Devices: Gait belt, Left in chair, Call light within reach, Nurse notified, Chair alarm in place, Patient at risk for falls  Restraints  Restraints Initially in Place: No     Restrictions  Restrictions/Precautions  Restrictions/Precautions: Weight Bearing, Fall Risk  Required Braces or Orthoses?: No  Lower Extremity Weight Bearing Restrictions  Left Lower Extremity Weight Bearing: Weight Bearing As Tolerated  Position Activity Restriction  Hip Precautions: No hip flexion > 90 degrees, No ADduction, Posterior hip precautions, No hip internal rotation  Other position/activity restrictions: S/p: L hip hemiarthoplasty 12/26/2022     Subjective   General  Chart Reviewed: Yes  Response To Previous Treatment: Patient with no complaints from previous session.   Family / Caregiver Present: No  General Comment  Comments: Pt retired to chair at end of session with call light in reach, chair alarm and PEÑA present  Subjective  Subjective: Pt and RN agreeable to PT. PT supine in bed upon arrival with 1/10 center of back pain. Pt pleasant and cooperative throughout session. Cognition   Orientation  Overall Orientation Status: Within Functional Limits  Cognition  Overall Cognitive Status: WFL     Objective   Bed mobility  Supine to Sit: Moderate assistance  Sit to Supine: Unable to assess (Pt retired to recliner)  Scooting: Moderate assistance  Bed Mobility Comments: HOB elevated. Pt initiated but required assistance to complete trunk and LE progression. Increased time and effort complete. Pt maintained sitting EOB with CGA d/t occasional posterior lean with exercises  Transfers  Sit to Stand: Moderate Assistance;Minimal Assistance;2 Person Assistance  Stand to Sit: Minimal Assistance; Moderate Assistance;2 Person Assistance  Comment: Sit<>stand from bed Taina x 2, Sit<>stand from recliner modA x 2 to RW with verbal cues for UE placement with fair return. Static standing maintained with CGA x 2  Ambulation  Surface: Level tile  Device: Rolling Walker  Assistance: Minimal assistance;2 Person assistance  Quality of Gait: mildly unsteady, decreased LLE stance phase. no true LOB, fwd flexed posture  Gait Deviations: Slow Sunshine;Decreased step length;Decreased step height  Distance: 10 ft  Comments: Verbal cues for RW position, gait pattern and cues for posture with good understanding. Brief static standing x 2 to correct posture. Chair follow for safety. Pt requested to sit down after 5 ft but agreeable to continue to 10 ft. Balance  Posture: Fair  Sitting - Static: Fair  Sitting - Dynamic: Fair  Standing - Static: Fair  Standing - Dynamic: Fair;-  Comments: standing balance assessed while using a RW  Exercise Treatment: Seated LE exercise program: Long Arc Quads, hip abduction/adduction, heel/toe raises, and marches.  Reps: 10x at EOB maintaining L hip precautions and B quad sets        OutComes Score   AM-PAC Score  AM-PAC Inpatient Mobility Raw Score : 12 (01/02/23 1030)  AM-PAC Inpatient T-Scale Score : 35.33 (01/02/23 1030)  Mobility Inpatient CMS 0-100% Score: 68.66 (01/02/23 1030)  Mobility Inpatient CMS G-Code Modifier : CL (01/02/23 1030)        Goals  Short Term Goals  Time Frame for Short Term Goals: 14 visits  Short Term Goal 1: Pt will ambulate 150 feet with a RW and SBA. Short Term Goal 2: Pt will demonstrate good- standing balance to decrease fall risk. Short Term Goal 3: Pt will negotiate 3 stairs with bilateral handrails and SBA to allow the pt to enter prior living arrangements. Short Term Goal 4: Pt will perform sit<>stand transfer with CGA. Short Term Goal 5: Pt will perform bed mobility with CGA while maintaining posterior hip precautions. Additional Goals?: No       Education  Patient Education  Education Given To: Patient  Education Provided: Role of Therapy;Transfer Training;Plan of Care; Fall Prevention Strategies;Precautions  Education Provided Comments: Educated on posterior hip precautions.   Education Method: Verbal  Barriers to Learning: None  Education Outcome: Verbalized understanding;Continued education needed      Therapy Time   Individual Concurrent Group Co-treatment   Time In 1000         Time Out 1041         Minutes 41         Timed Code Treatment Minutes: 13 Minutes (co treatment with [address])       Jeremiah Antoine PTA

## 2023-01-02 NOTE — PROGRESS NOTES
[facility]   Progress Note                   Date:   1/2/2023  Patient name: Jasvir Patel  Date of admission:  12/25/2022  7:27 PM  MRN:   [id number]  YOB: 1940  PCP: Gino Norman MD    Reason for Admission: Closed fracture of neck of left femur, initial encounter ([facility] [S72.002A]  Closed left hip fracture, initial encounter ([facility] [S72.002A]    Subjective:       Clinical Changes / Abnormalities: Patient seen and examined. Denies chest pain or shortness of breath. Not on Tele. vitals/labs reviewed . Discussed case with RN. Medications:   Scheduled Meds:   methocarbamol  500 mg Oral TID    pantoprazole  40 mg Oral BID AC    furosemide  40 mg Oral Daily    amLODIPine  10 mg Oral Daily    metoprolol succinate  100 mg Oral Daily    sertraline  25 mg Oral Daily    atorvastatin  10 mg Oral Daily    gabapentin  600 mg Oral BID    [Held by provider] aspirin  81 mg Oral Daily    [Held by provider] ticagrelor  90 mg Oral BID    sodium chloride flush  5-40 mL IntraVENous 2 times per day    acetaminophen  1,000 mg Oral 3 times per day     Continuous Infusions:   sodium chloride      [Held by provider] heparin (PORCINE) Infusion Stopped (12/29/22 0651)     CBC:   Recent Labs     12/31/22  0725 01/01/23  0634 01/01/23  1846 01/02/23  0718   WBC 10.7 9.0  --  9.9   HGB 8.7* 7.4* 8.2* 8.0*    207  --  214       BMP:    Recent Labs     12/31/22  1739 01/01/23  0634 01/02/23  0718   * 137 132*   K 3.7 3.8 3.2*   CL 97* 100 95*   CO2 27 29 30   BUN 16 16 12   CREATININE 0.71 0.77 0.70   GLUCOSE 92 140* 139*       Hepatic: No results for input(s): AST, ALT, ALB, BILITOT, ALKPHOS in the last 72 hours. Troponin: No results for input(s): TROPHS in the last 72 hours. BNP: No results for input(s): BNP in the last 72 hours. Lipids: No results for input(s): CHOL, HDL in the last 72 hours.     Invalid input(s): LDLCALCU  INR:   No results for input(s): INR in the last 72 hours.      Objective:   Vitals: BP (!) 159/91   Pulse 89   Temp 98.7 °F (37.1 °C) (Oral)   Resp 17   Ht 5' 7\" (1.702 m)   Wt 195 lb 15.8 oz (88.9 kg)   SpO2 93%   BMI 30.70 kg/m²   General appearance: alert and cooperative with exam  HEENT: Head: Normocephalic, no lesions, without obvious abnormality. Neck: no JVD, trachea midline, no adenopathy  Lungs: Diminished to auscultation, 2 L via NC without distress. Heart: Regular rate and rhythm, s1/s2 auscultated, no murmurs  Abdomen: soft, non-tender, bowel sounds active  Extremities: no edema  Neurologic: not done        Assessment / Acute Cardiac Problems:   S/p fall suffered from left hip fracture  Preop cardiac risk stratification for same  Ischemic cardiomyopathy with improved ejection fraction on last nuclear stress test to 49% 2020 currently compensated with no signs of volume overload. ICD interrogation October 2022 functioning normally 96% paced in the atrium with 5 years of battery longevity  CAD status post CABG and stenting as detailed above with patent LIMA to the LAD and stenting to OM 2 and OM 3. Hypertension  Hyperlipidemia    Patient Active Problem List:     Right upper quadrant abdominal pain     CAD (coronary artery disease)     Hypertension     Skin cancer     BPH (benign prostatic hyperplasia)     Chronic systolic heart failure (HCC)     Hyperlipemia     Leukocytosis     Cholelithiasis with acute cholecystitis     Pre-syncope     Claudication in peripheral vascular disease (HCC)     Carotid stenosis, asymptomatic     Squamous cell carcinoma of skin of left upper arm     Encounter due to AICD at end of battery life-Change OUT 7-1-19     Malignant neoplasm of prostate ([address]     Chest pain     COVID     COVID-19     Closed fracture of neck of left femur ([address]    1. Coronary artery disease. He had prior bypass, LIMA-LAD.  last catheterization 2009 showed patent LIMA to LAD, required drug-eluting stent to diagonal, left circumflex and LPDA for significant stenosis. 2. Ischemic cardiomyopathy with EF of 20-25% s/p Dual Chamber ICD in situ. Checks normal function with very short runs of Afib.  4. Obesity. 5. Vasodepressor syncope. 6. Echocardiogram 05/23/2019, showed reduced LV systolic function with an EF of 25% with reduced LV diastolic compliance, trace to mild AI, trace to mild MR, trace to mild TR noted. 7. CATH 9/2/2020 LM 10-20% LAD proximal 90% LCx OM2 99% TED OM3 80% TED. RCA 80% proximal and is small nondominant. 9. STRESS 9/2/2020 reversible ischemia lateral wall. Infarct anterior lateral, and apical wall. Hypokinesis apical anterior and septal wall. LVEF 49%        ECHO 12/25/22  Summary  Severe LV systolic dysfunction, with EF 20-25%. Reduced LV diastolic compliance. Mild aortic insufficiency. Mild mitral regurgitation. Mild tricuspid regurgitation. 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 Infarct extends to involve the entire apex and   small portions of the anterolateral and anterior septal wall. LVEF 37%. Risk stratification: Intermediate risk. Plan of Treatment:   ECHO reviewed. Has AICD in situ  CP secondary to NSVT. No EKG changes. Trops peaked at 75, now trending down. Stress test reviewed and shows galdino-infarct ischemia. Reviewed with Dr. Mihai Wallis and plan for cardiac cath once acute GI issues resolved . Per GI note cleared for antiplatelet but no AC  till 1/6/2022 Risks and benefits reviewed and pt is agreeable to proceed. Keep K > 4 and Mg > 2.      Electronically signed by RAVI Cardona NP on 1/2/2023 at 12:06 PM  [address]  [phone]

## 2023-01-03 LAB
ACTIVATED CLOTTING TIME: 224 SEC (ref 79–149)
ANION GAP SERPL CALCULATED.3IONS-SCNC: 10 MMOL/L (ref 9–17)
BUN BLDV-MCNC: 12 MG/DL (ref 8–23)
CALCIUM SERPL-MCNC: 7.9 MG/DL (ref 8.6–10.4)
CHLORIDE BLD-SCNC: 95 MMOL/L (ref 98–107)
CO2: 31 MMOL/L (ref 20–31)
CREAT SERPL-MCNC: 0.73 MG/DL (ref 0.7–1.2)
GFR SERPL CREATININE-BSD FRML MDRD: >60 ML/MIN/1.73M2
GLUCOSE BLD-MCNC: 159 MG/DL (ref 75–110)
GLUCOSE BLD-MCNC: 194 MG/DL (ref 70–99)
POTASSIUM SERPL-SCNC: 3.3 MMOL/L (ref 3.7–5.3)
SODIUM BLD-SCNC: 136 MMOL/L (ref 135–144)

## 2023-01-03 PROCEDURE — 6360000002 HC RX W HCPCS

## 2023-01-03 PROCEDURE — 2060000000 HC ICU INTERMEDIATE R&B

## 2023-01-03 PROCEDURE — 10700171 HC MISC INTRODUCER/SHEATH

## 2023-01-03 PROCEDURE — 2709999900 HC NON-CHARGEABLE SUPPLY

## 2023-01-03 PROCEDURE — C1874 STENT, COATED/COV W/DEL SYS: HCPCS

## 2023-01-03 PROCEDURE — C9600 PERC DRUG-EL COR STENT SING: HCPCS

## 2023-01-03 PROCEDURE — 2500000003 HC RX 250 WO HCPCS

## 2023-01-03 PROCEDURE — 2580000003 HC RX 258: Performed by: INTERNAL MEDICINE

## 2023-01-03 PROCEDURE — B2121ZZ FLUOROSCOPY OF SINGLE CORONARY ARTERY BYPASS GRAFT USING LOW OSMOLAR CONTRAST: ICD-10-PCS | Performed by: INTERNAL MEDICINE

## 2023-01-03 PROCEDURE — B2111ZZ FLUOROSCOPY OF MULTIPLE CORONARY ARTERIES USING LOW OSMOLAR CONTRAST: ICD-10-PCS | Performed by: INTERNAL MEDICINE

## 2023-01-03 PROCEDURE — 10700171 HC MISC CLOSURE DEVICE VASC IMPLANTABLE/INSERTABLE

## 2023-01-03 PROCEDURE — 6370000000 HC RX 637 (ALT 250 FOR IP): Performed by: INTERNAL MEDICINE

## 2023-01-03 PROCEDURE — C1892 INTRO/SHEATH,FIXED,PEEL-AWAY: HCPCS

## 2023-01-03 PROCEDURE — 027034Z DILATION OF CORONARY ARTERY, ONE ARTERY WITH DRUG-ELUTING INTRALUMINAL DEVICE, PERCUTANEOUS APPROACH: ICD-10-PCS | Performed by: INTERNAL MEDICINE

## 2023-01-03 PROCEDURE — C1894 INTRO/SHEATH, NON-LASER: HCPCS

## 2023-01-03 PROCEDURE — 10700171 HC CATHETER GUIDING

## 2023-01-03 PROCEDURE — 99232 SBSQ HOSP IP/OBS MODERATE 35: CPT | Performed by: PHYSICAL MEDICINE & REHABILITATION

## 2023-01-03 PROCEDURE — 82947 ASSAY GLUCOSE BLOOD QUANT: CPT

## 2023-01-03 PROCEDURE — 6370000000 HC RX 637 (ALT 250 FOR IP): Performed by: SURGERY

## 2023-01-03 PROCEDURE — 99152 MOD SED SAME PHYS/QHP 5/>YRS: CPT

## 2023-01-03 PROCEDURE — 10700171 HC MISC GUIDEWIRE

## 2023-01-03 PROCEDURE — 85347 COAGULATION TIME ACTIVATED: CPT

## 2023-01-03 PROCEDURE — 6360000002 HC RX W HCPCS: Performed by: FAMILY MEDICINE

## 2023-01-03 PROCEDURE — 99153 MOD SED SAME PHYS/QHP EA: CPT

## 2023-01-03 PROCEDURE — 6360000004 HC RX CONTRAST MEDICATION

## 2023-01-03 PROCEDURE — 4A023N7 MEASUREMENT OF CARDIAC SAMPLING AND PRESSURE, LEFT HEART, PERCUTANEOUS APPROACH: ICD-10-PCS | Performed by: INTERNAL MEDICINE

## 2023-01-03 PROCEDURE — C1760 CLOSURE DEV, VASC: HCPCS

## 2023-01-03 PROCEDURE — 10700171 HC MISC STENT COATED/CVRD W DEL SYS

## 2023-01-03 PROCEDURE — 93455 CORONARY ART/GRFT ANGIO S&I: CPT

## 2023-01-03 PROCEDURE — 10700171 HC MISC INTRODUCER PEEL-AWAY

## 2023-01-03 PROCEDURE — 99232 SBSQ HOSP IP/OBS MODERATE 35: CPT | Performed by: FAMILY MEDICINE

## 2023-01-03 PROCEDURE — 80048 BASIC METABOLIC PNL TOTAL CA: CPT

## 2023-01-03 PROCEDURE — 6370000000 HC RX 637 (ALT 250 FOR IP): Performed by: STUDENT IN AN ORGANIZED HEALTH CARE EDUCATION/TRAINING PROGRAM

## 2023-01-03 PROCEDURE — 10700171 HC NON-CHARGEABLE SUPPLY

## 2023-01-03 PROCEDURE — C1887 CATHETER, GUIDING: HCPCS

## 2023-01-03 PROCEDURE — 36415 COLL VENOUS BLD VENIPUNCTURE: CPT

## 2023-01-03 PROCEDURE — 6370000000 HC RX 637 (ALT 250 FOR IP)

## 2023-01-03 PROCEDURE — C1769 GUIDE WIRE: HCPCS

## 2023-01-03 RX ORDER — POTASSIUM CHLORIDE 20 MEQ/1
40 TABLET, EXTENDED RELEASE ORAL ONCE
Status: COMPLETED | OUTPATIENT
Start: 2023-01-03 | End: 2023-01-03

## 2023-01-03 RX ORDER — FUROSEMIDE 10 MG/ML
20 INJECTION INTRAMUSCULAR; INTRAVENOUS ONCE
Status: COMPLETED | OUTPATIENT
Start: 2023-01-03 | End: 2023-01-03

## 2023-01-03 RX ORDER — CLOPIDOGREL BISULFATE 75 MG/1
75 TABLET ORAL DAILY
Status: DISCONTINUED | OUTPATIENT
Start: 2023-01-04 | End: 2023-01-11 | Stop reason: HOSPADM

## 2023-01-03 RX ADMIN — ATORVASTATIN CALCIUM 10 MG: 10 TABLET, FILM COATED ORAL at 09:11

## 2023-01-03 RX ADMIN — SERTRALINE 25 MG: 25 TABLET, FILM COATED ORAL at 09:11

## 2023-01-03 RX ADMIN — SODIUM CHLORIDE, PRESERVATIVE FREE 10 ML: 5 INJECTION INTRAVENOUS at 09:12

## 2023-01-03 RX ADMIN — GABAPENTIN 600 MG: 300 CAPSULE ORAL at 09:11

## 2023-01-03 RX ADMIN — METOPROLOL SUCCINATE 100 MG: 100 TABLET, FILM COATED, EXTENDED RELEASE ORAL at 09:11

## 2023-01-03 RX ADMIN — ACETAMINOPHEN 1000 MG: 500 TABLET, FILM COATED ORAL at 06:34

## 2023-01-03 RX ADMIN — SODIUM CHLORIDE, PRESERVATIVE FREE 10 ML: 5 INJECTION INTRAVENOUS at 20:56

## 2023-01-03 RX ADMIN — AMLODIPINE BESYLATE 10 MG: 10 TABLET ORAL at 09:11

## 2023-01-03 RX ADMIN — POTASSIUM CHLORIDE 40 MEQ: 1500 TABLET, EXTENDED RELEASE ORAL at 12:36

## 2023-01-03 RX ADMIN — ACETAMINOPHEN 1000 MG: 500 TABLET, FILM COATED ORAL at 20:56

## 2023-01-03 RX ADMIN — METHOCARBAMOL TABLETS 500 MG: 500 TABLET, COATED ORAL at 09:11

## 2023-01-03 RX ADMIN — FUROSEMIDE 20 MG: 10 INJECTION, SOLUTION INTRAMUSCULAR; INTRAVENOUS at 11:31

## 2023-01-03 RX ADMIN — PANTOPRAZOLE SODIUM 40 MG: 40 TABLET, DELAYED RELEASE ORAL at 06:34

## 2023-01-03 RX ADMIN — FUROSEMIDE 40 MG: 40 TABLET ORAL at 09:11

## 2023-01-03 RX ADMIN — METHOCARBAMOL TABLETS 500 MG: 500 TABLET, COATED ORAL at 20:56

## 2023-01-03 RX ADMIN — GABAPENTIN 600 MG: 300 CAPSULE ORAL at 20:57

## 2023-01-03 ASSESSMENT — ENCOUNTER SYMPTOMS
NAUSEA: 0
ABDOMINAL PAIN: 0
DIARRHEA: 0
BLOOD IN STOOL: 0
WHEEZING: 0
COUGH: 0
SHORTNESS OF BREATH: 0
VOMITING: 0
CHEST TIGHTNESS: 0
CONSTIPATION: 0

## 2023-01-03 NOTE — PROGRESS NOTES
[facility]  Office: [address], DO, Stefani Treadwell, DO, Olimpia Machuca, DO, Harriet Banks Blood, DO, Stacey Red MD, Divine Lo MD, Yvone Phalen, MD, Abe Saleem MD,  Homer Hashimoto, MD, Coni Walker MD, Angeline Allen DO, Sarai Padgett MD,  Priscila Arteaga MD, Cris Pineda MD, Robinson Trinh DO, Nahun Bradley MD, Cira Rosas MD, Nereida Joyce, DO, Nick Hopson MD, Jimena Dominguez MD, Darrius Hinkle MD, Judy Roe MD, Paulina Key DO, Ruby Gerard MD, Leon Ceballos MD, Ekaterina Ortiz, CNP,  Natalia Palacios, CNP, Rufus Quiet, CNP, Shane Avalos, CNP,  Prisca Chan, [facility], Delfina Chowdhury, CNP, Erick Naranjo, CNP, Janae Connell, CNP, Sally Ba, CNP, Osito Blas, CNP, Cookie Don PA-C, Madhuri Wu, CNS, Jewel Tomas, CNP, Riley Mooney, [facility]    Progress Note    1/3/2023    9:11 AM    Name:   Jeni Alcantar  MRN:     [id number]     Acct:      [de-identified]   Room:   [address] [place] [address] Day:  9  Admit Date:  12/25/2022  7:27 PM    PCP:   Sami Ramirez MD  Code Status:  Full Code    Subjective:     C/C:   Chief Complaint   Patient presents with    Fall     Interval History Status: improved. Patient seen and examined at bedside, no acute events overnight. Continue to improve overall with better breathing. Patient denies any chest pain, shortness of breath, chills, fevers, nausea or vomiting.   Patient vitals, labs and all providers notes were reviewed,from overnight shift and morning updates were noted and discussed with the nurse      Brief History:     Per chart  This is a 79-year-old male past medical history of neuropathy, AICD in place, peripheral vascular disease, coronary artery disease, history of diastolic CHF, prostate cancer status postradiation therapy, arthritis, bilateral carotid stenosis presented to the ER on 12/25/2022 after patient had a fall denied any loss of consciousness. Patient was admitted to trauma surgery service. Cardiology was consulted for medical clearance was moderate to high risk for any OR procedure due to history of significant coronary artery disease and LAD. Patient underwent left hip hemiarthroplasty with orthopedic surgery on 12/26/2022, afterwards was noted to have episodes of nonsustained V. tach. Underwent stress test on 12/28/2022 showing concern of galdino-infarct ischemia and plan for cardiac catheterization however was held due to patient having black tarry stools on 12/29/2022 patient underwent EGD with GI found to have 10 mm duodenal ulcer with active bleeding that was cauterized and injected with epinephrine and clipped. Patient currently on clear liquid diet and plan for cardiac catheterization when cleared by GI. Patient continues on Protonix drip at this time       Review of Systems:     Review of Systems   Constitutional:  Positive for activity change, appetite change and fatigue. Negative for chills, diaphoresis and fever. HENT:  Negative for congestion. Eyes:  Negative for visual disturbance. Respiratory:  Negative for cough, chest tightness, shortness of breath and wheezing. Cardiovascular:  Negative for chest pain, palpitations and leg swelling. Gastrointestinal:  Negative for abdominal pain, blood in stool, constipation, diarrhea, nausea and vomiting. Genitourinary:  Negative for difficulty urinating. Neurological:  Positive for weakness. Negative for dizziness, light-headedness, numbness and headaches. All other systems reviewed and are negative. Medications: Allergies:     Allergies   Allergen Reactions    Percocet [Oxycodone-Acetaminophen] Other (See Comments)     Panic attack       Current Meds:   Scheduled Meds:    methocarbamol  500 mg Oral TID    pantoprazole  40 mg Oral BID AC    furosemide  40 mg Oral Daily    amLODIPine  10 mg Oral Daily    metoprolol succinate  100 mg Oral Daily    sertraline  25 mg Oral Daily    atorvastatin  10 mg Oral Daily    gabapentin  600 mg Oral BID    [Held by provider] aspirin  81 mg Oral Daily    [Held by provider] ticagrelor  90 mg Oral BID    sodium chloride flush  5-40 mL IntraVENous 2 times per day    acetaminophen  1,000 mg Oral 3 times per day     Continuous Infusions:    sodium chloride      [Held by provider] heparin (PORCINE) Infusion Stopped (12/29/22 0651)     PRN Meds: sodium chloride, polyethylene glycol, sodium chloride flush, sodium chloride flush, nitroGLYCERIN, [Held by provider] heparin (porcine), [Held by provider] heparin (porcine), fluticasone, melatonin, oxyCODONE, sodium chloride flush, ondansetron **OR** ondansetron    Data:     Past Medical History:   has a past medical history of Acid reflux, Anxiety, Arthritis, BPH (benign prostatic hypertrophy), CAD (coronary artery disease), Cancer ([facility] Carotid stenosis, Cataracts, both eyes, CHF (congestive heart failure) ([facility] Dry skin, Elevated PSA, Examination of participant in clinical trial, Hyperlipidemia, Hypertension, Left ventricular dysfunction, Macular degeneration, Myocardial infarction ([facility] Neuropathy, Pacemaker, Panic attacks, PVD (peripheral vascular disease) ([facility] Sinus problem, Snores, Vasovagal near syncope, and Wears glasses. Social History:   reports that he has never smoked. He has never used smokeless tobacco. He reports current alcohol use of about 2.0 standard drinks per week. He reports that he does not use drugs.      Family History:   Family History   Problem Relation Age of Onset    High Blood Pressure Mother     Cancer Mother     High Blood Pressure Father     Diabetes Sister     Cancer Sister     High Blood Pressure Sister     Heart Disease Brother     High Blood Pressure Brother        Vitals:  BP (!) 141/64   Pulse 70   Temp 97.7 °F (36.5 °C) (Oral)   Resp 18   Ht 5' 7\" (1.702 m)   Wt 195 lb 15.8 oz (88.9 kg)   SpO2 94%   BMI 30.70 kg/m² Temp (24hrs), Av.1 °F (36.7 °C), Min:97.7 °F (36.5 °C), Max:98.6 °F (37 °C)    No results for input(s): POCGLU in the last 72 hours. I/O (24Hr): No intake or output data in the 24 hours ending 23 0911      Labs:  Hematology:  Recent Labs     23  0634 23  1846 23  0718   WBC 9.0  --  9.9   RBC 2.51*  --  2.71*   HGB 7.4* 8.2* 8.0*   HCT 22.7* 24.7* 24.2*   MCV 90.4  --  89.3   MCH 29.5  --  29.5   MCHC 32.6  --  33.1   RDW 13.9  --  14.4     --  214   MPV 10.6  --  10.9       Chemistry:  Recent Labs     22  1739 23  0634 23  0718   * 137 132*   K 3.7 3.8 3.2*   CL 97* 100 95*   CO2 27 29 30   GLUCOSE 92 140* 139*   BUN 16 16 12   CREATININE 0.71 0.77 0.70   MG  --  1.8 1.9   ANIONGAP 10 8* 7*   LABGLOM >60 >60 >60   CALCIUM 7.5* 7.4* 7.6*   PHOS  --  3.6 3.3     No results for input(s): PROT, LABALBU, LABA1C, J0QSKLA, R7IIXIV, FT4, TSH, AST, ALT, LDH, GGT, ALKPHOS, LABGGT, BILITOT, BILIDIR, AMMONIA, AMYLASE, LIPASE, LACTATE, CHOL, HDL, LDLCHOLESTEROL, CHOLHDLRATIO, TRIG, VLDL, CBP91LN, PHENYTOIN, PHENYF, URICACID, POCGLU in the last 72 hours. ABG:  Lab Results   Component Value Date/Time    POCPH 7.35 2014 04:57 PM    POCPCO2 63 2014 04:57 PM    POCPO2 103 2014 04:57 PM    POCHCO3 35.2 2014 04:57 PM    NBEA NOT REPORTED 2014 04:57 PM    PBEA 10 2014 04:57 PM    KQR3EYP 37 2014 04:57 PM    YXVC9UNZ 97 2014 04:57 PM    FIO2 NO SAMPLE RECEIVED 2022 08:13 PM     Lab Results   Component Value Date/Time    SPECIAL NOT REPORTED 2014 05:19 PM     Lab Results   Component Value Date/Time    CULTURE NO GROWTH 2014 05:00 PM    CULTURE  2014 05:00 PM     [employer] [address] [facility] 3 [phone]       Radiology:  XR HIP LEFT (1 VIEW)    Result Date: 2022  Status post left total hip arthroplasty in a patient with acute cervical neck fracture.   Hardware alignment is satisfactory. No apparent hardware complication. Expected postoperative soft tissue changes are noted. 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 Infarct extends to involve the entire apex and small portions of the anterolateral and anterior septal wall. LVEF 37%. Risk stratification: Intermediate risk. XR HIP 2-3 VW W PELVIS LEFT    Result Date: 12/26/2022  Recent postop changes left hip arthroplasty       Physical Examination:        Physical Exam  Vitals and nursing note reviewed. Constitutional:       General: He is not in acute distress. HENT:      Head: Normocephalic and atraumatic. Eyes:      Conjunctiva/sclera: Conjunctivae normal.      Pupils: Pupils are equal, round, and reactive to light. Cardiovascular:      Rate and Rhythm: Normal rate and regular rhythm. Heart sounds: No murmur heard. Pulmonary:      Effort: Pulmonary effort is normal. No accessory muscle usage or respiratory distress. Breath sounds: No stridor. No decreased breath sounds, wheezing, rhonchi or rales. Abdominal:      General: Bowel sounds are normal. There is no distension. Palpations: Abdomen is soft. Abdomen is not rigid. Tenderness: There is no abdominal tenderness. There is no guarding. Musculoskeletal:         General: No tenderness. Comments:  L sided surgery is in dressing, CDI   Skin:     General: Skin is warm and dry. Findings: No erythema, lesion or rash. Neurological:      Mental Status: He is alert and oriented to person, place, and time. Cranial Nerves: No cranial nerve deficit. Motor: No seizure activity. Psychiatric:         Speech: Speech normal.         Behavior: Behavior normal. Behavior is cooperative.        Assessment:        Hospital Problems             Last Modified POA    * (Principal) Closed fracture of neck of left femur ([address] 12/26/2022 Yes    Duodenal ulcer 12/30/2022 Yes    CAD (coronary artery disease) 12/30/2022 Yes    Hypertension 12/30/2022 Yes    BPH (benign prostatic hyperplasia) 12/30/2022 Yes    Chronic systolic heart failure ([address] (Chronic) 12/30/2022 Yes    Claudication in peripheral vascular disease ([address] 12/30/2022 Yes    Malignant neoplasm of prostate ([address] 12/30/2022 Yes     Plan:        Principal Problem:    Closed fracture of neck of left femur ([address]  Active Problems:    Duodenal ulcer    CAD (coronary artery disease)    Hypertension    BPH (benign prostatic hyperplasia)    Chronic systolic heart failure (HCC)    Claudication in peripheral vascular disease (HCC)    Malignant neoplasm of prostate ([address]  Resolved Problems:    * No resolved hospital problems. *    -S/p left hip arthroplasty 12/26  -Recommended for anticoagulation for least 1 month postoperatively per orthopedic surgery  -Duodenal ulcer with active bleed status post EGD cauterization, clip, epinephrine on 12/29/2022. Diet advanced, GI recommending avoid anticoagulation unless the patient has active MRI and anticoagulation given his history   -GI okay for antiplatelets. -GI signed off.  -Transfused 1 unit RBC to keep Hb above 8.  -History of combined systolic diastolic heart failure with abnormal stress test  concerning for ischemia.   Plan for cardiac cath today  -  Continue Norvasc, Lipitor, Lasix, Toprol- mg,   -Depression continue Zoloft  -Anticoagulation is held due to GI bleed  -History of prostate cancer status postradiation therapy, continue to monitor intake and output  -Discussed with the patient and the nurse  -Discussed with cardiology nurse practitioner    Brain Mcfadden MD  1/3/2023  9:11 AM

## 2023-01-03 NOTE — OP NOTE
[facility]    CARDIAC CATHETERIZATION    Date:   1/3/2023  Patient name:  Bobby Lantigua  Date of admission:  12/25/2022  7:27 PM  MRN:   [id number]  YOB: 1940    Operators:  Primary:   Isabel Boas, MD     Assistant/CV fellow:  Shey Rhodes MD      Procedure performed:       [x] Left Heart Catheterization. [] Graft Angiography. [x] Left Ventriculography. [] Right Heart Catheterization. [x] Coronary Angiography. [] Aortic Valve Studies. [x] PCI      [] Other:       Pre Procedure Conscious Sedation Data:  ASA Class:    [] I [x] II [] III [] IV    Mallampati Class:  [] I [x] II [] III [] IV      Indication:  [] STEMI      [x] + Stress test  [x] ACS      [] + EKG Changes  [] Non Q MI       [x] Significant Risk Factors  [] Recurrent Angina             [] Diabetes Mellitus    [] New LBBB      [] Uncontrolled HTN. [] CHF / Low EF changes     [] Abnormal CTA / Ca Score      Procedure:  Access:  [x] Femoral  [] Radial  artery       [x] Right  [] Left    Procedure: After informed consent was obtained with explanation of the risks and benefits, patient was brought to the cath lab. The access area was prepped and draped in sterile fashion. 1% lidocaine was used for local block. The artery was cannulated with 6  Fr sheath with brisk arterial blood return. The side port was frequently flushed and aspirated with normal saline. Findings:    Angiographic Findings       Cardiac Arteries and Lesion Findings       LMCA: Normal 0% stenosis. LAD: 100% proximal stenosis   LIMA -LAD - D is patent with severe LAD apical disease     LCx: Dominant   Mid new stenosis 75%   OM1 stent patent with 40% stenosis   OM2/PDA: patent stent         Lesion on Mid CX: Proximal subsection. 80% stenosis 8 mm length reduced to     0%. Pre procedure VAN III flow was noted. Post Procedure VAN III flow     was present. Good runoff was present. The lesion was diagnosed as     Moderate Risk (B). Devices used         - Luge Wire 182 cm. Number of passes: 1.         - Resolute Tra 4.5 x 15 TED. 1 inflation(s) to a max pressure of: 12     navarro. RCA: Known occluded 100%      Coronary Tree        Dominance: Left     Estimated Blood Loss: 10 mL     Conclusions        Procedure Summary        Patent OM1 and OM2/PDA stents    Patent LIMA -LAD/D with severe diffuse disease in LAD    SUccessful PTCA -TED mid dominant LCX        Recommendations        Post stent protocol     ____________________________________________________________________    History and Risk Factors    [x] Hypertension     [] Family history of CAD  [x] Hyperlipidemia     [] Cerebrovascular Disease   [] Prior MI       [] Peripheral Vascular disease   [] Prior PCI              [] Diabetes Mellitus    [] Left Main PCI. [] Currently on Dialysis. [] Prior CABG. [] Currently smoker. [] Cardiac Arrest outside of healthcare facility. [] Yes    [x] No        Witnessed     [] Yes   [] No     Arrest after arrival of EMS  [] Yes   [] No     [] Cardiac Arrest at other Facility. [] Yes   [x] No    Pre-Procedure Information. Heart Failure       [] Yes    [x] No        Class  [] I      [] II  [] III    [] IV. New Diagnosis    [] Yes  [] No    HF Type      [] Systolic   [] Diastolic          [] Unknown. Diagnostic Test:   EKG       [] Normal   [x] Abnormal    New antiarrhythmia medications:    [] Yes   [x] No   New onset atrial fibrillation / Flutter     [] Yes   [x] No   ECG Abnormalities:      [] V. Fib   [] Taylor V. Tach           [] NS V. T   [] New LBBB           [] T.  Inv  []  ST dev > 0.5 mm         [] PVC's freq  [] PVC's infrequent    Stress Test Performed:      [x] Yes    [] No     Type:     [] Stress Echo   [] Exercise Stress Test (no imaging)      [x] Stress Nuclear  [] Stress Imaging     Results   [] Negative   [] Positive        [] Indeterminate  [] Unavailable     If Positive/ Risk / Extent of Ischemia:       [] Low  [x] Intermediate         [] High  [] Unavailable      Cardiac CTA Performed:     [] Yes    [x] No      Results   [] CAD   [] Non obstructive CAD      [] No CAD   [] Uncertain      [] Unknown   [] Structural Disease.      Pre Procedure Medications:   [] Yes    [] No         [] ASA   [x] Beta Blockers      [] Nitrate   [x] Ca Channel Blockers      [] Ranolazine   [x] Statin       [] Plavix/Others antiplatelets      Electronically signed on 1/3/2023 at 4:38 PM by:    Fermin Guerra MD  Fellow, [address] [place] [address]

## 2023-01-03 NOTE — PROGRESS NOTES
Orthopedic Surgery Update Progress Note:    S: Ortho notified of saturated dressing. Patient has no complaints or concerns regarding the left hip. He has been ambulating well with PT and walked 10 feet with a rolling walker yesterday with PT.    O: LLE: Dressings are saturated through with serous drainage. Removed for evaluation. Staples are intact, skin edges are well approximated without signs of dehiscence. No overt signs of infection such as purulent drainage or inappropriate erythema. Compartments soft and compressible. TA/EHL/FHL/GS motor intact. Able to actively flex knee and hip with minimal pain in hip/groin with movement. Deep and Superficial Peroneal/Saphenous/Sural SILT. Foot is warm and well perfused. A: Dayo Simons is a 79 y.o. male who [address], being seen for:    -Left femoral neck fracture s/p hip hemiarthroplasty, DOS 12/26/22    P:   - WBAT LLE  - Optifoam dressing on left lower hip was saturated. Serous drainage only seen. Changed optifoam today. Okay to reinforce/maintain by nursing if necessary. Please notify Ortho if saturated. - Pain and medical management per primary  - DVT ppx: EPC. OK for chemical anticoagulation from ortho perspective   - Continue working with PT/OT  - Follow up with Dr. Marek Harmon 1/10.  - Please page Ortho on call with any questions or concerns      Debra Ugarte, DO  Orthopedic Surgery Resident, PGY-1  [address] [facility]

## 2023-01-03 NOTE — PROGRESS NOTES
Patient admitted, consent signed and questions answered. Patient ready for procedure. Call light to reach with side rails up 2 of 2. Bilateral groins clipped with writer and  Margie present. No family at bedside with patient. History and physical updated.

## 2023-01-03 NOTE — PROGRESS NOTES
Patient received from Cath lab. Patient awake and alert, denies any complaints. Right groin with dressing intact. No hematomas or drainage noted. Patient resting flat in bed. Side rails up times 2, call light with in reach.

## 2023-01-03 NOTE — CARE COORDINATION
Transitional planning: Spoke with patient at the bedside regarding ARU. Patient voices he would prefer [facility] as 1st choice then [facility] as 2nd choice. Referral sent to [facility]. [phone] Phone call from [facility]. They will accept but will need therapy notes to start precert.

## 2023-01-03 NOTE — PROGRESS NOTES
[facility]   Progress Note                   Date:   1/3/2023  Patient name: Brittany Whyte  Date of admission:  12/25/2022  7:27 PM  MRN:   [id number]  YOB: 1940  PCP: Raad Andrade MD    Reason for Admission: Closed fracture of neck of left femur, initial encounter [facility] [S72.002A]  Closed left hip fracture, initial encounter ([facility] [S72.002A]    Subjective:       Clinical Changes / Abnormalities: Patient seen and examined. Denies chest pain or shortness of breath. vitals/labs reviewed . Discussed case with RN. Medications:   Scheduled Meds:   methocarbamol  500 mg Oral TID    pantoprazole  40 mg Oral BID AC    furosemide  40 mg Oral Daily    amLODIPine  10 mg Oral Daily    metoprolol succinate  100 mg Oral Daily    sertraline  25 mg Oral Daily    atorvastatin  10 mg Oral Daily    gabapentin  600 mg Oral BID    [Held by provider] aspirin  81 mg Oral Daily    [Held by provider] ticagrelor  90 mg Oral BID    sodium chloride flush  5-40 mL IntraVENous 2 times per day    acetaminophen  1,000 mg Oral 3 times per day     Continuous Infusions:   sodium chloride      [Held by provider] heparin (PORCINE) Infusion Stopped (12/29/22 0651)     CBC:   Recent Labs     01/01/23  0634 01/01/23  1846 01/02/23  0718   WBC 9.0  --  9.9   HGB 7.4* 8.2* 8.0*     --  214       BMP:    Recent Labs     01/01/23  0634 01/02/23  0718 01/03/23  0942    132* 136   K 3.8 3.2* 3.3*    95* 95*   CO2 29 30 31   BUN 16 12 12   CREATININE 0.77 0.70 0.73   GLUCOSE 140* 139* 194*       Hepatic: No results for input(s): AST, ALT, ALB, BILITOT, ALKPHOS in the last 72 hours. Troponin: No results for input(s): TROPHS in the last 72 hours. BNP: No results for input(s): BNP in the last 72 hours. Lipids: No results for input(s): CHOL, HDL in the last 72 hours. Invalid input(s): LDLCALCU  INR:   No results for input(s): INR in the last 72 hours.       Objective:   Vitals: BP (!) 141/64 Pulse 79   Temp 97.7 °F (36.5 °C) (Oral)   Resp 18   Ht 5' 7\" (1.702 m)   Wt 195 lb 15.8 oz (88.9 kg)   SpO2 94%   BMI 30.70 kg/m²   General appearance: alert and cooperative with exam  HEENT: Head: Normocephalic, no lesions, without obvious abnormality. Neck: no JVD, trachea midline, no adenopathy  Lungs: Diminished to auscultation, 2 L via NC without distress. Heart: Regular rate and rhythm, s1/s2 auscultated, no murmurs  Abdomen: soft, non-tender, bowel sounds active  Extremities: no edema  Neurologic: not done        Assessment / Acute Cardiac Problems:   S/p fall suffered from left hip fracture  Preop cardiac risk stratification for same  Ischemic cardiomyopathy with improved ejection fraction on last nuclear stress test to 49% 2020 currently compensated with no signs of volume overload. ICD interrogation October 2022 functioning normally 96% paced in the atrium with 5 years of battery longevity  CAD status post CABG and stenting as detailed above with patent LIMA to the LAD and stenting to OM 2 and OM 3. Hypertension  Hyperlipidemia    Patient Active Problem List:     Right upper quadrant abdominal pain     CAD (coronary artery disease)     Hypertension     Skin cancer     BPH (benign prostatic hyperplasia)     Chronic systolic heart failure (HCC)     Hyperlipemia     Leukocytosis     Cholelithiasis with acute cholecystitis     Pre-syncope     Claudication in peripheral vascular disease (HCC)     Carotid stenosis, asymptomatic     Squamous cell carcinoma of skin of left upper arm     Encounter due to AICD at end of battery life-Change OUT 7-1-19     Malignant neoplasm of prostate ([address]     Chest pain     COVID     COVID-19     Closed fracture of neck of left femur ([address]    1. Coronary artery disease. He had prior bypass, LIMA-LAD. last catheterization 2009 showed patent LIMA to LAD, required drug-eluting stent to diagonal, left circumflex and LPDA for significant stenosis.   2. Ischemic cardiomyopathy with EF of 20-25% s/p Dual Chamber ICD in situ. Checks normal function with very short runs of Afib.  4. Obesity. 5. Vasodepressor syncope. 6. Echocardiogram 05/23/2019, showed reduced LV systolic function with an EF of 25% with reduced LV diastolic compliance, trace to mild AI, trace to mild MR, trace to mild TR noted. 7. CATH 9/2/2020 LM 10-20% LAD proximal 90% LCx OM2 99% TED OM3 80% TED. RCA 80% proximal and is small nondominant. 9. STRESS 9/2/2020 reversible ischemia lateral wall. Infarct anterior lateral, and apical wall. Hypokinesis apical anterior and septal wall. LVEF 49%        ECHO 12/25/22  Summary  Severe LV systolic dysfunction, with EF 20-25%. Reduced LV diastolic compliance. Mild aortic insufficiency. Mild mitral regurgitation. Mild tricuspid regurgitation. 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 Infarct extends to involve the entire apex and   small portions of the anterolateral and anterior septal wall. LVEF 37%. Risk stratification: Intermediate risk. Plan of Treatment:   ECHO reviewed. Has AICD in situ  CP secondary to NSVT. No EKG changes. Trops peaked at 75, now trending down. Stress test reviewed and shows galdino-infarct ischemia. Reviewed with Dr. Nyasia Carmona and plan for cardiac cath . Per GI note cleared for antiplatelet but no AC  till 1/6/2022 I have discussed risks (including but not limited to vascular injury, infection, hematoma, contrast induced kidney dysfunction, CVA and MI), benefits, alternatives in detail. All questions answered. Patient agrees to proceed. Will add patient for cath today - please keep patient NPO   Keep K > 4 and Mg > 2.      Electronically signed by Marylene Modest, APRN - NP on 1/3/2023 at 12:17 PM  [address]  [phone]

## 2023-01-03 NOTE — PLAN OF CARE
Problem: Safety - Adult  Goal: Free from fall injury  1/3/2023 0108 by Shalom Slade RN  Outcome: Progressing

## 2023-01-03 NOTE — PROGRESS NOTES
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    Cardiology 1/2-has AICD, NSVT-no EKG changes now trending down troponin, stress test showed galdino-infarct ischemia plan for cardiac cath once acute GI is resolved per GI cleared for antiplatelet but no anticoagulation till 1/6 ischemic cardiomyopathy ejection fraction 2025% dual-chamber ICD, c    GI-bleeding duodenal ulcer status post EGD with triple Endo-therapy, anemia secondary to upper GI bleed anticoagulation be resumed after 7 days following Endo-therapy 1/6/2023 however if patient is having active MI may start ACE immediately antiplatelet therapy can be initiated    Internal medicine transfuse to keep hemoglobin above 8, anticoagulation held due to GI bleed    Subjective:   No complaints. Feels well. ROS:  Denies fevers, chills, sweats. No chest pain, palpitations, lightheadedness. Denies coughing, wheezing or shortness of breath. Denies abdominal pain, nausea, diarrhea or constipation. No new areas of joint pain. Denies new areas of numbness or weakness. Denies new anxiety or depression issues. No new skin problems. Rehabilitation:   PT:  Restrictions/Precautions: Weight Bearing, Fall Risk  Hip Precautions: No hip flexion > 90 degrees, No ADduction, Posterior hip precautions, No hip internal rotation  Other position/activity restrictions: S/p: L hip hemiarthoplasty 12/26/2022  Left Lower Extremity Weight Bearing: Weight Bearing As Tolerated   Transfers  Sit to Stand: Moderate Assistance, Minimal Assistance, 2 Person Assistance  Stand to Sit: Minimal Assistance, Moderate Assistance, 2 Person Assistance  Comment: Sit<>stand from bed Taina x 2, Sit<>stand from recliner modA x 2 to RW with verbal cues for UE placement with fair return.  Static standing maintained with CGA x 2  Ambulation  Surface: Level tile  Device: Rolling Walker  Assistance: Minimal assistance, 2 Person assistance  Quality of Gait: mildly unsteady, decreased LLE stance phase. no true LOB, fwd flexed posture  Gait Deviations: Slow Sunshine, Decreased step length, Decreased step height  Distance: 10 ft  Comments: Verbal cues for RW position, gait pattern and cues for posture with good understanding. Brief static standing x 2 to correct posture. Chair follow for safety. Pt requested to sit down after 5 ft but agreeable to continue to 10 ft. More Ambulation?: No      OT:  ADL  Feeding: Modified independent , Setup  Grooming: Modified independent   Grooming Skilled Clinical Factors: Face washing and oral care facilitated seated in chair. Pt demo 0 loss of , able to manipulate utensils  UE Bathing: Stand by assistance, Setup  UE Bathing Skilled Clinical Factors: faciltated seated in chair  LE Bathing: Maximum assistance, Setup, Verbal cueing, Increased time to complete  LE Bathing Skilled Clinical Factors: Seated/standing at chair with RW. Pt able to wash from thighs to knees required assist from knees to feet to maintain hip precautions and assist for galdino/post d/t decreased balance and activity tolerance  UE Dressing: Stand by assistance, Setup  UE Dressing Skilled Clinical Factors: To doff/don gown seated in chair  LE Dressing: Maximum assistance, Setup  LE Dressing Skilled Clinical Factors: To doff/don socks and brief seated/standing  Toileting: Maximum assistance, Setup, Increased time to complete  Toileting Skilled Clinical Factors: Max A for breif management standing and Mod A for urinal placement seated in chair  Additional Comments: Throughout session pt limited per fatigue, decreased balance and decreased strength.  Sx soap utilized appropriately during session                      Bed mobility  Supine to Sit: Moderate assistance  Sit to Supine: Unable to assess (Pt retired to recliner)  Scooting: Minimal assistance  Bed Mobility Comments: HOB elevated, utilizing bed rails  Transfers  Sit to stand: Moderate assistance, 2 Person assistance  Stand to sit: Minimal assistance, 2 Person assistance  Transfer Comments: Mod A x2 sit>stand from low chair; Min A x2 sit>stand from EOB                   ST:        Objective:  BP (!) 141/64   Pulse 70   Temp 97.7 °F (36.5 °C) (Oral)   Resp 18   Ht 5' 7\" (1.702 m)   Wt 195 lb 15.8 oz (88.9 kg)   SpO2 94%   BMI 30.70 kg/m²  I Body mass index is 30.7 kg/m². I   Wt Readings from Last 1 Encounters:   23 195 lb 15.8 oz (88.9 kg)      Temp (24hrs), Av.1 °F (36.7 °C), Min:97.7 °F (36.5 °C), Max:98.6 °F (37 °C)         GEN: well developed, well nourished, no acute distress  HEENT: Normocephalic atraumatic, EOMI, mucous membranes pink and moist  CV: RRR, no murmurs, rubs or gallops  PULM: CTAB, no rales or rhonchi. Respirations WNL and unlabored  ABD: soft, NT, ND, +BS and equal  NEURO: A&O x3. Sensation intact to light touch. MSK: 4+/5 upper and right lower extremity, limited proximal left lower extremity  EXTREMITIES: No calf tenderness to palpation bilaterally. No edema BLEs  SKIN: warm dry and intact with good turgor  PSYCH: appropriately interactive. Affect WNL.         Medications   Scheduled Meds:   furosemide  20 mg IntraVENous Once    methocarbamol  500 mg Oral TID    pantoprazole  40 mg Oral BID AC    furosemide  40 mg Oral Daily    amLODIPine  10 mg Oral Daily    metoprolol succinate  100 mg Oral Daily    sertraline  25 mg Oral Daily    atorvastatin  10 mg Oral Daily    gabapentin  600 mg Oral BID    [Held by provider] aspirin  81 mg Oral Daily    [Held by provider] ticagrelor  90 mg Oral BID    sodium chloride flush  5-40 mL IntraVENous 2 times per day    acetaminophen  1,000 mg Oral 3 times per day     Continuous Infusions:   sodium chloride      [Held by provider] heparin (PORCINE) Infusion Stopped (22 0651)     PRN Meds:.sodium chloride, polyethylene glycol, sodium chloride flush, sodium chloride flush, nitroGLYCERIN, [Held by provider] heparin (porcine), [Held by provider] heparin (porcine), fluticasone, melatonin, oxyCODONE, sodium chloride flush, ondansetron **OR** ondansetron     Diagnostics:     CBC:   Recent Labs     01/01/23  0634 01/01/23  1846 01/02/23  0718   WBC 9.0  --  9.9   RBC 2.51*  --  2.71*   HGB 7.4* 8.2* 8.0*   HCT 22.7* 24.7* 24.2*   MCV 90.4  --  89.3   RDW 13.9  --  14.4     --  214     BMP:   Recent Labs     01/01/23  0634 01/02/23  0718 01/03/23  0942    132* 136   K 3.8 3.2* 3.3*    95* 95*   CO2 29 30 31   PHOS 3.6 3.3  --    BUN 16 12 12   CREATININE 0.77 0.70 0.73     BNP: No results for input(s): BNP in the last 72 hours. PT/INR: No results for input(s): PROTIME, INR in the last 72 hours. APTT: No results for input(s): APTT in the last 72 hours. CARDIAC ENZYMES: No results for input(s): CKMB, CKMBINDEX, TROPONINT in the last 72 hours. Invalid input(s): CKTOTAL;3  FASTING LIPID PANEL:  Lab Results   Component Value Date    CHOL 98 02/28/2014    HDL 45 02/28/2014    TRIG 61 02/28/2014     LIVER PROFILE: No results for input(s): AST, ALT, ALB, BILIDIR, BILITOT, ALKPHOS in the last 72 hours. I/O (24Hr): No intake or output data in the 24 hours ending 01/03/23 1106    Glu last 24 hour  No results for input(s): POCGLU in the last 72 hours. No results for input(s): CLARITYU, COLORU, PHUR, SPECGRAV, PROTEINU, RBCUA, BLOODU, BACTERIA, NITRU, WBCUA, LEUKOCYTESUR, YEAST, GLUCOSEU, BILIRUBINUR in the last 72 hours.     Impression:     Left hip fracture s/p hemiarthroplasty on 12/26  Large anterior wall infarct with galdino-infarct ischemia,-pending cardiac cath  GI bleed-Protonix-Per GI anticoagulation be resumed after 7 days following Endo-therapy 1/6/2023 however if patient is having active MI may start ACE immediately antiplatelet therapy can be initiated  Anemia-hemoglobin 8. 0  CAD s/p CABG and stents/CHF s/p AICD-stress test 12/28.2 ejection fraction 37%, galdino-infarct ischemia-aspirin on hold  PVD  HTN/HLD-Norvasc, Lipitor, Lasix, metoprolol  GERD  BPH  Macular degeneration  Anxiety-depression-Zoloft     Recommendations:     Diagnosis:  Left hip fracture s/p hemiarthroplasty  Therapy: Has PT/OT needs  Medical Necessity: As above  Support: Lives with spouse  Rehab Recommendation: Would benefit acute inpatient rotation when medically ready-    - cardiac cath pending-noted to complete once acute GI issues resolved     DVT Prophylaxis: On heparin drip (currently on hold) on hold due to GI bleed no DVT prophylaxis will need Doppler screen and clearance for when can resume         Alfred Dunn MD       This note is created with the assistance of a speech recognition program.  While intending to generate a document that actually reflects the content of the visit, the document can still have some errors including those of syntax and sound a like substitutions which may escape proof reading.   In such instances, actual meaning can be extrapolated by contextual diversion

## 2023-01-04 PROBLEM — Z95.5 S/P CORONARY ARTERY STENT PLACEMENT: Status: ACTIVE | Noted: 2023-01-04

## 2023-01-04 PROBLEM — R94.39 ABNORMAL STRESS TEST: Status: ACTIVE | Noted: 2023-01-04

## 2023-01-04 PROBLEM — K92.2 GI BLEED: Status: ACTIVE | Noted: 2023-01-04

## 2023-01-04 LAB
ABSOLUTE EOS #: 0.16 K/UL (ref 0–0.4)
ABSOLUTE IMMATURE GRANULOCYTE: 0 K/UL (ref 0–0.3)
ABSOLUTE LYMPH #: 0.65 K/UL (ref 1–4.8)
ABSOLUTE MONO #: 0.24 K/UL (ref 0.1–0.8)
BASOPHILS # BLD: 0 % (ref 0–2)
BASOPHILS ABSOLUTE: 0 K/UL (ref 0–0.2)
EOSINOPHILS RELATIVE PERCENT: 2 % (ref 1–4)
HCT VFR BLD CALC: 25.9 % (ref 40.7–50.3)
HEMOGLOBIN: 8.4 G/DL (ref 13–17)
IMMATURE GRANULOCYTES: 0 %
LYMPHOCYTES # BLD: 8 % (ref 24–44)
MCH RBC QN AUTO: 29.8 PG (ref 25.2–33.5)
MCHC RBC AUTO-ENTMCNC: 32.4 G/DL (ref 28.4–34.8)
MCV RBC AUTO: 91.8 FL (ref 82.6–102.9)
MONOCYTES # BLD: 3 % (ref 1–7)
MORPHOLOGY: NORMAL
NRBC AUTOMATED: 0 PER 100 WBC
PDW BLD-RTO: 14.2 % (ref 11.8–14.4)
PLATELET # BLD: 296 K/UL (ref 138–453)
PMV BLD AUTO: 10.7 FL (ref 8.1–13.5)
RBC # BLD: 2.82 M/UL (ref 4.21–5.77)
SEG NEUTROPHILS: 87 % (ref 36–66)
SEGMENTED NEUTROPHILS ABSOLUTE COUNT: 7.05 K/UL (ref 1.8–7.7)
WBC # BLD: 8.1 K/UL (ref 3.5–11.3)

## 2023-01-04 PROCEDURE — 6370000000 HC RX 637 (ALT 250 FOR IP): Performed by: INTERNAL MEDICINE

## 2023-01-04 PROCEDURE — 2060000000 HC ICU INTERMEDIATE R&B

## 2023-01-04 PROCEDURE — 2700000000 HC OXYGEN THERAPY PER DAY

## 2023-01-04 PROCEDURE — 97110 THERAPEUTIC EXERCISES: CPT

## 2023-01-04 PROCEDURE — 6370000000 HC RX 637 (ALT 250 FOR IP): Performed by: STUDENT IN AN ORGANIZED HEALTH CARE EDUCATION/TRAINING PROGRAM

## 2023-01-04 PROCEDURE — 99232 SBSQ HOSP IP/OBS MODERATE 35: CPT | Performed by: FAMILY MEDICINE

## 2023-01-04 PROCEDURE — 6360000002 HC RX W HCPCS: Performed by: FAMILY MEDICINE

## 2023-01-04 PROCEDURE — 97116 GAIT TRAINING THERAPY: CPT

## 2023-01-04 PROCEDURE — 2580000003 HC RX 258: Performed by: INTERNAL MEDICINE

## 2023-01-04 PROCEDURE — 36415 COLL VENOUS BLD VENIPUNCTURE: CPT

## 2023-01-04 PROCEDURE — 85025 COMPLETE CBC W/AUTO DIFF WBC: CPT

## 2023-01-04 PROCEDURE — 99231 SBSQ HOSP IP/OBS SF/LOW 25: CPT | Performed by: PHYSICAL MEDICINE & REHABILITATION

## 2023-01-04 PROCEDURE — 6370000000 HC RX 637 (ALT 250 FOR IP)

## 2023-01-04 RX ORDER — FUROSEMIDE 10 MG/ML
20 INJECTION INTRAMUSCULAR; INTRAVENOUS ONCE
Status: COMPLETED | OUTPATIENT
Start: 2023-01-04 | End: 2023-01-04

## 2023-01-04 RX ORDER — LISINOPRIL 20 MG/1
20 TABLET ORAL DAILY
Status: DISCONTINUED | OUTPATIENT
Start: 2023-01-04 | End: 2023-01-11 | Stop reason: HOSPADM

## 2023-01-04 RX ADMIN — ASPIRIN 81 MG: 81 TABLET, CHEWABLE ORAL at 08:48

## 2023-01-04 RX ADMIN — ACETAMINOPHEN 1000 MG: 500 TABLET, FILM COATED ORAL at 17:45

## 2023-01-04 RX ADMIN — METHOCARBAMOL TABLETS 500 MG: 500 TABLET, COATED ORAL at 08:47

## 2023-01-04 RX ADMIN — METOPROLOL SUCCINATE 100 MG: 100 TABLET, FILM COATED, EXTENDED RELEASE ORAL at 08:47

## 2023-01-04 RX ADMIN — PANTOPRAZOLE SODIUM 40 MG: 40 TABLET, DELAYED RELEASE ORAL at 17:44

## 2023-01-04 RX ADMIN — FUROSEMIDE 40 MG: 40 TABLET ORAL at 08:48

## 2023-01-04 RX ADMIN — AMLODIPINE BESYLATE 10 MG: 10 TABLET ORAL at 08:48

## 2023-01-04 RX ADMIN — ATORVASTATIN CALCIUM 10 MG: 10 TABLET, FILM COATED ORAL at 08:48

## 2023-01-04 RX ADMIN — GABAPENTIN 600 MG: 300 CAPSULE ORAL at 21:02

## 2023-01-04 RX ADMIN — METHOCARBAMOL TABLETS 500 MG: 500 TABLET, COATED ORAL at 21:02

## 2023-01-04 RX ADMIN — ACETAMINOPHEN 1000 MG: 500 TABLET, FILM COATED ORAL at 21:03

## 2023-01-04 RX ADMIN — SERTRALINE 25 MG: 25 TABLET, FILM COATED ORAL at 08:47

## 2023-01-04 RX ADMIN — LISINOPRIL 20 MG: 20 TABLET ORAL at 17:44

## 2023-01-04 RX ADMIN — CLOPIDOGREL 75 MG: 75 TABLET, FILM COATED ORAL at 08:48

## 2023-01-04 RX ADMIN — SODIUM CHLORIDE, PRESERVATIVE FREE 10 ML: 5 INJECTION INTRAVENOUS at 08:48

## 2023-01-04 RX ADMIN — FUROSEMIDE 20 MG: 10 INJECTION, SOLUTION INTRAMUSCULAR; INTRAVENOUS at 12:00

## 2023-01-04 RX ADMIN — SODIUM CHLORIDE, PRESERVATIVE FREE 10 ML: 5 INJECTION INTRAVENOUS at 21:05

## 2023-01-04 RX ADMIN — METHOCARBAMOL TABLETS 500 MG: 500 TABLET, COATED ORAL at 17:44

## 2023-01-04 RX ADMIN — GABAPENTIN 600 MG: 300 CAPSULE ORAL at 08:47

## 2023-01-04 RX ADMIN — ACETAMINOPHEN 1000 MG: 500 TABLET, FILM COATED ORAL at 08:49

## 2023-01-04 RX ADMIN — PANTOPRAZOLE SODIUM 40 MG: 40 TABLET, DELAYED RELEASE ORAL at 08:47

## 2023-01-04 ASSESSMENT — ENCOUNTER SYMPTOMS
CONSTIPATION: 0
SHORTNESS OF BREATH: 1
ABDOMINAL PAIN: 0
SHORTNESS OF BREATH: 0
NAUSEA: 0
DIARRHEA: 0
COUGH: 0
WHEEZING: 0
BLOOD IN STOOL: 0
CHEST TIGHTNESS: 0
VOMITING: 0

## 2023-01-04 NOTE — PROGRESS NOTES
[facility]  Office: [address], DO, Isa Kwok, DO, Arlyn Patch, DO, Linda Pretty Blood, DO, Ayde Purdy MD, Jm Caldwell MD, Bishop Canales MD, David Grimm MD,  Brooke Lomeli MD, Bhavani Dobbs MD, Josué Canales, DO, Carlos Guardado MD,  Debbi Armando MD, Jany Paulino MD, Valeriy Zhang, DO, Susana Patterson MD, Hamida Ruiz MD, Briseida Santiago DO, Yvan Ruiz MD, Sukhdev Morgan MD, Ayden Tucker MD, Varghese Hannon MD, Iris Monae, DO, Veronica Whatley MD, Elo Higgins MD, Lily Finch, CNP,  Shari Carroll, CNP, Espinoza Cifuentes, CNP, Brittany Galeana, CNP,  Dia Antunez, [facility], Sherly Gonzalez, CNP, Dez Liriano, CNP, Amy Mccormick, CNP, Rosario Bahena, CNP, Onel Mckee, CNP, Roxie Frank PA-C, [place], CNS, Brayan Harley, CNP, Olga Lanza, [address] [place] [address]    Progress Note    1/4/2023    2:12 PM    Name:   Debbie Kauffman  MRN:     [id number]     Acct:      [de-identified]   Room:   2010/2010-01   Day:  10  Admit Date:  12/25/2022  7:27 PM    PCP:   Marilin Gallo MD  Code Status:  Full Code    Subjective:     C/C:   Chief Complaint   Patient presents with    Fall     Interval History Status: improved. Patient seen and examined at bedside, and his cardiac cath yesterday and received a stent in mid of his LCx . Feeling short of breath this morning  patient vitals, labs and all providers notes were reviewed,from overnight shift and morning updates were noted and discussed with the nurse    Brief History:     Per chart  This is a 62-year-old male past medical history of neuropathy, AICD in place, peripheral vascular disease, coronary artery disease, history of diastolic CHF, prostate cancer status postradiation therapy, arthritis, bilateral carotid stenosis presented to the ER on 12/25/2022 after patient had a fall denied any loss of consciousness.   Patient was admitted to trauma surgery service. Cardiology was consulted for medical clearance was moderate to high risk for any OR procedure due to history of significant coronary artery disease and LAD. Patient underwent left hip hemiarthroplasty with orthopedic surgery on 12/26/2022, afterwards was noted to have episodes of nonsustained V. tach. Underwent stress test on 12/28/2022 showing concern of galdino-infarct ischemia and plan for cardiac catheterization however was held due to patient having black tarry stools on 12/29/2022 patient underwent EGD with GI found to have 10 mm duodenal ulcer with active bleeding that was cauterized and injected with epinephrine and clipped. Patient currently on clear liquid diet and plan for cardiac catheterization when cleared by GI. Patient continues on Protonix drip at this time       Review of Systems:     Review of Systems   Constitutional:  Positive for activity change, appetite change and fatigue. Negative for chills, diaphoresis and fever. HENT:  Negative for congestion. Eyes:  Negative for visual disturbance. Respiratory:  Positive for shortness of breath. Negative for cough, chest tightness and wheezing. Cardiovascular:  Negative for chest pain, palpitations and leg swelling. Gastrointestinal:  Negative for abdominal pain, blood in stool, constipation, diarrhea, nausea and vomiting. Genitourinary:  Negative for difficulty urinating. Neurological:  Positive for weakness. Negative for dizziness, light-headedness, numbness and headaches. All other systems reviewed and are negative. Medications: Allergies:     Allergies   Allergen Reactions    Percocet [Oxycodone-Acetaminophen] Other (See Comments)     Panic attack       Current Meds:   Scheduled Meds:    clopidogrel  75 mg Oral Daily    methocarbamol  500 mg Oral TID    pantoprazole  40 mg Oral BID AC    furosemide  40 mg Oral Daily    amLODIPine  10 mg Oral Daily    metoprolol succinate  100 mg Oral Daily sertraline  25 mg Oral Daily    atorvastatin  10 mg Oral Daily    gabapentin  600 mg Oral BID    aspirin  81 mg Oral Daily    sodium chloride flush  5-40 mL IntraVENous 2 times per day    acetaminophen  1,000 mg Oral 3 times per day     Continuous Infusions:    sodium chloride       PRN Meds: sodium chloride, polyethylene glycol, sodium chloride flush, sodium chloride flush, nitroGLYCERIN, fluticasone, melatonin, oxyCODONE, sodium chloride flush, ondansetron **OR** ondansetron    Data:     Past Medical History:   has a past medical history of Acid reflux, Anxiety, Arthritis, BPH (benign prostatic hypertrophy), CAD (coronary artery disease), Cancer ([facility] [address] Carotid stenosis, Cataracts, both eyes, CHF (congestive heart failure) ([facility] [address] Dry skin, Elevated PSA, Examination of participant in clinical trial, Hyperlipidemia, Hypertension, Left ventricular dysfunction, Macular degeneration, Myocardial infarction ([facility] [address] Neuropathy, Pacemaker, Panic attacks, PVD (peripheral vascular disease) ([facility] [address] Sinus problem, Snores, Vasovagal near syncope, and Wears glasses. Social History:   reports that he has never smoked. He has never used smokeless tobacco. He reports current alcohol use of about 2.0 standard drinks per week. He reports that he does not use drugs. Family History:   Family History   Problem Relation Age of Onset    High Blood Pressure Mother     Cancer Mother     High Blood Pressure Father     Diabetes Sister     Cancer Sister     High Blood Pressure Sister     Heart Disease Brother     High Blood Pressure Brother        Vitals:  BP (!) 150/68   Pulse 71   Temp 98.4 °F (36.9 °C) (Oral)   Resp 14   Ht 5' 7\" (1.702 m)   Wt 195 lb 15.8 oz (88.9 kg)   SpO2 97%   BMI 30.70 kg/m²   Temp (24hrs), Av.3 °F (36.8 °C), Min:98 °F (36.7 °C), Max:98.5 °F (36.9 °C)    Recent Labs     23   POCGLU 159*       I/O (24Hr):   No intake or output data in the 24 hours ending 23 [facility]:  Hematology:  Recent Labs     01/01/23  1846 01/02/23  0718 01/04/23  0947   WBC  --  9.9 8.1   RBC  --  2.71* 2.82*   HGB 8.2* 8.0* 8.4*   HCT 24.7* 24.2* 25.9*   MCV  --  89.3 91.8   MCH  --  29.5 29.8   MCHC  --  33.1 32.4   RDW  --  14.4 14.2   PLT  --  214 296   MPV  --  10.9 10.7     Chemistry:  Recent Labs     01/02/23  0718 01/03/23  0942   * 136   K 3.2* 3.3*   CL 95* 95*   CO2 30 31   GLUCOSE 139* 194*   BUN 12 12   CREATININE 0.70 0.73   MG 1.9  --    ANIONGAP 7* 10   LABGLOM >60 >60   CALCIUM 7.6* 7.9*   PHOS 3.3  --      Recent Labs     01/03/23 1957   POCGLU 159*     ABG:  Lab Results   Component Value Date/Time    POCPH 7.35 03/04/2014 04:57 PM    POCPCO2 63 03/04/2014 04:57 PM    POCPO2 103 03/04/2014 04:57 PM    POCHCO3 35.2 03/04/2014 04:57 PM    NBEA NOT REPORTED 03/04/2014 04:57 PM    PBEA 10 03/04/2014 04:57 PM    NVI6XRK 37 03/04/2014 04:57 PM    FWLG5HOH 97 03/04/2014 04:57 PM    FIO2 NO SAMPLE RECEIVED 12/25/2022 08:13 PM     Lab Results   Component Value Date/Time    SPECIAL NOT REPORTED 07/28/2014 05:19 PM     Lab Results   Component Value Date/Time    CULTURE NO GROWTH 03/02/2014 05:00 PM    CULTURE  03/02/2014 05:00 PM     [facility] [id number] [address] [phone]       Radiology:  XR HIP LEFT (1 VIEW)    Result Date: 12/26/2022  Status post left total hip arthroplasty in a patient with acute cervical neck fracture. Hardware alignment is satisfactory. No apparent hardware complication. Expected postoperative soft tissue changes are noted. 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 Infarct extends to involve the entire apex and small portions of the anterolateral and anterior septal wall. LVEF 37%. Risk stratification: Intermediate risk.      XR HIP 2-3 VW W PELVIS LEFT    Result Date: 12/26/2022  Recent postop changes left hip arthroplasty       Physical Examination:        Physical Exam  Vitals and nursing note reviewed.   Constitutional:       General: He is not in acute distress.  HENT:      Head: Normocephalic and atraumatic.   Eyes:      Conjunctiva/sclera: Conjunctivae normal.      Pupils: Pupils are equal, round, and reactive to light.   Cardiovascular:      Rate and Rhythm: Normal rate and regular rhythm.      Heart sounds: No murmur heard.  Pulmonary:      Effort: Pulmonary effort is normal. No accessory muscle usage or respiratory distress.      Breath sounds: No stridor. No decreased breath sounds, wheezing, rhonchi or rales.   Abdominal:      General: Bowel sounds are normal. There is no distension.      Palpations: Abdomen is soft. Abdomen is not rigid.      Tenderness: There is no abdominal tenderness. There is no guarding.   Musculoskeletal:         General: No tenderness.      Comments: R groin soft with no bruise, palpable masses or bruit  L side incisions are clean, dressing CDI   Skin:     General: Skin is warm and dry.      Findings: No erythema, lesion or rash.   Neurological:      Mental Status: He is alert and oriented to person, place, and time.      Cranial Nerves: No cranial nerve deficit.      Motor: No seizure activity.   Psychiatric:         Speech: Speech normal.         Behavior: Behavior normal. Behavior is cooperative.       Assessment:        Hospital Problems             Last Modified POA    * (Principal) S/P coronary artery stent placement 1/4/2023 Yes    Closed fracture of neck of left femur (HCC) 1/4/2023 Yes    Duodenal ulcer 12/30/2022 Yes    Abnormal stress test 1/4/2023 Yes    GI bleed 1/4/2023 No    CAD (coronary artery disease) 12/30/2022 Yes    Hypertension 12/30/2022 Yes    BPH (benign prostatic hyperplasia) 12/30/2022 Yes    Chronic systolic heart failure (HCC) (Chronic) 12/30/2022 Yes    Claudication in peripheral vascular disease (HCC) 12/30/2022 Yes    Malignant neoplasm  of prostate ([facility] [address] 12/30/2022 Yes     Plan:        Principal Problem:    S/P coronary artery stent placement  Active Problems:    Closed fracture of neck of left femur (HCC)    Duodenal ulcer    Abnormal stress test    GI bleed    CAD (coronary artery disease)    Hypertension    BPH (benign prostatic hyperplasia)    Chronic systolic heart failure (HCC)    Claudication in peripheral vascular disease (HCC)    Malignant neoplasm of prostate ([facility] [address]  Resolved Problems:    * No resolved hospital problems. *    -S/p left hip arthroplasty 12/26  -Recommended for anticoagulation for least 1 month postoperatively per orthopedic surgery  -Anticoagulation is held due to GI bleed  -Duodenal ulcer with active bleed status post EGD cauterization, clip, epinephrine on 12/29/2022. -GI okay for antiplatelets.  -Transfused 1 unit RBC to keep Hb above 8.  -History of combined systolic diastolic heart failure , will give one dose of IV lasix today  -Abnormal stress test  concerning for ischemia.   S/P  cardiac cath 1/3 , LCx mid new stenosis 75% , stented  -  Continue Lipitor, Lasix, Toprol- mg,   -Depression continue Zoloft    -History of prostate cancer status postradiation therapy  -Discussed with the patient and the nurse  -Discussed with cardiology nurse practitioner  - Discharge today, stable, placement per CM      Skyler Jessica MD  1/4/2023  2:12 PM

## 2023-01-04 NOTE — PLAN OF CARE
Problem: Discharge Planning  Goal: Discharge to home or other facility with appropriate resources  1/4/2023 1009 by John Finn RN  Outcome: Progressing  1/4/2023 0358 by Avtar Jaramillo RN  Outcome: Progressing     Problem: Pain  Goal: Verbalizes/displays adequate comfort level or baseline comfort level  1/4/2023 1009 by John Finn RN  Outcome: Progressing  1/4/2023 0358 by Avtar Jaramillo RN  Outcome: Progressing     Problem: Safety - Adult  Goal: Free from fall injury  1/4/2023 1009 by John Finn RN  Outcome: Progressing  1/4/2023 0358 by Avtar Jaramillo RN  Outcome: Progressing     Problem: ABCDS Injury Assessment  Goal: Absence of physical injury  1/4/2023 1009 by John Finn RN  Outcome: Progressing  1/4/2023 0358 by Avtar Jaramillo RN  Outcome: Progressing     Problem: Skin/Tissue Integrity  Goal: Absence of new skin breakdown  Description: 1. Monitor for areas of redness and/or skin breakdown  2. Assess vascular access sites hourly  3. Every 4-6 hours minimum:  Change oxygen saturation probe site  4. Every 4-6 hours:  If on nasal continuous positive airway pressure, respiratory therapy assess nares and determine need for appliance change or resting period.   1/4/2023 1009 by John Finn RN  Outcome: Progressing  1/4/2023 0358 by Avtar Jaramillo RN  Outcome: Progressing     Problem: Chronic Conditions and Co-morbidities  Goal: Patient's chronic conditions and co-morbidity symptoms are monitored and maintained or improved  1/4/2023 1009 by John Finn RN  Outcome: Progressing  1/4/2023 0358 by Avtar Jaramillo RN  Outcome: Progressing     Problem: Nutrition Deficit:  Goal: Optimize nutritional status  1/4/2023 1009 by John Finn RN  Outcome: Progressing  1/4/2023 0358 by Avtar Jaramillo RN  Outcome: Progressing

## 2023-01-04 NOTE — PROGRESS NOTES
Physical Therapy  Facility/Department: [facility] CAR 2- STEPDOWN  Physical Therapy daily treatment note    Name: Debbie Soldsebastián  : 1940  MRN: [id number]  Date of Service: 2023    Discharge Recommendations:  Further therapy recommended at discharge. The patient should be able to tolerate at least 3 hours of therapy per day over 5 days or 15 hours over 7 days. This patient may benefit from a Physical Medicine and Rehab consult. PT Equipment Recommendations  Equipment Needed: No      Patient Diagnosis(es): The encounter diagnosis was Closed fracture of neck of left femur, initial encounter ([facility] [address] Past Medical History:  has a past medical history of Acid reflux, Anxiety, Arthritis, BPH (benign prostatic hypertrophy), CAD (coronary artery disease), Cancer ([address] Carotid stenosis, Cataracts, both eyes, CHF (congestive heart failure) ([address] Dry skin, Elevated PSA, Examination of participant in clinical trial, Hyperlipidemia, Hypertension, Left ventricular dysfunction, Macular degeneration, Myocardial infarction ([address] Neuropathy, Pacemaker, Panic attacks, PVD (peripheral vascular disease) ([address] Sinus problem, Snores, Vasovagal near syncope, and Wears glasses. Past Surgical History:  has a past surgical history that includes Pacemaker insertion (, , , , 2019); Cholecystectomy, laparoscopic (2014); eye surgery (Bilateral); Cardiac defibrillator placement (); Cardiac surgery (); Cardiac catheterization; Colonoscopy; Prostate Biopsy (2019); Prostate biopsy (N/A, 2019); pacemaker placement; Cataract removal; Hip Arthroplasty (Left, 2022); hip surgery (Left, 2022); Esophagogastroduodenoscopy (2022); Upper gastrointestinal endoscopy (N/A, 2022); and Coronary angioplasty with stent (2023). Assessment   Body Structures, Functions, Activity Limitations Requiring Skilled Therapeutic Intervention: Decreased functional mobility ; Decreased strength;Decreased endurance;Decreased balance; Increased pain;Decreased coordination  Assessment: Pt amb 5 ft bed to chair with RW and MIN A. Requires MOD A sit to stand. LImited by weakness, decreased balance, and endurance. REcommend aggressive PT after d/c to address deficits  Therapy Prognosis: Good  Activity Tolerance  Activity Tolerance: Patient tolerated treatment well;Patient limited by endurance     Plan   Physcial Therapy Plan  General Plan: 6-7 times per week  Current Treatment Recommendations: Strengthening, Balance training, Functional mobility training, Transfer training, Gait training, Endurance training, Safety education & training, Patient/Caregiver education & training, Therapeutic activities, Equipment evaluation, education, & procurement, Home exercise program, Stair training  Safety Devices  Type of Devices: Call light within reach, Gait belt, Left in chair, Patient at risk for falls, Nurse notified, Chair alarm in place  Restraints  Restraints Initially in Place: No     Restrictions  Restrictions/Precautions  Restrictions/Precautions: Weight Bearing, Fall Risk  Required Braces or Orthoses?: No  Lower Extremity Weight Bearing Restrictions  Left Lower Extremity Weight Bearing: Weight Bearing As Tolerated  Position Activity Restriction  Hip Precautions: No hip flexion > 90 degrees, No ADduction, Posterior hip precautions, No hip internal rotation  Other position/activity restrictions: S/p: L hip hemiarthoplasty 12/26/2022     Subjective   General  Patient assessed for rehabilitation services?: Yes  Response To Previous Treatment: Patient with no complaints from previous session. Family / Caregiver Present: No  Follows Commands: Within Functional Limits  Subjective  Subjective: Pt and RN agreeable to PT. PT supine in bed upon arrival with no c/o pain at rest. Pt pleasant and cooperative throughout session.             Cognition   Orientation  Overall Orientation Status: Within Functional Limits Objective      Bed mobility  Rolling to Left: Moderate assistance  Supine to Sit: Moderate assistance  Scooting: Moderate assistance  Bed Mobility Comments: MOD A for trunk support with HOB raised  Transfers  Sit to Stand: Moderate Assistance  Stand to Sit: Moderate Assistance  Bed to Chair: Minimal assistance  Ambulation  Surface: Level tile  Device: Rolling Walker  Assistance: Minimal assistance  Quality of Gait: step to gait pattern, advancing with L LE, very slow ilana, slight bucling as pt fatigues  Distance: ~5 ft  More Ambulation?: No     Balance  Posture: Fair  Sitting - Static: Fair  Sitting - Dynamic: Fair (post and R lateral lean in stting)  Standing - Static: Fair  Standing - Dynamic: Fair;-  Comments: standing balance assessed while using a RW   Exercise  Total Hip Arthroplasty Exercise Program: Quad sets, glut sets, ankle pumps, heel slides, hip abduction slides. Reps: 10x      AM-PAC Score  AM-PAC Inpatient Mobility Raw Score : 14 (01/04/23 1114)  AM-PAC Inpatient T-Scale Score : 38.1 (01/04/23 1114)  Mobility Inpatient CMS 0-100% Score: 61.29 (01/04/23 1114)  Mobility Inpatient CMS G-Code Modifier : CL (01/04/23 1114)        Goals  Short Term Goals  Time Frame for Short Term Goals: 14 visits  Short Term Goal 1: Pt will ambulate 150 feet with a RW and SBA. Short Term Goal 2: Pt will demonstrate good- standing balance to decrease fall risk. Short Term Goal 3: Pt will negotiate 3 stairs with bilateral handrails and SBA to allow the pt to enter prior living arrangements. Short Term Goal 4: Pt will perform sit<>stand transfer with CGA. Short Term Goal 5: Pt will perform bed mobility with CGA while maintaining posterior hip precautions.   Additional Goals?: No       Therapy Time   Individual Concurrent Group Co-treatment   Time In 1009         Time Out 1034         Minutes 25         Timed Code Treatment Minutes: 4605 Vance Mcdonough, PTA

## 2023-01-04 NOTE — PROGRESS NOTES
[facility]   Progress Note                   Date:   1/4/2023  Patient name: Lidya Bernardo  Date of admission:  12/25/2022  7:27 PM  MRN:   [id number]  YOB: 1940  PCP: Carmine Ordoñez MD    Reason for Admission: Closed fracture of neck of left femur, initial encounter ([facility] [S72.002A]  Closed left hip fracture, initial encounter ([facility] [S72.002A]    Subjective:       Clinical Changes / Abnormalities: Patient seen and examined. Denies chest pain or shortness of breath. He states breathing continues to improve. Vitals/labs reviewed . Discussed case with RN. Medications:   Scheduled Meds:   clopidogrel  75 mg Oral Daily    methocarbamol  500 mg Oral TID    pantoprazole  40 mg Oral BID AC    furosemide  40 mg Oral Daily    amLODIPine  10 mg Oral Daily    metoprolol succinate  100 mg Oral Daily    sertraline  25 mg Oral Daily    atorvastatin  10 mg Oral Daily    gabapentin  600 mg Oral BID    aspirin  81 mg Oral Daily    sodium chloride flush  5-40 mL IntraVENous 2 times per day    acetaminophen  1,000 mg Oral 3 times per day     Continuous Infusions:   sodium chloride      [Held by provider] heparin (PORCINE) Infusion Stopped (12/29/22 0651)     CBC:   Recent Labs     01/01/23  1846 01/02/23  0718 01/04/23  0947   WBC  --  9.9 8.1   HGB 8.2* 8.0* 8.4*   PLT  --  214 296       BMP:    Recent Labs     01/02/23  0718 01/03/23  0942   * 136   K 3.2* 3.3*   CL 95* 95*   CO2 30 31   BUN 12 12   CREATININE 0.70 0.73   GLUCOSE 139* 194*       Hepatic: No results for input(s): AST, ALT, ALB, BILITOT, ALKPHOS in the last 72 hours. Troponin: No results for input(s): TROPHS in the last 72 hours. BNP: No results for input(s): BNP in the last 72 hours. Lipids: No results for input(s): CHOL, HDL in the last 72 hours. Invalid input(s): LDLCALCU  INR:   No results for input(s): INR in the last 72 hours.     Cardiac cath 1/3/23  Angiographic Findings       Cardiac Arteries and Lesion Findings       LMCA: Normal 0% stenosis. LAD: 100% proximal stenosis   LIMA -LAD - D is patent with severe LAD apical disease     LCx: Dominant   Mid new stenosis 75%   OM1 stent patent with 40% stenosis   OM2/PDA: patent stent         Lesion on Mid CX: Proximal subsection. 80% stenosis 8 mm length reduced to     0%. Pre procedure VAN III flow was noted. Post Procedure VAN III flow     was present. Good runoff was present. The lesion was diagnosed as     Moderate Risk (B). Devices used         - Luge Wire 182 cm. Number of passes: 1.         - Resolute [place] 4.5 x 15 TED. 1 inflation(s) to a max pressure of: 12     [place]. RCA: Known occluded 100%      Coronary Tree        Dominance: Left      Estimated Blood Loss: 10 mL     Conclusions        Procedure Summary        Patent OM1 and OM2/PDA stents    Patent LIMA -LAD/D with severe diffuse disease in LAD    SUccessful PTCA -TED mid dominant LCX        Recommendations        Post stent protocol     Objective:   Vitals: BP (!) 150/68   Pulse 71   Temp 98.4 °F (36.9 °C) (Oral)   Resp 14   Ht 5' 7\" (1.702 m)   Wt 195 lb 15.8 oz (88.9 kg)   SpO2 97%   BMI 30.70 kg/m²   General appearance: alert and cooperative with exam  HEENT: Head: Normocephalic, no lesions, without obvious abnormality. Neck: no JVD, trachea midline, no adenopathy  Lungs: Diminished to auscultation, 2 L via NC without distress. Heart: Regular rate and rhythm, s1/s2 auscultated, no murmurs, right femoral artery site CDI, no signs/symptoms of infection. Abdomen: soft, non-tender, bowel sounds active  Extremities: no edema  Neurologic: not done        Assessment / Acute Cardiac Problems:   S/p fall suffered from left hip fracture  Preop cardiac risk stratification for same  Ischemic cardiomyopathy with improved ejection fraction on last nuclear stress test to 49% 2020 currently compensated with no signs of volume overload.   ICD interrogation October 2022 functioning normally 96% paced in the atrium with 5 years of battery longevity  CAD status post CABG and stenting as detailed above with patent LIMA to the LAD and stenting to OM 2 and OM 3. Hypertension  Hyperlipidemia    Patient Active Problem List:     Right upper quadrant abdominal pain     CAD (coronary artery disease)     Hypertension     Skin cancer     BPH (benign prostatic hyperplasia)     Chronic systolic heart failure (HCC)     Hyperlipemia     Leukocytosis     Cholelithiasis with acute cholecystitis     Pre-syncope     Claudication in peripheral vascular disease (HCC)     Carotid stenosis, asymptomatic     Squamous cell carcinoma of skin of left upper arm     Encounter due to AICD at end of battery life-Change OUT 7-1-19     Malignant neoplasm of prostate ([facility] [address]     Chest pain     COVID     COVID-19     Closed fracture of neck of left femur ([facility] [address]    1. Coronary artery disease. He had prior bypass, LIMA-LAD. last catheterization 2009 showed patent LIMA to LAD, required drug-eluting stent to diagonal, left circumflex and LPDA for significant stenosis. 2. Ischemic cardiomyopathy with EF of 20-25% s/p Dual Chamber ICD in situ. Checks normal function with very short runs of Afib.  4. Obesity. 5. Vasodepressor syncope. 6. Echocardiogram 05/23/2019, showed reduced LV systolic function with an EF of 25% with reduced LV diastolic compliance, trace to mild AI, trace to mild MR, trace to mild TR noted. 7. CATH 9/2/2020 LM 10-20% LAD proximal 90% LCx OM2 99% TED OM3 80% TED. RCA 80% proximal and is small nondominant. 9. STRESS 9/2/2020 reversible ischemia lateral wall. Infarct anterior lateral, and apical wall. Hypokinesis apical anterior and septal wall. LVEF 49%        ECHO 12/25/22  Summary  Severe LV systolic dysfunction, with EF 20-25%. Reduced LV diastolic compliance. Mild aortic insufficiency. Mild mitral regurgitation. Mild tricuspid regurgitation.        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 Infarct extends to involve the entire apex and   small portions of the anterolateral and anterior septal wall. LVEF 37%. Risk stratification: Intermediate risk. Plan of Treatment:   S/p cardiac cath reviewed as above. Patent OM1 and OM2/PDA stents   Patent LIMA -LAD/D with severe diffuse disease in LAD. Successful PTCA -TED mid dominant LCX. Discussed in detail with patient post cath POC including but not limited to medications, diet, exercise, right radial artery site care, and follow-up. Questions and concerns addressed. OK for discharge home today. F/U in office in 1-3 weeks. Continue statin, ASA, plavix, and BB. Continue Lasix. HTN. Continue Norvasc and BB. Will resume ACE for better blood pressure control. ECHO reviewed. Has AICD in situ  Keep K > 4 and Mg > 2.      Electronically signed by RAVI Jaquez CNP on 1/4/2023 at 1:07 PM  [address]  [phone]

## 2023-01-04 NOTE — PROGRESS NOTES
Physical Medicine & Rehabilitation  Progress Note    1/4/2023 12:59 PM     CC: Ambulatory and ADL dysfunction due to fall with left hip fracture status post hemiarthroplasty on 66/27 complicated by large anterior wall infarct with galdino-infarct ischemia    Cardiology 1/2-has AICD, NSVT-no EKG changes now trending down troponin, stress test showed galdino-infarct ischemia plan for cardiac cath once acute GI is resolved per GI cleared for antiplatelet but no anticoagulation till 1/6 ischemic cardiomyopathy ejection fraction 2025% dual-chamber ICD, c    GI-bleeding duodenal ulcer status post EGD with triple Endo-therapy, anemia secondary to upper GI bleed anticoagulation be resumed after 7 days following Endo-therapy 1/6/2023 however if patient is having active MI may start ACE immediately antiplatelet therapy can be initiated    Internal medicine transfuse to keep hemoglobin above 8, anticoagulation held due to GI bleed    Cath 1/3  Procedure Summary        Patent OM1 and OM2/PDA stents    Patent LIMA -LAD/D with severe diffuse disease in LAD    SUccessful PTCA -TED mid dominant LCX        Recommendations        Post stent protocol     Subjective:   No complaints. Feels well. ROS:  Denies fevers, chills, sweats. No chest pain, palpitations, lightheadedness. Denies coughing, wheezing or shortness of breath. Denies abdominal pain, nausea, diarrhea or constipation. No new areas of joint pain. Denies new areas of numbness or weakness. Denies new anxiety or depression issues. No new skin problems. Rehabilitation:   PT:  Restrictions/Precautions: Weight Bearing, Fall Risk  Hip Precautions: No hip flexion > 90 degrees, No ADduction, Posterior hip precautions, No hip internal rotation  Other position/activity restrictions: S/p: L hip hemiarthoplasty 12/26/2022  Left Lower Extremity Weight Bearing: Weight Bearing As Tolerated   Transfers  Sit to Stand:  Moderate Assistance  Stand to Sit: Moderate Assistance  Bed to Chair: Minimal assistance  Comment: Sit<>stand from bed T[place]a x 2, Sit<>stand from recliner modA x 2 to RW with verbal cues for UE placement with fair return. Static standing maintained with CGA x 2  Ambulation  Surface: Level tile  Device: Rolling Walker  Assistance: Minimal assistance  Quality of Gait: step to gait pattern, advancing with L LE, very slow ilana, slight bucling as pt fatigues  Gait Deviations: Slow Ilana, Decreased step length, Decreased step height  Distance: ~5 ft  Comments: Verbal cues for RW position, gait pattern and cues for posture with good understanding. Brief static standing x 2 to correct posture. Chair follow for safety. Pt requested to sit down after 5 ft but agreeable to continue to 10 ft. More Ambulation?: No      OT:  ADL  Feeding: Modified independent , Setup  Grooming: Modified independent   Grooming Skilled Clinical Factors: Face washing and oral care facilitated seated in chair. Pt demo 0 loss of , able to manipulate utensils  UE Bathing: Stand by assistance, Setup  UE Bathing Skilled Clinical Factors: faciltated seated in chair  LE Bathing: Maximum assistance, Setup, Verbal cueing, Increased time to complete  LE Bathing Skilled Clinical Factors: Seated/standing at chair with RW. Pt able to wash from thighs to knees required assist from knees to feet to maintain hip precautions and assist for galdino/post d/t decreased balance and activity tolerance  UE Dressing: Stand by assistance, Setup  UE Dressing Skilled Clinical Factors: To doff/don gown seated in chair  LE Dressing: Maximum assistance, Setup  LE Dressing Skilled Clinical Factors: To doff/don socks and brief seated/standing  Toileting: Maximum assistance, Setup, Increased time to complete  Toileting Skilled Clinical Factors: Max A for breif management standing and Mod A for urinal placement seated in chair  Additional Comments: Throughout session pt limited per fatigue, decreased balance and decreased strength.  Sx soap utilized appropriately during session                      Bed mobility  Rolling to Left: Moderate assistance  Supine to Sit: Moderate assistance  Sit to Supine: Unable to assess (Pt retired to recliner)  Scooting: Moderate assistance  Bed Mobility Comments: MOD A for trunk support with HOB raised  Transfers  Sit to stand: Moderate assistance, 2 Person assistance  Stand to sit: Minimal assistance, 2 Person assistance  Transfer Comments: Mod A x2 sit>stand from low chair; Min A x2 sit>stand from EOB                   ST:        Objective:  BP (!) 150/68   Pulse 71   Temp 98.4 °F (36.9 °C) (Oral)   Resp 14   Ht 5' 7\" (1.702 m)   Wt 195 lb 15.8 oz (88.9 kg)   SpO2 97%   BMI 30.70 kg/m²  I Body mass index is 30.7 kg/m². I   Wt Readings from Last 1 Encounters:   23 195 lb 15.8 oz (88.9 kg)      Temp (24hrs), Av.3 °F (36.8 °C), Min:98 °F (36.7 °C), Max:98.5 °F (36.9 °C)         GEN: well developed, well nourished, no acute distress  HEENT: Normocephalic atraumatic, EOMI, mucous membranes pink and moist  CV: RRR, no murmurs, rubs or gallops  PULM: CTAB, no rales or rhonchi. Respirations WNL and unlabored  ABD: soft, NT, ND, +BS and equal  NEURO: A&O x3. Sensation intact to light touch. MSK: 4+/5 upper and right lower extremity, limited proximal left lower extremity  EXTREMITIES: No calf tenderness to palpation bilaterally. No edema BLEs  SKIN: warm dry and intact with good turgor  PSYCH: appropriately interactive. Affect WNL.         Medications   Scheduled Meds:   clopidogrel  75 mg Oral Daily    methocarbamol  500 mg Oral TID    pantoprazole  40 mg Oral BID AC    furosemide  40 mg Oral Daily    amLODIPine  10 mg Oral Daily    metoprolol succinate  100 mg Oral Daily    sertraline  25 mg Oral Daily    atorvastatin  10 mg Oral Daily    gabapentin  600 mg Oral BID    aspirin  81 mg Oral Daily    sodium chloride flush  5-40 mL IntraVENous 2 times per day    acetaminophen  1,000 mg Oral 3 times per day Continuous Infusions:   sodium chloride      [Held by provider] heparin (PORCINE) Infusion Stopped (12/29/22 0651)     PRN Meds:.sodium chloride, polyethylene glycol, sodium chloride flush, sodium chloride flush, nitroGLYCERIN, [Held by provider] heparin (porcine), [Held by provider] heparin (porcine), fluticasone, melatonin, oxyCODONE, sodium chloride flush, ondansetron **OR** ondansetron     Diagnostics:     CBC:   Recent Labs     01/01/23  1846 01/02/23  0718 01/04/23  0947   WBC  --  9.9 8.1   RBC  --  2.71* 2.82*   HGB 8.2* 8.0* 8.4*   HCT 24.7* 24.2* 25.9*   MCV  --  89.3 91.8   RDW  --  14.4 14.2   PLT  --  214 296       BMP:   Recent Labs     01/02/23  0718 01/03/23  0942   * 136   K 3.2* 3.3*   CL 95* 95*   CO2 30 31   PHOS 3.3  --    BUN 12 12   CREATININE 0.70 0.73       BNP: No results for input(s): BNP in the last 72 hours. PT/INR: No results for input(s): PROTIME, INR in the last 72 hours. APTT: No results for input(s): APTT in the last 72 hours. CARDIAC ENZYMES: No results for input(s): CKMB, CKMBINDEX, TROPONINT in the last 72 hours. Invalid input(s): CKTOTAL;3  FASTING LIPID PANEL:  Lab Results   Component Value Date    CHOL 98 02/28/2014    HDL 45 02/28/2014    TRIG 61 02/28/2014     LIVER PROFILE: No results for input(s): AST, ALT, ALB, BILIDIR, BILITOT, ALKPHOS in the last 72 hours. I/O (24Hr): No intake or output data in the 24 hours ending 01/04/23 1259    Glu last 24 hour  Recent Labs     01/03/23  1957   POCGLU 159*       No results for input(s): CLARITYU, COLORU, PHUR, SPECGRAV, PROTEINU, RBCUA, BLOODU, BACTERIA, NITRU, WBCUA, LEUKOCYTESUR, YEAST, Kirk Gaw in the last 72 hours.     Impression:     Left hip fracture s/p hemiarthroplasty on 12/26  Large anterior wall infarct with galdino-infarct ischemia,-pending cardiac cath  GI bleed-Protonix-Per GI anticoagulation be resumed after 7 days following Endo-therapy 1/6/2023 however if patient is having active MI may start ACE immediately antiplatelet therapy can be initiated  Anemia-hemoglobin 8.0  CAD s/p CABG and stents/CHF s/p AICD-stress test 12/28.2 ejection fraction 37%, galdino-infarct ischemia-aspirin   PVD  HTN/HLD-Norvasc, Lipitor, Lasix, metoprolol  GERD  BPH  Macular degeneration  Anxiety-depression-Zoloft     Recommendations:     Diagnosis:  Left hip fracture s/p hemiarthroplasty  Therapy: Has PT/OT needs  Medical Necessity: As above  Support: Lives with spouse  Rehab Recommendation: Would benefit acute inpatient rehabilitation when medically ready-[facility]  DVT Prophylaxis: On heparin drip (currently on hold) on hold due to GI bleed no DVT prophylaxis noted         Alfred Law MD       This note is created with the assistance of a speech recognition program.  While intending to generate a document that actually reflects the content of the visit, the document can still have some errors including those of syntax and sound a like substitutions which may escape proof reading.   In such instances, actual meaning can be extrapolated by contextual diversion

## 2023-01-04 NOTE — PLAN OF CARE

## 2023-01-05 PROCEDURE — 6370000000 HC RX 637 (ALT 250 FOR IP): Performed by: STUDENT IN AN ORGANIZED HEALTH CARE EDUCATION/TRAINING PROGRAM

## 2023-01-05 PROCEDURE — 99231 SBSQ HOSP IP/OBS SF/LOW 25: CPT | Performed by: FAMILY MEDICINE

## 2023-01-05 PROCEDURE — 97110 THERAPEUTIC EXERCISES: CPT

## 2023-01-05 PROCEDURE — 97116 GAIT TRAINING THERAPY: CPT

## 2023-01-05 PROCEDURE — 6370000000 HC RX 637 (ALT 250 FOR IP): Performed by: INTERNAL MEDICINE

## 2023-01-05 PROCEDURE — 2060000000 HC ICU INTERMEDIATE R&B

## 2023-01-05 PROCEDURE — 2580000003 HC RX 258: Performed by: INTERNAL MEDICINE

## 2023-01-05 PROCEDURE — 97535 SELF CARE MNGMENT TRAINING: CPT

## 2023-01-05 PROCEDURE — 97530 THERAPEUTIC ACTIVITIES: CPT

## 2023-01-05 PROCEDURE — 6370000000 HC RX 637 (ALT 250 FOR IP)

## 2023-01-05 RX ORDER — PANTOPRAZOLE SODIUM 40 MG/1
40 TABLET, DELAYED RELEASE ORAL
Qty: 30 TABLET | Refills: 3 | Status: SHIPPED | OUTPATIENT
Start: 2023-01-05

## 2023-01-05 RX ADMIN — METHOCARBAMOL TABLETS 500 MG: 500 TABLET, COATED ORAL at 08:12

## 2023-01-05 RX ADMIN — GABAPENTIN 600 MG: 300 CAPSULE ORAL at 21:07

## 2023-01-05 RX ADMIN — ACETAMINOPHEN 1000 MG: 500 TABLET, FILM COATED ORAL at 21:07

## 2023-01-05 RX ADMIN — SODIUM CHLORIDE, PRESERVATIVE FREE 10 ML: 5 INJECTION INTRAVENOUS at 21:13

## 2023-01-05 RX ADMIN — SODIUM CHLORIDE, PRESERVATIVE FREE 10 ML: 5 INJECTION INTRAVENOUS at 08:13

## 2023-01-05 RX ADMIN — SERTRALINE 25 MG: 25 TABLET, FILM COATED ORAL at 08:12

## 2023-01-05 RX ADMIN — METOPROLOL SUCCINATE 100 MG: 100 TABLET, FILM COATED, EXTENDED RELEASE ORAL at 08:12

## 2023-01-05 RX ADMIN — PANTOPRAZOLE SODIUM 40 MG: 40 TABLET, DELAYED RELEASE ORAL at 05:57

## 2023-01-05 RX ADMIN — LISINOPRIL 20 MG: 20 TABLET ORAL at 08:12

## 2023-01-05 RX ADMIN — ASPIRIN 81 MG: 81 TABLET, CHEWABLE ORAL at 08:13

## 2023-01-05 RX ADMIN — ACETAMINOPHEN 1000 MG: 500 TABLET, FILM COATED ORAL at 13:55

## 2023-01-05 RX ADMIN — PANTOPRAZOLE SODIUM 40 MG: 40 TABLET, DELAYED RELEASE ORAL at 15:27

## 2023-01-05 RX ADMIN — AMLODIPINE BESYLATE 10 MG: 10 TABLET ORAL at 08:13

## 2023-01-05 RX ADMIN — CLOPIDOGREL 75 MG: 75 TABLET, FILM COATED ORAL at 08:13

## 2023-01-05 RX ADMIN — ATORVASTATIN CALCIUM 10 MG: 10 TABLET, FILM COATED ORAL at 08:13

## 2023-01-05 RX ADMIN — GABAPENTIN 600 MG: 300 CAPSULE ORAL at 08:12

## 2023-01-05 RX ADMIN — ACETAMINOPHEN 1000 MG: 500 TABLET, FILM COATED ORAL at 05:57

## 2023-01-05 RX ADMIN — METHOCARBAMOL TABLETS 500 MG: 500 TABLET, COATED ORAL at 21:07

## 2023-01-05 RX ADMIN — METHOCARBAMOL TABLETS 500 MG: 500 TABLET, COATED ORAL at 13:55

## 2023-01-05 RX ADMIN — FUROSEMIDE 40 MG: 40 TABLET ORAL at 08:13

## 2023-01-05 ASSESSMENT — ENCOUNTER SYMPTOMS
ABDOMINAL PAIN: 0
CHEST TIGHTNESS: 0
CONSTIPATION: 0
DIARRHEA: 0
WHEEZING: 0
SHORTNESS OF BREATH: 1
VOMITING: 0
NAUSEA: 0
COUGH: 0
BLOOD IN STOOL: 0

## 2023-01-05 ASSESSMENT — PAIN DESCRIPTION - LOCATION
LOCATION: GENERALIZED
LOCATION: HIP

## 2023-01-05 ASSESSMENT — PAIN DESCRIPTION - ORIENTATION: ORIENTATION: LEFT

## 2023-01-05 ASSESSMENT — PAIN DESCRIPTION - DESCRIPTORS: DESCRIPTORS: ACHING

## 2023-01-05 NOTE — PROGRESS NOTES
Physical Therapy  Facility/Department: [facility] CAR 2- STEPDOWN  Physical Therapy Daily Treatment Note    Name: Barney Blake  : 1940  MRN: [id number]  Date of Service: 2023    Discharge Recommendations:  Patient would benefit from continued therapy after discharge   PT Equipment Recommendations  Equipment Needed: No      Patient Diagnosis(es): The encounter diagnosis was Closed fracture of neck of left femur, [facility] Past Medical History:  has a past medical history of Acid reflux, Anxiety, Arthritis, BPH (benign prostatic hypertrophy), CAD (coronary artery disease), Cancer ([facility] Carotid stenosis, Cataracts, both eyes, CHF (congestive heart failure) ([facility] Dry skin, Elevated PSA, Examination of participant in clinical trial, Hyperlipidemia, Hypertension, Left ventricular dysfunction, Macular degeneration, Myocardial infarction ([facility] Neuropathy, Pacemaker, Panic attacks, PVD (peripheral vascular disease) ([facility] Sinus problem, Snores, Vasovagal near syncope, and Wears glasses. Past Surgical History:  has a past surgical history that includes Pacemaker insertion (, , , , 2019); Cholecystectomy, laparoscopic (2014); eye surgery (Bilateral); Cardiac defibrillator placement (); Cardiac surgery (); Cardiac catheterization; Colonoscopy; Prostate Biopsy (2019); Prostate biopsy (N/A, 2019); pacemaker placement; Cataract removal; Hip Arthroplasty (Left, 2022); hip surgery (Left, 2022); Esophagogastroduodenoscopy (2022); Upper gastrointestinal endoscopy (N/A, 2022); and Coronary angioplasty with stent (2023). Assessment   Body Structures, Functions, Activity Limitations Requiring Skilled Therapeutic Intervention: Decreased functional mobility ; Decreased strength;Decreased endurance;Decreased balance; Increased pain;Decreased coordination  Assessment: Pt required modA for bed mobility and Taina x2 for functional transfers and ambulation 5ft bed to chair with use of RW. Pt most limited by B LE fatigue and decreased endurance, would benefit from continued PT to address deficits and progress toward prior level of independence. Pt currently not safe to return to prior living arrangement  Therapy Prognosis: Good  Requires PT Follow-Up: Yes  Activity Tolerance  Activity Tolerance: Patient limited by fatigue;Patient limited by endurance     Plan   Physcial Therapy Plan  General Plan: 6-7 times per week  Current Treatment Recommendations: Strengthening, Balance training, Functional mobility training, Transfer training, Gait training, Endurance training, Safety education & training, Patient/Caregiver education & training, Therapeutic activities, Equipment evaluation, education, & procurement, Home exercise program, Stair training  Safety Devices  Type of Devices: Call light within reach, Gait belt, Left in chair, Patient at risk for falls, Nurse notified, Chair alarm in place  Restraints  Restraints Initially in Place: No     Restrictions  Restrictions/Precautions  Restrictions/Precautions: Weight Bearing, Fall Risk  Required Braces or Orthoses?: No  Lower Extremity Weight Bearing Restrictions  Left Lower Extremity Weight Bearing: Weight Bearing As Tolerated  Position Activity Restriction  Hip Precautions: No hip flexion > 90 degrees, No ADduction, Posterior hip precautions, No hip internal rotation  Other position/activity restrictions: S/p: L hip hemiarthoplasty 12/26/2022     Subjective   Pain: pt denies pain initially, admits to \"some\" L hip pain after ambulating, does not rate  General  Chart Reviewed: Yes  Patient assessed for rehabilitation services?: Yes  Response To Previous Treatment: Patient with no complaints from previous session.   Family / Caregiver Present: No  Follows Commands: Within Functional Limits  General Comment  Comments: Pt left seated in recliner with call light witin reach, alarm activated  Subjective  Subjective: Pt and RN agreeable to PT. PT supine in bed upon arrival with no c/o pain at rest. Pt pleasant and cooperative throughout session. Cognition   Orientation  Overall Orientation Status: Within Functional Limits  Orientation Level: Oriented X4  Cognition  Overall Cognitive Status: WFL     Objective   Heart Rate: 70  Heart Rate Source: Monitor  SpO2: 97 %  O2 Device: None (Room air)     Observation/Palpation  Posture: Fair     Bed mobility  Supine to Sit: Moderate assistance (for trunk progression)  Sit to Supine:  (pt left seated in recliner)  Scooting: Minimal assistance  Bed Mobility Comments: HOB elevated, increased time to complete  Transfers  Sit to Stand: Minimal Assistance;2 Person Assistance  Stand to Sit: Minimal Assistance;2 Person Assistance  Bed to Chair: Minimal assistance;2 Person Assistance  Comment: STS transfer completed with Marley x2 to RW, vc's for UE placement with good return demo. Able to maintain standing with CGA x2  Ambulation  Surface: Level tile  Device: Rolling Walker  Assistance: Minimal assistance;2 Person assistance  Quality of Gait: step to gait pattern, advancing with L LE, very slow ilana  Gait Deviations: Slow Ilana;Decreased step length;Decreased step height  Distance: ~5 ft  Comments: pt encouraged to attempt further amb but limited this date by B LE fatigue and decreased endurance  More Ambulation?: No  Stairs/Curb  Stairs?: No     Balance  Posture: Fair  Sitting - Static: Fair;+  Sitting - Dynamic: Fair;+  Standing - Static: Fair  Standing - Dynamic: Fair;-  Comments: standing balance assessed while using a RW, seated balance assessed EOB  Exercise Treatment:   Seated LE exercise program: [employer], heel/toe raises, and marches. Reps: 20x   Supine Exercises: Gluteal sets, Hamstring Sets, Quad Sets.   Reps:  20x            AM-PAC Score  AM-PAC Inpatient Mobility Raw Score : 13 (01/05/23 1545)  AM-PAC Inpatient T-Scale Score : 36.74 (01/05/23 1545)  Mobility Inpatient CMS 0-100% Score: 64.91 (01/05/23 1545)  Mobility Inpatient CMS G-Code Modifier : CL (01/05/23 1545)    Goals  Short Term Goals  Time Frame for Short Term Goals: 14 visits  Short Term Goal 1: Pt will ambulate 150 feet with a RW and SBA. Short Term Goal 2: Pt will demonstrate good- standing balance to decrease fall risk. Short Term Goal 3: Pt will negotiate 3 stairs with bilateral handrails and SBA to allow the pt to enter prior living arrangements. Short Term Goal 4: Pt will perform sit<>stand transfer with CGA. Short Term Goal 5: Pt will perform bed mobility with CGA while maintaining posterior hip precautions. Additional Goals?: No       Education  Patient Education  Education Given To: Patient  Education Provided: Role of Therapy;Transfer Training;Plan of Care; Fall Prevention Strategies;Precautions; Equipment  Education Provided Comments: reviewed posterior hip precautions.   Education Method: Demonstration;Verbal  Barriers to Learning: None  Education Outcome: Verbalized understanding;Continued education needed;Demonstrated understanding      Therapy Time   Individual Concurrent Group Co-treatment   Time In 8785         Time Out 8453         Minutes 30         Timed Code [facility]: [facility] [address]

## 2023-01-05 NOTE — PROGRESS NOTES
[facility]  Office: [address], DO, Gela Simmons, DO, Donovan Montes, DO, Madeline Brannon, DO, Hermann Bauer MD, Donald Ogden MD, Porter Aleman MD, Luvenia Duverney, MD,  Rios Savage MD, Charles Padgett MD, Ney Ness, DO, Rashmi Mack MD,  Nicole Dinero MD, Annie Trejo MD, Cecille Brown, DO, Justin Godinez MD, Newton Dimas MD, Zoraida Rodriguez DO, Frank Salomon MD, Trenton James MD, Fannie Stevens MD, Kurtis Torres MD, Liz Ellis, DO, Katelynn Thorpe MD, Jose Cartwright MD, Prabhu Powell, CNP,  Gloria Carter, CNP, Mychal Parsons, CNP, Christal Escobedo, CNP,  Shena Casarez, [facility], Prakash Ku, CNP, Yamile Campbell, CNP, Shereen Bradshaw, CNP, Epifaino Chakraborty, CNP, Elian Wolfe, CNP, Luis M Zeng PA-C, Chris Orantes, [facility], Adolfo Samano, CNP, Vicki Cason, [address] [place] [address]    Progress Note    1/5/2023    3:06 PM    Name:   Franc Walsh  MRN:     [id number]     Acct:      [de-identified]   Room:   2010/2010-01   Day:  6  Admit Date:  12/25/2022  7:27 PM    PCP:   Ugo Koenig MD  Code Status:  Full Code    Subjective:     C/C:   Chief Complaint   Patient presents with    Fall     Interval History Status: improved. Patient seen and examined at bedside, continues to feel okay feeling better than yesterday and moved around with therapy. He is awaiting bed   patient vitals, labs and all providers notes were reviewed,from overnight shift and morning updates were noted and discussed with the nurse    Brief History:     Per chart  This is a 79-year-old male past medical history of neuropathy, AICD in place, peripheral vascular disease, coronary artery disease, history of diastolic CHF, prostate cancer status postradiation therapy, arthritis, bilateral carotid stenosis presented to the ER on 12/25/2022 after patient had a fall denied any loss of consciousness.   Patient was admitted to trauma surgery service. Cardiology was consulted for medical clearance was moderate to high risk for any OR procedure due to history of significant coronary artery disease and LAD. Patient underwent left hip hemiarthroplasty with orthopedic surgery on 12/26/2022, afterwards was noted to have episodes of nonsustained V. tach. Underwent stress test on 12/28/2022 showing concern of galdino-infarct ischemia and plan for cardiac catheterization however was held due to patient having black tarry stools on 12/29/2022 patient underwent EGD with GI found to have 10 mm duodenal ulcer with active bleeding that was cauterized and injected with epinephrine and clipped. Patient currently on clear liquid diet and plan for cardiac catheterization when cleared by GI. Patient continues on Protonix drip at this time       Review of Systems:     Review of Systems   Constitutional:  Positive for activity change, appetite change and fatigue. Negative for chills, diaphoresis and fever. HENT:  Negative for congestion. Eyes:  Negative for visual disturbance. Respiratory:  Positive for shortness of breath. Negative for cough, chest tightness and wheezing. Cardiovascular:  Negative for chest pain, palpitations and leg swelling. Gastrointestinal:  Negative for abdominal pain, blood in stool, constipation, diarrhea, nausea and vomiting. Genitourinary:  Negative for difficulty urinating. Neurological:  Positive for weakness. Negative for dizziness, light-headedness, numbness and headaches. All other systems reviewed and are negative. Medications: Allergies:     Allergies   Allergen Reactions    Percocet [Oxycodone-Acetaminophen] Other (See Comments)     Panic attack       Current Meds:   Scheduled Meds:    lisinopril  20 mg Oral Daily    clopidogrel  75 mg Oral Daily    methocarbamol  500 mg Oral TID    pantoprazole  40 mg Oral BID AC    furosemide  40 mg Oral Daily    amLODIPine  10 mg Oral Daily metoprolol succinate  100 mg Oral Daily    sertraline  25 mg Oral Daily    atorvastatin  10 mg Oral Daily    gabapentin  600 mg Oral BID    aspirin  81 mg Oral Daily    sodium chloride flush  5-40 mL IntraVENous 2 times per day    acetaminophen  1,000 mg Oral 3 times per day     Continuous Infusions:    sodium chloride       PRN Meds: sodium chloride, polyethylene glycol, sodium chloride flush, sodium chloride flush, nitroGLYCERIN, fluticasone, melatonin, oxyCODONE, sodium chloride flush, ondansetron **OR** ondansetron    Data:     Past Medical History:   has a past medical history of Acid reflux, Anxiety, Arthritis, BPH (benign prostatic hypertrophy), CAD (coronary artery disease), Cancer ([facility] Carotid stenosis, Cataracts, both eyes, CH[facility] (congestive heart failure) [facility] Dry skin, Elevated PSA, Examination of participant in clinical trial, Hyperlipidemia, Hypertension, Left ventricular dysfunction, Macular degeneration, Myocardial infarction ([facility] Neuropathy, Pacemaker, Panic attacks, PVD (peripheral vascular disease) ([facility] Sinus problem, Snores, Vasovagal near syncope, and Wears glasses. Social History:   reports that he has never smoked. He has never used smokeless tobacco. He reports current alcohol use of about 2.0 standard drinks per week. He reports that he does not use drugs. Family History:   Family History   Problem Relation Age of Onset    High Blood Pressure Mother     Cancer Mother     High Blood Pressure Father     Diabetes Sister     Cancer Sister     High Blood Pressure Sister     Heart Disease Brother     High Blood Pressure Brother        Vitals:  BP (!) 143/65   Pulse 70   Temp 98 °F (36.7 °C) (Oral)   Resp 16   Ht 5' 7\" (1.702 m)   Wt 189 lb 9.5 oz (86 kg)   SpO2 97%   BMI 29.69 kg/m²   Temp (24hrs), Av.2 °F (36.8 °C), Min:98 °F (36.7 °C), Max:98.4 °F (36.9 °C)    Recent Labs     23   POCGLU 159*         I/O (24Hr):   No intake or output data in the 24 hours ending 01/05/23 1506    Labs:  Hematology:  Recent Labs     01/04/23  0947   WBC 8.1   RBC 2.82*   HGB 8.4*   HCT 25.9*   MCV 91.8   MCH 29.8   MCHC 32.4   RDW 14.2      MPV 10.7       Chemistry:  Recent Labs     01/03/23  0942      K 3.3*   CL 95*   CO2 31   GLUCOSE 194*   BUN 12   CREATININE 0.73   ANIONGAP 10   LABGLOM >60   CALCIUM 7.9*       Recent Labs     01/03/23  1957   POCGLU 159*       ABG:  Lab Results   Component Value Date/Time    POCPH 7.35 03/04/2014 04:57 PM    POCPCO2 63 03/04/2014 04:57 PM    POCPO2 103 03/04/2014 04:57 PM    POCHCO3 35.2 03/04/2014 04:57 PM    NBEA NOT REPORTED 03/04/2014 04:57 PM    PBEA 10 03/04/2014 04:57 PM    YSB3YLS 37 03/04/2014 04:57 PM    WSDL7WTB 97 03/04/2014 04:57 PM    FIO2 NO SAMPLE RECEIVED 12/25/2022 08:13 PM     Lab Results   Component Value Date/Time    SPECIAL NOT REPORTED 07/28/2014 05:19 PM     Lab Results   Component Value Date/Time    CULTURE NO GROWTH 03/02/2014 05:00 PM    CULTURE  03/02/2014 05:00 PM     [place] [address] [phone]       Radiology:  XR HIP LEFT (1 VIEW)    Result Date: 12/26/2022  Status post left total hip arthroplasty in a patient with acute cervical neck fracture. Hardware alignment is satisfactory. No apparent hardware complication. Expected postoperative soft tissue changes are noted. 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 Infarct extends to involve the entire apex and small portions of the anterolateral and anterior septal wall. LVEF 37%. Risk stratification: Intermediate risk. XR HIP 2-3 VW W PELVIS LEFT    Result Date: 12/26/2022  Recent postop changes left hip arthroplasty       Physical Examination:        Physical Exam  Vitals and nursing note reviewed. Constitutional:       General: He is not in acute distress.   HENT:      Head: Normocephalic and atraumatic. Eyes:      Conjunctiva/sclera: Conjunctivae normal.      Pupils: Pupils are equal, round, and reactive to light. Cardiovascular:      Rate and Rhythm: Normal rate and regular rhythm. Heart sounds: No murmur heard. Pulmonary:      Effort: Pulmonary effort is normal. No accessory muscle usage or respiratory distress. Breath sounds: No stridor. No decreased breath sounds, wheezing, rhonchi or rales. Abdominal:      General: Bowel sounds are normal. There is no distension. Palpations: Abdomen is soft. Abdomen is not rigid. Tenderness: There is no abdominal tenderness. There is no guarding. Musculoskeletal:         General: No tenderness. Comments: R groin soft with no bruise, palpable masses or bruit  L side incisions are clean, dressing CDI   Skin:     General: Skin is warm and dry. Findings: No erythema, lesion or rash. Neurological:      Mental Status: He is alert and oriented to person, place, and time. Cranial Nerves: No cranial nerve deficit. Motor: No seizure activity. Psychiatric:         Speech: Speech normal.         Behavior: Behavior normal. Behavior is cooperative.        Assessment:        Hospital Problems             Last Modified POA    * (Principal) S/P coronary artery stent placement 1/4/2023 Yes    Closed fracture of neck of left femur ([address] 1/4/2023 Yes    Duodenal ulcer 12/30/2022 Yes    Abnormal stress test 1/4/2023 Yes    GI bleed 1/4/2023 No    CAD (coronary artery disease) 12/30/2022 Yes    Hypertension 12/30/2022 Yes    BPH (benign prostatic hyperplasia) 12/30/2022 Yes    Chronic systolic heart failure ([address] (Chronic) 12/30/2022 Yes    Claudication in peripheral vascular disease ([address] 12/30/2022 Yes    Malignant neoplasm of prostate ([address] 12/30/2022 Yes     Plan:        Principal Problem:    S/P coronary artery stent placement  Active Problems:    Closed fracture of neck of left femur (HCC)    Duodenal ulcer    Abnormal stress test    GI bleed    CAD (coronary artery disease)    Hypertension    BPH (benign prostatic hyperplasia)    Chronic systolic heart failure (HCC)    Claudication in peripheral vascular disease (HCC)    Malignant neoplasm of prostate ([facility]  Resolved Problems:    * No resolved hospital problems. *    -S/p left hip arthroplasty 12/26  -Recommended for anticoagulation for least 1 month postoperatively per orthopedic surgery  -Anticoagulation is held due to GI bleed  -Duodenal ulcer with active bleed status post EGD cauterization, clip, epinephrine on 12/29/2022. -GI okay for antiplatelets.  -Transfused 1 unit RBC to keep Hb above 8.  -History of combined systolic diastolic heart failure , compensated    -Abnormal stress test  concerning for ischemia.   S/P  cardiac cath 1/3 , LCx mid new stenosis 75% , stented  -  Continue Lipitor, Lasix, Toprol- mg,   -Depression continue Zoloft    -History of prostate cancer status postradiation therapy  -Discussed with the patient and the nurse  -Stable for discharge, orders placed 1/4, await placement      Krista Powers MD  1/5/2023  3:06 PM

## 2023-01-05 NOTE — PROGRESS NOTES
Occupational Therapy  Facility/Department: [facility] CAR 2- STEPDOWN  Occupational Therapy Daily Treatment Note    Name: Jacinto Pearce  : 1940  MRN: [id number]  Date of Service: 2023    Discharge Recommendations:  Patient would benefit from continued therapy after discharge  OT Equipment Recommendations  Equipment Needed: Yes  Mobility Devices: ADL Assistive Devices  ADL Assistive Devices: Toileting - 3-in-1 Commode;Reacher;Sock-Aid Hard;Long-handled Shoe Horn;Long-handled Sponge    Patient Diagnosis(es): The encounter diagnosis was Closed fracture of neck of left femur, initial encounter ([facility] [address] Past Medical History:  has a past medical history of Acid reflux, Anxiety, Arthritis, BPH (benign prostatic hypertrophy), CAD (coronary artery disease), Cancer ([address] Carotid stenosis, Cataracts, both eyes, CHF (congestive heart failure) ([address] Dry skin, Elevated PSA, Examination of participant in clinical trial, Hyperlipidemia, Hypertension, Left ventricular dysfunction, Macular degeneration, Myocardial infarction ([address] Neuropathy, Pacemaker, Panic attacks, PVD (peripheral vascular disease) ([address] Sinus problem, Snores, Vasovagal near syncope, and Wears glasses. Past Surgical History:  has a past surgical history that includes Pacemaker insertion (, , , , 2019); Cholecystectomy, laparoscopic (2014); eye surgery (Bilateral); Cardiac defibrillator placement (); Cardiac surgery (); Cardiac catheterization; Colonoscopy; Prostate Biopsy (2019); Prostate biopsy (N/A, 2019); pacemaker placement; Cataract removal; Hip Arthroplasty (Left, 2022); hip surgery (Left, 2022); Esophagogastroduodenoscopy (2022); Upper gastrointestinal endoscopy (N/A, 2022); and Coronary angioplasty with stent (2023). Assessment   Performance deficits / Impairments: Decreased functional mobility ; Decreased ADL status; Decreased endurance;Decreased balance;Decreased high-level IADLs;Decreased safe awareness;Decreased strength  Prognosis: Good  REQUIRES OT FOLLOW-UP: Yes  Activity Tolerance  Activity Tolerance: Patient Tolerated treatment well;Patient limited by fatigue        Plan   Occupational Therapy Plan  Times Per Week: 4-5 x/wk  Current Treatment Recommendations: Balance training, Functional mobility training, Endurance training, Pain management, Safety education & training, Patient/Caregiver education & training, Equipment evaluation, education, & procurement, Self-Care / ADL, Home management training     Restrictions  Restrictions/Precautions  Restrictions/Precautions: Weight Bearing, Fall Risk  Required Braces or Orthoses?: No  Lower Extremity Weight Bearing Restrictions  Left Lower Extremity Weight Bearing: Weight Bearing As Tolerated  Position Activity Restriction  Hip Precautions: No hip flexion > 90 degrees, No ADduction, Posterior hip precautions, No hip internal rotation  Other position/activity restrictions: S/p: L hip hemiarthoplasty 12/26/2022    Subjective   General  Chart Reviewed: Yes  Patient assessed for rehabilitation services?: Yes  Response to previous treatment: Patient with no complaints from previous session  Family / Caregiver Present: No  Subjective  Subjective: Pt reported a mild pain in hip and back, 1/10. Pt did report that when he was rolled for galdino-care, pain hip did increase. Pt was able to continue with therapy session. General Comment  Comments: Pt and RN agreeable to therapy this day. Pt supine in bed at start of session and retired to seated in chair at session end with call light in reach, chair alarm on. Objective   O2 Device: None (Room air)    Observation/Palpation  Posture: Fair  Safety Devices  Type of Devices: Call light within reach;Gait belt;Left in chair;Patient at risk for falls;Nurse notified; Chair alarm in place  Restraints  Restraints Initially in Place: No  Balance  Sitting: With support (static/dynamic sitting at EOB demo post/ R lateral lean, Min A.  For sitting balance, after hip adjustment and repositioning in chair pt sat total ~5-8 mins)  Standing: With support (Pt tolerated ~2-4 min static standing with RW, demo 0 LOB required Min A X2 for posterior sway/safety.)  Gait  Overall Level of Assistance: Minimum assistance;Assist X2 (simulated household distance with RW from EOB to chair.)  Interventions: Safety awareness training;Verbal cues  Speed/Sunshine: Slow    ADL  Toileting: Maximum assistance;Setup;Increased time to complete  Toileting Skilled Clinical Factors: Max A for brief management and pericare supine in bed w/ RN assistance. Increased time needed d/t multiple BM.  Additional Comments: Throughout session pt limited per fatigue.  Pt declined further ADLs d/t fatigue and needing a rest break after completing func mob.     Activity Tolerance  Activity Tolerance: Patient limited by fatigue;Patient limited by endurance  Bed mobility  Supine to Sit: Moderate assistance  Sit to Supine:  (pt left seated in recliner)  Scooting: Minimal assistance  Bed Mobility Comments: HOB elevated, increased time to complete, pt used bedrails to help pull self up  Transfers  Sit to stand: Moderate assistance;2 Person assistance (Mod A X2 d/t weakness in L LE.)  Stand to sit: Minimal assistance;2 Person assistance     Cognition  Overall Cognitive Status: WFL  Orientation  Overall Orientation Status: Within Normal Limits  Orientation Level: Oriented X4    Education Given To: Patient  Education Provided: Role of Therapy;ADL Adaptive Strategies;Transfer Training;Precautions  Education Provided Comments: proper hand and foot placement; balance maintaince; walker management; hip precautions-F carry over  Education Method: Verbal  Barriers to Learning: Vision  Education Outcome: Continued education needed;Verbalized understanding    AM-PAC Score  AM-[facility] Inpatient Daily Activity Raw Score: 17 (01/05/23 5506)  -PAC Inpatient ADL T-Scale  Score : 37.26 (01/05/23 1751)  ADL Inpatient CMS 0-100% Score: 50.11 (01/05/23 1751)  ADL Inpatient CMS G-Code Modifier : CK (01/05/23 1751)    Goals  Short Term Goals  Time Frame for Short Term Goals: By discharge,pt will:  Short Term Goal 1: [de-identified] Min A for functional transfers with use of LRD for engagement in ADLs/IADLs  Short Term Goal 2: Demo CGA for functional mobility with use of LRD to increase functional independence with daily occupations  Short Term Goal 3: Demo Mod I for UB ADLs with adaptive tech utilized PRN  Short Term Goal 4: Demo Min A for LB ADLs and toileting tasks with use of AE and adaptive tech PRN  Short Term Goal 5: Demo 8 min dynamic standing and reaching outside TAM with unilateral hand release and SBA for improved balance during ADL/IADL participation       Therapy Time   Individual Concurrent Group Co-treatment   Time In 1358         Time Out 1430         Minutes 32      7   Timed Code Treatment Minutes: 25 Minutes  Co-treat needed for safety and support of pt while completing transfer and functional mobility.         MARIANA Soliz/NEL

## 2023-01-05 NOTE — PLAN OF CARE
Problem: Discharge Planning  Goal: Discharge to home or other facility with appropriate resources  1/5/2023 1602 by Antonieta Juares RN  Outcome: Progressing  1/5/2023 0526 by Sandie Young RN  Outcome: Progressing     Problem: Pain  Goal: Verbalizes/displays adequate comfort level or baseline comfort level  1/5/2023 1602 by Antonieta Juares RN  Outcome: Progressing  1/5/2023 0526 by Sandie Young RN  Outcome: Progressing     Problem: Safety - Adult  Goal: Free from fall injury  1/5/2023 1602 by Antonieta Juares RN  Outcome: Progressing  1/5/2023 0526 by Sandie Young RN  Outcome: Progressing     Problem: ABCDS Injury Assessment  Goal: Absence of physical injury  1/5/2023 1602 by Antonieta Juares RN  Outcome: Progressing  1/5/2023 0526 by Sandie Young RN  Outcome: Progressing     Problem: Skin/Tissue Integrity  Goal: Absence of new skin breakdown  Description: 1. Monitor for areas of redness and/or skin breakdown  2. Assess vascular access sites hourly  3. Every 4-6 hours minimum:  Change oxygen saturation probe site  4. Every 4-6 hours:  If on nasal continuous positive airway pressure, respiratory therapy assess nares and determine need for appliance change or resting period.   1/5/2023 1602 by Antonieta Juares RN  Outcome: Progressing  1/5/2023 0526 by Sandie Young RN  Outcome: Progressing     Problem: Chronic Conditions and Co-morbidities  Goal: Patient's chronic conditions and co-morbidity symptoms are monitored and maintained or improved  1/5/2023 1602 by Antonieta Juares RN  Outcome: Progressing  1/5/2023 0526 by Sandie Young RN  Outcome: Progressing     Problem: Nutrition Deficit:  Goal: Optimize nutritional status  1/5/2023 1602 by Antonieta Juares RN  Outcome: Progressing  1/5/2023 0526 by Troy Polanco RN  Outcome: Progressing

## 2023-01-06 PROCEDURE — 2580000003 HC RX 258: Performed by: INTERNAL MEDICINE

## 2023-01-06 PROCEDURE — 6370000000 HC RX 637 (ALT 250 FOR IP): Performed by: INTERNAL MEDICINE

## 2023-01-06 PROCEDURE — 6370000000 HC RX 637 (ALT 250 FOR IP)

## 2023-01-06 PROCEDURE — 6370000000 HC RX 637 (ALT 250 FOR IP): Performed by: STUDENT IN AN ORGANIZED HEALTH CARE EDUCATION/TRAINING PROGRAM

## 2023-01-06 PROCEDURE — 97116 GAIT TRAINING THERAPY: CPT

## 2023-01-06 PROCEDURE — 99231 SBSQ HOSP IP/OBS SF/LOW 25: CPT | Performed by: INTERNAL MEDICINE

## 2023-01-06 PROCEDURE — 2060000000 HC ICU INTERMEDIATE R&B

## 2023-01-06 PROCEDURE — 97110 THERAPEUTIC EXERCISES: CPT

## 2023-01-06 RX ORDER — LISINOPRIL 40 MG/1
20 TABLET ORAL DAILY
Qty: 30 TABLET | Refills: 3 | DISCHARGE
Start: 2023-01-06

## 2023-01-06 RX ORDER — AMLODIPINE BESYLATE 10 MG/1
10 TABLET ORAL DAILY
Qty: 30 TABLET | Refills: 3 | DISCHARGE
Start: 2023-01-06

## 2023-01-06 RX ADMIN — METOPROLOL SUCCINATE 100 MG: 100 TABLET, FILM COATED, EXTENDED RELEASE ORAL at 08:02

## 2023-01-06 RX ADMIN — ASPIRIN 81 MG: 81 TABLET, CHEWABLE ORAL at 08:02

## 2023-01-06 RX ADMIN — CLOPIDOGREL 75 MG: 75 TABLET, FILM COATED ORAL at 08:02

## 2023-01-06 RX ADMIN — SERTRALINE 25 MG: 25 TABLET, FILM COATED ORAL at 08:02

## 2023-01-06 RX ADMIN — FUROSEMIDE 40 MG: 40 TABLET ORAL at 08:02

## 2023-01-06 RX ADMIN — LISINOPRIL 20 MG: 20 TABLET ORAL at 08:02

## 2023-01-06 RX ADMIN — AMLODIPINE BESYLATE 10 MG: 10 TABLET ORAL at 08:02

## 2023-01-06 RX ADMIN — METHOCARBAMOL TABLETS 500 MG: 500 TABLET, COATED ORAL at 08:02

## 2023-01-06 RX ADMIN — ATORVASTATIN CALCIUM 10 MG: 10 TABLET, FILM COATED ORAL at 11:20

## 2023-01-06 RX ADMIN — PANTOPRAZOLE SODIUM 40 MG: 40 TABLET, DELAYED RELEASE ORAL at 07:00

## 2023-01-06 RX ADMIN — SODIUM CHLORIDE, PRESERVATIVE FREE 10 ML: 5 INJECTION INTRAVENOUS at 21:05

## 2023-01-06 RX ADMIN — ACETAMINOPHEN 1000 MG: 500 TABLET, FILM COATED ORAL at 07:00

## 2023-01-06 RX ADMIN — GABAPENTIN 600 MG: 300 CAPSULE ORAL at 08:02

## 2023-01-06 RX ADMIN — GABAPENTIN 600 MG: 300 CAPSULE ORAL at 21:03

## 2023-01-06 RX ADMIN — SODIUM CHLORIDE, PRESERVATIVE FREE 10 ML: 5 INJECTION INTRAVENOUS at 08:03

## 2023-01-06 RX ADMIN — METHOCARBAMOL TABLETS 500 MG: 500 TABLET, COATED ORAL at 21:04

## 2023-01-06 RX ADMIN — PANTOPRAZOLE SODIUM 40 MG: 40 TABLET, DELAYED RELEASE ORAL at 15:17

## 2023-01-06 ASSESSMENT — PAIN SCALES - GENERAL
PAINLEVEL_OUTOF10: 0
PAINLEVEL_OUTOF10: 0

## 2023-01-06 NOTE — PROGRESS NOTES
[facility]  Office: [address], DO, Renetta Goddard, DO, Clarke Pinadominguez, DO, John Kuhnudder Blood, DO, Елена Gilbert MD, Khanh Rahman MD, Eliazar Goetz MD, Brain Mcfadden MD,  Bridgett Murillo MD, Armin Geronimo MD, Yadira Sawyer, DO, Dolly Mitchell MD,  Yung Harvey MD, Palak Dean MD, Loc Britton, DO, Verena Chew MD, Lera Mcardle, MD, Nedra Middleton, DO, Teo Willoughby MD, Berry Ku MD, Keith Lombard, MD, Minnie Thakur MD, Romana Quevedo, DO, Valerie Devries MD, Wilber Amanda MD, Radha Rodriguez, CNP,  Idalia Ervin, CNP, Tyra Lin, CNP, Nahun Field, CNP,  Kayla Boykin, [facility], Kavin Neff, CNP, Evelyn Morales, CNP, Santo Christensen, CNP, Oneyda Murcia, CNP, Laury Basurto, CNP, Shanda Garcia PA-C, Doug Zambrano, [facility], Campbell Gram, CNP, Clarissa Escalera, [address] [place] [address]    Progress Note    1/6/2023    2:51 PM    Name:   Augustine White  MRN:     [id number]     [place]:      [de-identified]   Room:   2010/2010-01  IP Day:  12  Admit Date:  12/25/2022  7:27 PM    PCP:   Ardyce Hodgkins, MD  Code Status:  Full Code    Subjective:     C/C:   Chief Complaint   Patient presents with    Fall     Interval History Status: not changed. Feels fine  Denies cp/sob/n/v  No complaints    Brief History:     Per chart  This is a 79-year-old male past medical history of neuropathy, AICD in place, peripheral vascular disease, coronary artery disease, history of diastolic CHF, prostate cancer status postradiation therapy, arthritis, bilateral carotid stenosis presented to the ER on 12/25/2022 after patient had a fall denied any loss of consciousness. Patient was admitted to trauma surgery service. Cardiology was consulted for medical clearance was moderate to high risk for any OR procedure due to history of significant coronary artery disease and LAD.   Patient underwent left hip hemiarthroplasty with orthopedic surgery on 12/26/2022, afterwards was noted to have episodes of nonsustained V. tach. Underwent stress test on 12/28/2022 showing concern of galdino-infarct ischemia and plan for cardiac catheterization however was held due to patient having black tarry stools on 12/29/2022 patient underwent EGD with GI found to have 10 mm duodenal ulcer with active bleeding that was cauterized and injected with epinephrine and clipped. \"    Had cardiac cath 1/3: PTCA-Randee to left circ    Review of Systems:     Constitutional:  negative for chills, fevers, sweats  Respiratory:  negative for cough, dyspnea on exertion, shortness of breath, wheezing  Cardiovascular:  negative for chest pain, chest pressure/discomfort, lower extremity edema, palpitations  Gastrointestinal:  negative for abdominal pain, constipation, diarrhea, nausea, vomiting  Neurological:  negative for dizziness, headache    Medications: Allergies:     Allergies   Allergen Reactions    Percocet [Oxycodone-Acetaminophen] Other (See Comments)     Panic attack       Current Meds:   Scheduled Meds:    lisinopril  20 mg Oral Daily    clopidogrel  75 mg Oral Daily    methocarbamol  500 mg Oral TID    pantoprazole  40 mg Oral BID AC    furosemide  40 mg Oral Daily    amLODIPine  10 mg Oral Daily    metoprolol succinate  100 mg Oral Daily    sertraline  25 mg Oral Daily    atorvastatin  10 mg Oral Daily    gabapentin  600 mg Oral BID    aspirin  81 mg Oral Daily    sodium chloride flush  5-40 mL IntraVENous 2 times per day    acetaminophen  1,000 mg Oral 3 times per day     Continuous Infusions:    sodium chloride       PRN Meds: sodium chloride, polyethylene glycol, sodium chloride flush, sodium chloride flush, nitroGLYCERIN, fluticasone, melatonin, oxyCODONE, sodium chloride flush, ondansetron **OR** ondansetron    Data:     Past Medical History:   has a past medical history of Acid reflux, Anxiety, Arthritis, BPH (benign prostatic hypertrophy), CAD (coronary artery disease), [facility] Carotid stenosis, Cataracts, both eyes, CHF (congestive heart failure) ([facility] Dry skin, Elevated PSA, Examination of participant in clinical trial, Hyperlipidemia, Hypertension, Left ventricular dysfunction, Macular degeneration, Myocardial infarction ([facility] Neuropathy, Pacemaker, Panic attacks, PVD (peripheral vascular disease) ([facility] Sinus problem, Snores, Vasovagal near syncope, and Wears glasses. Social History:   reports that he has never smoked. He has never used smokeless tobacco. He reports current alcohol use of about 2.0 standard drinks per week. He reports that he does not use drugs. Family History:   Family History   Problem Relation Age of Onset    High Blood Pressure Mother     Cancer Mother     High Blood Pressure Father     Diabetes Sister     Cancer Sister     High Blood Pressure Sister     Heart Disease Brother     High Blood Pressure Brother        Vitals:  /60   Pulse 71   Temp 97.9 °F (36.6 °C) (Oral)   Resp 28   Ht 5' 7\" (1.702 m)   Wt 189 lb 9.5 oz (86 kg)   SpO2 97%   BMI 29.69 kg/m²   Temp (24hrs), Av °F (36.7 °C), Min:97.8 °F (36.6 °C), Max:98.4 °F (36.9 °C)    Recent Labs     237   POCGLU 159*       I/O (24Hr): Intake/Output Summary (Last 24 hours) at 2023 1451  Last data filed at 2023 2300  Gross per 24 hour   Intake 300 ml   Output --   Net 300 ml       Labs:  Hematology:  Recent Labs     23  0947   WBC 8.1   RBC 2.82*   HGB 8.4*   HCT 25.9*   MCV 91.8   MCH 29.8   MCHC 32.4   RDW 14.2      MPV 10.7     Chemistry:No results for input(s): NA, K, CL, CO2, GLUCOSE, BUN, CREATININE, MG, ANIONGAP, LABGLOM, GFRAA, CALCIUM, CAION, PHOS, PSA, PROBNP, TROPHS, CKTOTAL, CKMB, CKMBINDEX, MYOGLOBIN, DIGOXIN, LACTACIDWB in the last 72 hours.   Recent Labs     23   POCGLU 159*     ABG:  Lab Results   Component Value Date/Time    POCPH 7.35 2014 04:57 PM    POCPCO2 63 2014 04:57 PM POCPO2 103 03/04/2014 04:57 PM    POCHCO3 35.2 03/04/2014 04:57 PM    NBEA NOT REPORTED 03/04/2014 04:57 PM    PBEA 10 03/04/2014 04:57 PM    DZT9OFF 37 03/04/2014 04:57 PM    EDDL9QAD 97 03/04/2014 04:57 PM    FIO2 NO SAMPLE RECEIVED 12/25/2022 08:13 PM     Lab Results   Component Value Date/Time    SPECIAL NOT REPORTED 07/28/2014 05:19 PM     Lab Results   Component Value Date/Time    CULTURE NO GROWTH 03/02/2014 05:00 PM    CULTURE  03/02/2014 05:00 PM     [employer] [address] [facility], [facility] 3 [phone]       Radiology:  No results found.     Physical Examination:        General appearance:  alert, cooperative and no distress  Mental Status:  oriented to person, place and time and normal affect  Lungs:  clear to auscultation bilaterally, normal effort  Heart:  regular rate and rhythm, no murmur  Abdomen:  soft, nontender, nondistended, normal bowel sounds, no masses, hepatomegaly, splenomegaly  Extremities:  no edema, redness, tenderness in the calves  Skin:  no gross lesions, rashes, induration    Assessment:        Hospital Problems             Last Modified POA    * (Principal) S/P coronary artery stent placement 1/4/2023 Yes    Closed fracture of neck of left femur ([address] 1/4/2023 Yes    Duodenal ulcer 12/30/2022 Yes    Abnormal stress test 1/4/2023 Yes    GI bleed 1/4/2023 No    CAD (coronary artery disease) 12/30/2022 Yes    Hypertension 12/30/2022 Yes    BPH (benign prostatic hyperplasia) 12/30/2022 Yes    Chronic systolic heart failure ([address] (Chronic) 12/30/2022 Yes    Claudication in peripheral vascular disease ([address] 12/30/2022 Yes    Malignant neoplasm of prostate ([address] 12/30/2022 Yes       Plan:        Has been discharged since 1/4, awaiting precert  Dc med rec updated    Yaya Wilhelm Blood, DO  1/6/2023  2:51 PM

## 2023-01-06 NOTE — PLAN OF CARE
Problem: Discharge Planning  Goal: Discharge to home or other facility with appropriate resources  1/5/2023 2313 by Chelsi Jurado RN  Outcome: Progressing  1/5/2023 1602 by Delilah Boyce RN  Outcome: Progressing     Problem: Pain  Goal: Verbalizes/displays adequate comfort level or baseline comfort level  1/5/2023 2313 by Chelsi Jurado RN  Outcome: Progressing  1/5/2023 1602 by Delilah Boyce RN  Outcome: Progressing     Problem: Safety - Adult  Goal: Free from fall injury  1/5/2023 2313 by Chelsi Jurado RN  Outcome: Progressing  1/5/2023 1602 by Delilah Boyce RN  Outcome: Progressing     Problem: ABCDS Injury Assessment  Goal: Absence of physical injury  1/5/2023 2313 by Chelsi Jurado RN  Outcome: Progressing  1/5/2023 1602 by Delilah Boyce RN  Outcome: Progressing     Problem: Skin/Tissue Integrity  Goal: Absence of new skin breakdown  Description: 1. Monitor for areas of redness and/or skin breakdown  2. Assess vascular access sites hourly  3. Every 4-6 hours minimum:  Change oxygen saturation probe site  4. Every 4-6 hours:  If on nasal continuous positive airway pressure, respiratory therapy assess nares and determine need for appliance change or resting period.   1/5/2023 2313 by Chelsi Jurado RN  Outcome: Progressing  1/5/2023 1602 by Delilah Boyce RN  Outcome: Progressing     Problem: Chronic Conditions and Co-morbidities  Goal: Patient's chronic conditions and co-morbidity symptoms are monitored and maintained or improved  1/5/2023 2313 by Chelsi Jurado RN  Outcome: Progressing  1/5/2023 1602 by Delilah oByce RN  Outcome: Progressing     Problem: Nutrition Deficit:  Goal: Optimize nutritional status  1/5/2023 2313 by Chelsi Jurado RN  Outcome: Progressing  1/5/2023 1602 by Delilah Boyce RN  Outcome: Progressing

## 2023-01-06 NOTE — PROGRESS NOTES
Physical Therapy  Facility/Department: [facility] CAR 2- STEPDOWN  Physical Therapy daily treatment note    Name: Carl Shipley  : 1940  MRN: [id number]  Date of Service: 2023    Discharge Recommendations:  Patient would benefit from continued therapy after discharge   PT Equipment Recommendations  Equipment Needed: No      Patient Diagnosis(es): The encounter diagnosis was Closed fracture of neck of left femur, initial encounter ([address] Past Medical History:  has a past medical history of Acid reflux, Anxiety, Arthritis, BPH (benign prostatic hypertrophy), CAD (coronary artery disease), Cancer ([address] Carotid stenosis, Cataracts, both eyes, CHF (congestive heart failure) ([address] Dry skin, Elevated PSA, Examination of participant in clinical trial, Hyperlipidemia, Hypertension, Left ventricular dysfunction, Macular degeneration, Myocardial infarction ([address] Neuropathy, Pacemaker, Panic attacks, PVD (peripheral vascular disease) ([address] Sinus problem, Snores, Vasovagal near syncope, and Wears glasses. Past Surgical History:  has a past surgical history that includes Pacemaker insertion (, , , , 2019); Cholecystectomy, laparoscopic (2014); eye surgery (Bilateral); Cardiac defibrillator placement (); Cardiac surgery (); Cardiac catheterization; Colonoscopy; Prostate Biopsy (2019); Prostate biopsy (N/A, 2019); pacemaker placement; Cataract removal; Hip Arthroplasty (Left, 2022); hip surgery (Left, 2022); Esophagogastroduodenoscopy (2022); Upper gastrointestinal endoscopy (N/A, 2022); and Coronary angioplasty with stent (2023). Assessment   Body Structures, Functions, Activity Limitations Requiring Skilled Therapeutic Intervention: Decreased functional mobility ; Decreased strength;Decreased endurance;Decreased balance; Increased pain;Decreased coordination  Assessment: Pt amb 6 ft bed to chair with RW, requiring MIN A due to weakness.  Demo L knee buckling as fatigues. Pt would benefit from aggressive PT after d/c to address deficits and progress toward prior level of independnce  Therapy Prognosis: Good  Activity Tolerance  Activity Tolerance: Patient limited by fatigue;Patient limited by endurance     Plan   Physcial Therapy Plan  General Plan: 6-7 times per week  Current Treatment Recommendations: Strengthening, Balance training, Functional mobility training, Transfer training, Gait training, Endurance training, Safety education & training, Patient/Caregiver education & training, Therapeutic activities, Equipment evaluation, education, & procurement, Home exercise program, Stair training  Safety Devices  Type of Devices: Call light within reach, Gait belt, Left in chair, Patient at risk for falls, Nurse notified, Chair alarm in place  Restraints  Restraints Initially in Place: No     Restrictions  Restrictions/Precautions  Restrictions/Precautions: Weight Bearing, Fall Risk  Required Braces or Orthoses?: No  Lower Extremity Weight Bearing Restrictions  Left Lower Extremity Weight Bearing: Weight Bearing As Tolerated  Position Activity Restriction  Hip Precautions: No hip flexion > 90 degrees, No ADduction, Posterior hip precautions, No hip internal rotation  Other position/activity restrictions: S/p: L hip hemiarthoplasty 12/26/2022     Subjective   General  Patient assessed for rehabilitation services?: Yes  Response To Previous Treatment: Patient with no complaints from previous session. Family / Caregiver Present: No  Follows Commands: Within Functional Limits  Subjective  Subjective: Pt and RN agreeable to PT. PT supine in bed upon arrival with no c/o pain at rest. Pt pleasant and cooperative throughout session.         Cognition   Orientation  Overall Orientation Status: Within Normal Limits     Objective         Bed mobility  Rolling to Left: Moderate assistance  Supine to Sit: Moderate assistance  Scooting: Minimal assistance  Bed Mobility Comments: HOB elevated, increased time to complete, pt used bedrails to help pull self up  Transfers  Sit to Stand: Minimal Assistance  Stand to Sit: Minimal Assistance  Bed to Chair: Minimal assistance  Comment: very slow pace, cues for hand placement  Ambulation  Surface: Level tile  Device: Rolling Walker  Assistance: Minimal assistance  Quality of Gait: step to gait pattern, advancing with L LE, very slow ilana, L knee buckling as pt fatigues  Distance: 6 ft bed to chair  More Ambulation?: No     Balance  Posture: Fair  Sitting - Static: Fair;+  Sitting - Dynamic: Fair;+  Standing - Static: Fair  Standing - Dynamic: Fair;-  Comments: standing balance assessed while using a RW, seated balance assessed EOB   Exercise  Seated LE exercise program: Long Arc Quads, hip abduction/adduction, heel/toe raises, and marches. Reps: 20x    AM-PAC Score  AM-PAC Inpatient Mobility Raw Score : 14 (01/06/23 1527)  AM-PAC Inpatient T-Scale Score : 38.1 (01/06/23 1527)  Mobility Inpatient CMS 0-100% Score: 61.29 (01/06/23 1527)  Mobility Inpatient CMS G-Code Modifier : CL (01/06/23 1527)        Goals  Short Term Goals  Time Frame for Short Term Goals: 14 visits  Short Term Goal 1: Pt will ambulate 150 feet with a RW and SBA. Short Term Goal 2: Pt will demonstrate good- standing balance to decrease fall risk. Short Term Goal 3: Pt will negotiate 3 stairs with bilateral handrails and SBA to allow the pt to enter prior living arrangements. Short Term Goal 4: Pt will perform sit<>stand transfer with CGA. Short Term Goal 5: Pt will perform bed mobility with CGA while maintaining posterior hip precautions.   Additional Goals?: No       Education         Therapy Time   Individual Concurrent Group Co-treatment   Time In 1463         Time Out 1501         Minutes 28         Timed Code Treatment Minutes: [address] [facility]

## 2023-01-07 PROCEDURE — 6370000000 HC RX 637 (ALT 250 FOR IP)

## 2023-01-07 PROCEDURE — 2580000003 HC RX 258: Performed by: INTERNAL MEDICINE

## 2023-01-07 PROCEDURE — 99231 SBSQ HOSP IP/OBS SF/LOW 25: CPT | Performed by: STUDENT IN AN ORGANIZED HEALTH CARE EDUCATION/TRAINING PROGRAM

## 2023-01-07 PROCEDURE — 6360000002 HC RX W HCPCS: Performed by: STUDENT IN AN ORGANIZED HEALTH CARE EDUCATION/TRAINING PROGRAM

## 2023-01-07 PROCEDURE — 6370000000 HC RX 637 (ALT 250 FOR IP): Performed by: STUDENT IN AN ORGANIZED HEALTH CARE EDUCATION/TRAINING PROGRAM

## 2023-01-07 PROCEDURE — 6370000000 HC RX 637 (ALT 250 FOR IP): Performed by: INTERNAL MEDICINE

## 2023-01-07 PROCEDURE — 2060000000 HC ICU INTERMEDIATE R&B

## 2023-01-07 RX ORDER — ENOXAPARIN SODIUM 100 MG/ML
40 INJECTION SUBCUTANEOUS DAILY
Status: DISCONTINUED | OUTPATIENT
Start: 2023-01-07 | End: 2023-01-11 | Stop reason: HOSPADM

## 2023-01-07 RX ADMIN — PANTOPRAZOLE SODIUM 40 MG: 40 TABLET, DELAYED RELEASE ORAL at 15:06

## 2023-01-07 RX ADMIN — AMLODIPINE BESYLATE 10 MG: 10 TABLET ORAL at 08:23

## 2023-01-07 RX ADMIN — GABAPENTIN 600 MG: 300 CAPSULE ORAL at 08:22

## 2023-01-07 RX ADMIN — SODIUM CHLORIDE, PRESERVATIVE FREE 10 ML: 5 INJECTION INTRAVENOUS at 08:23

## 2023-01-07 RX ADMIN — GABAPENTIN 600 MG: 300 CAPSULE ORAL at 21:49

## 2023-01-07 RX ADMIN — ACETAMINOPHEN 1000 MG: 500 TABLET, FILM COATED ORAL at 21:53

## 2023-01-07 RX ADMIN — SERTRALINE 25 MG: 25 TABLET, FILM COATED ORAL at 08:22

## 2023-01-07 RX ADMIN — ACETAMINOPHEN 1000 MG: 500 TABLET, FILM COATED ORAL at 05:54

## 2023-01-07 RX ADMIN — SODIUM CHLORIDE, PRESERVATIVE FREE 10 ML: 5 INJECTION INTRAVENOUS at 21:00

## 2023-01-07 RX ADMIN — ASPIRIN 81 MG: 81 TABLET, CHEWABLE ORAL at 08:23

## 2023-01-07 RX ADMIN — METHOCARBAMOL TABLETS 500 MG: 500 TABLET, COATED ORAL at 08:22

## 2023-01-07 RX ADMIN — METHOCARBAMOL TABLETS 500 MG: 500 TABLET, COATED ORAL at 21:49

## 2023-01-07 RX ADMIN — ENOXAPARIN SODIUM 40 MG: 100 INJECTION SUBCUTANEOUS at 14:33

## 2023-01-07 RX ADMIN — METOPROLOL SUCCINATE 100 MG: 100 TABLET, FILM COATED, EXTENDED RELEASE ORAL at 08:22

## 2023-01-07 RX ADMIN — FUROSEMIDE 40 MG: 40 TABLET ORAL at 08:23

## 2023-01-07 RX ADMIN — LISINOPRIL 20 MG: 20 TABLET ORAL at 08:22

## 2023-01-07 RX ADMIN — PANTOPRAZOLE SODIUM 40 MG: 40 TABLET, DELAYED RELEASE ORAL at 08:22

## 2023-01-07 RX ADMIN — CLOPIDOGREL 75 MG: 75 TABLET, FILM COATED ORAL at 08:23

## 2023-01-07 RX ADMIN — ATORVASTATIN CALCIUM 10 MG: 10 TABLET, FILM COATED ORAL at 08:23

## 2023-01-07 ASSESSMENT — PAIN SCALES - GENERAL
PAINLEVEL_OUTOF10: 0
PAINLEVEL_OUTOF10: 5
PAINLEVEL_OUTOF10: 4
PAINLEVEL_OUTOF10: 4
PAINLEVEL_OUTOF10: 2

## 2023-01-07 ASSESSMENT — PAIN DESCRIPTION - ORIENTATION: ORIENTATION: LEFT

## 2023-01-07 ASSESSMENT — PAIN - FUNCTIONAL ASSESSMENT: PAIN_FUNCTIONAL_ASSESSMENT: ACTIVITIES ARE NOT PREVENTED

## 2023-01-07 ASSESSMENT — PAIN DESCRIPTION - LOCATION
LOCATION: HIP
LOCATION: HIP

## 2023-01-07 ASSESSMENT — PAIN DESCRIPTION - DESCRIPTORS
DESCRIPTORS: ACHING;DULL
DESCRIPTORS: ACHING;DULL
DESCRIPTORS: ACHING

## 2023-01-07 NOTE — PROGRESS NOTES
[facility]  Office: [address], DO, Daniel Ards, DO, Carly Andrews, DO, Alex Lashell Blood, DO, Gabo Colmenares MD, Lydia Catalan MD, Shandra Zaidi MD, Amirah Harrington MD,  Morenita Hanks MD, Yoly Curiel MD, Terrell Delacruz, DO, Rosie Andrade MD,  Jennifer Sow MD, Jazmine Franklin MD, Tori Moritz, DO, Paulo Alexandre MD, Alix Ugarte MD, Cecil Buerger, DO, Bob Duvall MD, Kassandra Wahl MD, Kang Morrison MD, Avila Onofre MD, Biju Salinas DO, Silverio Cisse MD, Ct Parker MD, Rumalda Snellen, CNP,  Gareth Coronel, CNP, Elie Claire, CNP, Kathy Guido, CNP,  Em Tracy, [facility], Verona Renner, CNP, Cierra Potter, CNP, Venus Jaime, CNP, Marii Capone, CNP, Jacob Whalen, CNP, Fei Rodgers PA-C, Kevin Campuzano, CNS, Vivi Maxwell, CNP, Gail Cuello, [address] [place] [address]    Progress Note    1/7/2023    8:48 AM    Name:   Cain Porter  MRN:     [id number]     Acct:      [de-identified]   Room:   2010/2010-01   Day:  15  Admit Date:  12/25/2022  7:27 PM    PCP:   Erick Emerson MD  Code Status:  Full Code    Subjective:     C/C:   Chief Complaint   Patient presents with    Fall     Interval History Status: not changed. Vitals reviewed, afebrile and hemodynamically stable. Saturating well on 2 L nasal cannula. Previous labs reviewed. Overnight patient had no significant events    On examination patient resting comfortably in bed. No complaints at this time. Awaiting placement at [facility]. Brief History:     Per chart  This is a 79-year-old male past medical history of neuropathy, AICD in place, peripheral vascular disease, coronary artery disease, history of diastolic CHF, prostate cancer status postradiation therapy, arthritis, bilateral carotid stenosis presented to the ER on 12/25/2022 after patient had a fall denied any loss of consciousness.   Patient was admitted to trauma surgery service. Cardiology was consulted for medical clearance was moderate to high risk for any OR procedure due to history of significant coronary artery disease and LAD. Patient underwent left hip hemiarthroplasty with orthopedic surgery on 12/26/2022, afterwards was noted to have episodes of nonsustained V. tach. Underwent stress test on 12/28/2022 showing concern of galdino-infarct ischemia and plan for cardiac catheterization however was held due to patient having black tarry stools on 12/29/2022 patient underwent EGD with GI found to have 10 mm duodenal ulcer with active bleeding that was cauterized and injected with epinephrine and clipped. \"     Had cardiac cath 1/3: PTCA-Randee to left circ    Review of Systems:     Constitutional:  negative for chills, fevers, sweats  Respiratory:  negative for cough, dyspnea on exertion, shortness of breath, wheezing  Cardiovascular:  negative for chest pain, chest pressure/discomfort, lower extremity edema, palpitations  Gastrointestinal:  negative for abdominal pain, constipation, diarrhea, nausea, vomiting  Neurological:  negative for dizziness, headache    Medications: Allergies:     Allergies   Allergen Reactions    Percocet [Oxycodone-Acetaminophen] Other (See Comments)     Panic attack       Current Meds:   Scheduled Meds:    lisinopril  20 mg Oral Daily    clopidogrel  75 mg Oral Daily    methocarbamol  500 mg Oral TID    pantoprazole  40 mg Oral BID AC    furosemide  40 mg Oral Daily    amLODIPine  10 mg Oral Daily    metoprolol succinate  100 mg Oral Daily    sertraline  25 mg Oral Daily    atorvastatin  10 mg Oral Daily    gabapentin  600 mg Oral BID    aspirin  81 mg Oral Daily    sodium chloride flush  5-40 mL IntraVENous 2 times per day    acetaminophen  1,000 mg Oral 3 times per day     Continuous Infusions:    sodium chloride       PRN Meds: sodium chloride, polyethylene glycol, sodium chloride flush, sodium chloride flush, nitroGLYCERIN, fluticasone, melatonin, oxyCODONE, sodium chloride flush, ondansetron **OR** ondansetron    Data:     Past Medical History:   has a past medical history of Acid reflux, Anxiety, Arthritis, BPH (benign prostatic hypertrophy), CAD (coronary artery disease), Cancer ([facility] Carotid stenosis, Cataracts, both eyes, CHF (congestive heart failure) ([facility] Dry skin, Elevated PSA, Examination of participant in clinical trial, Hyperlipidemia, Hypertension, Left ventricular dysfunction, Macular degeneration, Myocardial infarction ([facility] Neuropathy, Pacemaker, Panic attacks, PVD (peripheral vascular disease) ([facility] Sinus problem, Snores, Vasovagal near syncope, and Wears glasses. Social History:   reports that he has never smoked. He has never used smokeless tobacco. He reports current alcohol use of about 2.0 standard drinks per week. He reports that he does not use drugs. Family History:   Family History   Problem Relation Age of Onset    High Blood Pressure Mother     Cancer Mother     High Blood Pressure Father     Diabetes Sister     Cancer Sister     High Blood Pressure Sister     Heart Disease Brother     High Blood Pressure Brother        Vitals:  /68   Pulse 70   Temp 98 °F (36.7 °C) (Oral)   Resp 26   Ht 5' 7\" (1.702 m)   Wt 187 lb 13.3 oz (85.2 kg)   SpO2 97%   BMI 29.42 kg/m²   Temp (24hrs), Av.2 °F (36.8 °C), Min:97.9 °F (36.6 °C), Max:98.7 °F (37.1 °C)    No results for input(s): POCGLU in the last 72 hours. I/O (24Hr):     Intake/Output Summary (Last 24 hours) at 2023 0848  Last data filed at 2023 0000  Gross per 24 hour   Intake 300 ml   Output --   Net 300 ml       Labs:  Hematology:  Recent Labs     23  0947   WBC 8.1   RBC 2.82*   HGB 8.4*   HCT 25.9*   MCV 91.8   MCH 29.8   MCHC 32.4   RDW 14.2      MPV 10.7     Chemistry:No results for input(s): NA, K, CL, CO2, GLUCOSE, BUN, CREATININE, MG, ANIONGAP, LABGLOM, GFRAA, CALCIUM, CAION, PHOS, PSA, PROBNP, TROPHS, CKTOTAL, CKMB, CKMBINDEX, MYOGLOBIN, DIGOXIN, LACTACIDWB in the last 72 hours. No results for input(s): PROT, LABALBU, LABA1C, G0CLPPP, D1ZIXGJ, FT4, TSH, AST, ALT, LDH, GGT, ALKPHOS, LABGGT, BILITOT, BILIDIR, AMMONIA, AMYLASE, LIPASE, LACTATE, CHOL, HDL, LDLCHOLESTEROL, CHOLHDLRATIO, TRIG, VLDL, PEX88OI, PHENYTOIN, PHENYF, URICACID, POCGLU in the last 72 hours. ABG:  Lab Results   Component Value Date/Time    POCPH 7.35 03/04/2014 04:57 PM    POCPCO2 63 03/04/2014 04:57 PM    POCPO2 103 03/04/2014 04:57 PM    POCHCO3 35.2 03/04/2014 04:57 PM    NBEA NOT REPORTED 03/04/2014 04:57 PM    PBEA 10 03/04/2014 04:57 PM    LOZ0MNB 37 03/04/2014 04:57 PM    MEXG6GUW 97 03/04/2014 04:57 PM    FIO2 NO SAMPLE RECEIVED 12/25/2022 08:13 PM     Lab Results   Component Value Date/Time    SPECIAL NOT REPORTED 07/28/2014 05:19 PM     Lab Results   Component Value Date/Time    CULTURE NO GROWTH 03/02/2014 05:00 PM    CULTURE  03/02/2014 05:00 PM     Aly Schwab [address] [id number] [phone]       Radiology:  No results found.     Physical Examination:        General appearance:  alert, cooperative and no distress  Mental Status:  oriented to person, place and time and normal affect  Lungs:  clear to auscultation bilaterally, normal effort  Heart:  regular rate and rhythm, no murmur  Abdomen:  soft, nontender, nondistended, normal bowel sounds, no masses, hepatomegaly, splenomegaly  Extremities:  no edema, redness, tenderness in the calves  Skin:  no gross lesions, rashes, induration    Assessment:        Hospital Problems             Last Modified POA    * (Principal) S/P coronary artery stent placement 1/4/2023 Yes    Closed fracture of neck of left femur ([address] 1/4/2023 Yes    Duodenal ulcer 12/30/2022 Yes    Abnormal stress test 1/4/2023 Yes    GI bleed 1/4/2023 No    CAD (coronary artery disease) 12/30/2022 Yes    Hypertension 12/30/2022 Yes    BPH (benign prostatic hyperplasia) 12/30/2022 Yes    Chronic systolic heart failure ([facility] (Chronic) 12/30/2022 Yes    Claudication in peripheral vascular disease ([facility] 12/30/2022 Yes    Malignant neoplasm of prostate ([facility] 12/30/2022 Yes     Plan:        Fall with closed left femur fracture. Status post left hip hemiarthroplasty on 12/26/2022. Ventricular tachycardia with abnormal stress test.  Status post PTCA-TED to left circumflex on 1/3/2023. Melena. Status post EGD with 10 mm duodenal ulcer cauterized and injected with epinephrine and clipped. Continue on Protonix 40 mg twice daily. Okay for [facility] according to GI on 1/6/2023. We will start Lovenox today. Hypertension. Continue Norvasc 10 mg daily, lisinopril 20 mg daily, Toprol- mg daily. CAD. Status post new TED to left circumflex on 1/3/2023. Continue aspirin 81 mg daily, Plavix 75 mg daily, Lipitor 10 mg daily, Toprol- mg daily. Close outpatient follow-up with cardiology. Chronic systolic congestive heart failure. Continue Toprol- mg daily, lisinopril 20 mg daily, Lasix 40 mg daily. Peripheral vascular disease. Continue aspirin 81 mg daily and Lipitor 10 mg daily. DVT prophylaxis: Lovenox 40 mg daily. GI prophylaxis: Protonix 40 mg twice daily. Discharge planning: Medically stable for discharge. Awaiting placement.     Frank Orantes DO  1/7/2023  8:48 AM

## 2023-01-07 NOTE — PLAN OF CARE
Problem: Discharge Planning  Goal: Discharge to home or other facility with appropriate resources  1/7/2023 0044 by Nikhil De La Cruz RN  Outcome: Progressing  1/6/2023 1638 by Leanna Huerta RN  Outcome: Progressing     Problem: Pain  Goal: Verbalizes/displays adequate comfort level or baseline comfort level  1/7/2023 0044 by Nikhil De La Cruz RN  Outcome: Progressing  1/6/2023 1638 by Leanna Huerta RN  Outcome: Progressing     Problem: Safety - Adult  Goal: Free from fall injury  1/7/2023 0044 by Nikhil De La Cruz RN  Outcome: Progressing  1/6/2023 1638 by Leanna Huerta RN  Outcome: Progressing     Problem: ABCDS Injury Assessment  Goal: Absence of physical injury  1/7/2023 0044 by Nikhil De La Cruz RN  Outcome: Progressing  1/6/2023 1638 by Leanna Huerta RN  Outcome: Progressing     Problem: Skin/Tissue Integrity  Goal: Absence of new skin breakdown  Description: 1.  Monitor for areas of redness and/or skin breakdown  2.  Assess vascular access sites hourly  3.  Every 4-6 hours minimum:  Change oxygen saturation probe site  4.  Every 4-6 hours:  If on nasal continuous positive airway pressure, respiratory therapy assess nares and determine need for appliance change or resting period.  1/7/2023 0044 by Nikhil De La Cruz RN  Outcome: Progressing  1/6/2023 1638 by Leanna Huerta RN  Outcome: Progressing     Problem: Chronic Conditions and Co-morbidities  Goal: Patient's chronic conditions and co-morbidity symptoms are monitored and maintained or improved  1/7/2023 0044 by Nikhil De La Cruz RN  Outcome: Progressing  1/6/2023 1638 by Leanna Huerta RN  Outcome: Progressing     Problem: Nutrition Deficit:  Goal: Optimize nutritional status  1/7/2023 0044 by Nikhil De La Cruz RN  Outcome: Progressing  1/6/2023 1638 by Leanna Huerta RN  Outcome: Progressing

## 2023-01-08 ENCOUNTER — APPOINTMENT (OUTPATIENT)
Dept: CT IMAGING | Age: 83
DRG: 521 | End: 2023-01-08
Payer: MEDICARE

## 2023-01-08 ENCOUNTER — APPOINTMENT (OUTPATIENT)
Dept: GENERAL RADIOLOGY | Age: 83
DRG: 521 | End: 2023-01-08
Payer: MEDICARE

## 2023-01-08 LAB
ABSOLUTE EOS #: 0.1 K/UL (ref 0–0.4)
ABSOLUTE IMMATURE GRANULOCYTE: 0 K/UL (ref 0–0.3)
ABSOLUTE LYMPH #: 0.51 K/UL (ref 1–4.8)
ABSOLUTE MONO #: 0.71 K/UL (ref 0.1–0.8)
ANION GAP SERPL CALCULATED.3IONS-SCNC: 6 MMOL/L (ref 9–17)
BASOPHILS # BLD: 0 % (ref 0–2)
BASOPHILS ABSOLUTE: 0 K/UL (ref 0–0.2)
BUN BLDV-MCNC: 13 MG/DL (ref 8–23)
CALCIUM SERPL-MCNC: 8.1 MG/DL (ref 8.6–10.4)
CHLORIDE BLD-SCNC: 92 MMOL/L (ref 98–107)
CO2: 33 MMOL/L (ref 20–31)
CREAT SERPL-MCNC: 0.68 MG/DL (ref 0.7–1.2)
EOSINOPHILS RELATIVE PERCENT: 1 % (ref 1–4)
GFR SERPL CREATININE-BSD FRML MDRD: >60 ML/MIN/1.73M2
GLUCOSE BLD-MCNC: 130 MG/DL (ref 70–99)
HCT VFR BLD CALC: 29.4 % (ref 40.7–50.3)
HEMOGLOBIN: 9 G/DL (ref 13–17)
IMMATURE GRANULOCYTES: 0 %
LYMPHOCYTES # BLD: 5 % (ref 24–44)
MCH RBC QN AUTO: 28.8 PG (ref 25.2–33.5)
MCHC RBC AUTO-ENTMCNC: 30.6 G/DL (ref 28.4–34.8)
MCV RBC AUTO: 93.9 FL (ref 82.6–102.9)
MONOCYTES # BLD: 7 % (ref 1–7)
MORPHOLOGY: NORMAL
NRBC AUTOMATED: 0 PER 100 WBC
PDW BLD-RTO: 13.6 % (ref 11.8–14.4)
PLATELET # BLD: 428 K/UL (ref 138–453)
PMV BLD AUTO: 10.1 FL (ref 8.1–13.5)
POTASSIUM SERPL-SCNC: 4.5 MMOL/L (ref 3.7–5.3)
RBC # BLD: 3.13 M/UL (ref 4.21–5.77)
SEG NEUTROPHILS: 87 % (ref 36–66)
SEGMENTED NEUTROPHILS ABSOLUTE COUNT: 8.78 K/UL (ref 1.8–7.7)
SODIUM BLD-SCNC: 131 MMOL/L (ref 135–144)
WBC # BLD: 10.1 K/UL (ref 3.5–11.3)

## 2023-01-08 PROCEDURE — 2580000003 HC RX 258: Performed by: INTERNAL MEDICINE

## 2023-01-08 PROCEDURE — 6370000000 HC RX 637 (ALT 250 FOR IP): Performed by: INTERNAL MEDICINE

## 2023-01-08 PROCEDURE — 97535 SELF CARE MNGMENT TRAINING: CPT

## 2023-01-08 PROCEDURE — 6370000000 HC RX 637 (ALT 250 FOR IP): Performed by: NURSE PRACTITIONER

## 2023-01-08 PROCEDURE — 80048 BASIC METABOLIC PNL TOTAL CA: CPT

## 2023-01-08 PROCEDURE — 73080 X-RAY EXAM OF ELBOW: CPT

## 2023-01-08 PROCEDURE — 99231 SBSQ HOSP IP/OBS SF/LOW 25: CPT | Performed by: FAMILY MEDICINE

## 2023-01-08 PROCEDURE — 97530 THERAPEUTIC ACTIVITIES: CPT

## 2023-01-08 PROCEDURE — 70450 CT HEAD/BRAIN W/O DYE: CPT

## 2023-01-08 PROCEDURE — 36415 COLL VENOUS BLD VENIPUNCTURE: CPT

## 2023-01-08 PROCEDURE — 6360000002 HC RX W HCPCS: Performed by: STUDENT IN AN ORGANIZED HEALTH CARE EDUCATION/TRAINING PROGRAM

## 2023-01-08 PROCEDURE — 6370000000 HC RX 637 (ALT 250 FOR IP): Performed by: STUDENT IN AN ORGANIZED HEALTH CARE EDUCATION/TRAINING PROGRAM

## 2023-01-08 PROCEDURE — 85025 COMPLETE CBC W/AUTO DIFF WBC: CPT

## 2023-01-08 PROCEDURE — 2060000000 HC ICU INTERMEDIATE R&B

## 2023-01-08 PROCEDURE — 6370000000 HC RX 637 (ALT 250 FOR IP)

## 2023-01-08 RX ORDER — ECHINACEA PURPUREA EXTRACT 125 MG
1 TABLET ORAL PRN
Status: DISCONTINUED | OUTPATIENT
Start: 2023-01-08 | End: 2023-01-11 | Stop reason: HOSPADM

## 2023-01-08 RX ADMIN — ACETAMINOPHEN 1000 MG: 500 TABLET, FILM COATED ORAL at 05:43

## 2023-01-08 RX ADMIN — GABAPENTIN 600 MG: 300 CAPSULE ORAL at 20:04

## 2023-01-08 RX ADMIN — GABAPENTIN 600 MG: 300 CAPSULE ORAL at 08:27

## 2023-01-08 RX ADMIN — ACETAMINOPHEN 1000 MG: 500 TABLET, FILM COATED ORAL at 13:55

## 2023-01-08 RX ADMIN — METHOCARBAMOL TABLETS 500 MG: 500 TABLET, COATED ORAL at 20:04

## 2023-01-08 RX ADMIN — ENOXAPARIN SODIUM 40 MG: 100 INJECTION SUBCUTANEOUS at 08:27

## 2023-01-08 RX ADMIN — LISINOPRIL 20 MG: 20 TABLET ORAL at 08:26

## 2023-01-08 RX ADMIN — SODIUM CHLORIDE, PRESERVATIVE FREE 10 ML: 5 INJECTION INTRAVENOUS at 08:27

## 2023-01-08 RX ADMIN — METOPROLOL SUCCINATE 100 MG: 100 TABLET, FILM COATED, EXTENDED RELEASE ORAL at 08:26

## 2023-01-08 RX ADMIN — AMLODIPINE BESYLATE 10 MG: 10 TABLET ORAL at 08:27

## 2023-01-08 RX ADMIN — PANTOPRAZOLE SODIUM 40 MG: 40 TABLET, DELAYED RELEASE ORAL at 08:26

## 2023-01-08 RX ADMIN — SODIUM CHLORIDE, PRESERVATIVE FREE 10 ML: 5 INJECTION INTRAVENOUS at 20:08

## 2023-01-08 RX ADMIN — SERTRALINE 25 MG: 25 TABLET, FILM COATED ORAL at 08:26

## 2023-01-08 RX ADMIN — SALINE NASAL SPRAY 1 SPRAY: 1.5 SOLUTION NASAL at 21:40

## 2023-01-08 RX ADMIN — PANTOPRAZOLE SODIUM 40 MG: 40 TABLET, DELAYED RELEASE ORAL at 15:49

## 2023-01-08 RX ADMIN — METHOCARBAMOL TABLETS 500 MG: 500 TABLET, COATED ORAL at 08:26

## 2023-01-08 RX ADMIN — POLYETHYLENE GLYCOL 3350 17 G: 17 POWDER, FOR SOLUTION ORAL at 16:38

## 2023-01-08 RX ADMIN — ASPIRIN 81 MG: 81 TABLET, CHEWABLE ORAL at 08:27

## 2023-01-08 RX ADMIN — ATORVASTATIN CALCIUM 10 MG: 10 TABLET, FILM COATED ORAL at 08:27

## 2023-01-08 RX ADMIN — FUROSEMIDE 40 MG: 40 TABLET ORAL at 08:27

## 2023-01-08 RX ADMIN — ACETAMINOPHEN 1000 MG: 500 TABLET, FILM COATED ORAL at 20:07

## 2023-01-08 RX ADMIN — METHOCARBAMOL TABLETS 500 MG: 500 TABLET, COATED ORAL at 13:55

## 2023-01-08 RX ADMIN — CLOPIDOGREL 75 MG: 75 TABLET, FILM COATED ORAL at 08:27

## 2023-01-08 ASSESSMENT — ENCOUNTER SYMPTOMS
VOMITING: 0
DIARRHEA: 0
WHEEZING: 0
CHEST TIGHTNESS: 0
BLOOD IN STOOL: 0
NAUSEA: 0
CONSTIPATION: 0
ABDOMINAL PAIN: 0
COUGH: 0

## 2023-01-08 ASSESSMENT — PAIN DESCRIPTION - LOCATION: LOCATION: HIP

## 2023-01-08 ASSESSMENT — PAIN DESCRIPTION - DESCRIPTORS: DESCRIPTORS: ACHING;DULL

## 2023-01-08 ASSESSMENT — PAIN SCALES - GENERAL
PAINLEVEL_OUTOF10: 0
PAINLEVEL_OUTOF10: 3
PAINLEVEL_OUTOF10: 0

## 2023-01-08 ASSESSMENT — PAIN - FUNCTIONAL ASSESSMENT: PAIN_FUNCTIONAL_ASSESSMENT: PREVENTS OR INTERFERES SOME ACTIVE ACTIVITIES AND ADLS

## 2023-01-08 ASSESSMENT — PAIN DESCRIPTION - ORIENTATION: ORIENTATION: LEFT

## 2023-01-08 NOTE — PROGRESS NOTES
Occupational Therapy  Facility/Department: [facility] CAR 2- STEPDOWN  Occupational Daily Treatment Note    Name: Eh Garduno  : 1940  MRN: [id number]  Date of Service: 2023    Discharge Recommendations:  Patient would benefit from continued therapy after discharge Further therapy recommended at discharge. The patient should be able to tolerate at least 3 hours of therapy per day over 5 days or 15 hours over 7 days. This patient may benefit from a Physical Medicine and Rehab consult. Patient Diagnosis(es): The encounter diagnosis was Closed fracture of neck of left femur, initial encounter ([facility] Past Medical History:  has a past medical history of Acid reflux, Anxiety, Arthritis, BPH (benign prostatic hypertrophy), CAD (coronary artery disease), Cancer ([address] Carotid stenosis, Cataracts, both eyes, CHF (congestive heart failure) ([address] Dry skin, Elevated PSA, Examination of participant in clinical trial, Hyperlipidemia, Hypertension, Left ventricular dysfunction, Macular degeneration, Myocardial infarction ([address] Neuropathy, Pacemaker, Panic attacks, PVD (peripheral vascular disease) ([address] Sinus problem, Snores, Vasovagal near syncope, and Wears glasses. Past Surgical History:  has a past surgical history that includes Pacemaker insertion (, , , , 2019); Cholecystectomy, laparoscopic (2014); eye surgery (Bilateral); Cardiac defibrillator placement (); Cardiac surgery (); Cardiac catheterization; Colonoscopy; Prostate Biopsy (2019); Prostate biopsy (N/A, 2019); pacemaker placement; Cataract removal; Hip Arthroplasty (Left, 2022); hip surgery (Left, 2022); Esophagogastroduodenoscopy (2022); Upper gastrointestinal endoscopy (N/A, 2022); and Coronary angioplasty with stent (2023). Assessment   Performance deficits / Impairments: Decreased functional mobility ; Decreased ADL status; Decreased endurance;Decreased balance;Decreased high-level IADLs;Decreased safe awareness;Decreased strength  Prognosis: Good  Activity Tolerance  Activity Tolerance: Patient Tolerated treatment well;Patient limited by fatigue        Plan   Occupational Therapy Plan  Times Per Week: 4-5 x/wk  Current Treatment Recommendations: Balance training, Functional mobility training, Endurance training, Pain management, Safety education & training, Patient/Caregiver education & training, Equipment evaluation, education, & procurement, Self-Care / ADL, Home management training     Restrictions  Restrictions/Precautions  Restrictions/Precautions: Weight Bearing, Fall Risk  Required Braces or Orthoses?: No  Lower Extremity Weight Bearing Restrictions  Left Lower Extremity Weight Bearing: Weight Bearing As Tolerated  Position Activity Restriction  Hip Precautions: No hip flexion > 90 degrees, No ADduction, Posterior hip precautions, No hip internal rotation  Other position/activity restrictions: S/p: L hip hemiarthoplasty 12/26/2022  Pain assessment: Pt denies pain. Subjective   General  Chart Reviewed: Yes  Patient assessed for rehabilitation services?: Yes  Response to previous treatment: Patient with no complaints from previous session  Family / Caregiver Present: No     Safety Devices  Type of Devices: All fall risk precautions in place;Call light within reach;Nurse notified; Left in bed (RN present for assessment.)  Balance  Sitting: With support (CGA seated EOB, supervision seated on bedside commode.)  Standing: With support (Min A static standing EOB using RW, functional mobility across room using RW, toilet transfer x2. Total time 7 minutes.)  Gait  Overall Level of Assistance: Minimum assistance; Additional time (Verbal cues to place L knee in extension for greater support with fair return.)  Assistive Device: Walker, rolling  Toilet Transfers  Toilet - Technique: Ambulating  Equipment Used: Standard bedside commode  Toilet Transfer:  Moderate assistance ADL  Grooming: Setup; Increased time to complete;Modified independent  (Oral care seated on bedside commode.)  UE Bathing: Setup; Increased time to complete;Modified independent  (wash face. hands seated on bedside commode.)  Toileting: Maximum assistance;Setup (Bottom care standing at bedside commode.)  Additional Comments: Pt completed supine/sit transfer to seated EOB. Increased time needed seated EOB d/t dizziness after transfer, symptoms subsiding after a few minutes. Sit/stand transfer from EOB using RW for static standing. Functional mobility across room to bedside commode for toileting. Simple grooming completed seated on bedside commode. Sit/stand transfer from bedside commode using RW with verbal cues for hand placement with good return. max A for bottom care in standing. Pt stated feeling the need to return to seated on bedside commode for BM. Second sit/stand transfer rom bedside commode using RW. During pulling up brief, pt L knee buckled causing pt to fall back onto floor. RN notified, pt assesed and transferred back to bed. Bed mobility  Supine to Sit: Moderate assistance  Scooting: Minimal assistance  Bed Mobility Comments: HOB elevated, increased time to complete. Transfers  Sit to stand: Moderate assistance  Stand to sit: Minimal assistance  Transfer Comments: Verbal cues for hand placement with good return. Cognition  Overall Cognitive Status: WFL  Orientation  Overall Orientation Status: Within Functional Limits     Education Given To: Patient  Education Provided Comments: OT POC, transfer/walker safety, importance of participation in therapy, weight bearing status with good return.   AM-PAC Score     AM-PAC Inpatient Daily Activity Raw Score: 17 (01/08/23 1323)  AM-PAC Inpatient ADL T-Scale Score : 37.26 (01/08/23 1323)  ADL Inpatient CMS 0-100% Score: 50.11 (01/08/23 1323)  ADL Inpatient CMS G-Code Modifier : CK (01/08/23 1323)    Goals  Short Term Goals  Time Frame for Short Term Goals: By discharge,pt will:  Short Term Goal 1: [de-identified] Min A for functional transfers with use of LRD for engagement in ADLs/IADLs  Short Term Goal 2: Demo CGA for functional mobility with use of LRD to increase functional independence with daily occupations  Short Term Goal 3: Demo Mod I for UB ADLs with adaptive tech utilized PRN  Short Term Goal 4: Demo Min A for LB ADLs and toileting tasks with use of AE and adaptive tech PRN  Short Term Goal 5: Demo 8 min dynamic standing and reaching outside TAM with unilateral hand release and SBA for improved balance during ADL/IADL participation       Therapy Time   Individual Concurrent Group Co-treatment   Time In 1128         Time Out 1210         Minutes 42         Timed Code Treatment Minutes: 42 Minutes    Pt supine in bed upon therapist arrival. Pleasant and agreeable to therapy. See above for LOF for all tasks. Pt retired to supine in bed at end of session with call light within reach.       MARIANA Maher/NEL

## 2023-01-08 NOTE — CARE COORDINATION
Received call from [facility] from [facility].  Precert is still pending

## 2023-01-08 NOTE — PROGRESS NOTES
Writer notified that pt. Had fall while working with therapy. When writer entered room, pt. Was alert and oriented and all vital signs were stable. Pt. Was successfully moved back to bed with the carey. Dr. Arnulfo Jesus was notified of the fall. Pts. Wife was also notified of the fall and questions were answered to her satisfaction. Pts. Vitals remain stable and orientation is intact, will continue to monitor.

## 2023-01-08 NOTE — PLAN OF CARE
Problem: Discharge Planning  Goal: Discharge to home or other facility with appropriate resources  1/7/2023 2241 by Nahomi Cage RN  Outcome: Progressing  Flowsheets (Taken 1/7/2023 1900)  Discharge to home or other facility with appropriate resources:   Identify barriers to discharge with patient and caregiver   Arrange for needed discharge resources and transportation as appropriate   Identify discharge learning needs (meds, wound care, etc)  1/7/2023 1603 by Michael Perez RN  Outcome: Progressing     Problem: Pain  Goal: Verbalizes/displays adequate comfort level or baseline comfort level  1/7/2023 2241 by Nahomi Cage RN  Outcome: Progressing  1/7/2023 1603 by Michael Perez RN  Outcome: Progressing     Problem: Safety - Adult  Goal: Free from fall injury  1/7/2023 2241 by Nahomi Cage RN  Outcome: Progressing  1/7/2023 1603 by Michael Perez RN  Outcome: Progressing     Problem: ABCDS Injury Assessment  Goal: Absence of physical injury  1/7/2023 2241 by Nahomi Cage RN  Outcome: Progressing  1/7/2023 1603 by Michael Perez RN  Outcome: Progressing     Problem: Skin/Tissue Integrity  Goal: Absence of new skin breakdown  Description: 1. Monitor for areas of redness and/or skin breakdown  2. Assess vascular access sites hourly  3. Every 4-6 hours minimum:  Change oxygen saturation probe site  4. Every 4-6 hours:  If on nasal continuous positive airway pressure, respiratory therapy assess nares and determine need for appliance change or resting period.   1/7/2023 2241 by Nahomi Cage RN  Outcome: Progressing  1/7/2023 1603 by Michael Perez RN  Outcome: Progressing     Problem: Chronic Conditions and Co-morbidities  Goal: Patient's chronic conditions and co-morbidity symptoms are monitored and maintained or improved  1/7/2023 2241 by Nahomi Cage RN  Outcome: Progressing  Flowsheets (Taken 1/7/2023 1900)  Care Plan - Patient's Chronic Conditions and Co-Morbidity Symptoms are Monitored and Maintained or Improved:   Monitor and assess patient's chronic conditions and comorbid symptoms for stability, deterioration, or improvement   Collaborate with multidisciplinary team to address chronic and comorbid conditions and prevent exacerbation or deterioration  1/7/2023 1603 by Kay Lamar RN  Outcome: Progressing     Problem: Nutrition Deficit:  Goal: Optimize nutritional status  1/7/2023 2241 by Brittnee Adame RN  Outcome: Progressing  1/7/2023 1603 by Kay Lamar RN  Outcome: Progressing

## 2023-01-08 NOTE — PROGRESS NOTES
[facility]  Office: [address], DO, Renetta Goddard, DO, Clarke Simms, DO, John Bower Blood, DO, Елена Gilbert MD, Khanh Rahman MD, Eliazar Goetz MD, Brain Mcfadden MD,  Bridgett Murillo MD, Armin Geronimo MD, Yadira Sawyer, DO, Dolly Mitchell MD,  Yung Harvey MD, Palak Dean MD, Loc Britton, DO, Verena Chew MD, Lera Mcardle, MD, Nedra Middleton, DO, Teo Willoughby MD, Berry Ku MD, Keith Lombard, MD, Minnie Thakur MD, Romana Quevedo, DO, Valerie Devries MD, Wilber Amanda MD, Radha Rodriguez, CNP,  Idalia Ervin, CNP, Tyra Lin, CNP, Nahun Field, CNP,  Kayla Boykin, [facility], Kavin Neff, CNP, Evelyn Morales, CNP, Santo Christensen, CNP, Oneyda Murcia, CNP, Laury Basurto, CNP, Shanda Garcia PA-C, Doug Zambrano, [place], Campbell Gram, CNP, Clarissa Escalera, [address] [place] [address]    Progress Note    1/8/2023    7:44 AM    Name:   Augustine White  MRN:     [id number]     Acct:      [de-identified]   Room:   2010/2010-01  IP Day:  15  Admit Date:  12/25/2022  7:27 PM    PCP:   Ardyce Hodgkins, MD  Code Status:  Full Code    Subjective:     C/C:   Chief Complaint   Patient presents with    Fall     Interval History Status: improved. Patient seen and examined at bedside, continues to feel okay No acute events overnight, still waiting for placement   patient vitals, labs and all providers notes were reviewed,from overnight shift and morning updates were noted and discussed with the nurse    Brief History:     Per chart  This is a 79-year-old male past medical history of neuropathy, AICD in place, peripheral vascular disease, coronary artery disease, history of diastolic CHF, prostate cancer status postradiation therapy, arthritis, bilateral carotid stenosis presented to the ER on 12/25/2022 after patient had a fall denied any loss of consciousness.   Patient was admitted to trauma surgery service. Cardiology was consulted for medical clearance was moderate to high risk for any OR procedure due to history of significant coronary artery disease and LAD. Patient underwent left hip hemiarthroplasty with orthopedic surgery on 12/26/2022, afterwards was noted to have episodes of nonsustained V. tach. Underwent stress test on 12/28/2022 showing concern of galdino-infarct ischemia and plan for cardiac catheterization however was held due to patient having black tarry stools on 12/29/2022 patient underwent EGD with GI found to have 10 mm duodenal ulcer with active bleeding that was cauterized and injected with epinephrine and clipped. Patient currently on clear liquid diet and plan for cardiac catheterization when cleared by GI. Patient continues on Protonix drip at this time       Review of Systems:     Review of Systems   Constitutional:  Positive for activity change, appetite change and fatigue. Negative for chills, diaphoresis and fever. HENT:  Negative for congestion. Eyes:  Negative for visual disturbance. Respiratory:  Negative for cough, chest tightness and wheezing. Cardiovascular:  Negative for chest pain, palpitations and leg swelling. Gastrointestinal:  Negative for abdominal pain, blood in stool, constipation, diarrhea, nausea and vomiting. Genitourinary:  Negative for difficulty urinating. Neurological:  Positive for weakness. Negative for dizziness, light-headedness, numbness and headaches. All other systems reviewed and are negative. Medications: Allergies:     Allergies   Allergen Reactions    Percocet [Oxycodone-Acetaminophen] Other (See Comments)     Panic attack       Current Meds:   Scheduled Meds:    enoxaparin  40 mg SubCUTAneous Daily    lisinopril  20 mg Oral Daily    clopidogrel  75 mg Oral Daily    methocarbamol  500 mg Oral TID    pantoprazole  40 mg Oral BID AC    furosemide  40 mg Oral Daily    amLODIPine  10 mg Oral Daily    metoprolol succinate  100 mg Oral Daily    sertraline  25 mg Oral Daily    atorvastatin  10 mg Oral Daily    gabapentin  600 mg Oral BID    aspirin  81 mg Oral Daily    sodium chloride flush  5-40 mL IntraVENous 2 times per day    acetaminophen  1,000 mg Oral 3 times per day     Continuous Infusions:    sodium chloride       PRN Meds: sodium chloride, polyethylene glycol, sodium chloride flush, sodium chloride flush, nitroGLYCERIN, fluticasone, melatonin, oxyCODONE, sodium chloride flush, ondansetron **OR** ondansetron    Data:     Past Medical History:   has a past medical history of Acid reflux, Anxiety, Arthritis, BPH (benign prostatic hypertrophy), CAD (coronary artery disease), Cancer ([facility] [address] Carotid stenosis, Cataracts, both eyes, CHF (congestive heart failure) ([facility] [address] Dry skin, Elevated PSA, Examination of participant in clinical trial, Hyperlipidemia, Hypertension, Left ventricular dysfunction, Macular degeneration, Myocardial infarction ([facility] [address] Neuropathy, Pacemaker, Panic attacks, PVD (peripheral vascular disease) ([facility] [address] Sinus problem, Snores, Vasovagal near syncope, and Wears glasses. Social History:   reports that he has never smoked. He has never used smokeless tobacco. He reports current alcohol use of about 2.0 standard drinks per week. He reports that he does not use drugs. Family History:   Family History   Problem Relation Age of Onset    High Blood Pressure Mother     Cancer Mother     High Blood Pressure Father     Diabetes Sister     Cancer Sister     High Blood Pressure Sister     Heart Disease Brother     High Blood Pressure Brother        Vitals:  BP (!) 152/76   Pulse 72   Temp 98 °F (36.7 °C) (Oral)   Resp 16   Ht 5' 7\" (1.702 m)   Wt 190 lb 11.2 oz (86.5 kg)   SpO2 100%   BMI 29.87 kg/m²   Temp (24hrs), Av.3 °F (36.8 °C), Min:98 °F (36.7 °C), Max:98.7 °F (37.1 °C)    No results for input(s): POCGLU in the last 72 hours. I/O (24Hr):     Intake/Output Summary (Last 24 hours) at 2023 7444  Last data filed at 1/8/2023 0400  Gross per 24 hour   Intake 200 ml   Output 400 ml   Net -200 ml       Labs:  Hematology:  No results for input(s): WBC, RBC, HGB, HCT, MCV, MCH, MCHC, RDW, PLT, MPV, SEDRATE, CRP, INR, DDIMER, BU6NVWGT, LABABSO in the last 72 hours. Invalid input(s): PT    Chemistry:  No results for input(s): NA, K, CL, CO2, GLUCOSE, BUN, CREATININE, MG, ANIONGAP, LABGLOM, GFRAA, CALCIUM, CAION, PHOS, PSA, PROBNP, TROPHS, CKTOTAL, CKMB, CKMBINDEX, MYOGLOBIN, DIGOXIN, LACTACIDWB in the last 72 hours. No results for input(s): PROT, LABALBU, LABA1C, S4XYHTQ, V1RCYKH, FT4, TSH, AST, ALT, LDH, GGT, ALKPHOS, LABGGT, BILITOT, BILIDIR, AMMONIA, AMYLASE, LIPASE, LACTATE, CHOL, HDL, LDLCHOLESTEROL, CHOLHDLRATIO, TRIG, VLDL, SJC46XQ, PHENYTOIN, PHENYF, URICACID, POCGLU in the last 72 hours. ABG:  Lab Results   Component Value Date/Time    POCPH 7.35 03/04/2014 04:57 PM    POCPCO2 63 03/04/2014 04:57 PM    POCPO2 103 03/04/2014 04:57 PM    POCHCO3 35.2 03/04/2014 04:57 PM    NBEA NOT REPORTED 03/04/2014 04:57 PM    PBEA 10 03/04/2014 04:57 PM    XWP5MGK 37 03/04/2014 04:57 PM    IAPR5XBC 97 03/04/2014 04:57 PM    FIO2 NO SAMPLE RECEIVED 12/25/2022 08:13 PM     Lab Results   Component Value Date/Time    SPECIAL NOT REPORTED 07/28/2014 05:19 PM     Lab Results   Component Value Date/Time    CULTURE NO GROWTH 03/02/2014 05:00 PM    CULTURE  03/02/2014 05:00 PM     [facility] [address] [address] [phone]       Radiology:  XR HIP LEFT (1 VIEW)    Result Date: 12/26/2022  Status post left total hip arthroplasty in a patient with acute cervical neck fracture. Hardware alignment is satisfactory. No apparent hardware complication. Expected postoperative soft tissue changes are noted. 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   Infarct extends to involve the entire apex and small portions of the anterolateral and anterior septal wall. LVEF 37%. Risk stratification: Intermediate risk. XR HIP 2-3 VW W PELVIS LEFT    Result Date: 12/26/2022  Recent postop changes left hip arthroplasty       Physical Examination:        Physical Exam  Vitals and nursing note reviewed. Constitutional:       General: He is not in acute distress. HENT:      Head: Normocephalic and atraumatic. Eyes:      Conjunctiva/sclera: Conjunctivae normal.      Pupils: Pupils are equal, round, and reactive to light. Cardiovascular:      Rate and Rhythm: Normal rate and regular rhythm. Heart sounds: No murmur heard. Pulmonary:      Effort: Pulmonary effort is normal. No accessory muscle usage or respiratory distress. Breath sounds: No stridor. No decreased breath sounds, wheezing, rhonchi or rales. Abdominal:      General: Bowel sounds are normal. There is no distension. Palpations: Abdomen is soft. Abdomen is not rigid. Tenderness: There is no abdominal tenderness. There is no guarding. Musculoskeletal:         General: No tenderness. Comments: R groin soft with no bruise, palpable masses or bruit  L side incisions are clean, dressing CDI   Skin:     General: Skin is warm and dry. Findings: No erythema, lesion or rash. Neurological:      Mental Status: He is alert and oriented to person, place, and time. Cranial Nerves: No cranial nerve deficit. Motor: No seizure activity. Psychiatric:         Speech: Speech normal.         Behavior: Behavior normal. Behavior is cooperative.        Assessment:        Hospital Problems             Last Modified POA    * (Principal) S/P coronary artery stent placement 1/4/2023 Yes    Closed fracture of neck of left femur ([address] 1/4/2023 Yes    Duodenal ulcer 12/30/2022 Yes    Abnormal stress test 1/4/2023 Yes    GI bleed 1/4/2023 No    CAD (coronary artery disease) 12/30/2022 Yes    Hypertension 12/30/2022 Yes    BPH (benign prostatic hyperplasia) 12/30/2022 Yes    Chronic systolic heart failure ([address] (Chronic) 12/30/2022 Yes    Claudication in peripheral vascular disease ([address] 12/30/2022 Yes    Malignant neoplasm of prostate ([address] 12/30/2022 Yes   Plan:        Principal Problem:    S/P coronary artery stent placement  Active Problems:    Closed fracture of neck of left femur (HCC)    Duodenal ulcer    Abnormal stress test    GI bleed    CAD (coronary artery disease)    Hypertension    BPH (benign prostatic hyperplasia)    Chronic systolic heart failure (HCC)    Claudication in peripheral vascular disease (HCC)    Malignant neoplasm of prostate ([address]  Resolved Problems:    * No resolved hospital problems. *    -S/p left hip arthroplasty 12/26  -Recommended for anticoagulation for least 1 month postoperatively per orthopedic surgery  -Anticoagulation is held due to GI bleed  -Duodenal ulcer with active bleed status post EGD cauterization, clip, epinephrine on 12/29/2022. -GI okay for antiplatelets.  -Transfused 1 unit RBC to keep Hb above 8.  -History of combined systolic diastolic heart failure , compensated    -Abnormal stress test  concerning for ischemia.   S/P  cardiac cath 1/3 , LCx mid new stenosis 75% , stented  -  Continue Lipitor, Lasix, Toprol- mg,   -Depression continue Zoloft    -History of prostate cancer status postradiation therapy  -Stable for discharge, orders placed 1/4, await placement    - Later on was updated that his kneed gave out while getting up from the toilet and he fell and hit his head to the door and L elbow to floor, he was assisted up no other complain or concerns and was assisted up, will check [address] [place] [address] and X ray Paulette Randall MD  1/8/2023  7:44 AM

## 2023-01-09 PROCEDURE — 6370000000 HC RX 637 (ALT 250 FOR IP)

## 2023-01-09 PROCEDURE — 6370000000 HC RX 637 (ALT 250 FOR IP): Performed by: INTERNAL MEDICINE

## 2023-01-09 PROCEDURE — 2060000000 HC ICU INTERMEDIATE R&B

## 2023-01-09 PROCEDURE — 99232 SBSQ HOSP IP/OBS MODERATE 35: CPT | Performed by: STUDENT IN AN ORGANIZED HEALTH CARE EDUCATION/TRAINING PROGRAM

## 2023-01-09 PROCEDURE — 97530 THERAPEUTIC ACTIVITIES: CPT

## 2023-01-09 PROCEDURE — 97110 THERAPEUTIC EXERCISES: CPT

## 2023-01-09 PROCEDURE — 99231 SBSQ HOSP IP/OBS SF/LOW 25: CPT | Performed by: FAMILY MEDICINE

## 2023-01-09 PROCEDURE — 6360000002 HC RX W HCPCS: Performed by: STUDENT IN AN ORGANIZED HEALTH CARE EDUCATION/TRAINING PROGRAM

## 2023-01-09 PROCEDURE — 6370000000 HC RX 637 (ALT 250 FOR IP): Performed by: STUDENT IN AN ORGANIZED HEALTH CARE EDUCATION/TRAINING PROGRAM

## 2023-01-09 PROCEDURE — 2580000003 HC RX 258: Performed by: INTERNAL MEDICINE

## 2023-01-09 RX ADMIN — FUROSEMIDE 40 MG: 40 TABLET ORAL at 08:11

## 2023-01-09 RX ADMIN — GABAPENTIN 600 MG: 300 CAPSULE ORAL at 21:56

## 2023-01-09 RX ADMIN — CLOPIDOGREL 75 MG: 75 TABLET, FILM COATED ORAL at 08:11

## 2023-01-09 RX ADMIN — SODIUM CHLORIDE, PRESERVATIVE FREE 10 ML: 5 INJECTION INTRAVENOUS at 08:12

## 2023-01-09 RX ADMIN — ATORVASTATIN CALCIUM 10 MG: 10 TABLET, FILM COATED ORAL at 08:11

## 2023-01-09 RX ADMIN — GABAPENTIN 600 MG: 300 CAPSULE ORAL at 08:11

## 2023-01-09 RX ADMIN — ASPIRIN 81 MG: 81 TABLET, CHEWABLE ORAL at 08:12

## 2023-01-09 RX ADMIN — ACETAMINOPHEN 1000 MG: 500 TABLET, FILM COATED ORAL at 21:57

## 2023-01-09 RX ADMIN — AMLODIPINE BESYLATE 10 MG: 10 TABLET ORAL at 08:11

## 2023-01-09 RX ADMIN — ACETAMINOPHEN 1000 MG: 500 TABLET, FILM COATED ORAL at 06:23

## 2023-01-09 RX ADMIN — ENOXAPARIN SODIUM 40 MG: 100 INJECTION SUBCUTANEOUS at 08:12

## 2023-01-09 RX ADMIN — PANTOPRAZOLE SODIUM 40 MG: 40 TABLET, DELAYED RELEASE ORAL at 08:11

## 2023-01-09 RX ADMIN — METOPROLOL SUCCINATE 100 MG: 100 TABLET, FILM COATED, EXTENDED RELEASE ORAL at 08:11

## 2023-01-09 RX ADMIN — METHOCARBAMOL TABLETS 500 MG: 500 TABLET, COATED ORAL at 08:11

## 2023-01-09 RX ADMIN — METHOCARBAMOL TABLETS 500 MG: 500 TABLET, COATED ORAL at 21:56

## 2023-01-09 RX ADMIN — SERTRALINE 25 MG: 25 TABLET, FILM COATED ORAL at 08:11

## 2023-01-09 RX ADMIN — SODIUM CHLORIDE, PRESERVATIVE FREE 10 ML: 5 INJECTION INTRAVENOUS at 21:57

## 2023-01-09 RX ADMIN — LISINOPRIL 20 MG: 20 TABLET ORAL at 08:11

## 2023-01-09 RX ADMIN — PANTOPRAZOLE SODIUM 40 MG: 40 TABLET, DELAYED RELEASE ORAL at 15:05

## 2023-01-09 ASSESSMENT — ENCOUNTER SYMPTOMS
WHEEZING: 0
SHORTNESS OF BREATH: 0
CONSTIPATION: 0
BLOOD IN STOOL: 0
COUGH: 0
ABDOMINAL PAIN: 0
NAUSEA: 0
CHEST TIGHTNESS: 0
VOMITING: 0
DIARRHEA: 0

## 2023-01-09 NOTE — PROGRESS NOTES
Physical Medicine & Rehabilitation  Progress Note        Admitting Physician:  Jacqueline Mccurdy MD    Primary Care Provider:  Vazquez Massey MD     Chief Complaint:  Fall    Brief History: This is a follow up to the initial consult on Mr. Tameka Sacnhez who is a 79 y.o. male admitted to [facility] on 12/25/2022 with Fall    He initially presented after a mechanical fall while outside trying to  a trash can that had blown over. No LOC. He was found to have a left femoral neck fracture and underwent left hip hemiarthroplasty on 12/26/22 (Dr. Nolan Libman). He is WBAT to the left lower limb with posterior hip precautions. Cardiology was consulted for elevated troponin and surgical risk; stress test showed large anterior wall infarct from apex to base with galdino-infarct ischemia and EF 37%. He was found to have a bleeding duodenal ulcer s/p cautery, epinephrine, and clipping on 12/29/22. TED was placed to the mid dominant left circumflex during cardiac cath on 1/3/23. Subjective:  He reports feeling pretty well today. He had a fall yesterday but denies any injury. CT head showed no acute intracranial abnormality. He denies any pain, including chest pain, and shortness of breath. He states that he has chronic numbness/tingling of the bilateral feet. ROS:  Review of Systems   Constitutional:  Negative for fever. Respiratory:  Negative for shortness of breath. Cardiovascular:  Negative for chest pain. Gastrointestinal:  Negative for abdominal pain. Neurological:  Positive for sensory change.      Rehabilitation:    Physical Therapy    Restrictions/Precautions: Weight Bearing, Fall Risk  Hip Precautions: No hip flexion > 90 degrees, No ADduction, Posterior hip precautions, No hip internal rotation  Other position/activity restrictions: S/p: L hip hemiarthoplasty 12/26/2022  Left Lower Extremity Weight Bearing: Weight Bearing As Tolerated    Bed mobility  Rolling to Left: Moderate assistance  Rolling to Right: Moderate assistance  Supine to Sit: Maximum assistance  Sit to Supine: Unable to assess (Pt retired to chair at end of session.)  Scooting: Maximal assistance  Bed Mobility Comments: HOB elevated, increased time/effort to complete. required assistance with trunk and LE progression. Transfers  Sit to Stand: Moderate Assistance, 2 Person Assistance  Stand to Sit: Moderate Assistance, 2 Person Assistance  Bed to Chair: Minimal assistance  Comment: RW used for transfers, cueing for hand placement with good return demo. Performed x1 from bed and x1 from chair. WB Status: WBAT LLE  Ambulation  Surface: Level tile  Device: Rolling Walker  Other Apparatus: O2 (2L)  Assistance: Minimal assistance, 2 Person assistance  Quality of Gait: step to gait pattern, advancing with L LE, very slow ilana, L knee buckling as pt fatigues  Gait Deviations: Slow Ilana, Decreased step length, Decreased step height  Distance: 3ft bed to chair  Comments: pt encouraged to attempt further ambulation but limited this date by B LE fatigue and decreased endurance, also fear of falling. More Ambulation?: No      Occupational Therapy    ADL  Feeding: Modified independent , Setup  Grooming: Setup, Increased time to complete, Modified independent  (Oral care seated on bedside commode.)  Grooming Skilled Clinical Factors: Face washing and oral care facilitated seated in chair. Pt demo 0 loss of , able to manipulate utensils  UE Bathing: Setup, Increased time to complete, Modified independent  (wash face. hands seated on bedside commode.)  UE Bathing Skilled Clinical Factors: faciltated seated in chair  LE Bathing: Maximum assistance, Setup, Verbal cueing, Increased time to complete  LE Bathing Skilled Clinical Factors: Seated/standing at chair with RW.  Pt able to wash from thighs to knees required assist from knees to feet to maintain hip precautions and assist for galdino/post d/t decreased balance and activity tolerance  UE Dressing: Stand by assistance, Setup  UE Dressing Skilled Clinical Factors: To doff/don gown seated in chair  LE Dressing: Maximum assistance, Setup  LE Dressing Skilled Clinical Factors: To doff/don socks and brief seated/standing  Toileting: Maximum assistance, Setup (Bottom care standing at bedside commode.)  Toileting Skilled Clinical Factors: Max A for breif management and pericare supine in bed w/ RN assistance. Increased time needed d/t multiple BM. Additional Comments: Pt completed supine/sit transfer to seated EOB. Increased time needed seated EOB d/t dizziness after transfer, symptoms subsiding after a few minutes. Sit/stand transfer from EOB using RW for static standing. Functional mobility across room to bedside commode for toileting. Simple grooming completed seated on bedside commode. Sit/stand transfer from bedside commode using RW with verbal cues for hand placement with good return. max A for bottom care in standing. Pt stated feeling the need to return to seated on bedside commode for BM. Second sit/stand transfer rom bedside commode using RW. During pulling up brief, pt L knee buckled causing pt to fall back onto floor. RN notified, pt assesed and transferred back to bed. Transfers  Sit to stand: Moderate assistance  Stand to sit: Minimal assistance  Transfer Comments: Verbal cues for hand placement with good return. Toilet Transfers  Toilet - Technique: Ambulating  Equipment Used: Standard bedside commode  Toilet Transfer:  Moderate assistance               Speech Therapy      Current Medications:   Current Facility-Administered Medications: sodium chloride (OCEAN) 0.65 % nasal spray 1 spray, 1 spray, Each Nostril, PRN  enoxaparin (LOVENOX) injection 40 mg, 40 mg, SubCUTAneous, Daily  lisinopril (PRINIVIL;ZESTRIL) tablet 20 mg, 20 mg, Oral, Daily  clopidogrel (PLAVIX) tablet 75 mg, 75 mg, Oral, Daily  0.9 % sodium chloride infusion, , IntraVENous, PRN  methocarbamol (ROBAXIN) tablet 500 mg, 500 mg, Oral, TID  polyethylene glycol (GLYCOLAX) packet 17 g, 17 g, Oral, Daily PRN  pantoprazole (PROTONIX) tablet 40 mg, 40 mg, Oral, BID AC  furosemide (LASIX) tablet 40 mg, 40 mg, Oral, Daily  sodium chloride flush 0.9 % injection 10 mL, 10 mL, IntraVENous, PRN  sodium chloride flush 0.9 % injection 10 mL, 10 mL, IntraVENous, PRN  amLODIPine (NORVASC) tablet 10 mg, 10 mg, Oral, Daily  metoprolol succinate (TOPROL XL) extended release tablet 100 mg, 100 mg, Oral, Daily  sertraline (ZOLOFT) tablet 25 mg, 25 mg, Oral, Daily  atorvastatin (LIPITOR) tablet 10 mg, 10 mg, Oral, Daily  gabapentin (NEURONTIN) capsule 600 mg, 600 mg, Oral, BID  nitroGLYCERIN (NITROSTAT) SL tablet 0.4 mg, 0.4 mg, SubLINGual, Q5 Min PRN  aspirin chewable tablet 81 mg, 81 mg, Oral, Daily  fluticasone (FLONASE) 50 MCG/ACT nasal spray 1 spray, 1 spray, Each Nostril, Daily PRN  melatonin tablet 3 mg, 3 mg, Oral, Nightly PRN  oxyCODONE (ROXICODONE) immediate release tablet 2.5 mg, 2.5 mg, Oral, Q6H PRN  sodium chloride flush 0.9 % injection 5-40 mL, 5-40 mL, IntraVENous, 2 times per day  sodium chloride flush 0.9 % injection 5-40 mL, 5-40 mL, IntraVENous, PRN  ondansetron (ZOFRAN-ODT) disintegrating tablet 4 mg, 4 mg, Oral, Q8H PRN **OR** ondansetron (ZOFRAN) injection 4 mg, 4 mg, IntraVENous, Q6H PRN  acetaminophen (TYLENOL) tablet 1,000 mg, 1,000 mg, Oral, 3 times per day    Objective:  /61   Pulse 70   Temp 98.2 °F (36.8 °C) (Oral)   Resp 17   Ht 5' 7\" (1.702 m)   Wt 190 lb 11.2 oz (86.5 kg)   SpO2 98%   BMI 29.87 kg/m²       GEN: Well developed, well nourished, no acute distress  HEENT: NCAT. EOM grossly intact. Hearing grossly intact. Mucous membranes pink and moist.  RESP: Normal breath sounds with no wheezing, rales, or rhonchi. Respirations WNL and unlabored. CV: Regular rate and rhythm. No murmurs, rubs, or gallops. ABD: Soft, non-distended, BS+ and equal.  NEURO: Alert. Speech fluent. Sensation to light touch intact. MSK: Muscle tone and bulk are normal bilaterally. Moving bilateral upper limbs with at least antigravity strength. Strength 4+/5 with bilateral ankle dorsiflexion and plantarflexion. LIMBS: No edema in bilateral lower limbs. SKIN: Warm and dry with good turgor. PSYCH: Mood WNL. Affect WNL. Appropriately interactive. Diagnostics:     CBC:   Recent Labs     01/08/23  1441   WBC 10.1   RBC 3.13*   HGB 9.0*   HCT 29.4*   MCV 93.9   RDW 13.6        BMP:   Recent Labs     01/08/23  1441   *   K 4.5   CL 92*   CO2 33*   BUN 13   CREATININE 0.68*     BNP: No results for input(s): BNP in the last 72 hours. PT/INR: No results for input(s): PROTIME, INR in the last 72 hours. APTT: No results for input(s): APTT in the last 72 hours. CARDIAC ENZYMES: No results for input(s): CKMB, CKMBINDEX, TROPONINT in the last 72 hours. Invalid input(s): CKTOTAL;3  FASTING LIPID PANEL:  Lab Results   Component Value Date    CHOL 98 02/28/2014    HDL 45 02/28/2014    TRIG 61 02/28/2014     LIVER PROFILE: No results for input(s): AST, ALT, ALB, BILIDIR, BILITOT, ALKPHOS in the last 72 hours. Imaging:  CT head, 1/8/23:  Impression   No acute abnormality. 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 Anticipate he will be able to tolerate 3 hours of therapy per day in rehabilitation.  The patient requires multidisciplinary rehabilitation treatment including medical management by a PM&R physician, 24 hour rehabilitation nursing, physical therapy, occupational therapy, rehabilitation social work, and nutrition services. Patient and family also require education in post-hospital precautions and home exercise routine, adaptive techniques and deficit compensation strategies, strengthening and conditioning, equipment prescription and instructions in use.   DVT Prophylaxis: Lovenox      Electronically signed by Georgia Odom MD on 1/9/2023 at 10:30 PM

## 2023-01-09 NOTE — PROGRESS NOTES
[facility]  Office: [address], DO, Chandan Lauren, DO, Adrianna Leigh Ann, DO, Miki Jimenezmaritza Blood, DO, Tayla Hamilton MD, Johann Riedel, MD, Kadeem Olguin MD, Elisha Hwang MD,  Carmen Otero MD, Sunshine Taylor MD, Celena Anne, DO, Ciera Zavala MD,  Wai Rosas MD, Jacques Wiseman MD, Ivan Workman, DO, Esteban Mccarthy MD, Willie Aj MD, Karen Paz, DO, Pedro Luis Read MD, Yoly Tabor MD, Lucero Kelley MD, Marycruz Rios MD, Marissa Hess, DO, Nicolasa Gates MD, Yvonne Umanzor MD, Xin Junior, CNP,  Genny Broderick, CNP, Luisana Edmondson, CNP, Boubacar Dumas, CNP,  Leena Sebastian, [facility], Kaia Messer, CNP, Joao Castaneda, CNP, Sangeeta Mccormick, CNP, Jakob Sauer, CNP, Ortega Abrams, CNP, Harinder Patino PA-C, Dwayne Madrid, CNS, Sherrie Daugherty, CNP, Frankie Nunez, [address] [place] [address]    Progress Note    1/9/2023    9:21 AM    Name:   Jeanine He  MRN:     [id number]     [place]:      [de-identified]   Room:   2010/2010-01   Day:  13  Admit Date:  12/25/2022  7:27 PM    PCP:   Armani Chaidez MD  Code Status:  Full Code    Subjective:     C/C:   Chief Complaint   Patient presents with    Fall     Interval History Status: Stable    Patient seen and examined at bedside, continues to feel okay , had a fall yesterday but wan uneventful and imaging unremarkable  Still waiting for placement  patient vitals, labs and all providers notes were reviewed,from overnight shift and morning updates were noted and discussed with the nurse    Brief History:     Per chart  This is a 67-year-old male past medical history of neuropathy, AICD in place, peripheral vascular disease, coronary artery disease, history of diastolic CHF, prostate cancer status postradiation therapy, arthritis, bilateral carotid stenosis presented to the ER on 12/25/2022 after patient had a fall denied any loss of consciousness.   Patient was admitted to trauma surgery service. Cardiology was consulted for medical clearance was moderate to high risk for any OR procedure due to history of significant coronary artery disease and LAD. Patient underwent left hip hemiarthroplasty with orthopedic surgery on 12/26/2022, afterwards was noted to have episodes of nonsustained V. tach. Underwent stress test on 12/28/2022 showing concern of galdino-infarct ischemia and plan for cardiac catheterization however was held due to patient having black tarry stools on 12/29/2022 patient underwent EGD with GI found to have 10 mm duodenal ulcer with active bleeding that was cauterized and injected with epinephrine and clipped. Patient currently on clear liquid diet and plan for cardiac catheterization when cleared by GI. Patient continues on Protonix drip at this time       Review of Systems:     Review of Systems   Constitutional:  Positive for activity change, appetite change and fatigue. Negative for chills, diaphoresis and fever. HENT:  Negative for congestion. Eyes:  Negative for visual disturbance. Respiratory:  Negative for cough, chest tightness and wheezing. Cardiovascular:  Negative for chest pain, palpitations and leg swelling. Gastrointestinal:  Negative for abdominal pain, blood in stool, constipation, diarrhea, nausea and vomiting. Genitourinary:  Negative for difficulty urinating. Neurological:  Positive for weakness. Negative for dizziness, light-headedness, numbness and headaches. All other systems reviewed and are negative. Medications: Allergies:     Allergies   Allergen Reactions    Percocet [Oxycodone-Acetaminophen] Other (See Comments)     Panic attack       Current Meds:   Scheduled Meds:    enoxaparin  40 mg SubCUTAneous Daily    lisinopril  20 mg Oral Daily    clopidogrel  75 mg Oral Daily    methocarbamol  500 mg Oral TID    pantoprazole  40 mg Oral BID AC    furosemide  40 mg Oral Daily    amLODIPine  10 mg Oral Daily metoprolol succinate  100 mg Oral Daily    sertraline  25 mg Oral Daily    atorvastatin  10 mg Oral Daily    gabapentin  600 mg Oral BID    aspirin  81 mg Oral Daily    sodium chloride flush  5-40 mL IntraVENous 2 times per day    acetaminophen  1,000 mg Oral 3 times per day     Continuous Infusions:    sodium chloride       PRN Meds: sodium chloride, sodium chloride, polyethylene glycol, sodium chloride flush, sodium chloride flush, nitroGLYCERIN, fluticasone, melatonin, oxyCODONE, sodium chloride flush, ondansetron **OR** ondansetron    Data:     Past Medical History:   has a past medical history of Acid reflux, Anxiety, Arthritis, BPH (benign prostatic hypertrophy), CAD (coronary artery disease), Cancer ([facility] Carotid stenosis, Cataracts, both eyes, [facility]) [facility] Dry skin, Elevated PSA, Examination of participant in clinical trial, Hyperlipidemia, Hypertension, Left ventricular dysfunction, Macular degeneration, Myocardial infarction ([facility] Neuropathy, Pacemaker, Panic attacks, PVD (peripheral vascular disease) ([facility] Sinus problem, Snores, Vasovagal near syncope, and Wears glasses. Social History:   reports that he has never smoked. He has never used smokeless tobacco. He reports current alcohol use of about 2.0 standard drinks per week. He reports that he does not use drugs. Family History:   Family History   Problem Relation Age of Onset    High Blood Pressure Mother     Cancer Mother     High Blood Pressure Father     Diabetes Sister     Cancer Sister     High Blood Pressure Sister     Heart Disease Brother     High Blood Pressure Brother        Vitals:  BP (!) 154/70   Pulse 71   Temp 98 °F (36.7 °C) (Oral)   Resp 20   Ht 5' 7\" (1.702 m)   Wt 190 lb 11.2 oz (86.5 kg)   SpO2 98%   BMI 29.87 kg/m²   Temp (24hrs), Av.9 °F (36.6 °C), Min:97.5 °F (36.4 °C), Max:98.2 °F (36.8 °C)    No results for input(s): POCGLU in the last 72 hours. I/O (24Hr):     Intake/Output Summary (Last 24 hours) at 1/9/2023 0921  Last data filed at 1/9/2023 1977  Gross per 24 hour   Intake --   Output 375 ml   Net -375 ml         Labs:  Hematology:  Recent Labs     01/08/23  1441   WBC 10.1   RBC 3.13*   HGB 9.0*   HCT 29.4*   MCV 93.9   MCH 28.8   MCHC 30.6   RDW 13.6      MPV 10.1       Chemistry:  Recent Labs     01/08/23  1441   *   K 4.5   CL 92*   CO2 33*   GLUCOSE 130*   BUN 13   CREATININE 0.68*   ANIONGAP 6*   LABGLOM >60   CALCIUM 8.1*       No results for input(s): PROT, LABALBU, LABA1C, Y2YXDUZ, R2CSGIB, FT4, TSH, AST, ALT, LDH, GGT, ALKPHOS, LABGGT, BILITOT, BILIDIR, AMMONIA, AMYLASE, LIPASE, LACTATE, CHOL, HDL, LDLCHOLESTEROL, CHOLHDLRATIO, TRIG, VLDL, QWT95VB, PHENYTOIN, PHENYF, URICACID, POCGLU in the last 72 hours. ABG:  Lab Results   Component Value Date/Time    POCPH 7.35 03/04/2014 04:57 PM    POCPCO2 63 03/04/2014 04:57 PM    POCPO2 103 03/04/2014 04:57 PM    POCHCO3 35.2 03/04/2014 04:57 PM    NBEA NOT REPORTED 03/04/2014 04:57 PM    PBEA 10 03/04/2014 04:57 PM    XJO3PDW 37 03/04/2014 04:57 PM    RBYR3WXC 97 03/04/2014 04:57 PM    FIO2 NO SAMPLE RECEIVED 12/25/2022 08:13 PM     Lab Results   Component Value Date/Time    SPECIAL NOT REPORTED 07/28/2014 05:19 PM     Lab Results   Component Value Date/Time    CULTURE NO GROWTH 03/02/2014 05:00 PM    CULTURE  03/02/2014 05:00 PM     [employer] [address] [facility] 3 [phone]       Radiology:  XR HIP LEFT (1 VIEW)    Result Date: 12/26/2022  Status post left total hip arthroplasty in a patient with acute cervical neck fracture. Hardware alignment is satisfactory. No apparent hardware complication. Expected postoperative soft tissue changes are noted. 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   Infarct extends to involve the entire apex and small portions of the anterolateral and anterior septal wall. LVEF 37%. Risk stratification: Intermediate risk. XR HIP 2-3 VW W PELVIS LEFT    Result Date: 12/26/2022  Recent postop changes left hip arthroplasty       Physical Examination:        Physical Exam  Vitals and nursing note reviewed. Constitutional:       General: He is not in acute distress. HENT:      Head: Normocephalic and atraumatic. Eyes:      Conjunctiva/sclera: Conjunctivae normal.      Pupils: Pupils are equal, round, and reactive to light. Cardiovascular:      Rate and Rhythm: Normal rate and regular rhythm. Heart sounds: No murmur heard. Pulmonary:      Effort: Pulmonary effort is normal. No accessory muscle usage or respiratory distress. Breath sounds: No stridor. No decreased breath sounds, wheezing, rhonchi or rales. Abdominal:      General: Bowel sounds are normal. There is no distension. Palpations: Abdomen is soft. Abdomen is not rigid. Tenderness: There is no abdominal tenderness. There is no guarding. Musculoskeletal:         General: No tenderness. Comments: R groin soft with no bruise, palpable masses or bruit  L side incisions are clean, dressing CDI   Skin:     General: Skin is warm and dry. Findings: No erythema, lesion or rash. Neurological:      Mental Status: He is alert and oriented to person, place, and time. Cranial Nerves: No cranial nerve deficit. Motor: No seizure activity. Psychiatric:         Speech: Speech normal.         Behavior: Behavior normal. Behavior is cooperative.        Assessment:        Hospital Problems             Last Modified POA    * (Principal) S/P coronary artery stent placement 1/4/2023 Yes    Closed fracture of neck of left femur ([address] 1/4/2023 Yes    Duodenal ulcer 12/30/2022 Yes    Abnormal stress test 1/4/2023 Yes    GI bleed 1/4/2023 No    CAD (coronary artery disease) 12/30/2022 Yes    Hypertension 12/30/2022 Yes    BPH (benign prostatic hyperplasia) 12/30/2022 Yes    Chronic systolic heart failure ([facility] (Chronic) 12/30/2022 Yes    Claudication in peripheral vascular disease ([facility] [address] 12/30/2022 Yes    Malignant neoplasm of prostate ([facility] [address] 12/30/2022 Yes   Plan:        Principal Problem:    S/P coronary artery stent placement  Active Problems:    Closed fracture of neck of left femur (HCC)    Duodenal ulcer    Abnormal stress test    GI bleed    CAD (coronary artery disease)    Hypertension    BPH (benign prostatic hyperplasia)    Chronic systolic heart failure (HCC)    Claudication in peripheral vascular disease (HCC)    Malignant neoplasm of prostate ([facility] [address]  Resolved Problems:    * No resolved hospital problems. *    -S/p left hip arthroplasty 12/26  -Recommended for anticoagulation for least 1 month postoperatively per orthopedic surgery  -Anticoagulation is held due to GI bleed  -Duodenal ulcer with active bleed status post EGD cauterization, clip, epinephrine on 12/29/2022. -GI okay for antiplatelets.  -Transfused 1 unit RBC to keep Hb above 8.  -History of combined systolic diastolic heart failure , compensated    -Abnormal stress test  concerning for ischemia.   S/P  cardiac cath 1/3 , LCx mid new stenosis 75% , stented  -  Continue Lipitor, Lasix, Toprol- mg,   -Depression continue Zoloft    -History of prostate cancer status postradiation therapy  -Stable for discharge, orders placed 1/4, await placement, was updated by CM that peer to peer is needed , PMR contacted        Amirah Harrington MD  1/9/2023  9:21 AM

## 2023-01-09 NOTE — PLAN OF CARE
Problem: Discharge Planning  Goal: Discharge to home or other facility with appropriate resources  Outcome: Progressing  Flowsheets (Taken 1/7/2023 1900 by Tom Aiken, RN)  Discharge to home or other facility with appropriate resources:   Identify barriers to discharge with patient and caregiver   Arrange for needed discharge resources and transportation as appropriate   Identify discharge learning needs (meds, wound care, etc)     Problem: Pain  Goal: Verbalizes/displays adequate comfort level or baseline comfort level  Outcome: Progressing  Flowsheets (Taken 1/8/2023 2121)  Verbalizes/displays adequate comfort level or baseline comfort level:   Encourage patient to monitor pain and request assistance   Assess pain using appropriate pain scale   Administer analgesics based on type and severity of pain and evaluate response     Problem: Safety - Adult  Goal: Free from fall injury  Outcome: Progressing  Flowsheets (Taken 1/8/2023 2121)  Free From Fall Injury: Instruct family/caregiver on patient safety     Problem: ABCDS Injury Assessment  Goal: Absence of physical injury  Outcome: Progressing  Flowsheets (Taken 1/8/2023 2121)  Absence of Physical Injury: Implement safety measures based on patient assessment     Problem: Skin/Tissue Integrity  Goal: Absence of new skin breakdown  Description: 1. Monitor for areas of redness and/or skin breakdown  2. Assess vascular access sites hourly  3. Every 4-6 hours minimum:  Change oxygen saturation probe site  4. Every 4-6 hours:  If on nasal continuous positive airway pressure, respiratory therapy assess nares and determine need for appliance change or resting period.   Outcome: Progressing     Problem: Chronic Conditions and Co-morbidities  Goal: Patient's chronic conditions and co-morbidity symptoms are monitored and maintained or improved  Outcome: Progressing  Flowsheets (Taken 1/7/2023 1900 by Tom Aiken RN)  Care Plan - Patient's Chronic Conditions and Co-Morbidity Symptoms are Monitored and Maintained or Improved:   Monitor and assess patient's chronic conditions and comorbid symptoms for stability, deterioration, or improvement   Collaborate with multidisciplinary team to address chronic and comorbid conditions and prevent exacerbation or deterioration

## 2023-01-09 NOTE — PROGRESS NOTES
Physical Therapy  Facility/Department: [facility] CAR 2- STEPDOWN  Physical Therapy Daily Treatment Note    Name: Franc Walsh  : 1940  MRN: [id number]  Date of Service: 2023    Discharge Recommendations:  Patient would benefit from continued therapy after discharge   PT Equipment Recommendations  Equipment Needed: No      Patient Diagnosis(es): The encounter diagnosis was Closed fracture of neck of left femur, initial encounter ([address] Past Medical History:  has a past medical history of Acid reflux, Anxiety, Arthritis, BPH (benign prostatic hypertrophy), CAD (coronary artery disease), Cancer ([address] Carotid stenosis, Cataracts, both eyes, CHF (congestive heart failure) ([address] Dry skin, Elevated PSA, Examination of participant in clinical trial, Hyperlipidemia, Hypertension, Left ventricular dysfunction, Macular degeneration, Myocardial infarction ([address] Neuropathy, Pacemaker, Panic attacks, PVD (peripheral vascular disease) ([address] Sinus problem, Snores, Vasovagal near syncope, and Wears glasses. Past Surgical History:  has a past surgical history that includes Pacemaker insertion (, , , , 2019); Cholecystectomy, laparoscopic (2014); eye surgery (Bilateral); Cardiac defibrillator placement (); Cardiac surgery (); Cardiac catheterization; Colonoscopy; Prostate Biopsy (2019); Prostate biopsy (N/A, 2019); pacemaker placement; Cataract removal; Hip Arthroplasty (Left, 2022); hip surgery (Left, 2022); Esophagogastroduodenoscopy (2022); Upper gastrointestinal endoscopy (N/A, 2022); and Coronary angioplasty with stent (2023). Assessment   Body Structures, Functions, Activity Limitations Requiring Skilled Therapeutic Intervention: Decreased functional mobility ; Decreased strength;Decreased endurance;Decreased balance; Increased pain;Decreased coordination  Assessment: Pt required maxA for bed mobility and modA x2 to perform STS transfers.  Pt only able to tolerate ambulating ~3ft to chair with RW and Taina x2, significantly limited by decreased strength, endurance and balance. Recommending continued PT to address mobility deficits and improve overall level of independence with mobility. Therapy Prognosis: Good  Requires PT Follow-Up: Yes  Activity Tolerance  Activity Tolerance: Patient limited by fatigue;Patient limited by endurance     Plan   Physcial Therapy Plan  General Plan: 6-7 times per week  Current Treatment Recommendations: Strengthening, Balance training, Functional mobility training, Transfer training, Gait training, Endurance training, Safety education & training, Patient/Caregiver education & training, Therapeutic activities, Equipment evaluation, education, & procurement, Home exercise program, Stair training  Safety Devices  Type of Devices: Left in chair, Chair alarm in place, All fall risk precautions in place, Patient at risk for falls, Gait belt, Nurse notified  Restraints  Restraints Initially in Place: No     Restrictions  Restrictions/Precautions  Restrictions/Precautions: Weight Bearing, Fall Risk  Required Braces or Orthoses?: No  Lower Extremity Weight Bearing Restrictions  Left Lower Extremity Weight Bearing: Weight Bearing As Tolerated  Position Activity Restriction  Hip Precautions: No hip flexion > 90 degrees, No ADduction, Posterior hip precautions, No hip internal rotation  Other position/activity restrictions: S/p: L hip hemiarthoplasty 12/26/2022     Subjective   General  Chart Reviewed: Yes  Patient assessed for rehabilitation services?: Yes  Response To Previous Treatment: Patient with no complaints from previous session. Family / Caregiver Present: No  Follows Commands: Within Functional Limits  General Comment  Comments: Pt left seated in recliner with call light witin reach. Subjective  Subjective: Pt and RN agreeable to PT this morning. Pt supine in bed upon arrival with no c/o pain.  Pt pleasant and cooperative throughout session. Cognition   Orientation  Overall Orientation Status: Within Functional Limits  Orientation Level: Oriented X4  Cognition  Overall Cognitive Status: WFL     Objective   Bed mobility  Rolling to Left: Moderate assistance  Rolling to Right: Moderate assistance  Supine to Sit: Maximum assistance  Sit to Supine: Unable to assess (Pt retired to chair at end of session.)  Scooting: Maximal assistance  Bed Mobility Comments: HOB elevated, increased time/effort to complete. required assistance with trunk and LE progression. Transfers  Sit to Stand: Moderate Assistance;2 Person Assistance  Stand to Sit: Moderate Assistance;2 Person Assistance  Comment: RW used for transfers, cueing for hand placement with good return demo. Performed x1 from bed and x1 from chair. Ambulation  WB Status: WBAT LLE  Ambulation  Surface: Level tile  Device: Rolling Walker  Other Apparatus: O2 (2L)  Assistance: Minimal assistance;2 Person assistance  Quality of Gait: step to gait pattern, advancing with L LE, very slow ilana, L knee buckling as pt fatigues  Gait Deviations: Slow Ilana;Decreased step length;Decreased step height  Distance: 3ft bed to chair  Comments: pt encouraged to attempt further ambulation but limited this date by B LE fatigue and decreased endurance, also fear of falling. More Ambulation?: No  Stairs/Curb  Stairs?: No     Balance  Posture: Fair  Sitting - Static: Fair;+  Sitting - Dynamic: Fair;+  Standing - Static: Fair  Standing - Dynamic: Fair;-  Comments: standing balance assessed while using a RW, seated balance assessed EOB    Exercise Treatment:  Seated LE exercise program: Long Arc Quads, hip abduction/adduction, heel/toe raises, and marches. Reps: x10 BLE  Comments: Pt performed while seated in chair. Static Standing Balance Exercises: Pt stood at chair ~2 minutes for breif change/galdino care, required Taina with cueing to keep/maintain upright posture.       AM-PAC Score  AM-PAC Inpatient Mobility Raw Score : 11 (01/09/23 1214)  AM-PAC Inpatient T-Scale Score : 33.86 (01/09/23 1214)  Mobility Inpatient CMS 0-100% Score: 72.57 (01/09/23 1214)  Mobility Inpatient CMS G-Code Modifier : CL (01/09/23 1214)      Goals  Short Term Goals  Time Frame for Short Term Goals: 14 visits  Short Term Goal 1: Pt will ambulate 150 feet with a RW and SBA. Short Term Goal 2: Pt will demonstrate good- standing balance to decrease fall risk. Short Term Goal 3: Pt will negotiate 3 stairs with bilateral handrails and SBA to allow the pt to enter prior living arrangements. Short Term Goal 4: Pt will perform sit<>stand transfer with CGA. Short Term Goal 5: Pt will perform bed mobility with CGA while maintaining posterior hip precautions. Additional Goals?: No       Education  Patient Education  Education Given To: Patient  Education Provided: Role of Therapy;Transfer Training;Plan of Care; Fall Prevention Strategies;Precautions; Equipment;Home Exercise Program  Education Provided Comments: reviewed posterior hip precautions.   Education Method: Demonstration;Verbal  Barriers to Learning: None  Education Outcome: Verbalized understanding;Continued education needed;Demonstrated understanding      Therapy Time   Individual Concurrent Group Co-treatment   Time In 1056         Time Out 1125         Minutes 29         Timed Code Treatment Minutes: Estefanía 28, PTA

## 2023-01-09 NOTE — CARE COORDINATION
Transitional Planning    Called [facility] [phone] (#3) and spoke to Janie Floresring. Pre cert is still pending. She will call insurance company today for f/u.    1000 received VM from [address] from [facility] that insurance intends to deny. Peer to peer needs to be done by [phone] today. Reference # is [id number]. Number to call is [phone] option #4.      1008 PS to Dr Simi Hammond with [facility] about peer to peer    1 Dr Thiago Guerrero says Dr Shilpi Kerr covering [facility] PS to Dr Farzana Mcnamara about Peer to peer. [address] [facility] [address] Farzana Mcnamara responded okay at [phone] Spoke to patient and told him insurance is intending to deny ARU, but peer to peer pending. Discussed SNF if ARU denied. Gave SNf list and requested 2-3 options. Patient verbalized understanding. [phone] to patient and his choice for SNF if denied ARU is [facility]  CM requested more choices

## 2023-01-10 ENCOUNTER — APPOINTMENT (OUTPATIENT)
Dept: GENERAL RADIOLOGY | Age: 83
DRG: 521 | End: 2023-01-10
Payer: MEDICARE

## 2023-01-10 PROCEDURE — 6370000000 HC RX 637 (ALT 250 FOR IP)

## 2023-01-10 PROCEDURE — 6370000000 HC RX 637 (ALT 250 FOR IP): Performed by: INTERNAL MEDICINE

## 2023-01-10 PROCEDURE — 6360000002 HC RX W HCPCS: Performed by: STUDENT IN AN ORGANIZED HEALTH CARE EDUCATION/TRAINING PROGRAM

## 2023-01-10 PROCEDURE — 6370000000 HC RX 637 (ALT 250 FOR IP): Performed by: STUDENT IN AN ORGANIZED HEALTH CARE EDUCATION/TRAINING PROGRAM

## 2023-01-10 PROCEDURE — 73502 X-RAY EXAM HIP UNI 2-3 VIEWS: CPT

## 2023-01-10 PROCEDURE — 2580000003 HC RX 258: Performed by: INTERNAL MEDICINE

## 2023-01-10 PROCEDURE — 99231 SBSQ HOSP IP/OBS SF/LOW 25: CPT | Performed by: INTERNAL MEDICINE

## 2023-01-10 PROCEDURE — 2060000000 HC ICU INTERMEDIATE R&B

## 2023-01-10 RX ADMIN — GABAPENTIN 600 MG: 300 CAPSULE ORAL at 08:48

## 2023-01-10 RX ADMIN — LISINOPRIL 20 MG: 20 TABLET ORAL at 08:49

## 2023-01-10 RX ADMIN — PANTOPRAZOLE SODIUM 40 MG: 40 TABLET, DELAYED RELEASE ORAL at 06:35

## 2023-01-10 RX ADMIN — METHOCARBAMOL TABLETS 500 MG: 500 TABLET, COATED ORAL at 14:19

## 2023-01-10 RX ADMIN — GABAPENTIN 600 MG: 300 CAPSULE ORAL at 21:01

## 2023-01-10 RX ADMIN — ENOXAPARIN SODIUM 40 MG: 100 INJECTION SUBCUTANEOUS at 08:49

## 2023-01-10 RX ADMIN — ACETAMINOPHEN 1000 MG: 500 TABLET, FILM COATED ORAL at 06:35

## 2023-01-10 RX ADMIN — PANTOPRAZOLE SODIUM 40 MG: 40 TABLET, DELAYED RELEASE ORAL at 17:55

## 2023-01-10 RX ADMIN — CLOPIDOGREL 75 MG: 75 TABLET, FILM COATED ORAL at 08:49

## 2023-01-10 RX ADMIN — AMLODIPINE BESYLATE 10 MG: 10 TABLET ORAL at 08:49

## 2023-01-10 RX ADMIN — SODIUM CHLORIDE, PRESERVATIVE FREE 10 ML: 5 INJECTION INTRAVENOUS at 21:02

## 2023-01-10 RX ADMIN — FUROSEMIDE 40 MG: 40 TABLET ORAL at 08:49

## 2023-01-10 RX ADMIN — ACETAMINOPHEN 1000 MG: 500 TABLET, FILM COATED ORAL at 21:01

## 2023-01-10 RX ADMIN — METOPROLOL SUCCINATE 100 MG: 100 TABLET, FILM COATED, EXTENDED RELEASE ORAL at 08:50

## 2023-01-10 RX ADMIN — ASPIRIN 81 MG: 81 TABLET, CHEWABLE ORAL at 08:49

## 2023-01-10 RX ADMIN — METHOCARBAMOL TABLETS 500 MG: 500 TABLET, COATED ORAL at 21:01

## 2023-01-10 RX ADMIN — METHOCARBAMOL TABLETS 500 MG: 500 TABLET, COATED ORAL at 08:49

## 2023-01-10 RX ADMIN — ACETAMINOPHEN 1000 MG: 500 TABLET, FILM COATED ORAL at 14:19

## 2023-01-10 RX ADMIN — ATORVASTATIN CALCIUM 10 MG: 10 TABLET, FILM COATED ORAL at 08:49

## 2023-01-10 RX ADMIN — SODIUM CHLORIDE, PRESERVATIVE FREE 10 ML: 5 INJECTION INTRAVENOUS at 08:50

## 2023-01-10 RX ADMIN — SERTRALINE 25 MG: 25 TABLET, FILM COATED ORAL at 08:49

## 2023-01-10 ASSESSMENT — ENCOUNTER SYMPTOMS
DIARRHEA: 0
COUGH: 0
NAUSEA: 0
ABDOMINAL PAIN: 0
CONSTIPATION: 0
WHEEZING: 0
CHEST TIGHTNESS: 0
BLOOD IN STOOL: 0
VOMITING: 0

## 2023-01-10 NOTE — PROGRESS NOTES
[facility]  Office: [address], DO, Sondra Courtney, DO, Yulisa York, DO, Pita Hawkins Blood, DO, Romy Yen MD, Dave Bui MD, Minnie Liang MD, Prema Vargas MD,  Leandro Lawrence MD, Nirmal Thomas MD, Delfina Benjamin, DO, Kierra Parada MD,  Vinicio Roblero MD, Zaida Gordon MD, Virgen Santa, DO, Ellen Wyman MD, Donna Rooney MD, Rahul Flores, DO, Anatoliy Rasmussen MD, Madiha Lan MD, Eliseo Arnold MD, Joel Toscano MD, Galilea Watson, DO, Sen Benton MD, Luz Dallas MD, Mehul Funk, CNP,  Raghav Christian, CNP, Leida Bruce, CNP, Dalia Galeana, CNP,  Hanna Groves, [place], Sue Quesada, CNP, Gwendolyn Coronado, CNP, Nell Cowan, CNP, Santana Curtis, CNP, Eulalio Hyatt, CNP, Suha Valero PA-C, Lucie Epstein, CNS, oZltan Fernandez, CNP, Artemio Paiz, [address] [place] [address]    Progress Note    1/10/2023    3:20 PM    Name:   Majo Tristan  MRN:     [id number]     Acct:      [de-identified]   Room:   2010/2010-01  IP Day:  12  Admit Date:  12/25/2022  7:27 PM    PCP:   Yolanda Zabala MD  Code Status:  Full Code    Subjective:     C/C:   Chief Complaint   Patient presents with    Fall     Interval History Status: Stable    Patient seen and examined. Now off oxygen. Discuss rehab placement. Brief History:     Per chart  This is a 76-year-old male past medical history of neuropathy, AICD in place, peripheral vascular disease, coronary artery disease, history of diastolic CHF, prostate cancer status postradiation therapy, arthritis, bilateral carotid stenosis presented to the ER on 12/25/2022 after patient had a fall denied any loss of consciousness. Patient was admitted to trauma surgery service. Cardiology was consulted for medical clearance was moderate to high risk for any OR procedure due to history of significant coronary artery disease and LAD.   Patient underwent left hip hemiarthroplasty with orthopedic surgery on 12/26/2022, afterwards was noted to have episodes of nonsustained V. tach. Underwent stress test on 12/28/2022 showing concern of galdino-infarct ischemia and plan for cardiac catheterization however was held due to patient having black tarry stools on 12/29/2022 patient underwent EGD with GI found to have 10 mm duodenal ulcer with active bleeding that was cauterized and injected with epinephrine and clipped. Patient currently on clear liquid diet and plan for cardiac catheterization when cleared by GI. Patient continues on Protonix drip at this time       Review of Systems:     Review of Systems   Constitutional:  Positive for activity change, appetite change and fatigue. Negative for chills, diaphoresis and fever. HENT:  Negative for congestion. Eyes:  Negative for visual disturbance. Respiratory:  Negative for cough, chest tightness and wheezing. Cardiovascular:  Negative for chest pain, palpitations and leg swelling. Gastrointestinal:  Negative for abdominal pain, blood in stool, constipation, diarrhea, nausea and vomiting. Genitourinary:  Negative for difficulty urinating. Neurological:  Positive for weakness. Negative for dizziness, light-headedness, numbness and headaches. All other systems reviewed and are negative. Medications: Allergies:     Allergies   Allergen Reactions    Percocet [Oxycodone-Acetaminophen] Other (See Comments)     Panic attack       Current Meds:   Scheduled Meds:    enoxaparin  40 mg SubCUTAneous Daily    lisinopril  20 mg Oral Daily    clopidogrel  75 mg Oral Daily    methocarbamol  500 mg Oral TID    pantoprazole  40 mg Oral BID AC    furosemide  40 mg Oral Daily    amLODIPine  10 mg Oral Daily    metoprolol succinate  100 mg Oral Daily    sertraline  25 mg Oral Daily    atorvastatin  10 mg Oral Daily    gabapentin  600 mg Oral BID    aspirin  81 mg Oral Daily    sodium chloride flush  5-40 mL IntraVENous 2 times per day    acetaminophen  1,000 mg Oral 3 times per day     Continuous Infusions:    sodium chloride       PRN Meds: sodium chloride, sodium chloride, polyethylene glycol, sodium chloride flush, sodium chloride flush, nitroGLYCERIN, fluticasone, melatonin, oxyCODONE, sodium chloride flush, ondansetron **OR** ondansetron    Data:     Past Medical History:   has a past medical history of Acid reflux, Anxiety, Arthritis, BPH (benign prostatic hypertrophy), CAD (coronary artery disease), Cancer ([facility] Carotid stenosis, Cataracts, both eyes, CHF (congestive heart failure) ([facility] [facility] Dry skin, Elevated PSA, Examination of participant in clinical trial, Hyperlipidemia, Hypertension, Left ventricular dysfunction, Macular degeneration, Myocardial infarction ([facility] Neuropathy, Pacemaker, Panic attacks, PVD (peripheral vascular disease) ([facility] Sinus problem, Snores, Vasovagal near syncope, and Wears glasses. Social History:   reports that he has never smoked. He has never used smokeless tobacco. He reports current alcohol use of about 2.0 standard drinks per week. He reports that he does not use drugs. Family History:   Family History   Problem Relation Age of Onset    High Blood Pressure Mother     Cancer Mother     High Blood Pressure Father     Diabetes Sister     Cancer Sister     High Blood Pressure Sister     Heart Disease Brother     High Blood Pressure Brother        Vitals:  /61   Pulse 70   Temp 97.5 °F (36.4 °C) (Oral)   Resp 16   Ht 5' 7\" (1.702 m)   Wt 194 lb 3.6 oz (88.1 kg)   SpO2 96%   BMI 30.42 kg/m²   Temp (24hrs), Av.1 °F (36.7 °C), Min:97.5 °F (36.4 °C), Max:98.6 °F (37 °C)    No results for input(s): POCGLU in the last 72 hours. I/O (24Hr):     Intake/Output Summary (Last 24 hours) at 1/10/2023 1520  Last data filed at 1/10/2023 0430  Gross per 24 hour   Intake --   Output 700 ml   Net -700 ml         Labs:  Hematology:  Recent Labs     23  1441   WBC 10.1   RBC 3.13*   HGB 9.0* HCT 29.4*   MCV 93.9   MCH 28.8   MCHC 30.6   RDW 13.6      MPV 10.1       Chemistry:  Recent Labs     01/08/23  1441   *   K 4.5   CL 92*   CO2 33*   GLUCOSE 130*   BUN 13   CREATININE 0.68*   ANIONGAP 6*   LABGLOM >60   CALCIUM 8.1*       No results for input(s): PROT, LABALBU, LABA1C, E7FJBJU, F2CEVND, FT4, TSH, AST, ALT, LDH, GGT, ALKPHOS, LABGGT, BILITOT, BILIDIR, AMMONIA, AMYLASE, LIPASE, LACTATE, CHOL, HDL, LDLCHOLESTEROL, CHOLHDLRATIO, TRIG, VLDL, LNO14UU, PHENYTOIN, PHENYF, URICACID, POCGLU in the last 72 hours. ABG:  Lab Results   Component Value Date/Time    POCPH 7.35 03/04/2014 04:57 PM    POCPCO2 63 03/04/2014 04:57 PM    POCPO2 103 03/04/2014 04:57 PM    POCHCO3 35.2 03/04/2014 04:57 PM    NBEA NOT REPORTED 03/04/2014 04:57 PM    PBEA 10 03/04/2014 04:57 PM    RTU8LPA 37 03/04/2014 04:57 PM    FIQW7BBM 97 03/04/2014 04:57 PM    FIO2 NO SAMPLE RECEIVED 12/25/2022 08:13 PM     Lab Results   Component Value Date/Time    SPECIAL NOT REPORTED 07/28/2014 05:19 PM     Lab Results   Component Value Date/Time    CULTURE NO GROWTH 03/02/2014 05:00 PM    CULTURE  03/02/2014 05:00 PM     [place] [id number] [phone]       Radiology:  XR HIP LEFT (1 VIEW)    Result Date: 12/26/2022  Status post left total hip arthroplasty in a patient with acute cervical neck fracture. Hardware alignment is satisfactory. No apparent hardware complication. Expected postoperative soft tissue changes are noted. 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 Infarct extends to involve the entire apex and small portions of the anterolateral and anterior septal wall. LVEF 37%. Risk stratification: Intermediate risk.      XR HIP 2-3 VW W PELVIS LEFT    Result Date: 12/26/2022  Recent postop changes left hip arthroplasty       Physical Examination: Physical Exam  Vitals and nursing note reviewed. Constitutional:       General: He is not in acute distress. HENT:      Head: Normocephalic and atraumatic. Eyes:      Conjunctiva/sclera: Conjunctivae normal.      Pupils: Pupils are equal, round, and reactive to light. Cardiovascular:      Rate and Rhythm: Normal rate and regular rhythm. Heart sounds: No murmur heard. Pulmonary:      Effort: Pulmonary effort is normal. No accessory muscle usage or respiratory distress. Breath sounds: No stridor. No decreased breath sounds, wheezing, rhonchi or rales. Abdominal:      General: Bowel sounds are normal. There is no distension. Palpations: Abdomen is soft. Abdomen is not rigid. Tenderness: There is no abdominal tenderness. There is no guarding. Musculoskeletal:         General: No tenderness. Comments: R groin soft with no bruise, palpable masses or bruit  L side incisions are clean, dressing CDI   Skin:     General: Skin is warm and dry. Findings: No erythema, lesion or rash. Neurological:      Mental Status: He is alert and oriented to person, place, and time. Cranial Nerves: No cranial nerve deficit. Motor: No seizure activity. Psychiatric:         Speech: Speech normal.         Behavior: Behavior normal. Behavior is cooperative.        Assessment:        Hospital Problems             Last Modified POA    * (Principal) S/P coronary artery stent placement 1/4/2023 Yes    Closed fracture of neck of left femur ([address] 1/4/2023 Yes    Duodenal ulcer 12/30/2022 Yes    Abnormal stress test 1/4/2023 Yes    GI bleed 1/4/2023 No    CAD (coronary artery disease) 12/30/2022 Yes    Hypertension 12/30/2022 Yes    BPH (benign prostatic hyperplasia) 12/30/2022 Yes    Chronic systolic heart failure ([address] (Chronic) 12/30/2022 Yes    Claudication in peripheral vascular disease ([address] 12/30/2022 Yes    Malignant neoplasm of prostate ([address] 12/30/2022 Yes   Plan:        Principal Problem:    S/P coronary artery stent placement  Active Problems:    Closed fracture of neck of left femur (HCC)    Duodenal ulcer    Abnormal stress test    GI bleed    CAD (coronary artery disease)    Hypertension    BPH (benign prostatic hyperplasia)    Chronic systolic heart failure (HCC)    Claudication in peripheral vascular disease (HCC)    Malignant neoplasm of prostate ([address]  Resolved Problems:    * No resolved hospital problems. *    -S/p left hip arthroplasty 12/26  -Recommended for anticoagulation for least 1 month postoperatively per orthopedic surgery  -Anticoagulation is held due to GI bleed  -Duodenal ulcer with active bleed status post EGD cauterization, clip, epinephrine on 12/29/2022. -GI okay for antiplatelets  -History of combined systolic diastolic heart failure , compensated    -Abnormal stress test  concerning for ischemia.   S/P  cardiac cath 1/3 , LCx mid new stenosis 75% , stented  -  Continue Lipitor, Lasix, Toprol- mg,   -Depression continue Zoloft    -History of prostate cancer status postradiation therapy  -Stable for discharge, orders placed 1/4, await placement, likely [facility] at this point     Discussed anticoagulation with gastroenterology, still okay for discharge    Ada Olmstead,   1/10/2023  3:20 PM

## 2023-01-10 NOTE — CARE COORDINATION
Transport to [facility] at [facility], pt updated, left  for Danuta Mccurdy at [phone] [phone].

## 2023-01-10 NOTE — PROGRESS NOTES
Physician Progress Note      PATIENT:               Tala Nuñez  CSN #:                  [id number]  :                       1940  ADMIT DATE:       2022 7:27 PM  [address] [place] DATE:  Daniela Griffin  PROVIDER #:        Radha Jacome DO          QUERY TEXT:    Patient admitted with s/p fall with left femoral neck fracture. Per   Cardiology consult for surgical risk stress test showed large anterior wall   infarct from apex to base with galdino-infarct ischemia and EF 37%. If possible, please document in the progress notes and discharge summary if   you are evaluating and/or treating any of the following: The medical record reflects the following:  Risk Factors: CAD, EF 06-48%, chronic systolic and diastolic CHF  Clinical Indicators: Trops  36>75>68>64  50,  EF 20-25%  stress test showed large anterior wall infarct from apex to base with   galdino-infarct ischemia and EF 37%. Cardiac Cath for ACS  Cardiac cath  Lesion on Mid CX: Proximal subsection. 80% stenosis 8 mm length   reduced to 0% with TED  Treatment: Cardiology consult, Stress test, Cardiac Cath w/ TED, Heparin gtt,   daily ASA, plavix, lasix    Thank you please contact me for any questions! Paulo Gleason RN, CCDS, CDI Supervisor [phone]  Options provided:  -- NSTEMI  -- history of MI only, no acute during admission  -- Other - I will add my own diagnosis  -- Disagree - Not applicable / Not valid  -- Disagree - Clinically unable to determine / Unknown  -- Refer to Clinical Documentation Reviewer    PROVIDER RESPONSE TEXT:    This patient has an NSTEMI.     Query created by: Yessy Iqbal on 1/10/2023 7:27 AM      Electronically signed by:  Radha Jacome DO 1/10/2023 7:43 AM

## 2023-01-10 NOTE — PROGRESS NOTES
Comprehensive Nutrition Assessment    Type and Reason for Visit:  Initial    Nutrition Recommendations/Plan:   Continue current diet as ordered  Continue to monitor weight, labs, and intake     Malnutrition Assessment:  Malnutrition Status:  No malnutrition (01/02/23 1341)    Context:  Acute Illness     Findings of the 6 clinical characteristics of malnutrition:  Energy Intake:  Mild decrease in energy intake (Comment)  Weight Loss:  No significant weight loss     Body Fat Loss:  No significant body fat loss     Muscle Mass Loss:  No significant muscle mass loss    Fluid Accumulation:  No significant fluid accumulation     Strength:  Not Performed    Nutrition Assessment:    Patient seen for follow up. Diet Reg, low fat, low chol, high fiber, CARRIE. Patient was eating lunch during visit. Patient claims appetite is good and this is the first meal he thinks he will finish in a while. Patient stated poor intake may be r/t some foods being too spicy for their preference. Labs: Na+ 131, Cl 92, Cr: 0.68, Glu 130, Ca2+ 8.1. Last BM 1/9. Nutrition Related Findings:    Labs/Meds reviewed Wound Type: None       Current Nutrition Intake & Therapies:    Average Meal Intake: 51-75%, %  Average Supplements Intake: None Ordered  ADULT DIET; Regular; Low Fat/Low Chol/High Fiber/CARRIE    Anthropometric Measures:  Height: 5' 7\" (170.2 cm)  Ideal Body Weight (IBW): 148 lbs (67 kg)    Admission Body Weight: 182 lb (82.6 kg)  Current Body Weight: 194 lb 3.6 oz (88.1 kg),   IBW. Weight Source: Bed Scale  Current BMI (kg/m2): 30.4                          BMI Categories: Obese Class 1 (BMI 30.0-34. 9)    Estimated Daily Nutrient Needs:  Energy Requirements Based On: Formula  Weight Used for Energy Requirements: Current  Energy (kcal/day): 2300kcal/day  Weight Used for Protein Requirements: Ideal  Protein (g/day): 80-100gmsPRO/day     Fluid (ml/day): Per MD    Nutrition Diagnosis:   No nutrition diagnosis at this time     Nutrition Interventions:   Food and/or Nutrient Delivery: Continue Current Diet  Nutrition Education/Counseling: No recommendation at this time  Coordination of Nutrition Care: Continue to monitor while inpatient  Plan of Care discussed with: Patient    Goals:     Goals: Meet at least 75% of estimated needs, within 7 days, PO intake 50% or greater       Nutrition Monitoring and Evaluation:   Behavioral-Environmental Outcomes: None Identified  Food/Nutrient Intake Outcomes: Food and Nutrient Intake, Supplement Intake  Physical Signs/Symptoms Outcomes: Biochemical Data, Skin, Weight, GI Status, Fluid Status or Edema    Discharge Planning:    Continue Oral Nutrition Supplement, Continue current diet     Serafin Crowder 429, [address]  Contact: [address] [facility] [place]

## 2023-01-10 NOTE — PLAN OF CARE
Problem: Discharge Planning  Goal: Discharge to home or other facility with appropriate resources  1/10/2023 0938 by Jennifer Dash RN  Outcome: Progressing     Problem: Pain  Goal: Verbalizes/displays adequate comfort level or baseline comfort level  1/10/2023 0938 by Jennifer Dash RN  Outcome: Progressing     Problem: Safety - Adult  Goal: Free from fall injury  1/10/2023 0938 by Jennifer Dash RN  Outcome: Progressing     Problem: ABCDS Injury Assessment  Goal: Absence of physical injury  1/10/2023 0938 by Jennifer Dash RN  Outcome: Progressing     Problem: Skin/Tissue Integrity  Goal: Absence of new skin breakdown  Description: 1. Monitor for areas of redness and/or skin breakdown  2. Assess vascular access sites hourly  3. Every 4-6 hours minimum:  Change oxygen saturation probe site  4. Every 4-6 hours:  If on nasal continuous positive airway pressure, respiratory therapy assess nares and determine need for appliance change or resting period.   1/10/2023 8685 by Jennifer Dash RN  Outcome: Progressing     Problem: Chronic Conditions and Co-morbidities  Goal: Patient's chronic conditions and co-morbidity symptoms are monitored and maintained or improved  1/10/2023 0938 by Jennifer Dash RN  Outcome: Progressing     Problem: Nutrition Deficit:  Goal: Optimize nutritional status  1/10/2023 0938 by Jennifer Dash RN  Outcome: Progressing

## 2023-01-10 NOTE — PROGRESS NOTES
Orthopedic Progress Note     Patient:  Jasvir Patel  YOB: 1940     79 y.o. male    Subjective  Patient seen and examined. No complaints or concerns. No issue overnight. Pain well controlled. Denies fever, HA, CP, SOB, N/V, dysuria. PT maximum assistance for bed mobility and patient tolerating ambulating ~3ft to chair with rolling walker. Vitals reviewed, afebrile. Objective  Vitals:    01/10/23 0015   BP: 123/63   Pulse: 72   Resp: 17   Temp: 98.4 °F (36.9 °C)   SpO2: 94%     Gen: NAD, Cooperative. Cardiovascular: Regular Rate  Respiratory: No Acute Respiratory Distress  MSK:  LLE   Incision well approximated, no signs of dehiscence or infection including erythema, induration, fluctuance or drainage. Staples removed with optifoam placed. Appropriately tender to palpation. Compartments soft. EHL/FHL/TA/GSC motor intact. Sural, Saphenous, Superficial/Deep Peroneal, and Plantar Nerve distribution SILT. DP and PT pulses 2+ with BCR. Recent Labs     01/08/23  1441   WBC 10.1   HGB 9.0*   HCT 29.4*      *   K 4.5   BUN 13   CREATININE 0.68*   GLUCOSE 130*      Meds: See Rec for Complete List    Impression 79 y.o. male being seen for    - Left femoral neck fracture s/p hip hemiarthroplasty, DOS 12/26. Plan  - No plans for further orthopedic intervention.  - WB status: WBAT LLE  - Staples removed from incision site this morning; Optifoam dressing replaced, please maintain. - Primary Team: Trauma Surgery    - Pain Management:  Per primary team    - Medical Management: Per primary team  - Antibiotics:  Per primary team   - DVT ppx:  Per primary team    - Ice for pain and swelling.  - Encourage Incentive Spirometry use  - PT/OT  - Patient awaiting placement for discharge  - F/u Dr. Dimple Cardozo in his clinic in 4 weeks, 2/7/23.  - Please page the On Call Ortho resident with any questions. _________________________________  Slade Barreto D.O.   Orthopedic Surgery Resident, PGY-1  [address]    PGY-2 Addendum    Patient seen and examined. Agree with subjective and objective portions from Dr. Zack Schultz with note adjusted where indicated. Educated patient on the importance to continue to use incentive spirometry while in bed.       Court Grover DO PGY-2  Orthopedic Surgery Resident  [address]

## 2023-01-11 VITALS
SYSTOLIC BLOOD PRESSURE: 150 MMHG | HEART RATE: 71 BPM | RESPIRATION RATE: 18 BRPM | TEMPERATURE: 98.1 F | DIASTOLIC BLOOD PRESSURE: 79 MMHG | BODY MASS INDEX: 30.48 KG/M2 | HEIGHT: 67 IN | WEIGHT: 194.22 LBS | OXYGEN SATURATION: 96 %

## 2023-01-11 PROCEDURE — 2580000003 HC RX 258: Performed by: INTERNAL MEDICINE

## 2023-01-11 PROCEDURE — 6370000000 HC RX 637 (ALT 250 FOR IP): Performed by: INTERNAL MEDICINE

## 2023-01-11 PROCEDURE — 6370000000 HC RX 637 (ALT 250 FOR IP)

## 2023-01-11 PROCEDURE — 6360000002 HC RX W HCPCS: Performed by: STUDENT IN AN ORGANIZED HEALTH CARE EDUCATION/TRAINING PROGRAM

## 2023-01-11 PROCEDURE — 99239 HOSP IP/OBS DSCHRG MGMT >30: CPT | Performed by: INTERNAL MEDICINE

## 2023-01-11 PROCEDURE — 6370000000 HC RX 637 (ALT 250 FOR IP): Performed by: STUDENT IN AN ORGANIZED HEALTH CARE EDUCATION/TRAINING PROGRAM

## 2023-01-11 RX ORDER — RIVAROXABAN 10 MG/1
10 TABLET, FILM COATED ORAL
Qty: 12 TABLET | Refills: 0 | DISCHARGE
Start: 2023-01-11 | End: 2023-01-11 | Stop reason: HOSPADM

## 2023-01-11 RX ADMIN — METOPROLOL SUCCINATE 100 MG: 100 TABLET, FILM COATED, EXTENDED RELEASE ORAL at 08:14

## 2023-01-11 RX ADMIN — LISINOPRIL 20 MG: 20 TABLET ORAL at 08:14

## 2023-01-11 RX ADMIN — AMLODIPINE BESYLATE 10 MG: 10 TABLET ORAL at 08:15

## 2023-01-11 RX ADMIN — SERTRALINE 25 MG: 25 TABLET, FILM COATED ORAL at 08:14

## 2023-01-11 RX ADMIN — PANTOPRAZOLE SODIUM 40 MG: 40 TABLET, DELAYED RELEASE ORAL at 06:45

## 2023-01-11 RX ADMIN — ENOXAPARIN SODIUM 40 MG: 100 INJECTION SUBCUTANEOUS at 08:16

## 2023-01-11 RX ADMIN — SODIUM CHLORIDE, PRESERVATIVE FREE 10 ML: 5 INJECTION INTRAVENOUS at 08:16

## 2023-01-11 RX ADMIN — GABAPENTIN 600 MG: 300 CAPSULE ORAL at 08:15

## 2023-01-11 RX ADMIN — FUROSEMIDE 40 MG: 40 TABLET ORAL at 08:15

## 2023-01-11 RX ADMIN — ACETAMINOPHEN 1000 MG: 500 TABLET, FILM COATED ORAL at 06:45

## 2023-01-11 RX ADMIN — ATORVASTATIN CALCIUM 10 MG: 10 TABLET, FILM COATED ORAL at 08:15

## 2023-01-11 RX ADMIN — METHOCARBAMOL TABLETS 500 MG: 500 TABLET, COATED ORAL at 08:14

## 2023-01-11 RX ADMIN — CLOPIDOGREL 75 MG: 75 TABLET, FILM COATED ORAL at 08:14

## 2023-01-11 RX ADMIN — ASPIRIN 81 MG: 81 TABLET, CHEWABLE ORAL at 08:14

## 2023-01-11 ASSESSMENT — ENCOUNTER SYMPTOMS
WHEEZING: 0
VOMITING: 0
DIARRHEA: 0
CHEST TIGHTNESS: 0
COUGH: 0
BLOOD IN STOOL: 0
CONSTIPATION: 0
ABDOMINAL PAIN: 0
NAUSEA: 0

## 2023-01-11 NOTE — PLAN OF CARE
Problem: Discharge Planning  Goal: Discharge to home or other facility with appropriate resources  1/10/2023 2131 by Gris Singh RN  Outcome: Progressing  1/10/2023 0938 by Yue Grimes RN  Outcome: Progressing     Problem: Pain  Goal: Verbalizes/displays adequate comfort level or baseline comfort level  1/10/2023 2131 by Gris Singh RN  Outcome: Progressing  1/10/2023 0938 by Yue Grimes RN  Outcome: Progressing     Problem: Safety - Adult  Goal: Free from fall injury  1/10/2023 2131 by Gris Singh RN  Outcome: Progressing  1/10/2023 0938 by Yue Grimes RN  Outcome: Progressing     Problem: ABCDS Injury Assessment  Goal: Absence of physical injury  1/10/2023 2131 by Gris Singh RN  Outcome: Progressing  1/10/2023 0938 by Yue Grimes RN  Outcome: Progressing     Problem: Skin/Tissue Integrity  Goal: Absence of new skin breakdown  Description: 1. Monitor for areas of redness and/or skin breakdown  2. Assess vascular access sites hourly  3. Every 4-6 hours minimum:  Change oxygen saturation probe site  4. Every 4-6 hours:  If on nasal continuous positive airway pressure, respiratory therapy assess nares and determine need for appliance change or resting period.   1/10/2023 2131 by Gris Singh RN  Outcome: Progressing  1/10/2023 0938 by Yue Grimes RN  Outcome: Progressing     Problem: Chronic Conditions and Co-morbidities  Goal: Patient's chronic conditions and co-morbidity symptoms are monitored and maintained or improved  1/10/2023 2131 by Gris Singh RN  Outcome: Progressing  1/10/2023 0938 by Yue Grimes RN  Outcome: Progressing     Problem: Nutrition Deficit:  Goal: Optimize nutritional status  1/10/2023 2131 by Gris Snigh RN  Outcome: Progressing  1/10/2023 0938 by Yue Grimes RN  Outcome: Progressing

## 2023-01-11 NOTE — CARE COORDINATION
Spoke to [employer] from [employer] They are able to accept patient at any time. Spoke to [employer] from [employer]. They are able to  patient in 30 minutes. Patient notified and agreeable with plan. Voicemail left for [employer] to notify. AVS faxed to [employer]. PAS completed    1050 received call from [employer].  She is agreeable with  time    Discharge [facility] Case Management Department  Written by: Juana Garzon RN    Patient Name: Severino Rodriguez  Attending Provider: Henrry Cisneros DO  Admit Date: 2022  7:27 PM  MRN: [id number]  Account: [de-identified]                     : 1940  Discharge Date: 2023      Disposition: [facility]    Juana Garzon RN

## 2023-01-11 NOTE — PLAN OF CARE
Problem: Discharge Planning  Goal: Discharge to home or other facility with appropriate resources  1/11/2023 0927 by Elizabeth Pichardo RN  Outcome: Progressing     Problem: Pain  Goal: Verbalizes/displays adequate comfort level or baseline comfort level  1/11/2023 0927 by Elizabeth Pichardo RN  Outcome: Progressing     Problem: Safety - Adult  Goal: Free from fall injury  1/11/2023 0927 by Elizabeth Pichardo RN  Outcome: Progressing     Problem: ABCDS Injury Assessment  Goal: Absence of physical injury  1/11/2023 0927 by Elizabeth Pichardo RN  Outcome: Progressing     Problem: Skin/Tissue Integrity  Goal: Absence of new skin breakdown  Description: 1. Monitor for areas of redness and/or skin breakdown  2. Assess vascular access sites hourly  3. Every 4-6 hours minimum:  Change oxygen saturation probe site  4. Every 4-6 hours:  If on nasal continuous positive airway pressure, respiratory therapy assess nares and determine need for appliance change or resting period.   1/11/2023 9414 by Elizabeth Pichardo RN  Outcome: Progressing     Problem: Chronic Conditions and Co-morbidities  Goal: Patient's chronic conditions and co-morbidity symptoms are monitored and maintained or improved  1/11/2023 0927 by Elizabeth Pichardo RN  Outcome: Progressing     Problem: Nutrition Deficit:  Goal: Optimize nutritional status  1/11/2023 0927 by Elizabeth Pichardo RN  Outcome: Progressing

## 2023-01-11 NOTE — DISCHARGE SUMMARY
[facility]  Office: [address], DO, Good Patella, DO, Sharon Chavez, DO, Ivana Tsang Blood, DO, Sasha Emerson MD, Kyra Dunn MD, Maribell Diaz MD, Jacque Reyes MD,  Marzena Calderon MD, Kathleen Monroe MD, Eddy Mccoy DO, Nakia Huerta MD,  Jennifer Jasso MD, Claudia Germain MD, Lamar Jarvis DO, Mariana Womack MD, Vineet James MD, Tova Snyder DO, Joan Gonzalez MD, Gela Watkins MD, Marky Salas MD, Valentina Schafer MD, Deng Olivas DO, Gely Jane MD, Rhea Andre MD, Kirsten Marie, CNP,  Dinesh Patel, CNP, Stanton Latham, CNP, Merwyn Cooks, CNP,  Amber Gold, [facility], Glenny White, CNP, Joel Courtney, CNP, Richard Randall, CNP, Faheem Liang, CNP, Melchor Marti, [facility], Dudley Hughes PA-C, Concepción Fontana, CNS, Radha Vences, CNP, Miky Yonathan, [facility]    Discharge Summary     Patient ID: Kaylene Prado  :  1940   MRN: [id number]     ACCOUNT:  [de-identified]   Patient's PCP: Cynthia Walsh MD  Admit Date: 2022   Discharge Date: 2023 ***    Length of Stay: 17  Code Status:  Full Code  Admitting Physician: Jacque Reyes MD  Discharge Physician: Eddy Mccoy DO     Active Discharge Diagnoses:     Hospital Problem Lists:  Principal Problem:    S/P coronary artery stent placement  Active Problems:    Closed fracture of neck of left femur (HCC)    Duodenal ulcer    Abnormal stress test    GI bleed    CAD (coronary artery disease)    Hypertension    BPH (benign prostatic hyperplasia)    Chronic systolic heart failure (HCC)    Claudication in peripheral vascular disease ([facility]    Malignant neoplasm of prostate ([facility]  Resolved Problems:    * No resolved hospital problems.  *      Admission Condition:  {condition:13370}     Discharged Condition: {condition:80460}    Hospital Stay:     Hospital Course:  Kaylene Prado is a 79 y.o. male who was admitted for the management of  *** S/P coronary artery stent placement , presented to ER with ***Fall          Significant therapeutic interventions: ***    Significant Diagnostic Studies:   Labs / Micro:  {PXUY:[id number]}   ***  Radiology:  XR ELBOW LEFT (MIN 3 VIEWS)    Result Date: 1/8/2023  Negative elbow. CT HEAD WO CONTRAST    Result Date: 1/8/2023  No acute abnormality. XR HIP 2-3 VW W PELVIS LEFT    Result Date: 1/10/2023  Left hip arthroplasty hardware without significant periprosthetic lucency or acute osseous abnormality. Soft tissue gas has diminished since the previous study. Consultations:    Consults:     Final Specialist Recommendations/Findings:   IP CONSULT TO TRAUMA SURGERY  IP CONSULT TO ORTHOPEDIC SURGERY  IP CONSULT TO CARDIOLOGY  IP CONSULT TO PHYSICAL MEDICINE REHAB  IP CONSULT TO GI  IP CONSULT TO INTERNAL MEDICINE  IP CONSULT TO IV TEAM  IP CONSULT TO IV TEAM  IP CONSULT TO GI      The patient was seen and examined on day of discharge and this discharge summary is in conjunction with any daily progress note from day of discharge.     Discharge plan:     Disposition: {DISPOSITIONS:150830928}    Physician Follow Up:   ***  Michelle Franklin MD  [address] [facility]  [phone]    Schedule an appointment as soon as possible for a visit  Follow up with PCP re: hospital visit    Juliann Enrique, [address]  [phone]    Schedule an appointment as soon as possible for a visit on 2/8/2023  Follow up with Orthopedic Surgery at 1:45 pm    [facility]  [address] [place] [address]  [phone]  Schedule an appointment as soon as possible for a visit  Follow up with Cardiology       Requiring Further Evaluation/Follow Up POST HOSPITALIZATION/Incidental Findings: ***    Diet: {diet:73552}    Activity: As tolerated***    Instructions to Patient: ***    Discharge Medications:      Medication List        START taking these medications      pantoprazole 40 MG tablet  Commonly known as: PROTONIX  Take 1 tablet by mouth 2 times daily (before meals)     vitamin D 1.25 MG (66216 UT) Caps capsule  Commonly known as: ERGOCALCIFEROL  Take 1 capsule by mouth once a week for 8 doses            CHANGE how you take these medications      aspirin EC 81 MG EC tablet  Take 1 tablet by mouth daily. What changed: additional instructions     lisinopril 40 MG tablet  Commonly known as: PRINIVIL;ZESTRIL  Take 0.5 tablets by mouth daily  What changed: how much to take            CONTINUE taking these medications      alfuzosin 10 MG extended release tablet  Commonly known as: UROXATRAL     amLODIPine 10 MG tablet  Commonly known as: NORVASC  Take 1 tablet by mouth daily     ammonium lactate 12 % cream  Commonly known as: Lac-Hydrin  Apply topically as needed. clopidogrel 75 MG tablet  Commonly known as: PLAVIX     fluticasone 50 MCG/ACT nasal spray  Commonly known as: FLONASE     furosemide 40 MG tablet  Commonly known as: LASIX     gabapentin 600 MG tablet  Commonly known as: Neurontin  Take 1 tablet by mouth 2 times daily for 90 days. metoprolol succinate 100 MG extended release tablet  Commonly known as: TOPROL XL     nitroGLYCERIN 0.4 MG SL tablet  Commonly known as: Nitrostat  Place 1 tablet under the tongue every 5 minutes as needed for Chest pain up to max of 3 total doses.  If no relief after 1 dose, call 911.     sertraline 25 MG tablet  Commonly known as: ZOLOFT     simvastatin 40 MG tablet  Commonly known as: ZOCOR     therapeutic multivitamin-minerals tablet            STOP taking these medications      bicalutamide 50 MG chemo tablet  Commonly known as: CASODEX     meloxicam 15 MG tablet  Commonly known as: MOBIC     Sore Throat Lozenges 6-10 MG Lozg lozenge  Generic drug: Benzocaine-Menthol     ticagrelor 90 MG Tabs tablet  Commonly known as: BRILINTA               Where to Get Your Medications        These medications were sent to [facility] [id number] [facility], [facility] Freddie[address] Romeo [address] [phone] Charleen Freed [phone]  [address], [facility] [address]      Phone: [phone]   vitamin D 1.25 MG (77547 UT) Caps capsule       These medications were sent to [facility] [address], [address]  [address] [facility], [facility] [address]      Phone: [phone]   pantoprazole 40 MG tablet       Information about where to get these medications is not yet available    Ask your nurse or doctor about these medications  amLODIPine 10 MG tablet  lisinopril 40 MG tablet         No discharge procedures on file. Time Spent on discharge is  {Blank single:73328::\"15 mins\",\"17 mins\",\"20 mins\",\"31 mins\",\"32 mins\",\"33 mins\",\"34 mins\",\"35 mins\",\"36 mins\",\"37 mins\",\"38 mins\",\"39 mins\",\"40 mins\",\"41 mins\",\"43 mins\",\"45 mins\",\"50 mins\",\"***\"} in patient examination, evaluation, counseling as well as medication reconciliation, prescriptions for required medications, discharge plan and follow up. Electronically signed by   Sofía Santoro DO  1/11/2023  12:16 PM      Thank you Dr. Lawana Collet, MD for the opportunity to be involved in this patient's care.

## 2023-01-11 NOTE — PLAN OF CARE
Report called to [facility]. Pt has already updated wife re:discharge and does not want writer to call her. Picked up by transport.

## 2023-01-11 NOTE — PROGRESS NOTES
[facility]  Office: [address], DO, Jluis Silver, DO, Robin Leventhal, DO, Ashlyn Brizuela Blood, DO, Basilio Rodriguez MD, Laila Garcia MD, Blaise Talbot MD, Daniel Sexton MD,  Sara Otero MD, Dedrick Jalloh MD, Nafisa Bautista, DO, Dagmar Contreras MD,  Fransico Dimas MD, Pamela Gonzalez MD, Kaylyn Quinones, DO, Manny Land MD, Felipe Huang MD, Sofi Justice DO, Jessica Leigh MD, Johanna Hernandez MD, Ileana Goss MD, Benedicto Justice MD, Jhoana Logan DO, Abi Cordova MD, Nay Bartholomew MD, Mai Nava, CNP,  Cruzito Camacho, CNP, Yi Weinstein, CNP, Yoly Melgar, CNP,  [facility], [facility], Sarah Pablo, CNP, Kady Rivera, CNP, Yaneli Knight, CNP, Humera Rodriguez, CNP, Eileen Edmondson, CNP, Jud Menjivar PA-C, Segun Juarez, CNS, Demetrius Chin, CNP, [facility], [address] [place] [address]    Progress Note    1/11/2023    12:09 PM    Name:   Sebastian Gonsales  MRN:     [id number]     [place]:      [de-identified]   Room:   2010/2010-01  IP Day:  16  Admit Date:  12/25/2022  7:27 PM    PCP:   Baldo Hdz MD  Code Status:  Full Code    Subjective:     C/C:   Chief Complaint   Patient presents with    Fall     Interval History Status: Stable    Patient doing well, planning for dc today at 11:30    Brief History:     Per chart  This is a 50-year-old male past medical history of neuropathy, AICD in place, peripheral vascular disease, coronary artery disease, history of diastolic CHF, prostate cancer status postradiation therapy, arthritis, bilateral carotid stenosis presented to the ER on 12/25/2022 after patient had a fall denied any loss of consciousness. Patient was admitted to trauma surgery service. Cardiology was consulted for medical clearance was moderate to high risk for any OR procedure due to history of significant coronary artery disease and LAD.   Patient underwent left hip hemiarthroplasty with orthopedic surgery on 12/26/2022, afterwards was noted to have episodes of nonsustained V. tach. Underwent stress test on 12/28/2022 showing concern of galdino-infarct ischemia and plan for cardiac catheterization however was held due to patient having black tarry stools on 12/29/2022 patient underwent EGD with GI found to have 10 mm duodenal ulcer with active bleeding that was cauterized and injected with epinephrine and clipped. Patient currently on clear liquid diet and plan for cardiac catheterization when cleared by GI. Patient continues on Protonix drip at this time       Review of Systems:     Review of Systems   Constitutional:  Positive for activity change, appetite change and fatigue. Negative for chills, diaphoresis and fever. HENT:  Negative for congestion. Eyes:  Negative for visual disturbance. Respiratory:  Negative for cough, chest tightness and wheezing. Cardiovascular:  Negative for chest pain, palpitations and leg swelling. Gastrointestinal:  Negative for abdominal pain, blood in stool, constipation, diarrhea, nausea and vomiting. Genitourinary:  Negative for difficulty urinating. Neurological:  Positive for weakness. Negative for dizziness, light-headedness, numbness and headaches. All other systems reviewed and are negative. Medications: Allergies:     Allergies   Allergen Reactions    Percocet [Oxycodone-Acetaminophen] Other (See Comments)     Panic attack       Current Meds:   Scheduled Meds:    enoxaparin  40 mg SubCUTAneous Daily    lisinopril  20 mg Oral Daily    clopidogrel  75 mg Oral Daily    methocarbamol  500 mg Oral TID    pantoprazole  40 mg Oral BID AC    furosemide  40 mg Oral Daily    amLODIPine  10 mg Oral Daily    metoprolol succinate  100 mg Oral Daily    sertraline  25 mg Oral Daily    atorvastatin  10 mg Oral Daily    gabapentin  600 mg Oral BID    aspirin  81 mg Oral Daily    sodium chloride flush  5-40 mL IntraVENous 2 times per day acetaminophen  1,000 mg Oral 3 times per day     Continuous Infusions:    sodium chloride       PRN Meds: sodium chloride, sodium chloride, polyethylene glycol, sodium chloride flush, sodium chloride flush, nitroGLYCERIN, fluticasone, melatonin, oxyCODONE, sodium chloride flush, ondansetron **OR** ondansetron    Data:     Past Medical History:   has a past medical history of Acid reflux, Anxiety, Arthritis, BPH (benign prostatic hypertrophy), CAD (coronary artery disease), Cancer ([facility] [address] Carotid stenosis, Cataracts, both eyes, CHF (congestive heart failure) ([facility] [address] Dry skin, Elevated PSA, Examination of participant in clinical trial, Hyperlipidemia, Hypertension, Left ventricular dysfunction, Macular degeneration, Myocardial infarction ([facility] [address] Neuropathy, Pacemaker, Panic attacks, PVD (peripheral vascular disease) ([facility] [address] Sinus problem, Snores, Vasovagal near syncope, and Wears glasses. Social History:   reports that he has never smoked. He has never used smokeless tobacco. He reports current alcohol use of about 2.0 standard drinks per week. He reports that he does not use drugs. Family History:   Family History   Problem Relation Age of Onset    High Blood Pressure Mother     Cancer Mother     High Blood Pressure Father     Diabetes Sister     Cancer Sister     High Blood Pressure Sister     Heart Disease Brother     High Blood Pressure Brother        Vitals:  BP (!) 150/79   Pulse 71   Temp 98.1 °F (36.7 °C) (Oral)   Resp 18   Ht 5' 7\" (1.702 m)   Wt 194 lb 3.6 oz (88.1 kg)   SpO2 96%   BMI 30.42 kg/m²   Temp (24hrs), Av.9 °F (36.6 °C), Min:97.5 °F (36.4 °C), Max:98.2 °F (36.8 °C)    No results for input(s): POCGLU in the last 72 hours. I/O (24Hr):   No intake or output data in the 24 hours ending 23 1209      Labs:  Hematology:  Recent Labs     23  1441   WBC 10.1   RBC 3.13*   HGB 9.0*   HCT 29.4*   MCV 93.9   MCH 28.8   MCHC 30.6   RDW 13.6      MPV 10.1       Chemistry:  Recent Labs     01/08/23  1441   *   K 4.5   CL 92*   CO2 33*   GLUCOSE 130*   BUN 13   CREATININE 0.68*   ANIONGAP 6*   LABGLOM >60   CALCIUM 8.1*       No results for input(s): PROT, LABALBU, LABA1C, R2RWVTK, I2NIWIZ, FT4, TSH, AST, ALT, LDH, GGT, ALKPHOS, LABGGT, BILITOT, BILIDIR, AMMONIA, AMYLASE, LIPASE, LACTATE, CHOL, HDL, LDLCHOLESTEROL, CHOLHDLRATIO, TRIG, VLDL, QDE81ZW, PHENYTOIN, PHENYF, URICACID, POCGLU in the last 72 hours.    ABG:  Lab Results   Component Value Date/Time    POCPH 7.35 03/04/2014 04:57 PM    POCPCO2 63 03/04/2014 04:57 PM    POCPO2 103 03/04/2014 04:57 PM    POCHCO3 35.2 03/04/2014 04:57 PM    NBEA NOT REPORTED 03/04/2014 04:57 PM    PBEA 10 03/04/2014 04:57 PM    EOI5DBO 37 03/04/2014 04:57 PM    VZSV8LGD 97 03/04/2014 04:57 PM    FIO2 NO SAMPLE RECEIVED 12/25/2022 08:13 PM     Lab Results   Component Value Date/Time    SPECIAL NOT REPORTED 07/28/2014 05:19 PM     Lab Results   Component Value Date/Time    CULTURE NO GROWTH 03/02/2014 05:00 PM    CULTURE  03/02/2014 05:00 PM     [address] [id number] [phone]       Radiology:  XR HIP LEFT (1 VIEW)    Result Date: 12/26/2022  Status post left total hip arthroplasty in a patient with acute cervical neck fracture.  Hardware alignment is satisfactory.  No apparent hardware complication.  Expected postoperative soft tissue changes are noted.     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  Infarct extends to involve the entire apex and small portions of the anterolateral and anterior septal wall. LVEF 37%. Risk stratification: Intermediate risk.     XR HIP 2-3 VW W PELVIS LEFT    Result Date: 12/26/2022  Recent postop changes left hip arthroplasty       Physical Examination:        Physical Exam  Vitals and nursing note reviewed.   Constitutional:       General:  He is not in acute distress. HENT:      Head: Normocephalic and atraumatic. Eyes:      Conjunctiva/sclera: Conjunctivae normal.      Pupils: Pupils are equal, round, and reactive to light. Cardiovascular:      Rate and Rhythm: Normal rate and regular rhythm. Heart sounds: No murmur heard. Pulmonary:      Effort: Pulmonary effort is normal. No accessory muscle usage or respiratory distress. Breath sounds: No stridor. No decreased breath sounds, wheezing, rhonchi or rales. Abdominal:      General: Bowel sounds are normal. There is no distension. Palpations: Abdomen is soft. Abdomen is not rigid. Tenderness: There is no abdominal tenderness. There is no guarding. Musculoskeletal:         General: No tenderness. Comments: R groin soft with no bruise, palpable masses or bruit  L side incisions are clean, dressing CDI   Skin:     General: Skin is warm and dry. Findings: No erythema, lesion or rash. Neurological:      Mental Status: He is alert and oriented to person, place, and time. Cranial Nerves: No cranial nerve deficit. Motor: No seizure activity. Psychiatric:         Speech: Speech normal.         Behavior: Behavior normal. Behavior is cooperative.        Assessment:        Hospital Problems             Last Modified POA    * (Principal) S/P coronary artery stent placement 1/4/2023 Yes    Closed fracture of neck of left femur ([address] 1/4/2023 Yes    Duodenal ulcer 12/30/2022 Yes    Abnormal stress test 1/4/2023 Yes    GI bleed 1/4/2023 No    CAD (coronary artery disease) 12/30/2022 Yes    Hypertension 12/30/2022 Yes    BPH (benign prostatic hyperplasia) 12/30/2022 Yes    Chronic systolic heart failure ([address] (Chronic) 12/30/2022 Yes    Claudication in peripheral vascular disease ([address] 12/30/2022 Yes    Malignant neoplasm of prostate ([address] 12/30/2022 Yes   Plan:        Principal Problem:    S/P coronary artery stent placement  Active Problems:    Closed fracture of neck of left femur (HCC)    Duodenal ulcer    Abnormal stress test    GI bleed    CAD (coronary artery disease)    Hypertension    BPH (benign prostatic hyperplasia)    Chronic systolic heart failure (HCC)    Claudication in peripheral vascular disease (HCC)    Malignant neoplasm of prostate ([address]  Resolved Problems:    * No resolved hospital problems. *    -S/p left hip arthroplasty 12/26  -Recommended for anticoagulation for least 1 month postoperatively per orthopedic surgery  -Anticoagulation is held due to GI bleed  -Duodenal ulcer with active bleed status post EGD cauterization, clip, epinephrine on 12/29/2022. -GI okay for antiplatelets  -History of combined systolic diastolic heart failure , compensated    -Abnormal stress test  concerning for ischemia. S/P  cardiac cath 1/3 , LCx mid new stenosis 75% , stented  -  Continue Lipitor, Lasix, Toprol- mg,   -Depression continue Zoloft    -History of prostate cancer status postradiation therapy  -Stable for discharge, orders placed 1/4, await placement, likely [facility] at this point     [facility] per GI, ortho recommending AC until 1/23/23. Patient high risk for bleeding and has not had further bleeding on DAPT. Will continue DAPT for now.      Tello Singh,   1/11/2023  12:09 PM

## 2023-01-12 ENCOUNTER — HOSPITAL ENCOUNTER (OUTPATIENT)
Age: 83
Setting detail: SPECIMEN
Discharge: HOME | End: 2023-01-12

## 2023-01-12 LAB
ALBUMIN SERPL-MCNC: 3 G/DL (ref 3.5–5.2)
ALP BLD-CCNC: 79 U/L (ref 40–129)
ALT SERPL-CCNC: 23 U/L (ref 5–41)
ANION GAP SERPL CALCULATED.3IONS-SCNC: 9 MMOL/L (ref 9–17)
AST SERPL-CCNC: 23 U/L
BILIRUB SERPL-MCNC: 0.3 MG/DL (ref 0.3–1.2)
BUN BLDV-MCNC: 13 MG/DL (ref 8–23)
BUN/CREAT BLD: 17 (ref 9–20)
CALCIUM SERPL-MCNC: 8.6 MG/DL (ref 8.6–10.4)
CHLORIDE BLD-SCNC: 94 MMOL/L (ref 98–107)
CO2: 32 MMOL/L (ref 20–31)
CREAT SERPL-MCNC: 0.78 MG/DL (ref 0.7–1.2)
GFR SERPL CREATININE-BSD FRML MDRD: >60 ML/MIN/1.73M2
GLUCOSE BLD-MCNC: 156 MG/DL (ref 70–99)
HCT VFR BLD CALC: 29 % (ref 40.7–50.3)
HEMOGLOBIN: 9 G/DL (ref 13–17)
MCH RBC QN AUTO: 28.2 PG (ref 25.2–33.5)
MCHC RBC AUTO-ENTMCNC: 31 G/DL (ref 28.4–34.8)
MCV RBC AUTO: 90.9 FL (ref 82.6–102.9)
NRBC AUTOMATED: 0 PER 100 WBC
PDW BLD-RTO: 13.3 % (ref 11.8–14.4)
PLATELET # BLD: 421 K/UL (ref 138–453)
PMV BLD AUTO: 9.8 FL (ref 8.1–13.5)
POTASSIUM SERPL-SCNC: 4.3 MMOL/L (ref 3.7–5.3)
PRO-BNP: 2722 PG/ML
RBC # BLD: 3.19 M/UL (ref 4.21–5.77)
SODIUM BLD-SCNC: 135 MMOL/L (ref 135–144)
TOTAL PROTEIN: 5.9 G/DL (ref 6.4–8.3)
VITAMIN D 25-HYDROXY: 20 NG/ML
WBC # BLD: 6.6 K/UL (ref 3.5–11.3)

## 2023-01-12 PROCEDURE — P9603 ONE-WAY ALLOW PRORATED MILES: HCPCS

## 2023-01-12 PROCEDURE — 80053 COMPREHEN METABOLIC PANEL: CPT

## 2023-01-12 PROCEDURE — 82306 VITAMIN D 25 HYDROXY: CPT

## 2023-01-12 PROCEDURE — 83880 ASSAY OF NATRIURETIC PEPTIDE: CPT

## 2023-01-12 PROCEDURE — 85027 COMPLETE CBC AUTOMATED: CPT

## 2023-01-12 PROCEDURE — 36415 COLL VENOUS BLD VENIPUNCTURE: CPT

## 2023-01-13 ENCOUNTER — HOSPITAL ENCOUNTER (OUTPATIENT)
Age: 83
Setting detail: SPECIMEN
Discharge: HOME | End: 2023-01-13

## 2023-01-13 LAB
ANION GAP SERPL CALCULATED.3IONS-SCNC: 8 MMOL/L (ref 9–17)
BUN BLDV-MCNC: 15 MG/DL (ref 8–23)
BUN/CREAT BLD: 19 (ref 9–20)
CALCIUM SERPL-MCNC: 8.4 MG/DL (ref 8.6–10.4)
CHLORIDE BLD-SCNC: 99 MMOL/L (ref 98–107)
CO2: 32 MMOL/L (ref 20–31)
CREAT SERPL-MCNC: 0.8 MG/DL (ref 0.7–1.2)
GFR SERPL CREATININE-BSD FRML MDRD: >60 ML/MIN/1.73M2
GLUCOSE BLD-MCNC: 122 MG/DL (ref 70–99)
HCT VFR BLD CALC: 29.7 % (ref 40.7–50.3)
HEMOGLOBIN: 8.9 G/DL (ref 13–17)
MCH RBC QN AUTO: 28.3 PG (ref 25.2–33.5)
MCHC RBC AUTO-ENTMCNC: 30 G/DL (ref 28.4–34.8)
MCV RBC AUTO: 94.3 FL (ref 82.6–102.9)
NRBC AUTOMATED: 0 PER 100 WBC
PDW BLD-RTO: 13.7 % (ref 11.8–14.4)
PLATELET # BLD: 382 K/UL (ref 138–453)
PMV BLD AUTO: 9.8 FL (ref 8.1–13.5)
POTASSIUM SERPL-SCNC: 4.8 MMOL/L (ref 3.7–5.3)
RBC # BLD: 3.15 M/UL (ref 4.21–5.77)
SODIUM BLD-SCNC: 139 MMOL/L (ref 135–144)
TSH SERPL DL<=0.05 MIU/L-ACNC: 2.49 UIU/ML (ref 0.3–5)
WBC # BLD: 6.3 K/UL (ref 3.5–11.3)

## 2023-01-13 PROCEDURE — 84443 ASSAY THYROID STIM HORMONE: CPT

## 2023-01-13 PROCEDURE — P9603 ONE-WAY ALLOW PRORATED MILES: HCPCS

## 2023-01-13 PROCEDURE — 36415 COLL VENOUS BLD VENIPUNCTURE: CPT

## 2023-01-13 PROCEDURE — 85027 COMPLETE CBC AUTOMATED: CPT

## 2023-01-13 PROCEDURE — 80048 BASIC METABOLIC PNL TOTAL CA: CPT

## 2023-01-13 PROCEDURE — 84436 ASSAY OF TOTAL THYROXINE: CPT

## 2023-01-14 LAB — T4 TOTAL: 5 UG/DL

## 2023-02-17 RX ORDER — ERGOCALCIFEROL 1.25 MG/1
CAPSULE ORAL
Qty: 8 CAPSULE | Refills: 0 | OUTPATIENT
Start: 2023-02-17

## 2023-02-28 ENCOUNTER — TELEPHONE (OUTPATIENT)
Dept: RADIATION ONCOLOGY | Age: 83
End: 2023-02-28

## 2023-02-28 NOTE — TELEPHONE ENCOUNTER
I called pt to remind him of rad/onc follow up 3/2/23. Pt requests to reschedule as he has just had hip replacement surgery and is recovering.   Rescheduled for 4/18/23 w/, rad/onc

## 2023-03-20 RX ORDER — GABAPENTIN 600 MG/1
TABLET ORAL
Qty: 180 TABLET | Refills: 0 | Status: SHIPPED | OUTPATIENT
Start: 2023-03-20 | End: 2023-06-18

## 2023-03-27 ENCOUNTER — OFFICE VISIT (OUTPATIENT)
Dept: ORTHOPEDIC SURGERY | Age: 83
End: 2023-03-27

## 2023-03-27 VITALS — BODY MASS INDEX: 30.45 KG/M2 | HEIGHT: 67 IN | WEIGHT: 194 LBS

## 2023-03-27 DIAGNOSIS — S72.002D CLOSED FRACTURE OF NECK OF LEFT FEMUR WITH ROUTINE HEALING, SUBSEQUENT ENCOUNTER: Primary | ICD-10-CM

## 2023-03-27 PROCEDURE — 99024 POSTOP FOLLOW-UP VISIT: CPT | Performed by: STUDENT IN AN ORGANIZED HEALTH CARE EDUCATION/TRAINING PROGRAM

## 2023-03-27 NOTE — PROGRESS NOTES
fracture s/p hemiarthroplasty    Comparison: January 10, 2023    Findings: 3 radiographic views (AP pelvis, AP and lateral left hip) showing a hemiarthroplasty in unchanged alignment with no signs of hardware failure. There are no new fractures, dislocations, or soft tissue abnormalities. Impression: Stable left hip hemiarthroplasty     Assessment:   79y.o. year old male with left femoral neck fracture s/p left hip hemiarthroplasty on 12/26/22. Plan:   -Imaging reviewed with patient. .  -WBAT LLE. No restrictions on range of motion. Patient may cross his legs if desired. Discussed with patient need to ambulate with a walker at all times to prevent falls. Patient expressed understanding.  -Instructed patient he may follow-up with me as needed or if any acute issues. All questions answered. Patient is amenable to this plan.          Orders Placed This Encounter   Procedures    XR HIP LEFT (2-3 VIEWS)     Standing Status:   Future     Number of Occurrences:   1     Standing Expiration Date:   3/23/2024     Order Specific Question:   Reason for exam:     Answer:   monitor       Electronically signed by Burt Austin DO on 3/27/2023 at 6:40 PM

## 2023-04-18 ENCOUNTER — HOSPITAL ENCOUNTER (OUTPATIENT)
Dept: RADIATION ONCOLOGY | Age: 83
Discharge: HOME | End: 2023-04-18
Payer: MEDICARE

## 2023-04-18 VITALS
DIASTOLIC BLOOD PRESSURE: 71 MMHG | TEMPERATURE: 97.2 F | RESPIRATION RATE: 18 BRPM | HEART RATE: 71 BPM | WEIGHT: 191.2 LBS | SYSTOLIC BLOOD PRESSURE: 138 MMHG | BODY MASS INDEX: 29.95 KG/M2

## 2023-04-18 PROCEDURE — 99212 OFFICE O/P EST SF 10 MIN: CPT | Performed by: STUDENT IN AN ORGANIZED HEALTH CARE EDUCATION/TRAINING PROGRAM

## 2023-04-18 NOTE — PROGRESS NOTES
[address]            Radiation Oncology          [address] [facility]          Michael Guidry, [address]        [facility]: [phone]        F: [phone]       [employer] [address] [place] [address]       Radiation Oncology   [address] [facility], [address]       Masha Cortescumb: [phone]       F: [phone]       [email or web address]      Date of Service: 2023     Location:  [address] [place] [address] [place],   [address] [facility]., HostDavid Stover   [phone]        RADIATION ONCOLOGY FOLLOW UP NOTE    Patient ID:   Elfreda Hatchet  : 1940   MRN: [id number]    DIAGNOSIS:   Cancer Staging   Malignant neoplasm of prostate [facility])  Staging form: Prostate, AJCC 8th Edition  - Clinical stage from 2019: Stage IIC (cT1c, cN0, cM0, PSA: 8.4, Grade Group: 3) - Signed by Iliana Rea MD on 2019        INTERVAL HISTORY:   Elfreda Hatchet is a 79 y.o.. male with a history of unfavorable intermediate risk prostate cancer, s/p radiation and concurrent short course ADT, completing in 2020, who returns today in 3 year follow up. He has no new urinary complaints, he continues to take alfuzosin and follows with Urology. He continues to undergo PSA testing, most recently on 23 which was undetectable.         AUA Score: 9 - feels that his symptoms are largely unchanged, though maybe slight worsening of weak stream.     MEDICATIONS:    Current Outpatient Medications:     gabapentin (NEURONTIN) 600 MG tablet, TAKE ONE TABLET BY MOUTH TWICE A DAY, Disp: 180 tablet, Rfl: 0    lisinopril (PRINIVIL;ZESTRIL) 40 MG tablet, Take 0.5 tablets by mouth daily, Disp: 30 tablet, Rfl: 3    amLODIPine (NORVASC) 10 MG tablet, Take 1 tablet by mouth daily, Disp: 30 tablet, Rfl: 3    pantoprazole (PROTONIX) 40 MG tablet, Take 1 tablet by mouth 2 times daily (before meals), Disp: 30 tablet, Rfl: 3    vitamin D (ERGOCALCIFEROL) 1.25 MG (16587 UT) CAPS capsule,

## 2023-04-18 NOTE — PROGRESS NOTES
Swati Stacks  4/18/2023  2:36 PM      Vitals:    04/18/23 1415   BP: 138/71   Pulse: 71   Resp: 18   Temp: 97.2 °F (36.2 °C)    :     Pain Assessment: None - Denies Pain          Wt Readings from Last 1 Encounters:   04/18/23 191 lb 3.2 oz (86.7 kg)                Current Outpatient Medications:     gabapentin (NEURONTIN) 600 MG tablet, TAKE ONE TABLET BY MOUTH TWICE A DAY, Disp: 180 tablet, Rfl: 0    lisinopril (PRINIVIL;ZESTRIL) 40 MG tablet, Take 0.5 tablets by mouth daily, Disp: 30 tablet, Rfl: 3    amLODIPine (NORVASC) 10 MG tablet, Take 1 tablet by mouth daily, Disp: 30 tablet, Rfl: 3    pantoprazole (PROTONIX) 40 MG tablet, Take 1 tablet by mouth 2 times daily (before meals), Disp: 30 tablet, Rfl: 3    vitamin D (ERGOCALCIFEROL) 1.25 MG (79241 UT) CAPS capsule, Take 1 capsule by mouth once a week for 8 doses, Disp: 8 capsule, Rfl: 0    clopidogrel (PLAVIX) 75 MG tablet, Take 75 mg by mouth daily, Disp: , Rfl:     Multiple Vitamins-Minerals (THERAPEUTIC MULTIVITAMIN-MINERALS) tablet, Take 1 tablet by mouth daily, Disp: , Rfl:     ammonium lactate (LAC-HYDRIN) 12 % cream, Apply topically as needed. , Disp: 1 Bottle, Rfl: 3    nitroGLYCERIN (NITROSTAT) 0.4 MG SL tablet, Place 1 tablet under the tongue every 5 minutes as needed for Chest pain up to max of 3 total doses. If no relief after 1 dose, call 911., Disp: 25 tablet, Rfl: 3    fluticasone (FLONASE) 50 MCG/ACT nasal spray, 1 spray by Nasal route daily as needed , Disp: , Rfl:     sertraline (ZOLOFT) 25 MG tablet, Take 25 mg by mouth daily. , Disp: , Rfl:     furosemide (LASIX) 40 MG tablet, Take 40 mg by mouth daily. , Disp: , Rfl:     aspirin EC 81 MG EC tablet, Take 1 tablet by mouth daily. , Disp: 30 tablet, Rfl: 6    simvastatin (ZOCOR) 40 MG tablet, Take 20 mg by mouth nightly , Disp: , Rfl:     alfuzosin (UROXATRAL) 10 MG SR tablet, Take 10 mg by mouth daily. , Disp: , Rfl:     metoprolol (TOPROL-XL) 100 MG XL tablet, Take 100 mg by mouth daily. , Disp: ,

## 2023-07-05 ENCOUNTER — OFFICE VISIT (OUTPATIENT)
Dept: PODIATRY | Age: 83
End: 2023-07-05
Payer: MEDICARE

## 2023-07-05 VITALS — WEIGHT: 187 LBS | HEIGHT: 67 IN | BODY MASS INDEX: 29.35 KG/M2

## 2023-07-05 DIAGNOSIS — L60.0 INGROWN NAIL: ICD-10-CM

## 2023-07-05 DIAGNOSIS — I73.9 PERIPHERAL VASCULAR DISORDER (HCC): ICD-10-CM

## 2023-07-05 DIAGNOSIS — B35.1 DERMATOPHYTOSIS OF NAIL: Primary | ICD-10-CM

## 2023-07-05 DIAGNOSIS — M79.671 PAIN IN BOTH FEET: ICD-10-CM

## 2023-07-05 DIAGNOSIS — M79.672 PAIN IN BOTH FEET: ICD-10-CM

## 2023-07-05 PROCEDURE — 1123F ACP DISCUSS/DSCN MKR DOCD: CPT | Performed by: PODIATRIST

## 2023-07-05 PROCEDURE — 11721 DEBRIDE NAIL 6 OR MORE: CPT | Performed by: PODIATRIST

## 2023-07-05 PROCEDURE — 99213 OFFICE O/P EST LOW 20 MIN: CPT | Performed by: PODIATRIST

## 2023-07-05 RX ORDER — OFLOXACIN 3 MG/ML
SOLUTION/ DROPS OPHTHALMIC
COMMUNITY
Start: 2023-06-02

## 2023-07-05 RX ORDER — FLUOROURACIL 50 MG/G
CREAM TOPICAL
COMMUNITY
Start: 2023-06-16

## 2023-07-05 RX ORDER — TAMSULOSIN HYDROCHLORIDE 0.4 MG/1
CAPSULE ORAL
COMMUNITY
Start: 2023-06-08

## 2023-07-05 RX ORDER — LISINOPRIL 20 MG/1
TABLET ORAL
COMMUNITY
Start: 2023-06-08

## 2023-07-05 RX ORDER — SIMVASTATIN 20 MG
TABLET ORAL
COMMUNITY
Start: 2023-06-08

## 2023-07-05 RX ORDER — MULTIVITAMIN
TABLET ORAL
COMMUNITY
Start: 2023-06-08

## 2023-08-05 ENCOUNTER — HOSPITAL ENCOUNTER (OUTPATIENT)
Facility: HOSPITAL | Age: 83
Discharge: HOME | End: 2023-08-06
Attending: STUDENT IN AN ORGANIZED HEALTH CARE EDUCATION/TRAINING PROGRAM | Admitting: FAMILY MEDICINE
Payer: MEDICARE

## 2023-08-05 ENCOUNTER — APPOINTMENT (OUTPATIENT)
Dept: GENERAL RADIOLOGY | Facility: HOSPITAL | Age: 83
End: 2023-08-05
Payer: MEDICARE

## 2023-08-05 DIAGNOSIS — I20.0 UNSTABLE ANGINA PECTORIS: Primary | ICD-10-CM

## 2023-08-05 DIAGNOSIS — R07.9 CHEST PAIN, UNSPECIFIED TYPE: ICD-10-CM

## 2023-08-05 DIAGNOSIS — Z86.79 HISTORY OF CORONARY ARTERY DISEASE: ICD-10-CM

## 2023-08-05 LAB
ALBUMIN SERPL-MCNC: 3.8 G/DL (ref 3.5–5.2)
ALBUMIN/GLOB SERPL: 1.3 G/DL
ALP SERPL-CCNC: 86 U/L (ref 39–117)
ALT SERPL W P-5'-P-CCNC: 22 U/L (ref 1–41)
ANION GAP SERPL CALCULATED.3IONS-SCNC: 11.1 MMOL/L (ref 5–15)
APTT PPP: 32.4 SECONDS (ref 70–100)
AST SERPL-CCNC: 26 U/L (ref 1–40)
BASOPHILS # BLD AUTO: 0.02 10*3/MM3 (ref 0–0.2)
BASOPHILS NFR BLD AUTO: 0.4 % (ref 0–1.5)
BILIRUB SERPL-MCNC: 0.2 MG/DL (ref 0–1.2)
BUN SERPL-MCNC: 15 MG/DL (ref 8–23)
BUN/CREAT SERPL: 13.3 (ref 7–25)
CALCIUM SPEC-SCNC: 8.6 MG/DL (ref 8.6–10.5)
CHLORIDE SERPL-SCNC: 99 MMOL/L (ref 98–107)
CO2 SERPL-SCNC: 30.9 MMOL/L (ref 22–29)
CREAT SERPL-MCNC: 1.13 MG/DL (ref 0.76–1.27)
DEPRECATED RDW RBC AUTO: 45 FL (ref 37–54)
EGFRCR SERPLBLD CKD-EPI 2021: 64.1 ML/MIN/1.73
EOSINOPHIL # BLD AUTO: 0.14 10*3/MM3 (ref 0–0.4)
EOSINOPHIL NFR BLD AUTO: 3.1 % (ref 0.3–6.2)
ERYTHROCYTE [DISTWIDTH] IN BLOOD BY AUTOMATED COUNT: 14.6 % (ref 12.3–15.4)
FLUAV RNA RESP QL NAA+PROBE: NOT DETECTED
FLUBV RNA RESP QL NAA+PROBE: NOT DETECTED
GEN 5 2HR TROPONIN T REFLEX: 34 NG/L
GLOBULIN UR ELPH-MCNC: 2.9 GM/DL
GLUCOSE SERPL-MCNC: 117 MG/DL (ref 65–99)
HCT VFR BLD AUTO: 36.6 % (ref 37.5–51)
HGB BLD-MCNC: 12 G/DL (ref 13–17.7)
HOLD SPECIMEN: NORMAL
HOLD SPECIMEN: NORMAL
IMM GRANULOCYTES # BLD AUTO: 0.02 10*3/MM3 (ref 0–0.05)
IMM GRANULOCYTES NFR BLD AUTO: 0.4 % (ref 0–0.5)
INR PPP: 1.11 (ref 0.9–1.1)
LYMPHOCYTES # BLD AUTO: 0.66 10*3/MM3 (ref 0.7–3.1)
LYMPHOCYTES NFR BLD AUTO: 14.7 % (ref 19.6–45.3)
MCH RBC QN AUTO: 27.5 PG (ref 26.6–33)
MCHC RBC AUTO-ENTMCNC: 32.8 G/DL (ref 31.5–35.7)
MCV RBC AUTO: 83.9 FL (ref 79–97)
MONOCYTES # BLD AUTO: 0.47 10*3/MM3 (ref 0.1–0.9)
MONOCYTES NFR BLD AUTO: 10.5 % (ref 5–12)
NEUTROPHILS NFR BLD AUTO: 3.17 10*3/MM3 (ref 1.7–7)
NEUTROPHILS NFR BLD AUTO: 70.9 % (ref 42.7–76)
NRBC BLD AUTO-RTO: 0 /100 WBC (ref 0–0.2)
PLATELET # BLD AUTO: 166 10*3/MM3 (ref 140–450)
PMV BLD AUTO: 9.6 FL (ref 6–12)
POTASSIUM SERPL-SCNC: 3.5 MMOL/L (ref 3.5–5.2)
PROT SERPL-MCNC: 6.7 G/DL (ref 6–8.5)
PROTHROMBIN TIME: 14.9 SECONDS (ref 12.3–15.1)
RBC # BLD AUTO: 4.36 10*6/MM3 (ref 4.14–5.8)
SARS-COV-2 RNA RESP QL NAA+PROBE: NOT DETECTED
SODIUM SERPL-SCNC: 141 MMOL/L (ref 136–145)
TROPONIN T DELTA: 11 NG/L
TROPONIN T SERPL HS-MCNC: 23 NG/L
UFH PPP CHRO-ACNC: 0.1 IU/ML (ref 0.3–0.7)
WBC NRBC COR # BLD: 4.48 10*3/MM3 (ref 3.4–10.8)
WHOLE BLOOD HOLD COAG: NORMAL
WHOLE BLOOD HOLD SPECIMEN: NORMAL

## 2023-08-05 PROCEDURE — C1887 CATHETER, GUIDING: HCPCS | Performed by: INTERNAL MEDICINE

## 2023-08-05 PROCEDURE — 25010000002 HEPARIN (PORCINE) PER 1000 UNITS: Performed by: FAMILY MEDICINE

## 2023-08-05 PROCEDURE — 96376 TX/PRO/DX INJ SAME DRUG ADON: CPT

## 2023-08-05 PROCEDURE — 63710000001 ALUMINUM-MAGNESIUM HYDROXIDE-SIMETHICONE 400-400-40 MG/5ML SUSPENSION: Performed by: INTERNAL MEDICINE

## 2023-08-05 PROCEDURE — 85730 THROMBOPLASTIN TIME PARTIAL: CPT | Performed by: EMERGENCY MEDICINE

## 2023-08-05 PROCEDURE — A9270 NON-COVERED ITEM OR SERVICE: HCPCS | Performed by: INTERNAL MEDICINE

## 2023-08-05 PROCEDURE — 99284 EMERGENCY DEPT VISIT MOD MDM: CPT

## 2023-08-05 PROCEDURE — 99204 OFFICE O/P NEW MOD 45 MIN: CPT | Performed by: INTERNAL MEDICINE

## 2023-08-05 PROCEDURE — 85520 HEPARIN ASSAY: CPT | Performed by: EMERGENCY MEDICINE

## 2023-08-05 PROCEDURE — 93567 NJX CAR CTH SPRVLV AORTGRPHY: CPT | Performed by: INTERNAL MEDICINE

## 2023-08-05 PROCEDURE — 63710000001 LISINOPRIL 20 MG TABLET: Performed by: INTERNAL MEDICINE

## 2023-08-05 PROCEDURE — 25510000001 IOPAMIDOL PER 1 ML: Performed by: INTERNAL MEDICINE

## 2023-08-05 PROCEDURE — 36415 COLL VENOUS BLD VENIPUNCTURE: CPT

## 2023-08-05 PROCEDURE — G0378 HOSPITAL OBSERVATION PER HR: HCPCS

## 2023-08-05 PROCEDURE — 85025 COMPLETE CBC W/AUTO DIFF WBC: CPT | Performed by: STUDENT IN AN ORGANIZED HEALTH CARE EDUCATION/TRAINING PROGRAM

## 2023-08-05 PROCEDURE — 93005 ELECTROCARDIOGRAM TRACING: CPT | Performed by: STUDENT IN AN ORGANIZED HEALTH CARE EDUCATION/TRAINING PROGRAM

## 2023-08-05 PROCEDURE — 71045 X-RAY EXAM CHEST 1 VIEW: CPT

## 2023-08-05 PROCEDURE — 93455 CORONARY ART/GRFT ANGIO S&I: CPT | Performed by: INTERNAL MEDICINE

## 2023-08-05 PROCEDURE — 63710000001 TEMAZEPAM 15 MG CAPSULE: Performed by: INTERNAL MEDICINE

## 2023-08-05 PROCEDURE — C1894 INTRO/SHEATH, NON-LASER: HCPCS | Performed by: INTERNAL MEDICINE

## 2023-08-05 PROCEDURE — 63710000001 ATORVASTATIN 80 MG TABLET: Performed by: INTERNAL MEDICINE

## 2023-08-05 PROCEDURE — 80053 COMPREHEN METABOLIC PANEL: CPT | Performed by: STUDENT IN AN ORGANIZED HEALTH CARE EDUCATION/TRAINING PROGRAM

## 2023-08-05 PROCEDURE — 96366 THER/PROPH/DIAG IV INF ADDON: CPT

## 2023-08-05 PROCEDURE — 84484 ASSAY OF TROPONIN QUANT: CPT | Performed by: STUDENT IN AN ORGANIZED HEALTH CARE EDUCATION/TRAINING PROGRAM

## 2023-08-05 PROCEDURE — 96365 THER/PROPH/DIAG IV INF INIT: CPT

## 2023-08-05 PROCEDURE — 25010000002 ONDANSETRON PER 1 MG: Performed by: INTERNAL MEDICINE

## 2023-08-05 PROCEDURE — 25010000002 HEPARIN (PORCINE) PER 1000 UNITS: Performed by: EMERGENCY MEDICINE

## 2023-08-05 PROCEDURE — 63710000001 ISOSORBIDE MONONITRATE 60 MG TABLET SUSTAINED-RELEASE 24 HOUR: Performed by: INTERNAL MEDICINE

## 2023-08-05 PROCEDURE — C1769 GUIDE WIRE: HCPCS | Performed by: INTERNAL MEDICINE

## 2023-08-05 PROCEDURE — 25010000002 HEPARIN (PORCINE) PER 1000 UNITS: Performed by: INTERNAL MEDICINE

## 2023-08-05 PROCEDURE — 63710000001 RANOLAZINE 500 MG TABLET SUSTAINED-RELEASE 12 HOUR: Performed by: INTERNAL MEDICINE

## 2023-08-05 PROCEDURE — 85610 PROTHROMBIN TIME: CPT | Performed by: EMERGENCY MEDICINE

## 2023-08-05 PROCEDURE — 0 LIDOCAINE 1 % SOLUTION: Performed by: INTERNAL MEDICINE

## 2023-08-05 PROCEDURE — 25010000002 NITROGLYCERIN 100-5 MCG/ML-% SOLUTION: Performed by: INTERNAL MEDICINE

## 2023-08-05 PROCEDURE — 87636 SARSCOV2 & INF A&B AMP PRB: CPT | Performed by: STUDENT IN AN ORGANIZED HEALTH CARE EDUCATION/TRAINING PROGRAM

## 2023-08-05 RX ORDER — ONDANSETRON 4 MG/1
4 TABLET, FILM COATED ORAL EVERY 6 HOURS PRN
Status: DISCONTINUED | OUTPATIENT
Start: 2023-08-05 | End: 2023-08-05 | Stop reason: SDUPTHER

## 2023-08-05 RX ORDER — CLOPIDOGREL BISULFATE 75 MG/1
75 TABLET ORAL DAILY
COMMUNITY

## 2023-08-05 RX ORDER — HEPARIN SODIUM 1000 [USP'U]/ML
4000 INJECTION, SOLUTION INTRAVENOUS; SUBCUTANEOUS ONCE
Status: COMPLETED | OUTPATIENT
Start: 2023-08-05 | End: 2023-08-05

## 2023-08-05 RX ORDER — ALUMINA, MAGNESIA, AND SIMETHICONE 2400; 2400; 240 MG/30ML; MG/30ML; MG/30ML
15 SUSPENSION ORAL EVERY 6 HOURS PRN
Status: DISCONTINUED | OUTPATIENT
Start: 2023-08-05 | End: 2023-08-06 | Stop reason: HOSPADM

## 2023-08-05 RX ORDER — ASPIRIN 81 MG/1
81 TABLET, CHEWABLE ORAL DAILY
Status: DISCONTINUED | OUTPATIENT
Start: 2023-08-06 | End: 2023-08-06 | Stop reason: HOSPADM

## 2023-08-05 RX ORDER — SODIUM CHLORIDE 0.9 % (FLUSH) 0.9 %
10 SYRINGE (ML) INJECTION AS NEEDED
Status: DISCONTINUED | OUTPATIENT
Start: 2023-08-05 | End: 2023-08-06 | Stop reason: HOSPADM

## 2023-08-05 RX ORDER — HEPARIN SODIUM 1000 [USP'U]/ML
INJECTION, SOLUTION INTRAVENOUS; SUBCUTANEOUS
Status: DISCONTINUED | OUTPATIENT
Start: 2023-08-05 | End: 2023-08-05 | Stop reason: HOSPADM

## 2023-08-05 RX ORDER — ATORVASTATIN CALCIUM 80 MG/1
80 TABLET, FILM COATED ORAL NIGHTLY
Status: DISCONTINUED | OUTPATIENT
Start: 2023-08-05 | End: 2023-08-06 | Stop reason: HOSPADM

## 2023-08-05 RX ORDER — PANTOPRAZOLE SODIUM 40 MG/1
40 TABLET, DELAYED RELEASE ORAL DAILY
Status: DISCONTINUED | OUTPATIENT
Start: 2023-08-06 | End: 2023-08-06 | Stop reason: HOSPADM

## 2023-08-05 RX ORDER — POLYETHYLENE GLYCOL 3350 17 G/17G
17 POWDER, FOR SOLUTION ORAL DAILY PRN
Status: DISCONTINUED | OUTPATIENT
Start: 2023-08-05 | End: 2023-08-06 | Stop reason: HOSPADM

## 2023-08-05 RX ORDER — LISINOPRIL 20 MG/1
20 TABLET ORAL DAILY
COMMUNITY

## 2023-08-05 RX ORDER — ONDANSETRON 4 MG/1
4 TABLET, FILM COATED ORAL EVERY 6 HOURS PRN
Status: DISCONTINUED | OUTPATIENT
Start: 2023-08-05 | End: 2023-08-06 | Stop reason: HOSPADM

## 2023-08-05 RX ORDER — AMLODIPINE BESYLATE 5 MG/1
10 TABLET ORAL DAILY
Status: DISCONTINUED | OUTPATIENT
Start: 2023-08-06 | End: 2023-08-06 | Stop reason: HOSPADM

## 2023-08-05 RX ORDER — CLOPIDOGREL BISULFATE 75 MG/1
75 TABLET ORAL DAILY
Status: DISCONTINUED | OUTPATIENT
Start: 2023-08-06 | End: 2023-08-06 | Stop reason: HOSPADM

## 2023-08-05 RX ORDER — BISACODYL 5 MG/1
5 TABLET, DELAYED RELEASE ORAL DAILY PRN
Status: DISCONTINUED | OUTPATIENT
Start: 2023-08-05 | End: 2023-08-06 | Stop reason: HOSPADM

## 2023-08-05 RX ORDER — LISINOPRIL 20 MG/1
20 TABLET ORAL DAILY
Status: DISCONTINUED | OUTPATIENT
Start: 2023-08-05 | End: 2023-08-06 | Stop reason: HOSPADM

## 2023-08-05 RX ORDER — SERTRALINE HYDROCHLORIDE 25 MG/1
25 TABLET, FILM COATED ORAL DAILY
COMMUNITY

## 2023-08-05 RX ORDER — AMLODIPINE BESYLATE 10 MG/1
10 TABLET ORAL DAILY
COMMUNITY

## 2023-08-05 RX ORDER — SODIUM CHLORIDE 9 MG/ML
40 INJECTION, SOLUTION INTRAVENOUS AS NEEDED
Status: DISCONTINUED | OUTPATIENT
Start: 2023-08-05 | End: 2023-08-06 | Stop reason: HOSPADM

## 2023-08-05 RX ORDER — SODIUM CHLORIDE 9 MG/ML
10 INJECTION, SOLUTION INTRAVENOUS CONTINUOUS
Status: ACTIVE | OUTPATIENT
Start: 2023-08-05 | End: 2023-08-05

## 2023-08-05 RX ORDER — NITROGLYCERIN 0.4 MG/1
0.4 TABLET SUBLINGUAL
Status: DISCONTINUED | OUTPATIENT
Start: 2023-08-05 | End: 2023-08-06 | Stop reason: HOSPADM

## 2023-08-05 RX ORDER — ATORVASTATIN CALCIUM 20 MG/1
20 TABLET, FILM COATED ORAL NIGHTLY
Status: DISCONTINUED | OUTPATIENT
Start: 2023-08-05 | End: 2023-08-05

## 2023-08-05 RX ORDER — ONDANSETRON 2 MG/ML
4 INJECTION INTRAMUSCULAR; INTRAVENOUS EVERY 6 HOURS PRN
Status: DISCONTINUED | OUTPATIENT
Start: 2023-08-05 | End: 2023-08-05 | Stop reason: SDUPTHER

## 2023-08-05 RX ORDER — METOPROLOL SUCCINATE 100 MG/1
200 TABLET, EXTENDED RELEASE ORAL DAILY
Status: DISCONTINUED | OUTPATIENT
Start: 2023-08-06 | End: 2023-08-06 | Stop reason: HOSPADM

## 2023-08-05 RX ORDER — MULTIPLE VITAMINS W/ MINERALS TAB 9MG-400MCG
1 TAB ORAL DAILY
COMMUNITY

## 2023-08-05 RX ORDER — ISOSORBIDE MONONITRATE 60 MG/1
30 TABLET, EXTENDED RELEASE ORAL
Status: DISCONTINUED | OUTPATIENT
Start: 2023-08-05 | End: 2023-08-06 | Stop reason: HOSPADM

## 2023-08-05 RX ORDER — HYDROCODONE BITARTRATE AND ACETAMINOPHEN 5; 325 MG/1; MG/1
1 TABLET ORAL EVERY 4 HOURS PRN
Status: DISCONTINUED | OUTPATIENT
Start: 2023-08-05 | End: 2023-08-06 | Stop reason: HOSPADM

## 2023-08-05 RX ORDER — RANOLAZINE 500 MG/1
500 TABLET, EXTENDED RELEASE ORAL EVERY 12 HOURS SCHEDULED
Status: DISCONTINUED | OUTPATIENT
Start: 2023-08-05 | End: 2023-08-06 | Stop reason: HOSPADM

## 2023-08-05 RX ORDER — FUROSEMIDE 40 MG/1
40 TABLET ORAL 2 TIMES DAILY
COMMUNITY

## 2023-08-05 RX ORDER — SODIUM CHLORIDE 0.9 % (FLUSH) 0.9 %
10 SYRINGE (ML) INJECTION EVERY 12 HOURS SCHEDULED
Status: DISCONTINUED | OUTPATIENT
Start: 2023-08-05 | End: 2023-08-06 | Stop reason: HOSPADM

## 2023-08-05 RX ORDER — TEMAZEPAM 15 MG/1
15 CAPSULE ORAL NIGHTLY PRN
Status: DISCONTINUED | OUTPATIENT
Start: 2023-08-05 | End: 2023-08-06 | Stop reason: HOSPADM

## 2023-08-05 RX ORDER — FUROSEMIDE 40 MG/1
40 TABLET ORAL 2 TIMES DAILY
Status: DISCONTINUED | OUTPATIENT
Start: 2023-08-05 | End: 2023-08-06 | Stop reason: HOSPADM

## 2023-08-05 RX ORDER — PANTOPRAZOLE SODIUM 40 MG/1
40 TABLET, DELAYED RELEASE ORAL DAILY
COMMUNITY

## 2023-08-05 RX ORDER — BISACODYL 10 MG
10 SUPPOSITORY, RECTAL RECTAL DAILY PRN
Status: DISCONTINUED | OUTPATIENT
Start: 2023-08-05 | End: 2023-08-06 | Stop reason: HOSPADM

## 2023-08-05 RX ORDER — MORPHINE SULFATE 4 MG/ML
4 INJECTION, SOLUTION INTRAMUSCULAR; INTRAVENOUS
Status: DISCONTINUED | OUTPATIENT
Start: 2023-08-05 | End: 2023-08-06 | Stop reason: HOSPADM

## 2023-08-05 RX ORDER — HEPARIN SODIUM 1000 [USP'U]/ML
2000 INJECTION, SOLUTION INTRAVENOUS; SUBCUTANEOUS AS NEEDED
Status: DISCONTINUED | OUTPATIENT
Start: 2023-08-05 | End: 2023-08-05

## 2023-08-05 RX ORDER — METOPROLOL SUCCINATE 100 MG/1
100 TABLET, EXTENDED RELEASE ORAL DAILY
COMMUNITY

## 2023-08-05 RX ORDER — METOPROLOL SUCCINATE 100 MG/1
100 TABLET, EXTENDED RELEASE ORAL DAILY
Status: DISCONTINUED | OUTPATIENT
Start: 2023-08-06 | End: 2023-08-05

## 2023-08-05 RX ORDER — LIDOCAINE HYDROCHLORIDE 10 MG/ML
INJECTION, SOLUTION INFILTRATION; PERINEURAL
Status: DISCONTINUED | OUTPATIENT
Start: 2023-08-05 | End: 2023-08-05 | Stop reason: HOSPADM

## 2023-08-05 RX ORDER — SIMVASTATIN 20 MG
20 TABLET ORAL NIGHTLY
COMMUNITY

## 2023-08-05 RX ORDER — AMOXICILLIN 250 MG
2 CAPSULE ORAL 2 TIMES DAILY
Status: DISCONTINUED | OUTPATIENT
Start: 2023-08-05 | End: 2023-08-06 | Stop reason: HOSPADM

## 2023-08-05 RX ORDER — ALPRAZOLAM 0.25 MG/1
0.25 TABLET ORAL 3 TIMES DAILY PRN
Status: DISCONTINUED | OUTPATIENT
Start: 2023-08-05 | End: 2023-08-06 | Stop reason: HOSPADM

## 2023-08-05 RX ORDER — ACETAMINOPHEN 325 MG/1
650 TABLET ORAL EVERY 4 HOURS PRN
Status: DISCONTINUED | OUTPATIENT
Start: 2023-08-05 | End: 2023-08-06 | Stop reason: HOSPADM

## 2023-08-05 RX ORDER — HEPARIN SODIUM 1000 [USP'U]/ML
4000 INJECTION, SOLUTION INTRAVENOUS; SUBCUTANEOUS AS NEEDED
Status: DISCONTINUED | OUTPATIENT
Start: 2023-08-05 | End: 2023-08-05

## 2023-08-05 RX ORDER — VERAPAMIL HYDROCHLORIDE 2.5 MG/ML
INJECTION, SOLUTION INTRAVENOUS
Status: DISCONTINUED | OUTPATIENT
Start: 2023-08-05 | End: 2023-08-05 | Stop reason: HOSPADM

## 2023-08-05 RX ORDER — HEPARIN SODIUM 10000 [USP'U]/100ML
12 INJECTION, SOLUTION INTRAVENOUS
Status: DISCONTINUED | OUTPATIENT
Start: 2023-08-05 | End: 2023-08-05

## 2023-08-05 RX ORDER — ASPIRIN 81 MG/1
81 TABLET, CHEWABLE ORAL DAILY
COMMUNITY

## 2023-08-05 RX ORDER — ONDANSETRON 2 MG/ML
4 INJECTION INTRAMUSCULAR; INTRAVENOUS EVERY 6 HOURS PRN
Status: DISCONTINUED | OUTPATIENT
Start: 2023-08-05 | End: 2023-08-06 | Stop reason: HOSPADM

## 2023-08-05 RX ORDER — NALOXONE HCL 0.4 MG/ML
0.4 VIAL (ML) INJECTION
Status: DISCONTINUED | OUTPATIENT
Start: 2023-08-05 | End: 2023-08-06 | Stop reason: HOSPADM

## 2023-08-05 RX ORDER — DIPHENHYDRAMINE HYDROCHLORIDE 50 MG/ML
25 INJECTION INTRAMUSCULAR; INTRAVENOUS EVERY 6 HOURS PRN
Status: DISCONTINUED | OUTPATIENT
Start: 2023-08-05 | End: 2023-08-06 | Stop reason: HOSPADM

## 2023-08-05 RX ADMIN — ALUMINUM HYDROXIDE, MAGNESIUM HYDROXIDE, AND DIMETHICONE 15 ML: 400; 400; 40 SUSPENSION ORAL at 21:55

## 2023-08-05 RX ADMIN — ATORVASTATIN CALCIUM 80 MG: 80 TABLET, FILM COATED ORAL at 20:25

## 2023-08-05 RX ADMIN — Medication 10 ML: at 12:00

## 2023-08-05 RX ADMIN — HEPARIN SODIUM 4000 UNITS: 1000 INJECTION INTRAVENOUS; SUBCUTANEOUS at 09:53

## 2023-08-05 RX ADMIN — RANOLAZINE 500 MG: 500 TABLET, FILM COATED, EXTENDED RELEASE ORAL at 14:48

## 2023-08-05 RX ADMIN — RANOLAZINE 500 MG: 500 TABLET, FILM COATED, EXTENDED RELEASE ORAL at 20:25

## 2023-08-05 RX ADMIN — ISOSORBIDE MONONITRATE 30 MG: 60 TABLET, EXTENDED RELEASE ORAL at 14:48

## 2023-08-05 RX ADMIN — ONDANSETRON 4 MG: 2 INJECTION INTRAMUSCULAR; INTRAVENOUS at 18:17

## 2023-08-05 RX ADMIN — Medication 10 ML: at 20:25

## 2023-08-05 RX ADMIN — LISINOPRIL 20 MG: 20 TABLET ORAL at 14:48

## 2023-08-05 RX ADMIN — TEMAZEPAM 15 MG: 15 CAPSULE ORAL at 20:25

## 2023-08-05 RX ADMIN — HEPARIN SODIUM AND DEXTROSE 12 UNITS/KG/HR: 10000; 5 INJECTION INTRAVENOUS at 09:54

## 2023-08-05 NOTE — PLAN OF CARE
Problem: Adult Inpatient Plan of Care  Goal: Plan of Care Review  Outcome: Ongoing, Progressing  Flowsheets (Taken 8/5/2023 7897)  Progress: no change  Plan of Care Reviewed With: patient   Goal Outcome Evaluation:  Plan of Care Reviewed With: patient        Progress: no change          VSS. Status post heart cath. TR band removed, dressing clean dry and intact at this time. No chest pain noted. Plan of care reviewed with patient and family. Will continue to monitor.

## 2023-08-05 NOTE — Clinical Note
The aortic root was injected and visualized. Rate = 25 mL/sec. Total volume = 50 mL. 1200 PSI not 999. Computer will not allow a number greater At 999 PSI.

## 2023-08-05 NOTE — CONSULTS
"[facility]iology Consult Note    Referring Provider: Ne  Reason for Consultation: Non-ST elevation myocardial infarction    Patient Care Team:  System, Provider Not In as PCP - General    Chief complaint : Chest pain    Subjective:    History of present illness: This is an 83-year-old male patient who presents to the emergency room with an episode of severe chest discomfort this morning.  The patient indicates he took multiple sublingual nitroglycerin.  He indicates the quality of the discomfort was the same as his usual \"angina\".  The patient indicates he has not used any sublingual nitroglycerin over the last year.  He has a history of coronary artery disease with a two-vessel CABG in 1994.  He reports having 6 coronary stent procedures since his bypass surgery.  He reports that his last coronary stents were placed in December of last year.  The patient was visiting from [facility] at a local wedding.  He has a history of hypertension but no personal history of diabetes.  He is a non-smoker.  His twelve-lead electrocardiogram showed normal sinus rhythm with a left bundle branch block.  There are no old EKGs for comparison.  His initial cardiac troponin was elevated and has trended upward.    Review of Systems   Review of Systems   Constitutional: Negative for chills, diaphoresis, fever, malaise/fatigue, weight gain and weight loss.   HENT:  Negative for ear discharge, hearing loss, hoarse voice and nosebleeds.    Eyes:  Negative for discharge, double vision, pain and photophobia.   Cardiovascular:  Positive for chest pain. Negative for claudication, cyanosis, dyspnea on exertion, irregular heartbeat, leg swelling, near-syncope, orthopnea, palpitations, paroxysmal nocturnal dyspnea and syncope.   Respiratory:  Negative for cough, hemoptysis, sputum production and wheezing.    Endocrine: Negative for cold intolerance, heat intolerance, polydipsia, polyphagia and polyuria.   Hematologic/Lymphatic: Negative for " adenopathy and bleeding problem. Does not bruise/bleed easily.   Skin:  Negative for color change, flushing, itching and rash.   Musculoskeletal:  Negative for muscle cramps, muscle weakness, myalgias and stiffness.   Gastrointestinal:  Negative for abdominal pain, diarrhea, hematemesis, hematochezia, nausea and vomiting.   Genitourinary:  Negative for dysuria, frequency and nocturia.   Neurological:  Negative for focal weakness, loss of balance, numbness, paresthesias and seizures.   Psychiatric/Behavioral:  Negative for altered mental status, hallucinations and suicidal ideas.    Allergic/Immunologic: Negative for HIV exposure, hives and persistent infections.     History  Past Medical History:   Diagnosis Date    Anxiety     Hypertension     MI (myocardial infarction)    ,   Past Surgical History:   Procedure Laterality Date    CORONARY ANGIOPLASTY WITH STENT PLACEMENT     , History reviewed. No pertinent family history.,   Social History     Tobacco Use    Smoking status: Never    Smokeless tobacco: Never   Vaping Use    Vaping Use: Never used   Substance Use Topics    Alcohol use: Yes     Comment: social    Drug use: Never   ,   Medications Prior to Admission   Medication Sig Dispense Refill Last Dose    amLODIPine (NORVASC) 10 MG tablet Take 1 tablet by mouth Daily.       aspirin 81 MG chewable tablet Chew 1 tablet Daily.       clopidogrel (PLAVIX) 75 MG tablet Take 1 tablet by mouth Daily.       furosemide (LASIX) 40 MG tablet Take 1 tablet by mouth 2 (Two) Times a Day.       lisinopril (PRINIVIL,ZESTRIL) 20 MG tablet Take 1 tablet by mouth Daily.       metoprolol succinate XL (TOPROL-XL) 100 MG 24 hr tablet Take 1 tablet by mouth Daily.       multivitamin with minerals tablet tablet Take 1 tablet by mouth Daily.       pantoprazole (PROTONIX) 40 MG EC tablet Take 1 tablet by mouth Daily.       sertraline (ZOLOFT) 25 MG tablet Take 1 tablet by mouth Daily.       simvastatin (ZOCOR) 20 MG tablet Take 1 tablet  "by mouth Every Night.       and Allergies:  Oxycodone    Objective:    Vital Sign Min/Max for last 24 hours  Temp  Min: 98 øF (36.7 øC)  Max: 98 øF (36.7 øC)   BP  Min: 123/70  Max: 152/73   Pulse  Min: 67  Max: 81   Resp  Min: 17  Max: 17   SpO2  Min: 90 %  Max: 95 %   No data recorded   Weight  Min: 82.6 kg (182 lb)  Max: 82.6 kg (182 lb)     Flowsheet Rows      Flowsheet Row First Filed Value   Admission Height 170.2 cm (67\") Documented at 08/05/2023 0545   Admission Weight 82.6 kg (182 lb) Documented at 08/05/2023 0545                 Physical Exam:   Vitals and nursing note reviewed.   Constitutional:       Appearance: Healthy appearance. Not in distress.   Neck:      Vascular: No JVR. JVD normal.   Pulmonary:      Effort: Pulmonary effort is normal.      Breath sounds: Normal breath sounds. No wheezing. No rhonchi. No rales.   Chest:      Chest wall: Not tender to palpatation.   Cardiovascular:      PMI at left midclavicular line. Normal rate. Regular rhythm. Normal S1. Normal S2.       Murmurs: There is no murmur.      No gallop.  No click. No rub.   Pulses:     Intact distal pulses.   Edema:     Peripheral edema absent.   Abdominal:      General: Bowel sounds are normal.      Palpations: Abdomen is soft.      Tenderness: There is no abdominal tenderness.   Musculoskeletal: Normal range of motion.         General: No tenderness. Skin:     General: Skin is warm and dry.   Neurological:      General: No focal deficit present.      Mental Status: Alert and oriented to person, place and time.       Results Review:   I reviewed the patient's new clinical results.  Results from last 7 days   Lab Units 08/05/23  0558   WBC 10*3/mm3 4.48   HEMOGLOBIN g/dL 12.0*   HEMATOCRIT % 36.6*   PLATELETS 10*3/mm3 166     Results from last 7 days   Lab Units 08/05/23  0558   SODIUM mmol/L 141   POTASSIUM mmol/L 3.5   CHLORIDE mmol/L 99   CO2 mmol/L 30.9*   BUN mg/dL 15   CREATININE mg/dL 1.13   GLUCOSE mg/dL 117*   CALCIUM mg/dL " 8.6     Lab Results   Lab Value Date/Time    TROPONINT 34 (H) 08/05/2023 0755    TROPONINT 23 (H) 08/05/2023 0558             Assessment/Plan:      Chest pain      I have recommended coronary angiography with interventional standby.  Mr. Dias has been engaged in a patient level discussion explaining the rationale for proceeding with invasive testing/procedures.  The procedure of coronary angiography with catheter-based coronary revascularization has been explained in detail, using layman's terminology, including risks benefits and alternatives.  He expresses understanding and desire to proceed.  Further recommendations will be predicated on the results of his catheterization findings.    I discussed the patient's findings and my recommendations with patient, family, and consulting provider    Merrick Forman MD  08/05/23  10:17 EDT

## 2023-08-05 NOTE — ED PROVIDER NOTES
Subjective:  History of Present Illness:    Patient is an 83-year-old male with history of CAD status post MI and multiple stents, pacemaker defibrillator placement who presents today with chest pain.  Reports that he is slept in an abnormal position and woke up this morning with substernal chest pain.  No radiation.  States intermittent shortness of breath but has no shortness of breath at this time.  Denies any preceding fevers.  No abdominal pain, nausea, vomiting, diarrhea.  Denies any history of DVT or PE.  No unilateral leg swelling or leg pain.      Nurses Notes reviewed and agree, including vitals, allergies, social history and prior medical history.     REVIEW OF SYSTEMS: All systems reviewed and not pertinent unless noted.  Review of Systems   Constitutional:  Positive for activity change. Negative for appetite change, chills, fatigue and fever.   HENT:  Negative for congestion, sinus pressure, sneezing and trouble swallowing.    Eyes:  Negative for discharge and itching.   Respiratory:  Negative for cough and shortness of breath.    Cardiovascular:  Positive for chest pain. Negative for palpitations.   Gastrointestinal:  Negative for abdominal distention, abdominal pain, diarrhea, nausea and vomiting.   Endocrine: Negative for cold intolerance and heat intolerance.   Genitourinary:  Negative for decreased urine volume, dysuria and urgency.   Musculoskeletal:  Negative for gait problem, neck pain and neck stiffness.   Skin:  Negative for color change and rash.   Allergic/Immunologic: Negative for immunocompromised state.   Neurological:  Negative for facial asymmetry and headaches.   Hematological:  Negative for adenopathy.   Psychiatric/Behavioral:  Negative for self-injury and suicidal ideas.      Past Medical History:   Diagnosis Date    Anxiety     Hypertension     MI (myocardial infarction)        Allergies:    Oxycodone      Past Surgical History:   Procedure Laterality Date    CORONARY ANGIOPLASTY  "WITH STENT PLACEMENT           Social History     Socioeconomic History    Marital status:    Tobacco Use    Smoking status: Never    Smokeless tobacco: Never   Vaping Use    Vaping Use: Never used   Substance and Sexual Activity    Alcohol use: Yes     Comment: social    Drug use: Never         History reviewed. No pertinent family history.    Objective  Physical Exam:  /73   Pulse 71   Temp 98 øF (36.7 øC) (Oral)   Resp 17   Ht 170.2 cm (67\")   Wt 82.6 kg (182 lb)   SpO2 91%   BMI 28.51 kg/mý      Physical Exam  Constitutional:       General: He is not in acute distress.     Appearance: Normal appearance. He is obese. He is not ill-appearing.   HENT:      Head: Normocephalic and atraumatic.      Nose: Nose normal. No congestion or rhinorrhea.      Mouth/Throat:      Mouth: Mucous membranes are moist.      Pharynx: Oropharynx is clear.   Eyes:      Extraocular Movements: Extraocular movements intact.      Conjunctiva/sclera: Conjunctivae normal.      Pupils: Pupils are equal, round, and reactive to light.   Cardiovascular:      Rate and Rhythm: Normal rate and regular rhythm.      Pulses: Normal pulses.   Pulmonary:      Effort: Pulmonary effort is normal. No respiratory distress.      Breath sounds: Normal breath sounds.   Abdominal:      General: Abdomen is flat. Bowel sounds are normal. There is no distension.      Palpations: Abdomen is soft.      Tenderness: There is no abdominal tenderness.   Musculoskeletal:         General: No swelling or tenderness. Normal range of motion.      Cervical back: Normal range of motion and neck supple. No rigidity or tenderness.   Skin:     General: Skin is warm and dry.      Capillary Refill: Capillary refill takes less than 2 seconds.   Neurological:      General: No focal deficit present.      Mental Status: He is alert and oriented to person, place, and time. Mental status is at baseline.      Cranial Nerves: No cranial nerve deficit.      Sensory: No " sensory deficit.      Motor: No weakness.   Psychiatric:         Mood and Affect: Mood normal.         Behavior: Behavior normal.         Thought Content: Thought content normal.         Judgment: Judgment normal.       Procedures    ED Course:    ED Course as of 08/05/23 0944   Sat Aug 05, 2023   0551 EKG interpreted by me, sinus rhythm with left bundle branch block, no findings concerning for Sgarbossa criteria, rate of 72 [JE]      ED Course User Index  [JE] Corey Britton MD       Lab Results (last 24 hours)       Procedure Component Value Units Date/Time    CBC & Differential [[id number]]  (Abnormal) Collected: 08/05/23 0558    Specimen: Blood Updated: 08/05/23 0605    Narrative:      The following orders were created for panel order CBC & Differential.  Procedure                               Abnormality         Status                     ---------                               -----------         ------                     CBC Auto Differential[[id number]]        Abnormal            Final result                 Please view results for these tests on the individual orders.    Comprehensive Metabolic Panel [[id number]]  (Abnormal) Collected: 08/05/23 0558    Specimen: Blood Updated: 08/05/23 0630     Glucose 117 mg/dL      BUN 15 mg/dL      Creatinine 1.13 mg/dL      Sodium 141 mmol/L      Potassium 3.5 mmol/L      Chloride 99 mmol/L      CO2 30.9 mmol/L      Calcium 8.6 mg/dL      Total Protein 6.7 g/dL      Albumin 3.8 g/dL      ALT (SGPT) 22 U/L      AST (SGOT) 26 U/L      Alkaline Phosphatase 86 U/L      Total Bilirubin 0.2 mg/dL      Globulin 2.9 gm/dL      A/G Ratio 1.3 g/dL      BUN/Creatinine Ratio 13.3     Anion Gap 11.1 mmol/L      eGFR 64.1 mL/min/1.73     Narrative:      GFR Normal >60  Chronic Kidney Disease <60  Kidney Failure <15    The GFR formula is only valid for adults with stable renal function between ages 18 and 70.    High Sensitivity Troponin T [[id number]]  (Abnormal) Collected:  08/05/23 0558    Specimen: Blood Updated: 08/05/23 0630     HS Troponin T 23 ng/L     Narrative:      High Sensitive Troponin T Reference Range:  <10.0 ng/L- Negative Female for AMI  <15.0 ng/L- Negative Male for AMI  >=10 - Abnormal Female indicating possible myocardial injury.  >=15 - Abnormal Male indicating possible myocardial injury.   Clinicians would have to utilize clinical acumen, EKG, Troponin, and serial changes to determine if it is an Acute Myocardial Infarction or myocardial injury due to an underlying chronic condition.         CBC Auto Differential [[id number]]  (Abnormal) Collected: 08/05/23 0558    Specimen: Blood Updated: 08/05/23 0605     WBC 4.48 10*3/mm3      RBC 4.36 10*6/mm3      Hemoglobin 12.0 g/dL      Hematocrit 36.6 %      MCV 83.9 fL      MCH 27.5 pg      MCHC 32.8 g/dL      RDW 14.6 %      RDW-SD 45.0 fl      MPV 9.6 fL      Platelets 166 10*3/mm3      Neutrophil % 70.9 %      Lymphocyte % 14.7 %      Monocyte % 10.5 %      Eosinophil % 3.1 %      Basophil % 0.4 %      Immature Grans % 0.4 %      Neutrophils, Absolute 3.17 10*3/mm3      Lymphocytes, Absolute 0.66 10*3/mm3      Monocytes, Absolute 0.47 10*3/mm3      Eosinophils, Absolute 0.14 10*3/mm3      Basophils, Absolute 0.02 10*3/mm3      Immature Grans, Absolute 0.02 10*3/mm3      nRBC 0.0 /100 WBC     COVID-19 and FLU A/B PCR - Swab, Nasopharynx [[id number]]  (Normal) Collected: 08/05/23 0559    Specimen: Swab from Nasopharynx Updated: 08/05/23 0625     COVID19 Not Detected     Influenza A PCR Not Detected     Influenza B PCR Not Detected    Narrative:      Fact sheet for providers: https://www.fda.gov/media/986179/download    Fact sheet for patients: https://www.fda.gov/media/545113/download    Test performed by PCR.    High Sensitivity Troponin T 2Hr [[id number]]  (Abnormal) Collected: 08/05/23 0755    Specimen: Blood Updated: 08/05/23 0846     HS Troponin T 34 ng/L      Troponin T Delta 11 ng/L     Narrative:      High Sensitive  Troponin T Reference Range:  <10.0 ng/L- Negative Female for AMI  <15.0 ng/L- Negative Male for AMI  >=10 - Abnormal Female indicating possible myocardial injury.  >=15 - Abnormal Male indicating possible myocardial injury.   Clinicians would have to utilize clinical acumen, EKG, Troponin, and serial changes to determine if it is an Acute Myocardial Infarction or myocardial injury due to an underlying chronic condition.         Heparin Anti-Xa [[id number]] Collected: 08/05/23 0937    Specimen: Blood Updated: 08/05/23 0940    Protime-INR [[id number]] Collected: 08/05/23 0937    Specimen: Blood Updated: 08/05/23 0940    aPTT [[id number]] Collected: 08/05/23 0937    Specimen: Blood Updated: 08/05/23 0940             XR Chest 1 View    Result Date: 8/5/2023  PROCEDURE: XR CHEST 1 VW-  HISTORY: Chest Pain Triage Protocol  COMPARISON: None.  FINDINGS: The heart is mildly enlarged.. Sternotomy wires noted as well as a 2-lead pacemaker. There is pulmonary vascular congestion and mild interstitial disease bilaterally with small left effusion consistent with mild congestive heart failure. Left basilar pleural calcifications noted... The mediastinum is unremarkable. There is no pneumothorax. There are no acute osseous abnormalities.      Impression: Findings consistent with congestive heart failure, recommend follow-up..      This report was signed and finalized on 8/5/2023 8:44 AM by Yakelin Benitez MD.          MDM     Amount and/or Complexity of Data Reviewed  Clinical lab tests: reviewed  Tests in the radiology section of [facility]: reviewed  Tests in the medicine section of [facility]: reviewed        Initial impression of presenting illness: Chest pain    DDX: includes but is not limited to: ACS, MI, costochondritis    Patient arrives stable with vitals interpreted by myself.     Pertinent features from physical exam: Clear to auscultation, no murmur, stable blood pressure.    Initial diagnostic plan: CBC, CMP, troponin, ECG, chest  x-ray    09:44 EDT  I received care from Dr. Britton,    Results from initial plan were reviewed and interpreted by me revealing  Initial work-up reveals negative flu and COVID troponin 20 3 repeat 34, nonactionable CBC CMP chest x-ray suggestive of CHF.  EKG with left bundle branch block.  No ectopy, did have PVCs on telemetry.    Diagnostic information from other sources: Attempted old record review    Interventions / Re-evaluation: Patient pain-free on my evaluation.    Results/clinical rationale were discussed with patient and family member at the bedside    Consultations/Discussion of results with other physicians: Discussed with cardiologist Dr. Forman given patient's extensive coronary artery disease history with CABG 97, pacemaker placement, multiple stents post CABG 6 total, last in December 2022, recommended admission, heparin infusion, discussed with Dr. Waddell hospitalist will admit    Also suspect patient may have a component of sleep apnea, his sleeping oxygen was as low as 81%    Heart score 7  -----        Final diagnoses:   Chest pain, unspecified type   History of coronary artery disease          Karan Hyatt, DO  08/05/23 7452

## 2023-08-05 NOTE — Clinical Note
Hemostasis started on the left radial artery. R-Band was used in achieving hemostasis. Radial compression device applied to vessel. Hemostasis achieved successfully. Closure device additional comment: 14 mls

## 2023-08-05 NOTE — H&P
[facility]   HISTORY AND PHYSICAL      Name:  Caesar Dias   Age:  83 y.o.  Sex:  male  :  1940  MRN:  [id number]   Visit Number:  [id number]  Admission Date:  2023  Date Of Service:  23  Primary Care Physician:  System, Provider Not In    Chief Complaint:     Chest pain    History Of Presenting Illness:      Mr. Dias is an 83-year-old gentleman with multiple medical comorbidities including prior history of CAD with CABG followed by multiple stents, possibly 6, most recently last year in [place], systolic congestive heart failure, who presented this morning with complaints of chest pain.  He admits to taking multiple doses of sublingual nitroglycerin.  He is down in [place] due to a wedding.  He denied any missed medication doses.  He does not smoke or use illicit substances.  He does have a pacemaker. He was accompanied by his wife at time of visit.    In the ER, he was overall hemodynamically stable.  Mild hypertension.  He did have initial troponin elevations which up trended.  He had an EKG which appears to show a sinus rhythm with a left bundle branch block.  His chest x-ray appeared to show some degree of pulmonary congestion and interstitial edema.  He was given heparin and kept NPO.  ER provider talked with cardiology who would see upon admission to the hospital.    Review Of Systems:    All systems were reviewed and negative except as mentioned in history of presenting illness, assessment and plan.    Past Medical History: Patient  has a past medical history of Anxiety, Hypertension, and MI (myocardial infarction).    Past Surgical History: Patient  has a past surgical history that includes Coronary angioplasty with stent.    Social History: Patient  reports that he has never smoked. He has never used smokeless tobacco. He reports current alcohol use. He reports that he does not use drugs.    Family History:  Patient's family history has been reviewed and found to  "be noncontributory.     Allergies:      Oxycodone    Home Medications:    Prior to Admission Medications       Prescriptions Last Dose Informant Patient Reported? Taking?    amLODIPine (NORVASC) 10 MG tablet   Yes No    Take 1 tablet by mouth Daily.    aspirin 81 MG chewable tablet   Yes No    Chew 1 tablet Daily.    clopidogrel (PLAVIX) 75 MG tablet   Yes No    Take 1 tablet by mouth Daily.    furosemide (LASIX) 40 MG tablet   Yes No    Take 1 tablet by mouth 2 (Two) Times a Day.    lisinopril (PRINIVIL,ZESTRIL) 20 MG tablet   Yes No    Take 1 tablet by mouth Daily.    metoprolol succinate XL (TOPROL-XL) 100 MG 24 hr tablet   Yes No    Take 1 tablet by mouth Daily.    multivitamin with minerals tablet tablet   Yes No    Take 1 tablet by mouth Daily.    pantoprazole (PROTONIX) 40 MG EC tablet   Yes No    Take 1 tablet by mouth Daily.    sertraline (ZOLOFT) 25 MG tablet   Yes No    Take 1 tablet by mouth Daily.    simvastatin (ZOCOR) 20 MG tablet   Yes No    Take 1 tablet by mouth Every Night.          ED Medications:    Medications   sodium chloride 0.9 % flush 10 mL (has no administration in time range)   heparin 03965 units/250 ml (100 units/ml) in D5W (12 Units/kg/hr x 82.6 kg Intravenous New Bag 8/5/23 0954)   Pharmacy to Dose Heparin (has no administration in time range)   heparin (porcine) injection 4,000 Units (4,000 Units Intravenous Given 8/5/23 0953)     Vital Signs:  Temp:  [98 øF (36.7 øC)-98.1 øF (36.7 øC)] 98.1 øF (36.7 øC)  Heart Rate:  [67-81] 70  Resp:  [17-18] 18  BP: (122-152)/(58-83) 122/74        08/05/23  0545 08/05/23  1027   Weight: 82.6 kg (182 lb) 83.3 kg (183 lb 10.3 oz)     Body mass index is 28.76 kg/mý.    Physical Exam:     Most recent vital Signs: /74 (BP Location: Right arm, Patient Position: Lying)   Pulse 70   Temp 98.1 øF (36.7 øC) (Oral)   Resp 18   Ht 170.2 cm (67\")   Wt 83.3 kg (183 lb 10.3 oz)   SpO2 96%   BMI 28.76 kg/mý     Physical Exam  Constitutional:       " Appearance: He is normal weight. He is not toxic-appearing.   HENT:      Head: Normocephalic.      Mouth/Throat:      Mouth: Mucous membranes are moist.   Eyes:      Pupils: Pupils are equal, round, and reactive to light.   Cardiovascular:      Rate and Rhythm: Normal rate and regular rhythm.      Pulses: Normal pulses.      Heart sounds:     No gallop.   Pulmonary:      Breath sounds: No wheezing or rhonchi.   Abdominal:      General: Abdomen is flat. Bowel sounds are normal.      Tenderness: There is no abdominal tenderness. There is no guarding.   Musculoskeletal:      Right lower leg: Edema present.      Left lower leg: Edema present.   Skin:     Capillary Refill: Capillary refill takes less than 2 seconds.   Neurological:      General: No focal deficit present.      Mental Status: He is alert. Mental status is at baseline.   Psychiatric:         Mood and Affect: Mood normal.         Thought Content: Thought content normal.       Laboratory data:    I have reviewed the labs done in the emergency room.    Results from last 7 days   Lab Units 08/05/23  0558   SODIUM mmol/L 141   POTASSIUM mmol/L 3.5   CHLORIDE mmol/L 99   CO2 mmol/L 30.9*   BUN mg/dL 15   CREATININE mg/dL 1.13   CALCIUM mg/dL 8.6   BILIRUBIN mg/dL 0.2   ALK PHOS U/L 86   ALT (SGPT) U/L 22   AST (SGOT) U/L 26   GLUCOSE mg/dL 117*     Results from last 7 days   Lab Units 08/05/23  0558   WBC 10*3/mm3 4.48   HEMOGLOBIN g/dL 12.0*   HEMATOCRIT % 36.6*   PLATELETS 10*3/mm3 166     Results from last 7 days   Lab Units 08/05/23  0937   INR  1.11*     Results from last 7 days   Lab Units 08/05/23  0755 08/05/23  0558   HSTROP T ng/L 34* 23*                           Invalid input(s): USDES,  BLOODU, NITRITITE, BACT, EP    Pain Management Panel           No data to display                EKG:      Appears to be sinus rhythm with a left bundle branch block.    Radiology:    XR Chest 1 View    Result Date: 8/5/2023  PROCEDURE: XR CHEST 1 VW-  HISTORY: Chest  Pain Triage Protocol  COMPARISON: None.  FINDINGS: The heart is mildly enlarged.. Sternotomy wires noted as well as a 2-lead pacemaker. There is pulmonary vascular congestion and mild interstitial disease bilaterally with small left effusion consistent with mild congestive heart failure. Left basilar pleural calcifications noted... The mediastinum is unremarkable. There is no pneumothorax. There are no acute osseous abnormalities.      Findings consistent with congestive heart failure, recommend follow-up..      This report was signed and finalized on 8/5/2023 8:44 AM by Yakelin Benitez MD.       Assessment:    Chest pain with concern for NSTEMI, POA  History of ischemic cardiomyopathy with most recent ejection fraction of 20 to 25% with a dual-chamber ICD  CAD status post CABG, with YAMILEX thereafter.  Hypertension    Plan:    Admitted for observation    Chest pain/NSTEMI:  On review of last cardiology note from April 26, 2023, the patient's most recent heart cath was from 1/16/2023 with patent OM1 and OM 2/PDA stents with patent lima to LAD/D with severe diffuse disease in the native LAD.  He had underwent PTCA with YAMILEX to the mid dominant left circumflex at that time.  Most recent ejection fraction was 25%.  Currently has a dual-chamber ICD.  In the past has noted to have runs of atrial fibrillation.  Currently on a heparin drip.  Cardiology seen in consultation recommending coronary angiography.    Ischemic cardiomyopathy:  Not appear to be overtly volume overloaded on exam at this time but will monitor.  Placed on cardiac diet and fluid restrictions after heart cath.    Patient otherwise meets observation level care anticipate less than 2 midnight stay.  Further recommendations depend upon clinical course.  Plan of care discussed with patient at bedside.    Risk Assessment: High  DVT Prophylaxis: Heparin  Code Status: Full  Diet: NPO    Advance Care Planning   ACP discussion was held with the patient during this  visit. Patient does not have an advance directive, declines further assistance.           Miriam Garcia DO  08/05/23  10:53 EDT    Dictated utilizing Dragon dictation.

## 2023-08-05 NOTE — Clinical Note
Prepped: right groin and Left Wrist. Prepped with: Hibiclens. The site was clipped. The patient was draped in a sterile fashion.

## 2023-08-05 NOTE — PROGRESS NOTES
HEPARIN INFUSION  Caesar Dias is a  83 y.o. male receiving heparin infusion.     Therapy for (VTE/Cardiac):   Cardiac  Patient Weight: 82.6 kg (182 lb)   Initial Bolus (Y/N):   Y  Any Bolus (Y/N):   Y        Signs or Symptoms of Bleeding: N    Cardiac or Other (Not VTE)   Initial Bolus: 60 units/kg (Max 4,000 units)  Initial rate: 12 units/kg/hr (Max 1,000 units/hr)   Anti Xa Rebolus Infusion Hold time Change infusion Dose (Units/kg/hr) Next Anti Xa or aPTT Level Due   < 0.11 50 Units/kg  (4000 Units Max) None Increase by  3 Units/kg/hr 6 hours   0.11- 0.19 25 Units/kg  (2000 Units Max) None Increase by  2 Units/kg/hr 6 hours   0.2 - 0.29 0 None Increase by  1 Units/kg/hr 6 hours   0.3 - 0.5 0 None No Change 6 hours (after 2 consecutive levels in range check qAM)   0.51 - 0.6 0 None Decrease by  1 Units/kg/hr 6 hours   0.61 - 0.8 0 30 Minutes Decrease by  2 Units/kg/hr 6 hours   0.81 - 1 0 60 Minutes Decrease by  3 Units/kg/hr 6 hours   >1 0 Hold  After Anti Xa less than 0.5 decrease previous rate by  4 Units/kg/hr  Every 2 hours until Anti Xa  less than 0.5 then when infusion restarts in 6 hours       Recommend anti-Xa every 6 hours.         Lab 08/05/23  0558   HEMOGLOBIN 12.0*   HEMATOCRIT 36.6*   PLATELETS 166   CREATININE 1.13   EGFR 64.1           Date   Time   Anti-Xa Current Rate (Unit/kg/hr) Bolus   (Units) Rate Change   (Unit/kg/hr) New Rate (Unit/kg/hr) Next   Anti-Xa Comments  Pump Check Daily   8/5 0945 -- 0 4000 +12 12 8/5 @~1600 D/W REZA Nair                                                                                                                                                                                                                                 Pharmacy will continue to follow anti-Xa results and monitor for signs and symptoms of bleeding or thrombosis.    Zack Duque [facility]  08/05/23 09:49 EDT

## 2023-08-06 ENCOUNTER — APPOINTMENT (OUTPATIENT)
Dept: CARDIOLOGY | Facility: HOSPITAL | Age: 83
End: 2023-08-06
Payer: MEDICARE

## 2023-08-06 VITALS
BODY MASS INDEX: 28.82 KG/M2 | RESPIRATION RATE: 18 BRPM | HEIGHT: 67 IN | HEART RATE: 71 BPM | SYSTOLIC BLOOD PRESSURE: 138 MMHG | TEMPERATURE: 98 F | DIASTOLIC BLOOD PRESSURE: 68 MMHG | WEIGHT: 183.64 LBS | OXYGEN SATURATION: 96 %

## 2023-08-06 LAB
ANION GAP SERPL CALCULATED.3IONS-SCNC: 7.5 MMOL/L (ref 5–15)
BUN SERPL-MCNC: 13 MG/DL (ref 8–23)
BUN/CREAT SERPL: 12.3 (ref 7–25)
CALCIUM SPEC-SCNC: 8.1 MG/DL (ref 8.6–10.5)
CHLORIDE SERPL-SCNC: 102 MMOL/L (ref 98–107)
CHOLEST SERPL-MCNC: 94 MG/DL (ref 0–200)
CO2 SERPL-SCNC: 32.5 MMOL/L (ref 22–29)
CREAT SERPL-MCNC: 1.06 MG/DL (ref 0.76–1.27)
DEPRECATED RDW RBC AUTO: 46.1 FL (ref 37–54)
EGFRCR SERPLBLD CKD-EPI 2021: 69.2 ML/MIN/1.73
ERYTHROCYTE [DISTWIDTH] IN BLOOD BY AUTOMATED COUNT: 14.9 % (ref 12.3–15.4)
GLUCOSE SERPL-MCNC: 111 MG/DL (ref 65–99)
HBA1C MFR BLD: 5.9 % (ref 4.8–5.6)
HCT VFR BLD AUTO: 32.3 % (ref 37.5–51)
HDLC SERPL-MCNC: 40 MG/DL (ref 40–60)
HGB BLD-MCNC: 10.4 G/DL (ref 13–17.7)
LDLC SERPL CALC-MCNC: 36 MG/DL (ref 0–100)
LDLC/HDLC SERPL: 0.91 {RATIO}
MCH RBC QN AUTO: 27.3 PG (ref 26.6–33)
MCHC RBC AUTO-ENTMCNC: 32.2 G/DL (ref 31.5–35.7)
MCV RBC AUTO: 84.8 FL (ref 79–97)
PLATELET # BLD AUTO: 146 10*3/MM3 (ref 140–450)
PMV BLD AUTO: 10.1 FL (ref 6–12)
POTASSIUM SERPL-SCNC: 4 MMOL/L (ref 3.5–5.2)
RBC # BLD AUTO: 3.81 10*6/MM3 (ref 4.14–5.8)
SODIUM SERPL-SCNC: 142 MMOL/L (ref 136–145)
TRIGL SERPL-MCNC: 89 MG/DL (ref 0–150)
TSH SERPL DL<=0.05 MIU/L-ACNC: 3.03 UIU/ML (ref 0.27–4.2)
VLDLC SERPL-MCNC: 18 MG/DL (ref 5–40)
WBC NRBC COR # BLD: 5.02 10*3/MM3 (ref 3.4–10.8)

## 2023-08-06 PROCEDURE — 63710000001 SERTRALINE 50 MG TABLET: Performed by: INTERNAL MEDICINE

## 2023-08-06 PROCEDURE — A9270 NON-COVERED ITEM OR SERVICE: HCPCS | Performed by: INTERNAL MEDICINE

## 2023-08-06 PROCEDURE — 63710000001 CLOPIDOGREL 75 MG TABLET: Performed by: INTERNAL MEDICINE

## 2023-08-06 PROCEDURE — G0378 HOSPITAL OBSERVATION PER HR: HCPCS

## 2023-08-06 PROCEDURE — 63710000001 ISOSORBIDE MONONITRATE 60 MG TABLET SUSTAINED-RELEASE 24 HOUR: Performed by: INTERNAL MEDICINE

## 2023-08-06 PROCEDURE — 84443 ASSAY THYROID STIM HORMONE: CPT | Performed by: INTERNAL MEDICINE

## 2023-08-06 PROCEDURE — 63710000001 AMLODIPINE 5 MG TABLET: Performed by: INTERNAL MEDICINE

## 2023-08-06 PROCEDURE — 80048 BASIC METABOLIC PNL TOTAL CA: CPT | Performed by: INTERNAL MEDICINE

## 2023-08-06 PROCEDURE — 63710000001 LISINOPRIL 20 MG TABLET: Performed by: INTERNAL MEDICINE

## 2023-08-06 PROCEDURE — 85027 COMPLETE CBC AUTOMATED: CPT | Performed by: INTERNAL MEDICINE

## 2023-08-06 PROCEDURE — 63710000001 ALPRAZOLAM 0.25 MG TABLET: Performed by: INTERNAL MEDICINE

## 2023-08-06 PROCEDURE — 63710000001 RANOLAZINE 500 MG TABLET SUSTAINED-RELEASE 12 HOUR: Performed by: INTERNAL MEDICINE

## 2023-08-06 PROCEDURE — 63710000001 METOPROLOL SUCCINATE XL 100 MG TABLET SUSTAINED-RELEASE 24 HOUR: Performed by: INTERNAL MEDICINE

## 2023-08-06 PROCEDURE — 63710000001 FUROSEMIDE 40 MG TABLET: Performed by: INTERNAL MEDICINE

## 2023-08-06 PROCEDURE — 83036 HEMOGLOBIN GLYCOSYLATED A1C: CPT | Performed by: INTERNAL MEDICINE

## 2023-08-06 PROCEDURE — 80061 LIPID PANEL: CPT | Performed by: INTERNAL MEDICINE

## 2023-08-06 PROCEDURE — 63710000001 ASPIRIN 81 MG CHEWABLE TABLET: Performed by: INTERNAL MEDICINE

## 2023-08-06 PROCEDURE — 63710000001 PANTOPRAZOLE 40 MG TABLET DELAYED-RELEASE: Performed by: INTERNAL MEDICINE

## 2023-08-06 RX ORDER — ISOSORBIDE MONONITRATE 30 MG/1
30 TABLET, EXTENDED RELEASE ORAL
Qty: 30 TABLET | Refills: 0 | Status: SHIPPED | OUTPATIENT
Start: 2023-08-07 | End: 2023-09-06

## 2023-08-06 RX ADMIN — SERTRALINE 25 MG: 50 TABLET, FILM COATED ORAL at 08:28

## 2023-08-06 RX ADMIN — ASPIRIN 81 MG CHEWABLE TABLET 81 MG: 81 TABLET CHEWABLE at 08:29

## 2023-08-06 RX ADMIN — METOPROLOL SUCCINATE 200 MG: 100 TABLET, EXTENDED RELEASE ORAL at 08:29

## 2023-08-06 RX ADMIN — LISINOPRIL 20 MG: 20 TABLET ORAL at 08:29

## 2023-08-06 RX ADMIN — Medication 10 ML: at 08:29

## 2023-08-06 RX ADMIN — ISOSORBIDE MONONITRATE 30 MG: 60 TABLET, EXTENDED RELEASE ORAL at 08:28

## 2023-08-06 RX ADMIN — FUROSEMIDE 40 MG: 40 TABLET ORAL at 08:29

## 2023-08-06 RX ADMIN — ALPRAZOLAM 0.25 MG: 0.25 TABLET ORAL at 02:52

## 2023-08-06 RX ADMIN — AMLODIPINE BESYLATE 10 MG: 5 TABLET ORAL at 08:28

## 2023-08-06 RX ADMIN — PANTOPRAZOLE SODIUM 40 MG: 40 TABLET, DELAYED RELEASE ORAL at 08:29

## 2023-08-06 RX ADMIN — RANOLAZINE 500 MG: 500 TABLET, FILM COATED, EXTENDED RELEASE ORAL at 08:28

## 2023-08-06 RX ADMIN — CLOPIDOGREL BISULFATE 75 MG: 75 TABLET ORAL at 08:29

## 2023-08-06 NOTE — DISCHARGE SUMMARY
[facility]   [facility]      Name:  Caesar Dias   Age:  83 y.o.  Sex:  male  :  1940  MRN:  [id number]   Visit Number:  [id number]    Admission Date:  2023  Date of Discharge:  2023  Primary Care Physician:  System, Provider Not In    Important issues to note:    Continue dual antiplatelet therapy upon discharge  Continue home medications as previously directed  You have been started on Imdur, medication sent to pharmacy in [place]  Please follow-up with your primary care provider and cardiologist within the next 1 to 2 weeks    Discharge Diagnoses:     Chest pain with concern for NSTEMI, POA  History of ischemic cardiomyopathy with most recent ejection fraction of 20 to 25% with a dual-chamber ICD  CAD status post CABG, with YAMILEX thereafter.  Hypertension    Problem List:     Active Hospital Problems    Diagnosis  POA    **Chest pain [R07.9]  Yes      Resolved Hospital Problems   No resolved problems to display.     Presenting Problem:    Chief Complaint   Patient presents with    Chest Pain    Anxiety      Consults:     Consulting Physician(s)       Provider Relationship Specialty    Breeding, Merrick KAMINSKI MD Consulting Physician Cardiology          Procedures Performed:    Procedure(s):  Coronary angiography    History of presenting illness/Hospital Course:    Mr. Dias is an 83-year-old gentleman with multiple medical comorbidities including prior history of CAD with CABG followed by multiple stents, possibly 6, most recently last year in [place], systolic congestive heart failure, who presented this morning with complaints of chest pain.  He admits to taking multiple doses of sublingual nitroglycerin.  He is down in [place] due to a wedding.  He denied any missed medication doses.  He does not smoke or use illicit substances.  He does have a pacemaker. He was accompanied by his wife at time of visit.     In the ER, he was overall hemodynamically stable.  Mild  hypertension.  He did have initial troponin elevations which up trended.  He had an EKG which appears to show a sinus rhythm with a left bundle branch block.  His chest x-ray appeared to show some degree of pulmonary congestion and interstitial edema.  He was given heparin and kept NPO.  ER provider talked with cardiology who would see upon admission to the hospital.    Upon chart review, last cardiology note from April 26, 2023, the patient's most recent heart cath was from 1/16/2023 with patent OM1 and OM 2/PDA stents with patent lima to LAD/D with severe diffuse disease in the native LAD. He had underwent PTCA with YAMILEX to the mid dominant left circumflex at that time. Most recent ejection fraction was 25%. Currently has a dual-chamber ICD. In the past has noted to have runs of atrial fibrillation.  He was admitted and placed on heparin drip with cardiology consultation.    The patient underwent coronary angiography on 8/5/2023 with evidence of severe three-vessel coronary artery disease, with angiographic anatomy that was poorly suited to PCI.  There was patency of 2 of 2 bypass grafts.  Dr. Forman was recommending continuation of dual antiplatelet therapy with aspirin and Plavix, blood pressure management, and he was started on Imdur at 30 mg.  Patient is otherwise been hemodynamically stable overnight and has no recurrence of chest pain.  His kidney function is stable this morning.  I discussed with him I would send in Imdur to his pharmacy in [place], they will need to confirm tomorrow that this has been E prescribed as on the weekend I cannot ensure this.  He needs early follow-up with his primary care provider and cardiologist in they will make those appointments when they get back home.  Otherwise continuation of cardiac diet and fluid restriction per previous recommendations.    Vital Signs:    Temp:  [97.9 øF (36.6 øC)-98.7 øF (37.1 øC)] 98 øF (36.7 øC)  Heart Rate:  [67-73] 71  Resp:  [14-18] 18  BP:  (122-162)/(61-90) 138/68    Physical Exam:    General Appearance:  Alert and cooperative.    Head:  Atraumatic and normocephalic.   Eyes: Conjunctivae and sclerae normal, no icterus. No pallor.   Ears:  Ears with no abnormalities noted.   Throat: No oral lesions, no thrush, oral mucosa moist.   Neck: Supple, trachea midline, no thyromegaly.   Back:   No kyphoscoliosis present. No tenderness to palpation.   Lungs:   Breath sounds heard bilaterally equally.  No crackles or wheezing. No Pleural rub or bronchial breathing.   Heart:  Normal S1 and S2, no murmur, no gallop, no rub. No JVD.   Abdomen:   Normal bowel sounds, no masses, no organomegaly. Soft, nontender, nondistended, no rebound tenderness.   Extremities: Supple, no edema, no cyanosis, no clubbing.   Pulses: Pulses palpable bilaterally.   Skin: No bleeding or rash.   Neurologic: Alert and oriented x 3. No facial asymmetry. Moves all four limbs. No tremors.     Pertinent Lab Results:     Results from last 7 days   Lab Units 08/06/23  0530 08/05/23  0558   SODIUM mmol/L 142 141   POTASSIUM mmol/L 4.0 3.5   CHLORIDE mmol/L 102 99   CO2 mmol/L 32.5* 30.9*   BUN mg/dL 13 15   CREATININE mg/dL 1.06 1.13   CALCIUM mg/dL 8.1* 8.6   BILIRUBIN mg/dL  --  0.2   ALK PHOS U/L  --  86   ALT (SGPT) U/L  --  22   AST (SGOT) U/L  --  26   GLUCOSE mg/dL 111* 117*     Results from last 7 days   Lab Units 08/06/23  0530 08/05/23  0558   WBC 10*3/mm3 5.02 4.48   HEMOGLOBIN g/dL 10.4* 12.0*   HEMATOCRIT % 32.3* 36.6*   PLATELETS 10*3/mm3 146 166     Results from last 7 days   Lab Units 08/05/23  0937   INR  1.11*     Results from last 7 days   Lab Units 08/05/23  0755 08/05/23  0558   HSTROP T ng/L 34* 23*                           Pertinent Radiology Results:    Imaging Results (All)       Procedure Component Value Units Date/Time    XR Chest 1 View [[id number]] Collected: 08/05/23 0743     Updated: 08/05/23 0846    Narrative:      PROCEDURE: XR CHEST 1 VW-     HISTORY: Chest Pain  Triage Protocol     COMPARISON: None.     FINDINGS: The heart is mildly enlarged.. Sternotomy wires noted as well  as a 2-lead pacemaker. There is pulmonary vascular congestion and mild  interstitial disease bilaterally with small left effusion consistent  with mild congestive heart failure. Left basilar pleural calcifications  noted... The mediastinum is unremarkable. There is no pneumothorax.   There are no acute osseous abnormalities.       Impression:      Findings consistent with congestive heart failure, recommend  follow-up..                 This report was signed and finalized on 8/5/2023 8:44 AM by Yakelin Benitez MD.               Echo:      Condition on Discharge:      Stable.    Code status during the hospital stay:    Code Status and Medical Interventions:   Ordered at: 08/05/23 1118     Code Status (Patient has no pulse and is not breathing):    CPR (Attempt to Resuscitate)     Medical Interventions (Patient has pulse or is breathing):    Full Support     Release to patient:    Routine Release     Discharge Disposition:    Home or Self Care    Discharge Medications:       Discharge Medications        New Medications        Instructions Start Date   isosorbide mononitrate 30 MG 24 hr tablet  Commonly known as: IMDUR   30 mg, Oral, Every 24 Hours Scheduled   Start Date: August 7, 2023            Continue These Medications        Instructions Start Date   amLODIPine 10 MG tablet  Commonly known as: NORVASC   10 mg, Oral, Daily      aspirin 81 MG chewable tablet   81 mg, Oral, Daily      clopidogrel 75 MG tablet  Commonly known as: PLAVIX   75 mg, Oral, Daily      furosemide 40 MG tablet  Commonly known as: LASIX   40 mg, Oral, 2 Times Daily      lisinopril 20 MG tablet  Commonly known as: PRINIVIL,ZESTRIL   20 mg, Oral, Daily      metoprolol succinate  MG 24 hr tablet  Commonly known as: TOPROL-XL   100 mg, Oral, Daily      multivitamin with minerals tablet tablet   1 tablet, Oral, Daily       pantoprazole 40 MG EC tablet  Commonly known as: PROTONIX   40 mg, Oral, Daily      sertraline 25 MG tablet  Commonly known as: ZOLOFT   25 mg, Oral, Daily      simvastatin 20 MG tablet  Commonly known as: ZOCOR   20 mg, Oral, Nightly             Discharge Diet:   Cardiac    Activity at Discharge:   As tolerated    Follow-up Appointments:     Follow-up Information       System, Provider Not In Follow up in 1 week(s).    Contact information:  [facility] [id number]               Dr. Contreras Follow up.    Why: Wife will make appt                         No future appointments.  Test Results Pending at Discharge:           Miriam Garcia DO  08/06/23  08:54 EDT    Time: I spent 25 minutes on this discharge activity which included: face-to-face encounter with the patient, reviewing the data in the system, coordination of the care with the nursing staff as well as consultants, documentation, and entering orders.     Dictated utilizing Dragon dictation.

## 2023-08-06 NOTE — PLAN OF CARE
Goal Outcome Evaluation:                      Pt clear for discharge per attending to follow up with cardiology at home

## 2023-08-06 NOTE — PLAN OF CARE
"Goal Outcome Evaluation:  Plan of Care Reviewed With: patient        Progress: no change  Outcome Evaluation: VSS. 2L NC. No reports of pain or soa. \"Upset stomach\" treated with PRNS. Pt reporting difficulty falling asleep PRNs given for this. No acute overnight events.         "

## 2023-08-06 NOTE — CASE MANAGEMENT/SOCIAL WORK
Case Management Discharge Note                Selected Continued Care - Discharged on 8/6/2023 Admission date: 8/5/2023 - Discharge disposition: Home or Self Care      Destination    No services have been selected for the patient.                Durable Medical Equipment    No services have been selected for the patient.                Dialysis/Infusion    No services have been selected for the patient.                Home Medical Care    No services have been selected for the patient.                Therapy    No services have been selected for the patient.                Community Resources    No services have been selected for the patient.                Community & DME    No services have been selected for the patient.                    Transportation Services  Private: Car    Final Discharge Disposition Code: 01 - home or self-care

## 2023-09-01 PROBLEM — Z95.810 ICD (IMPLANTABLE CARDIOVERTER-DEFIBRILLATOR) IN PLACE: Status: ACTIVE | Noted: 2023-09-01

## 2023-09-01 PROBLEM — I25.5 ISCHEMIC CARDIOMYOPATHY: Status: ACTIVE | Noted: 2023-09-01

## 2023-09-01 PROBLEM — I49.01 VENTRICULAR FIBRILLATION (HCC): Status: ACTIVE | Noted: 2023-09-01

## 2023-09-06 LAB — PROSTATE SPECIFIC ANTIGEN: <0.06 NG/ML

## 2023-11-08 ENCOUNTER — OFFICE VISIT (OUTPATIENT)
Dept: PODIATRY | Age: 83
End: 2023-11-08
Payer: MEDICARE

## 2023-11-08 VITALS — HEIGHT: 67 IN | BODY MASS INDEX: 29.35 KG/M2 | WEIGHT: 187 LBS

## 2023-11-08 DIAGNOSIS — M79.672 PAIN IN BOTH FEET: ICD-10-CM

## 2023-11-08 DIAGNOSIS — I73.9 PERIPHERAL VASCULAR DISORDER (HCC): ICD-10-CM

## 2023-11-08 DIAGNOSIS — L60.0 INGROWN NAIL: ICD-10-CM

## 2023-11-08 DIAGNOSIS — M79.671 PAIN IN BOTH FEET: ICD-10-CM

## 2023-11-08 DIAGNOSIS — B35.1 DERMATOPHYTOSIS OF NAIL: Primary | ICD-10-CM

## 2023-11-08 PROCEDURE — 1123F ACP DISCUSS/DSCN MKR DOCD: CPT | Performed by: PODIATRIST

## 2023-11-08 PROCEDURE — 99213 OFFICE O/P EST LOW 20 MIN: CPT | Performed by: PODIATRIST

## 2023-11-08 PROCEDURE — 11721 DEBRIDE NAIL 6 OR MORE: CPT | Performed by: PODIATRIST

## 2023-11-08 RX ORDER — ACETAMINOPHEN 160 MG
TABLET,DISINTEGRATING ORAL
COMMUNITY
Start: 2023-10-25

## 2023-11-08 NOTE — PROGRESS NOTES
[address] [facility],[address] PODIATRY [address] [place] [address]  Dept: [phone]  Dept Fax: [phone]     PAIN PROGRESS NOTE  Date of patient's visit: 11/8/2023  Patient's Name:  Eleanor Christiansen YOB: 1940            Patient Care Team:  Nilda Anna MD as PCP - General  David Corado DPM as Physician (Podiatry)  Verona Kaufman MD as Consulting Physician (Urology)  Nissa Romo DPM as Physician (Podiatry)  Marquis Stephanie MD as Consulting Physician (Radiation Oncology)      Chief Complaint   Patient presents with    Nail Problem     Toenail trim    Foot Pain     Bilateral feet       Subjective: This Eleanor Christiansen comes to clinic for foot and nail care. Pt currently has complaint of thickened, painful, elongated nails that he/she cannot manage by themselves. Pt. Relates pain to nails with shoe gear. Pt's primary care physician is Nilda Anna MD last seen 01/03/2022. Pt has a new complaint of left ingrown nail.   Pt has tried changing shoes but it has not helped the pain    Past Medical History:   Diagnosis Date    Acid reflux     resolved since s/p shona    Anxiety     Arthritis     back/ fingers    BPH (benign prostatic hypertrophy)     CAD (coronary artery disease)     Dr. Harmony hernandez/ [facility]    Cancer [facility])     face, ear, scalp and arms     Carotid stenosis     bilat    Cataracts, both eyes     CHF (congestive heart failure) ([address])     Dry skin     Elevated PSA     Examination of participant in clinical trial 4/27/39    For the life of the medtronic device    Hyperlipidemia     pt denies 11-27-19    Hypertension     PCP Dr. Atilio Fuentes/ last seen 9-2019    Left ventricular dysfunction     Macular degeneration     left    Myocardial infarction ([address]) 11/17/88, 1/2013    Neuropathy     Pacemaker 1995-2013    X 5    Panic attacks     PVD (peripheral vascular disease) ([address])     Sinus problem     Snores

## 2023-11-21 RX ORDER — GABAPENTIN 600 MG/1
600 TABLET ORAL 2 TIMES DAILY
Qty: 60 TABLET | Refills: 2 | Status: SHIPPED | OUTPATIENT
Start: 2023-11-21 | End: 2024-02-19

## 2024-01-22 RX ORDER — GABAPENTIN 600 MG/1
600 TABLET ORAL 2 TIMES DAILY
Qty: 60 TABLET | Refills: 2 | Status: SHIPPED | OUTPATIENT
Start: 2024-01-22 | End: 2024-04-21

## 2024-01-31 ENCOUNTER — OFFICE VISIT (OUTPATIENT)
Dept: PODIATRY | Age: 84
End: 2024-01-31
Payer: MEDICARE

## 2024-01-31 VITALS — HEIGHT: 67 IN | BODY MASS INDEX: 29.35 KG/M2 | WEIGHT: 187 LBS

## 2024-01-31 DIAGNOSIS — M79.671 PAIN IN BOTH FEET: ICD-10-CM

## 2024-01-31 DIAGNOSIS — M79.672 PAIN IN BOTH FEET: ICD-10-CM

## 2024-01-31 DIAGNOSIS — I73.9 PERIPHERAL VASCULAR DISORDER (HCC): ICD-10-CM

## 2024-01-31 DIAGNOSIS — L60.0 INGROWN NAIL: ICD-10-CM

## 2024-01-31 DIAGNOSIS — B35.1 DERMATOPHYTOSIS OF NAIL: Primary | ICD-10-CM

## 2024-01-31 PROCEDURE — 1123F ACP DISCUSS/DSCN MKR DOCD: CPT | Performed by: PODIATRIST

## 2024-01-31 PROCEDURE — 99213 OFFICE O/P EST LOW 20 MIN: CPT | Performed by: PODIATRIST

## 2024-01-31 PROCEDURE — 11721 DEBRIDE NAIL 6 OR MORE: CPT | Performed by: PODIATRIST

## 2024-01-31 RX ORDER — ALBUTEROL SULFATE 90 UG/1
AEROSOL, METERED RESPIRATORY (INHALATION)
COMMUNITY
Start: 2024-01-19

## 2024-01-31 NOTE — PROGRESS NOTES
[facility] PODIATRY [address]  [address]  [facility] [address]  Dept: [phone]  Dept Fax: [phone]     PAIN PROGRESS NOTE  Date of patient's visit: 1/31/2024  Patient's Name:  Galo Ruiz YOB: 1940            Patient Care Team:  Briana Fuentes MD as PCP - General  Reza Walter DPM as Physician (Podiatry)  Mauro Mistry MD as Consulting Physician (Urology)  Steffanie Walter DPM as Physician (Podiatry)  Sawyer Pascal MD as Consulting Physician (Radiation Oncology)      Chief Complaint   Patient presents with    Nail Problem     Toenail trim    Foot Pain     Bilateral feet       Subjective:   This Galo Ruiz comes to clinic for foot and nail care.  Pt currently has complaint of thickened, painful, elongated nails that he/she cannot manage by themselves.  Pt. Relates pain to nails with shoe gear.  Pt's primary care physician is Briana Fuentes MD last seen 01/03/2022.  Pt has a new complaint of increased painful ingrown nail to rupal great toes, rupal.  Pt has tried changing shoes but it has not helped the pain    Past Medical History:   Diagnosis Date    Acid reflux     resolved since s/p shona    Anxiety     Arthritis     back/ fingers    BPH (benign prostatic hypertrophy)     CAD (coronary artery disease)     Dr. Curtis/ Dr. hernandez/ [facility]    Cancer ([facility])     face, ear, scalp and arms     Carotid stenosis     bilat    Cataracts, both eyes     CHF (congestive heart failure) ([facility])     Dry skin     Elevated PSA     Examination of participant in clinical trial 4/27/39    For the life of the medtronic device    Hyperlipidemia     pt denies 11-27-19    Hypertension     PCP Dr. Briana Fuentes/ last seen 9-2019    Left ventricular dysfunction     Macular degeneration     left    Myocardial infarction (HCC) 11/17/88, 1/2013    Neuropathy     Pacemaker 1995-2013    X 5    Panic attacks     PVD (peripheral vascular disease)

## 2024-04-16 DIAGNOSIS — C61 MALIGNANT NEOPLASM OF PROSTATE (HCC): Primary | ICD-10-CM

## 2024-04-16 RX ORDER — GABAPENTIN 600 MG/1
600 TABLET ORAL 2 TIMES DAILY
Qty: 60 TABLET | Refills: 2 | Status: SHIPPED | OUTPATIENT
Start: 2024-04-16 | End: 2024-07-15

## 2024-04-22 ENCOUNTER — TELEPHONE (OUTPATIENT)
Dept: RADIATION ONCOLOGY | Age: 84
End: 2024-04-22

## 2024-04-22 ENCOUNTER — APPOINTMENT (OUTPATIENT)
Dept: CT IMAGING | Age: 84
End: 2024-04-22
Payer: MEDICARE

## 2024-04-22 ENCOUNTER — APPOINTMENT (OUTPATIENT)
Dept: GENERAL RADIOLOGY | Age: 84
End: 2024-04-22
Attending: EMERGENCY MEDICINE
Payer: MEDICARE

## 2024-04-22 ENCOUNTER — HOSPITAL ENCOUNTER (EMERGENCY)
Age: 84
Discharge: HOME | End: 2024-04-22
Attending: EMERGENCY MEDICINE
Payer: MEDICARE

## 2024-04-22 VITALS
BODY MASS INDEX: 27.77 KG/M2 | HEART RATE: 80 BPM | SYSTOLIC BLOOD PRESSURE: 152 MMHG | DIASTOLIC BLOOD PRESSURE: 78 MMHG | HEIGHT: 68 IN | WEIGHT: 183.2 LBS | TEMPERATURE: 97.6 F | RESPIRATION RATE: 18 BRPM | OXYGEN SATURATION: 96 %
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
## 2024-04-22 DIAGNOSIS — M54.2 NECK PAIN: Primary | ICD-10-CM

## 2024-04-22 PROCEDURE — 99284 EMERGENCY DEPT VISIT MOD MDM: CPT

## 2024-04-22 PROCEDURE — 71045 X-RAY EXAM CHEST 1 VIEW: CPT

## 2024-04-22 PROCEDURE — 72128 CT CHEST SPINE W/O DYE: CPT

## 2024-04-22 PROCEDURE — 72131 CT LUMBAR SPINE W/O DYE: CPT

## 2024-04-22 PROCEDURE — 72125 CT NECK SPINE W/O DYE: CPT

## 2024-04-22 PROCEDURE — 72170 X-RAY EXAM OF PELVIS: CPT

## 2024-04-22 PROCEDURE — 70450 CT HEAD/BRAIN W/O DYE: CPT

## 2024-04-22 ASSESSMENT — ENCOUNTER SYMPTOMS
BACK PAIN: 0
SHORTNESS OF BREATH: 0
CHEST TIGHTNESS: 0
EYE DISCHARGE: 0
FACIAL SWELLING: 0
ABDOMINAL PAIN: 0
ABDOMINAL DISTENTION: 0
EYE PAIN: 0

## 2024-04-22 ASSESSMENT — PAIN - FUNCTIONAL ASSESSMENT: PAIN_FUNCTIONAL_ASSESSMENT: 0-10

## 2024-04-22 ASSESSMENT — PAIN SCALES - GENERAL: PAINLEVEL_OUTOF10: 3

## 2024-04-22 NOTE — TELEPHONE ENCOUNTER
I called pt to confirm his rad/onc 1 yr follow up tomorrow and to request PSA to be completed.  Pts spouse states it is difficult for them to get around and they would prefer to follow with Dr. OLIVERIO Mistry, Urology, only regarding pts care and follow up of PSAs. Appt cancelled.

## 2024-04-22 NOTE — ED NOTES
Pt from home states rolling chair slipped out from behind him and fell backwards on the floor and hit his head. Pt denies any dizziness prior to fall. Pt denies LOC. Pt is on blood thinners. No bruising/bleeding noted on back of head. Pt lives at home with his wife. Pt rates neck pain 3/10. Respirations are equal and nonlabored. Pt denies any chest pain or SOB. Call light within reach. Family at bedside. Care continues.

## 2024-05-01 ENCOUNTER — OFFICE VISIT (OUTPATIENT)
Dept: PODIATRY | Age: 84
End: 2024-05-01
Payer: MEDICARE

## 2024-05-01 VITALS — WEIGHT: 183 LBS | HEIGHT: 68 IN | BODY MASS INDEX: 27.74 KG/M2

## 2024-05-01 DIAGNOSIS — G60.9 IDIOPATHIC PERIPHERAL NEUROPATHY: ICD-10-CM

## 2024-05-01 DIAGNOSIS — M79.671 PAIN IN BOTH FEET: ICD-10-CM

## 2024-05-01 DIAGNOSIS — L85.3 XEROSIS CUTIS: ICD-10-CM

## 2024-05-01 DIAGNOSIS — M79.672 PAIN IN BOTH FEET: ICD-10-CM

## 2024-05-01 DIAGNOSIS — I73.9 PERIPHERAL VASCULAR DISORDER (HCC): ICD-10-CM

## 2024-05-01 DIAGNOSIS — B35.1 DERMATOPHYTOSIS OF NAIL: Primary | ICD-10-CM

## 2024-05-01 PROCEDURE — 1123F ACP DISCUSS/DSCN MKR DOCD: CPT | Performed by: PODIATRIST

## 2024-05-01 PROCEDURE — 99213 OFFICE O/P EST LOW 20 MIN: CPT | Performed by: PODIATRIST

## 2024-05-01 PROCEDURE — 11721 DEBRIDE NAIL 6 OR MORE: CPT | Performed by: PODIATRIST

## 2024-05-01 RX ORDER — CIPROFLOXACIN HYDROCHLORIDE 3.5 MG/ML
SOLUTION/ DROPS TOPICAL
COMMUNITY
Start: 2024-03-18

## 2024-05-07 NOTE — PROGRESS NOTES
Bilateral    Neurological: Sensation diminshed to light touch to level of digits, Bilateral.  Protective sensation intact 6/10 sites via 5.07/10g [place] Monofilament, Bilateral.  negative Tinel's, Bilateral.  negative Valleix sign, Bilateral.      Integument: Warm, dry, supple, Bilateral.  Open lesion absent, Bilateral.  Interdigital maceration absent to web spaces 4, Bilateral.  Nails 1-5 left and 1-5 right thickened > 3.0 mm, dystrophic and crumbly, discolored with subungual debris.  Fissures absent, Bilateral.   General: AAO x 3 in NAD.    Derm  Toenail Description  Sites of Onychomycosis Involvement (Check affected area)  [x] [x] [x] [x] [x] [x] [x] [x] [x] [x]  5 4 3 2 1 1 2 3 4 5                          Right                                        Left    Thickness  [x] [x] [x] [x] [x] [x] [x] [x] [x] [x]  5 4 3 2 1 1 2 3 4 5                         Right                                        Left    Dystrophic Changes   [x] [x] [x] [x] [x] [x] [x] [x] [x] [x]  5 4 3 2 1 1 2 3 4 5                         Right                                        Left    Color  [x] [x] [x] [x] [x] [x] [x] [x] [x] [x]  5 4 3 2 1 1 2 3 4 5                          Right                                        Left    Incurvation/Ingrowin   [] [] [] [] [] [] [] [] [] []  5 4 3 2 1 1 2 3 4 5                         Right                                        Left    Inflammation/Pain   [x] [x] [x] [x] [x] [x] [x] [x] [x] [x]  5 4 3  2 1 1 2 3 4 5                         Right                                        Left       Nails are painful 1-10 with direct palpation.      Q7   []Yes  []No                Q8   [x]Yes  []No                     Q9   []Yes    []No  Assessment:  84 y.o. male with:      Diagnosis Orders   1. Dermatophytosis of nail  44614 - [place] DEBRIDEMENT OF NAILS, 6 OR MORE      2. Pain in both feet  13666 - [place] DEBRIDEMENT OF NAILS, 6 OR MORE      3. Peripheral vascular disorder (HCC)  20503 - [place]

## 2024-06-06 NOTE — CARE COORDINATION
Called Alessio back and reviewed biopsy with him. He needs enema 2 hours prior- should eat. Try to not to urinate.  We also be given paperwork after procedure   Transitional planning    Called [facility] [phone] and spoke to Banner. Still waiting on pre cert. She will call with any updates. [id number] Spoke to patient and confirmed with him that plan for discharge is for him to go to [facility] ARU. CM told him we are still waiting on pre cert. CM will update him as needed. He verbalized understanding and agrees with plan. [address] [facility] and left  requesting call back.

## 2024-06-20 ENCOUNTER — APPOINTMENT (OUTPATIENT)
Dept: GENERAL RADIOLOGY | Age: 84
End: 2024-06-20
Payer: MEDICARE

## 2024-06-20 ENCOUNTER — HOSPITAL ENCOUNTER (EMERGENCY)
Age: 84
Discharge: HOME | End: 2024-06-20
Attending: EMERGENCY MEDICINE
Payer: MEDICARE

## 2024-06-20 VITALS
OXYGEN SATURATION: 94 % | DIASTOLIC BLOOD PRESSURE: 72 MMHG | WEIGHT: 181 LBS | TEMPERATURE: 98.2 F | SYSTOLIC BLOOD PRESSURE: 132 MMHG | HEART RATE: 70 BPM | RESPIRATION RATE: 21 BRPM | BODY MASS INDEX: 27.93 KG/M2

## 2024-06-20 DIAGNOSIS — F43.20 ADJUSTMENT DISORDER, UNSPECIFIED TYPE: Primary | ICD-10-CM

## 2024-06-20 LAB
ANION GAP SERPL CALCULATED.3IONS-SCNC: 8 MMOL/L (ref 9–17)
BASOPHILS # BLD: 0 K/UL (ref 0–0.2)
BASOPHILS NFR BLD: 0 %
BUN SERPL-MCNC: 19 MG/DL (ref 8–23)
BUN/CREAT SERPL: 24 (ref 9–20)
CALCIUM SERPL-MCNC: 8.6 MG/DL (ref 8.6–10.4)
CHLORIDE SERPL-SCNC: 102 MMOL/L (ref 98–107)
CO2 SERPL-SCNC: 31 MMOL/L (ref 20–31)
CREAT SERPL-MCNC: 0.8 MG/DL (ref 0.7–1.2)
EOSINOPHIL # BLD: 0.06 K/UL (ref 0–0.4)
EOSINOPHILS RELATIVE PERCENT: 1 % (ref 1–4)
ERYTHROCYTE [DISTWIDTH] IN BLOOD BY AUTOMATED COUNT: 13.5 % (ref 11.8–14.4)
GFR, ESTIMATED: 87 ML/MIN/1.73M2
GLUCOSE SERPL-MCNC: 110 MG/DL (ref 70–99)
HCT VFR BLD AUTO: 40.5 % (ref 40.7–50.3)
HGB BLD-MCNC: 12.7 G/DL (ref 13–17)
IMM GRANULOCYTES # BLD AUTO: 0 K/UL (ref 0–0.3)
IMM GRANULOCYTES NFR BLD: 0 %
LYMPHOCYTES NFR BLD: 0.41 K/UL (ref 1–4.8)
LYMPHOCYTES RELATIVE PERCENT: 7 % (ref 24–44)
MAGNESIUM SERPL-MCNC: 2 MG/DL (ref 1.6–2.6)
MCH RBC QN AUTO: 28.4 PG (ref 25.2–33.5)
MCHC RBC AUTO-ENTMCNC: 31.4 G/DL (ref 28.4–34.8)
MCV RBC AUTO: 90.6 FL (ref 82.6–102.9)
MONOCYTES NFR BLD: 0.46 K/UL (ref 0.2–0.8)
MONOCYTES NFR BLD: 8 % (ref 1–7)
NEUTROPHILS NFR BLD: 84 % (ref 36–66)
NEUTS SEG NFR BLD: 4.87 K/UL (ref 1.8–7.7)
NRBC BLD-RTO: 0 PER 100 WBC
PLATELET # BLD AUTO: 160 K/UL (ref 138–453)
PMV BLD AUTO: 10.5 FL (ref 8.1–13.5)
POTASSIUM SERPL-SCNC: 4.5 MMOL/L (ref 3.7–5.3)
RBC # BLD AUTO: 4.47 M/UL (ref 4.21–5.77)
SODIUM SERPL-SCNC: 141 MMOL/L (ref 135–144)
T4 FREE SERPL-MCNC: 1.1 NG/DL (ref 0.9–1.7)
TROPONIN I SERPL HS-MCNC: 15 NG/L (ref 0–22)
TROPONIN I SERPL HS-MCNC: 16 NG/L (ref 0–22)
TSH SERPL DL<=0.05 MIU/L-ACNC: 2.65 UIU/ML (ref 0.3–5)
WBC OTHER # BLD: 5.8 K/UL (ref 3.5–11.3)

## 2024-06-20 PROCEDURE — 93005 ELECTROCARDIOGRAM TRACING: CPT | Performed by: EMERGENCY MEDICINE

## 2024-06-20 PROCEDURE — 84484 ASSAY OF TROPONIN QUANT: CPT

## 2024-06-20 PROCEDURE — 83735 ASSAY OF MAGNESIUM: CPT

## 2024-06-20 PROCEDURE — 99285 EMERGENCY DEPT VISIT HI MDM: CPT

## 2024-06-20 PROCEDURE — 71045 X-RAY EXAM CHEST 1 VIEW: CPT

## 2024-06-20 PROCEDURE — 85025 COMPLETE CBC W/AUTO DIFF WBC: CPT

## 2024-06-20 PROCEDURE — 84443 ASSAY THYROID STIM HORMONE: CPT

## 2024-06-20 PROCEDURE — 80048 BASIC METABOLIC PNL TOTAL CA: CPT

## 2024-06-20 PROCEDURE — 84439 ASSAY OF FREE THYROXINE: CPT

## 2024-06-20 ASSESSMENT — ENCOUNTER SYMPTOMS
EYE PAIN: 0
EYE DISCHARGE: 0
FACIAL SWELLING: 0
ABDOMINAL DISTENTION: 0
CHEST TIGHTNESS: 0
ABDOMINAL PAIN: 0
SHORTNESS OF BREATH: 0
BACK PAIN: 0

## 2024-06-20 ASSESSMENT — PAIN - FUNCTIONAL ASSESSMENT: PAIN_FUNCTIONAL_ASSESSMENT: NONE - DENIES PAIN

## 2024-06-20 NOTE — ED PROVIDER NOTES
medications on file     PHYSICIAN CONSULTS ORDERED THIS ENCOUNTER:  None  FINAL IMPRESSION      1. Adjustment disorder, unspecified type          DISPOSITION/PLAN   DISPOSITION Decision To Discharge 06/20/2024 01:08:21 PM      OUTPATIENT FOLLOW UP THE PATIENT:  Briana Fuentes MD  [address] [facility] [address]  [phone]            Amna Ricci MD        This note was created with the assistance of a speech-recognition program. While intending to generate a document that actually reflects the content of the visit, no guarantees can be provided that every mistake has been identified and corrected by editing.        Amna Ricci MD  06/20/24 5349

## 2024-06-20 NOTE — ED NOTES
Pt presents to ER from anxiety and shaky, Pt states feeling warm and shaky. Pt states hx  of anxiety. Pt states taking Zoloft this AM and feeling better.Pt states hx of MI,Pt states hx of 6 stents placed. Pt states pace maker with defibrillator. Pt denies chest pain,SOB/difficulty breathing, nausea or vomiting.Pt is A/O x 4, equal chest expansion with non labored breathing, wheels locked, bed in lowest position, call light in reach.

## 2024-06-21 LAB
EKG ATRIAL RATE: 70 BPM
EKG P AXIS: -22 DEGREES
EKG P-R INTERVAL: 228 MS
EKG Q-T INTERVAL: 446 MS
EKG QRS DURATION: 162 MS
EKG QTC CALCULATION (BAZETT): 481 MS
EKG R AXIS: -37 DEGREES
EKG T AXIS: 119 DEGREES
EKG VENTRICULAR RATE: 70 BPM

## 2024-07-24 ENCOUNTER — OFFICE VISIT (OUTPATIENT)
Dept: PODIATRY | Age: 84
End: 2024-07-24
Payer: MEDICARE

## 2024-07-24 VITALS — BODY MASS INDEX: 27.43 KG/M2 | WEIGHT: 181 LBS | HEIGHT: 68 IN

## 2024-07-24 DIAGNOSIS — M79.672 PAIN IN BOTH FEET: ICD-10-CM

## 2024-07-24 DIAGNOSIS — B35.1 DERMATOPHYTOSIS OF NAIL: Primary | ICD-10-CM

## 2024-07-24 DIAGNOSIS — M79.671 PAIN IN BOTH FEET: ICD-10-CM

## 2024-07-24 DIAGNOSIS — I73.9 PERIPHERAL VASCULAR DISORDER (HCC): ICD-10-CM

## 2024-07-24 PROCEDURE — 11721 DEBRIDE NAIL 6 OR MORE: CPT | Performed by: PODIATRIST

## 2024-07-31 NOTE — PROGRESS NOTES
[facility] PODIATRY [address]  [address]  [facility] [address]  Dept: [phone]  Dept Fax: [phone]     PAIN PROGRESS NOTE  Date of patient's visit: 7/31/2024  Patient's Name:  Galo Ruiz YOB: 1940            Patient Care Team:  Briana Fuentes MD as PCP - General  Reza Walter DPM as Physician (Podiatry)  Mauro Mistry MD as Consulting Physician (Urology)  Steffanie Walter DPM as Physician (Podiatry)  Sawyer Pascal MD as Consulting Physician (Radiation Oncology)      Chief Complaint   Patient presents with    Foot Pain     Bilateral feet     Nail Problem     Toenail trim        Subjective:   This Galo Ruiz comes to clinic for foot and nail care.  Pt currently has complaint of thickened, painful, elongated nails that he/she cannot manage by themselves.  Pt. Relates pain to nails with shoe gear.  Pt's primary care physician is Briana Fuentes MD last seen 01/03/2022.      Past Medical History:   Diagnosis Date    Acid reflux     resolved since s/p shona    Anxiety     Arthritis     back/ fingers    BPH (benign prostatic hypertrophy)     CAD (coronary artery disease)     Dr. Curtis/ Dr. hernandez/ [facility]    Cancer ([facility])     face, ear, scalp and arms     Carotid stenosis     bilat    Cataracts, both eyes     CHF (congestive heart failure) ([facility])     Dry skin     Elevated PSA     Examination of participant in clinical trial 4/27/39    For the life of the medtronic device    Hyperlipidemia     pt denies 11-27-19    Hypertension     PCP Dr. Briana Fuentes/ last seen 9-2019    Left ventricular dysfunction     Macular degeneration     left    Myocardial infarction (HCC) 11/17/88, 1/2013    Neuropathy     Pacemaker 1995-2013    X 5    Panic attacks     PVD (peripheral vascular disease) ([facility])     Sinus problem     Snores     Vasovagal near syncope     Wears glasses        Allergies   Allergen Reactions    Percocet

## 2024-08-23 NOTE — TELEPHONE ENCOUNTER
Last Visit:  7/24/2024     Next Visit Date:10/14/2024  Future Appointments   Date Time Provider Department Center   10/3/2024 10:15 AM SCHEDULE, [facility]   10/14/2024 10:45 AM Steffanie Walter DPM [facility]       Health Maintenance   Topic Date Due    Depression Screen  Never done    DTaP/Tdap/Td vaccine (1 - Tdap) Never done    Shingles vaccine (1 of 2) Never done    Respiratory Syncytial Virus (RSV) Pregnant or age 60 yrs+ (1 - 1-dose 60+ series) Never done    Lipids  02/28/2015    Pneumococcal 65+ years Vaccine (2 of 2 - PCV) 06/17/2020    COVID-19 Vaccine (1 - 2023-24 season) Never done    Annual Wellness Visit (Medicare Advantage)  Never done    Flu vaccine (1) 08/01/2024    Prostate Specific Antigen (PSA) Screening or Monitoring  09/06/2024    Hepatitis A vaccine  Aged Out    Hepatitis B vaccine  Aged Out    Hib vaccine  Aged Out    Polio vaccine  Aged Out    Meningococcal (ACWY) vaccine  Aged Out       Hemoglobin A1C (%)   Date Value   12/28/2022 5.3   03/01/2014 5.6   04/04/2013 5.7             ( goal A1C is < 7)   No components found for: \"LABMICR\"  No components found for: \"LDLCHOLESTEROL\", \"LDLCALC\"    (goal LDL is <100)   AST (U/L)   Date Value   01/12/2023 23     ALT (U/L)   Date Value   01/12/2023 23     BUN (mg/dL)   Date Value   06/20/2024 19     BP Readings from Last 3 Encounters:   06/20/24 132/72   04/22/24 (!) 152/78   03/08/24 120/70          (goal 120/80)    All Future Testing planned in [facility]  Lab Frequency Next Occurrence   PSA, Diagnostic Once 04/23/2024               Patient Active Problem List:     Right upper quadrant abdominal pain     CAD (coronary artery disease)     Hypertension     Skin cancer     BPH (benign prostatic hyperplasia)     Chronic systolic heart failure (HCC)     Hyperlipemia     Leukocytosis     Cholelithiasis with acute cholecystitis     Pre-syncope     Claudication in peripheral vascular disease (HCC)     Carotid

## 2024-08-26 RX ORDER — GABAPENTIN 600 MG/1
600 TABLET ORAL 2 TIMES DAILY
Qty: 56 TABLET | Refills: 0 | Status: SHIPPED | OUTPATIENT
Start: 2024-08-26 | End: 2024-09-25

## 2024-09-09 LAB — PROSTATE SPECIFIC ANTIGEN: <0.06 NG/ML

## 2024-10-01 RX ORDER — GABAPENTIN 600 MG/1
600 TABLET ORAL 2 TIMES DAILY
Qty: 60 TABLET | Refills: 2 | Status: SHIPPED | OUTPATIENT
Start: 2024-10-01 | End: 2024-12-30

## 2024-10-14 ENCOUNTER — OFFICE VISIT (OUTPATIENT)
Dept: PODIATRY | Age: 84
End: 2024-10-14
Payer: MEDICARE

## 2024-10-14 VITALS — BODY MASS INDEX: 27.43 KG/M2 | HEIGHT: 68 IN | WEIGHT: 181 LBS

## 2024-10-14 DIAGNOSIS — M79.671 PAIN IN BOTH FEET: ICD-10-CM

## 2024-10-14 DIAGNOSIS — M79.672 PAIN IN BOTH FEET: ICD-10-CM

## 2024-10-14 DIAGNOSIS — B35.1 DERMATOPHYTOSIS OF NAIL: Primary | ICD-10-CM

## 2024-10-14 DIAGNOSIS — I73.9 PERIPHERAL VASCULAR DISORDER (HCC): ICD-10-CM

## 2024-10-14 DIAGNOSIS — G60.9 IDIOPATHIC PERIPHERAL NEUROPATHY: ICD-10-CM

## 2024-10-14 DIAGNOSIS — L60.0 INGROWN NAIL: ICD-10-CM

## 2024-10-14 DIAGNOSIS — L85.3 XEROSIS CUTIS: ICD-10-CM

## 2024-10-14 PROCEDURE — 11721 DEBRIDE NAIL 6 OR MORE: CPT | Performed by: PODIATRIST

## 2024-10-14 PROCEDURE — 1123F ACP DISCUSS/DSCN MKR DOCD: CPT | Performed by: PODIATRIST

## 2024-10-14 PROCEDURE — 99213 OFFICE O/P EST LOW 20 MIN: CPT | Performed by: PODIATRIST

## 2024-10-14 NOTE — PROGRESS NOTES
Snores     Vasovagal near syncope     Wears glasses        Allergies   Allergen Reactions    Percocet [Oxycodone-Acetaminophen] Other (See Comments)     Panic attack     Current Outpatient Medications on File Prior to Visit   Medication Sig Dispense Refill    gabapentin (NEURONTIN) 600 MG tablet Take 1 tablet by mouth 2 times daily for 90 days. 60 tablet 2    ciprofloxacin (CILOXAN) 0.3 % ophthalmic solution       albuterol sulfate HFA (PROVENTIL;VENTOLIN;PROAIR) 108 (90 Base) MCG/ACT inhaler       ciclopirox (PENLAC) 8 % solution Apply topically nightly. Remove once weekly with alcohol or nail polish remover. 1 each 2    Cholecalciferol (VITAMIN D3) 50 MCG (2000 UT) CAPS       isosorbide mononitrate (IMDUR) 30 MG extended release tablet Take 1 tablet by mouth daily      nitroGLYCERIN (NITROSTAT) 0.4 MG SL tablet Place 1 tablet under the tongue as needed for Chest pain up to max of 3 total doses. If no relief after 1 dose, call 911. 25 tablet 0    lisinopril (PRINIVIL;ZESTRIL) 20 MG tablet       Multiple Vitamin (MULTIVITAMIN) TABS       simvastatin (ZOCOR) 20 MG tablet       ofloxacin (OCUFLOX) 0.3 % solution       tamsulosin (FLOMAX) 0.4 MG capsule       fluorouracil (EFUDEX) 5 % cream       amLODIPine (NORVASC) 10 MG tablet Take 1 tablet by mouth daily 30 tablet 3    pantoprazole (PROTONIX) 40 MG tablet Take 1 tablet by mouth 2 times daily (before meals) 30 tablet 3    clopidogrel (PLAVIX) 75 MG tablet Take 1 tablet by mouth daily      Multiple Vitamins-Minerals (THERAPEUTIC MULTIVITAMIN-MINERALS) tablet Take 1 tablet by mouth daily      ammonium lactate (LAC-HYDRIN) 12 % cream Apply topically as needed. 1 Bottle 3    fluticasone (FLONASE) 50 MCG/ACT nasal spray 1 spray by Nasal route daily as needed      sertraline (ZOLOFT) 25 MG tablet Take 1 tablet by mouth daily      furosemide (LASIX) 40 MG tablet Take 1 tablet by mouth daily      aspirin EC 81 MG EC tablet Take 1 tablet by mouth daily. 30 tablet 6

## 2024-10-19 ENCOUNTER — APPOINTMENT (OUTPATIENT)
Dept: GENERAL RADIOLOGY | Age: 84
DRG: 291 | End: 2024-10-19
Payer: MEDICARE

## 2024-10-19 ENCOUNTER — APPOINTMENT (OUTPATIENT)
Dept: CT IMAGING | Age: 84
DRG: 291 | End: 2024-10-19
Payer: MEDICARE

## 2024-10-19 ENCOUNTER — HOSPITAL ENCOUNTER (INPATIENT)
Age: 84
LOS: 3 days | Discharge: HOME | DRG: 291 | End: 2024-10-22
Attending: EMERGENCY MEDICINE | Admitting: INTERNAL MEDICINE
Payer: MEDICARE

## 2024-10-19 DIAGNOSIS — R79.89 ELEVATED TROPONIN: ICD-10-CM

## 2024-10-19 DIAGNOSIS — I50.9 CONGESTIVE HEART FAILURE, UNSPECIFIED HF CHRONICITY, UNSPECIFIED HEART FAILURE TYPE (HCC): ICD-10-CM

## 2024-10-19 DIAGNOSIS — Z95.1 S/P CABG (CORONARY ARTERY BYPASS GRAFT): ICD-10-CM

## 2024-10-19 DIAGNOSIS — S00.03XA HEMATOMA OF FRONTAL SCALP, INITIAL ENCOUNTER: ICD-10-CM

## 2024-10-19 DIAGNOSIS — R09.02 HYPOXIA: ICD-10-CM
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
## 2024-10-19 PROBLEM — I25.5 ISCHEMIC CARDIOMYOPATHY WITH IMPLANTABLE CARDIOVERTER-DEFIBRILLATOR (ICD): Status: ACTIVE | Noted: 2024-10-19

## 2024-10-19 PROBLEM — I25.10 CAD (CORONARY ARTERY DISEASE): Status: ACTIVE | Noted: 2024-10-19

## 2024-10-19 PROBLEM — Z87.19 H/O: DUODENAL ULCER: Status: ACTIVE | Noted: 2024-10-19

## 2024-10-19 PROBLEM — I25.2 CORONARY ARTERY DISEASE WITH HX OF MYOCARDIAL INFARCT W/O HX OF CABG: Status: RESOLVED | Noted: 2024-10-19 | Resolved: 2024-10-19

## 2024-10-19 PROBLEM — I25.2 CORONARY ARTERY DISEASE WITH HX OF MYOCARDIAL INFARCT W/O HX OF CABG: Status: ACTIVE | Noted: 2024-10-19

## 2024-10-19 PROBLEM — I25.10 CORONARY ARTERY DISEASE WITH HX OF MYOCARDIAL INFARCT W/O HX OF CABG: Status: ACTIVE | Noted: 2024-10-19

## 2024-10-19 PROBLEM — F41.9 ANXIETY: Status: ACTIVE | Noted: 2024-10-19

## 2024-10-19 PROBLEM — I50.43 ACUTE ON CHRONIC COMBINED SYSTOLIC AND DIASTOLIC HEART FAILURE (HCC): Status: ACTIVE | Noted: 2024-10-19

## 2024-10-19 PROBLEM — S00.83XA TRAUMATIC HEMATOMA OF FOREHEAD: Status: ACTIVE | Noted: 2024-10-19

## 2024-10-19 PROBLEM — Z95.810 ISCHEMIC CARDIOMYOPATHY WITH IMPLANTABLE CARDIOVERTER-DEFIBRILLATOR (ICD): Status: ACTIVE | Noted: 2024-10-19

## 2024-10-19 PROBLEM — N40.0 BPH (BENIGN PROSTATIC HYPERPLASIA): Status: ACTIVE | Noted: 2024-10-19

## 2024-10-19 PROBLEM — I25.10 CORONARY ARTERY DISEASE WITH HX OF MYOCARDIAL INFARCT W/O HX OF CABG: Status: RESOLVED | Noted: 2024-10-19 | Resolved: 2024-10-19

## 2024-10-19 PROBLEM — I10 HYPERTENSION: Status: ACTIVE | Noted: 2024-10-19

## 2024-10-19 PROBLEM — J96.01 ACUTE HYPOXIC RESPIRATORY FAILURE: Status: ACTIVE | Noted: 2024-10-19

## 2024-10-19 LAB
25(OH)D3 SERPL-MCNC: 43.4 NG/ML (ref 30–100)
ABO + RH BLD: NORMAL
ALBUMIN SERPL-MCNC: 3.4 G/DL (ref 3.5–5.2)
ALBUMIN SERPL-MCNC: 3.9 G/DL (ref 3.5–5.2)
ALBUMIN/GLOB SERPL: 1 {RATIO} (ref 1–2.5)
ALP SERPL-CCNC: 68 U/L (ref 40–129)
ALT SERPL-CCNC: 12 U/L (ref 10–50)
AMPHET UR QL SCN: NEGATIVE
ANION GAP SERPL CALCULATED.3IONS-SCNC: 8 MMOL/L (ref 9–16)
ARM BAND NUMBER: NORMAL
AST SERPL-CCNC: 22 U/L (ref 10–50)
B PARAP IS1001 DNA NPH QL NAA+NON-PROBE: NOT DETECTED
B PERT DNA SPEC QL NAA+PROBE: NOT DETECTED
BARBITURATES UR QL SCN: NEGATIVE
BENZODIAZ UR QL: NEGATIVE
BILIRUB DIRECT SERPL-MCNC: 0.1 MG/DL (ref 0–0.2)
BILIRUB INDIRECT SERPL-MCNC: 0.2 MG/DL (ref 0–1)
BILIRUB SERPL-MCNC: 0.3 MG/DL (ref 0–1.2)
BILIRUB UR QL STRIP: NEGATIVE
BLOOD BANK SAMPLE EXPIRATION: NORMAL
BLOOD BANK SPECIMEN: ABNORMAL
BLOOD GROUP ANTIBODIES SERPL: NEGATIVE
BNP SERPL-MCNC: 1992 PG/ML (ref 0–300)
BODY TEMPERATURE: 37
BUN SERPL-MCNC: 13 MG/DL (ref 8–23)
C PNEUM DNA NPH QL NAA+NON-PROBE: NOT DETECTED
CANNABINOIDS UR QL SCN: NEGATIVE
CHLORIDE SERPL-SCNC: 97 MMOL/L (ref 98–107)
CK SERPL-CCNC: 25 U/L (ref 39–308)
CLARITY UR: CLEAR
CO2 SERPL-SCNC: 32 MMOL/L (ref 20–31)
COCAINE UR QL SCN: NEGATIVE
COHGB MFR BLD: 1 % (ref 0–5)
COLOR UR: YELLOW
COMMENT: NORMAL
CREAT SERPL-MCNC: 0.9 MG/DL (ref 0.7–1.2)
ERYTHROCYTE [DISTWIDTH] IN BLOOD BY AUTOMATED COUNT: 13.6 % (ref 11.8–14.4)
ETHANOL PERCENT: <0.01 %
ETHANOLAMINE SERPL-MCNC: <10 MG/DL (ref 0–0.08)
FENTANYL UR QL: NEGATIVE
FIBRINOGEN, FUNCTIONAL TEG: 29 MM (ref 15–32)
FIO2 ON VENT: ABNORMAL %
FLUAV RNA NPH QL NAA+NON-PROBE: NOT DETECTED
FLUBV RNA NPH QL NAA+NON-PROBE: NOT DETECTED
GFR, ESTIMATED: 84 ML/MIN/1.73M2
GLOBULIN SER CALC-MCNC: 2.3 G/DL
GLUCOSE SERPL-MCNC: 118 MG/DL (ref 74–99)
GLUCOSE UR STRIP-MCNC: NEGATIVE MG/DL
HADV DNA NPH QL NAA+NON-PROBE: NOT DETECTED
HCO3 VENOUS: 37 MMOL/L (ref 22–29)
HCO3 VENOUS: 37.9 MMOL/L (ref 24–30)
HCOV 229E RNA NPH QL NAA+NON-PROBE: NOT DETECTED
HCOV HKU1 RNA NPH QL NAA+NON-PROBE: NOT DETECTED
HCOV NL63 RNA NPH QL NAA+NON-PROBE: NOT DETECTED
HCOV OC43 RNA NPH QL NAA+NON-PROBE: NOT DETECTED
HCT VFR BLD AUTO: 42.3 % (ref 40.7–50.3)
HGB BLD-MCNC: 13.6 G/DL (ref 13–17)
HGB UR QL STRIP.AUTO: NEGATIVE
HMPV RNA NPH QL NAA+NON-PROBE: NOT DETECTED
HPIV1 RNA NPH QL NAA+NON-PROBE: NOT DETECTED
HPIV2 RNA NPH QL NAA+NON-PROBE: NOT DETECTED
HPIV3 RNA NPH QL NAA+NON-PROBE: NOT DETECTED
HPIV4 RNA NPH QL NAA+NON-PROBE: NOT DETECTED
INR PPP: 1
KETONES UR STRIP-MCNC: NEGATIVE MG/DL
LEUKOCYTE ESTERASE UR QL STRIP: NEGATIVE
LY30 (LYSIS) TEG: 0 % (ref 0–2.6)
M PNEUMO DNA NPH QL NAA+NON-PROBE: NOT DETECTED
MA(MAX CLOT) RAPID TEG: 66.7 MM (ref 52–70)
MCH RBC QN AUTO: 28.3 PG (ref 25.2–33.5)
MCHC RBC AUTO-ENTMCNC: 32.2 G/DL (ref 28.4–34.8)
MCV RBC AUTO: 87.9 FL (ref 82.6–102.9)
METHADONE UR QL: NEGATIVE
MYOGLOBIN SERPL-MCNC: 40 NG/ML (ref 28–72)
NITRITE UR QL STRIP: NEGATIVE
NRBC BLD-RTO: 0 PER 100 WBC
O2 SAT, VEN: 33.3 % (ref 60–85)
O2 SAT, VEN: 46.2 % (ref 60–85)
OPIATES UR QL SCN: NEGATIVE
OXYCODONE UR QL SCN: NEGATIVE
PARTIAL THROMBOPLASTIN TIME: 29.7 SEC (ref 23–36.5)
PCO2 VENOUS: 72.4 MM HG (ref 41–51)
PCO2 VENOUS: 77.9 MM HG (ref 39–55)
PCP UR QL SCN: NEGATIVE
PERFORMING LOCATION: NORMAL
PH UR STRIP: 6.5 [PH] (ref 5–8)
PH VENOUS: 7.31 (ref 7.32–7.42)
PH VENOUS: 7.32 (ref 7.32–7.43)
PLATELET # BLD AUTO: 157 K/UL (ref 138–453)
PMV BLD AUTO: 10.6 FL (ref 8.1–13.5)
PO2 VENOUS: 22.1 MM HG (ref 30–50)
PO2 VENOUS: 28.8 MM HG (ref 30–50)
POSITIVE BASE EXCESS, VEN: 8.2 MMOL/L (ref 0–3)
POSITIVE BASE EXCESS, VEN: 8.7 MMOL/L (ref 0–2)
POTASSIUM SERPL-SCNC: 4 MMOL/L (ref 3.7–5.3)
PROT SERPL-MCNC: 5.7 G/DL (ref 6.6–8.7)
PROT UR STRIP-MCNC: NEGATIVE MG/DL
PROTHROMBIN TIME: 12.9 SEC (ref 11.7–14.9)
RBC # BLD AUTO: 4.81 M/UL (ref 4.21–5.77)
REACTION TIME TEG: 7.2 MIN (ref 4.6–9.1)
RSV RNA NPH QL NAA+NON-PROBE: NOT DETECTED
RV+EV RNA NPH QL NAA+NON-PROBE: NOT DETECTED
SARS-COV-2 RDRP RESP QL NAA+PROBE: NOT DETECTED
SARS-COV-2 RNA NPH QL NAA+NON-PROBE: NOT DETECTED
SODIUM SERPL-SCNC: 137 MMOL/L (ref 136–145)
SP GR UR STRIP: 1.01 (ref 1–1.03)
SPECIMEN DESCRIPTION: NORMAL
SPECIMEN DESCRIPTION: NORMAL
T4 FREE SERPL-MCNC: 1.1 NG/DL (ref 0.92–1.68)
TEST INFORMATION: NORMAL
TROPONIN I SERPL HS-MCNC: 18 NG/L (ref 0–22)
TROPONIN I SERPL HS-MCNC: 19 NG/L (ref 0–22)
TROPONIN I SERPL HS-MCNC: 22 NG/L (ref 0–22)
TROPONIN I SERPL HS-MCNC: 25 NG/L (ref 0–22)
TSH SERPL DL<=0.05 MIU/L-ACNC: 3.8 UIU/ML (ref 0.27–4.2)
UROBILINOGEN UR STRIP-ACNC: NORMAL EU/DL (ref 0–1)
VIT B12 SERPL-MCNC: 339 PG/ML (ref 232–1245)
WBC OTHER # BLD: 5.1 K/UL (ref 3.5–11.3)

## 2024-10-19 PROCEDURE — 84439 ASSAY OF FREE THYROXINE: CPT

## 2024-10-19 PROCEDURE — 85390 FIBRINOLYSINS SCREEN I&R: CPT

## 2024-10-19 PROCEDURE — 86850 RBC ANTIBODY SCREEN: CPT

## 2024-10-19 PROCEDURE — 85347 COAGULATION TIME ACTIVATED: CPT

## 2024-10-19 PROCEDURE — 6360000004 HC RX CONTRAST MEDICATION

## 2024-10-19 PROCEDURE — 82040 ASSAY OF SERUM ALBUMIN: CPT

## 2024-10-19 PROCEDURE — 96374 THER/PROPH/DIAG INJ IV PUSH: CPT

## 2024-10-19 PROCEDURE — 6360000002 HC RX W HCPCS

## 2024-10-19 PROCEDURE — 2580000003 HC RX 258

## 2024-10-19 PROCEDURE — 83880 ASSAY OF NATRIURETIC PEPTIDE: CPT

## 2024-10-19 PROCEDURE — G0480 DRUG TEST DEF 1-7 CLASSES: HCPCS

## 2024-10-19 PROCEDURE — 80307 DRUG TEST PRSMV CHEM ANLYZR: CPT

## 2024-10-19 PROCEDURE — 2060000000 HC ICU INTERMEDIATE R&B

## 2024-10-19 PROCEDURE — 6370000000 HC RX 637 (ALT 250 FOR IP)

## 2024-10-19 PROCEDURE — 71260 CT THORAX DX C+: CPT

## 2024-10-19 PROCEDURE — 73560 X-RAY EXAM OF KNEE 1 OR 2: CPT

## 2024-10-19 PROCEDURE — 93005 ELECTROCARDIOGRAM TRACING: CPT

## 2024-10-19 PROCEDURE — 99222 1ST HOSP IP/OBS MODERATE 55: CPT | Performed by: SURGERY

## 2024-10-19 PROCEDURE — 84520 ASSAY OF UREA NITROGEN: CPT

## 2024-10-19 PROCEDURE — 72170 X-RAY EXAM OF PELVIS: CPT

## 2024-10-19 PROCEDURE — 99223 1ST HOSP IP/OBS HIGH 75: CPT | Performed by: INTERNAL MEDICINE

## 2024-10-19 PROCEDURE — 82947 ASSAY GLUCOSE BLOOD QUANT: CPT

## 2024-10-19 PROCEDURE — 80051 ELECTROLYTE PANEL: CPT

## 2024-10-19 PROCEDURE — 86901 BLOOD TYPING SEROLOGIC RH(D): CPT

## 2024-10-19 PROCEDURE — 70450 CT HEAD/BRAIN W/O DYE: CPT

## 2024-10-19 PROCEDURE — 82565 ASSAY OF CREATININE: CPT

## 2024-10-19 PROCEDURE — 99285 EMERGENCY DEPT VISIT HI MDM: CPT

## 2024-10-19 PROCEDURE — 85384 FIBRINOGEN ACTIVITY: CPT

## 2024-10-19 PROCEDURE — 82607 VITAMIN B-12: CPT

## 2024-10-19 PROCEDURE — 85576 BLOOD PLATELET AGGREGATION: CPT

## 2024-10-19 PROCEDURE — 86900 BLOOD TYPING SEROLOGIC ABO: CPT

## 2024-10-19 PROCEDURE — 83036 HEMOGLOBIN GLYCOSYLATED A1C: CPT

## 2024-10-19 PROCEDURE — 84484 ASSAY OF TROPONIN QUANT: CPT

## 2024-10-19 PROCEDURE — 83874 ASSAY OF MYOGLOBIN: CPT

## 2024-10-19 PROCEDURE — 0202U NFCT DS 22 TRGT SARS-COV-2: CPT

## 2024-10-19 PROCEDURE — 85610 PROTHROMBIN TIME: CPT

## 2024-10-19 PROCEDURE — 84703 CHORIONIC GONADOTROPIN ASSAY: CPT

## 2024-10-19 PROCEDURE — 81003 URINALYSIS AUTO W/O SCOPE: CPT

## 2024-10-19 PROCEDURE — 82803 BLOOD GASES ANY COMBINATION: CPT

## 2024-10-19 PROCEDURE — 80076 HEPATIC FUNCTION PANEL: CPT

## 2024-10-19 PROCEDURE — 72125 CT NECK SPINE W/O DYE: CPT

## 2024-10-19 PROCEDURE — 85027 COMPLETE CBC AUTOMATED: CPT

## 2024-10-19 PROCEDURE — 87635 SARS-COV-2 COVID-19 AMP PRB: CPT

## 2024-10-19 PROCEDURE — 99222 1ST HOSP IP/OBS MODERATE 55: CPT | Performed by: INTERNAL MEDICINE

## 2024-10-19 PROCEDURE — 85730 THROMBOPLASTIN TIME PARTIAL: CPT

## 2024-10-19 PROCEDURE — 71045 X-RAY EXAM CHEST 1 VIEW: CPT

## 2024-10-19 PROCEDURE — 82550 ASSAY OF CK (CPK): CPT

## 2024-10-19 PROCEDURE — 82805 BLOOD GASES W/O2 SATURATION: CPT

## 2024-10-19 PROCEDURE — 84443 ASSAY THYROID STIM HORMONE: CPT

## 2024-10-19 PROCEDURE — 82306 VITAMIN D 25 HYDROXY: CPT

## 2024-10-19 RX ORDER — GABAPENTIN 600 MG/1
300 TABLET ORAL 2 TIMES DAILY
Status: DISCONTINUED | OUTPATIENT
Start: 2024-10-19 | End: 2024-10-19

## 2024-10-19 RX ORDER — ATORVASTATIN CALCIUM 20 MG/1
20 TABLET, FILM COATED ORAL DAILY
Status: DISCONTINUED | OUTPATIENT
Start: 2024-10-19 | End: 2024-10-22 | Stop reason: HOSPADM

## 2024-10-19 RX ORDER — IOPAMIDOL 755 MG/ML
75 INJECTION, SOLUTION INTRAVASCULAR
Status: COMPLETED | OUTPATIENT
Start: 2024-10-19 | End: 2024-10-19

## 2024-10-19 RX ORDER — SERTRALINE HYDROCHLORIDE 25 MG/1
25 TABLET, FILM COATED ORAL DAILY
COMMUNITY

## 2024-10-19 RX ORDER — GABAPENTIN 600 MG/1
300 TABLET ORAL 2 TIMES DAILY
Status: DISCONTINUED | OUTPATIENT
Start: 2024-10-19 | End: 2024-10-22 | Stop reason: HOSPADM

## 2024-10-19 RX ORDER — SODIUM CHLORIDE 0.9 % (FLUSH) 0.9 %
5-40 SYRINGE (ML) INJECTION EVERY 12 HOURS SCHEDULED
Status: DISCONTINUED | OUTPATIENT
Start: 2024-10-19 | End: 2024-10-22 | Stop reason: HOSPADM

## 2024-10-19 RX ORDER — LISINOPRIL 20 MG/1
20 TABLET ORAL DAILY
COMMUNITY

## 2024-10-19 RX ORDER — SODIUM CHLORIDE 9 MG/ML
INJECTION, SOLUTION INTRAVENOUS PRN
Status: DISCONTINUED | OUTPATIENT
Start: 2024-10-19 | End: 2024-10-22 | Stop reason: HOSPADM

## 2024-10-19 RX ORDER — ACETAMINOPHEN 160 MG
2000 TABLET,DISINTEGRATING ORAL DAILY
COMMUNITY

## 2024-10-19 RX ORDER — FUROSEMIDE 10 MG/ML
40 INJECTION INTRAMUSCULAR; INTRAVENOUS ONCE
Status: COMPLETED | OUTPATIENT
Start: 2024-10-19 | End: 2024-10-19

## 2024-10-19 RX ORDER — CHOLECALCIFEROL (VITAMIN D3) 1250 MCG
1 CAPSULE ORAL DAILY
Status: ON HOLD | COMMUNITY
End: 2024-10-22 | Stop reason: HOSPADM

## 2024-10-19 RX ORDER — ISOSORBIDE MONONITRATE 30 MG/1
30 TABLET, EXTENDED RELEASE ORAL DAILY
Status: DISCONTINUED | OUTPATIENT
Start: 2024-10-20 | End: 2024-10-22 | Stop reason: HOSPADM

## 2024-10-19 RX ORDER — POTASSIUM CHLORIDE 1500 MG/1
40 TABLET, EXTENDED RELEASE ORAL PRN
Status: DISCONTINUED | OUTPATIENT
Start: 2024-10-19 | End: 2024-10-22 | Stop reason: HOSPADM

## 2024-10-19 RX ORDER — CLOPIDOGREL BISULFATE 75 MG/1
75 TABLET ORAL DAILY
COMMUNITY

## 2024-10-19 RX ORDER — ATORVASTATIN CALCIUM 20 MG/1
10 TABLET, FILM COATED ORAL DAILY
Status: DISCONTINUED | OUTPATIENT
Start: 2024-10-19 | End: 2024-10-19

## 2024-10-19 RX ORDER — FENTANYL CITRATE 50 UG/ML
25 INJECTION, SOLUTION INTRAMUSCULAR; INTRAVENOUS ONCE
Status: COMPLETED | OUTPATIENT
Start: 2024-10-19 | End: 2024-10-19

## 2024-10-19 RX ORDER — AMLODIPINE BESYLATE 10 MG/1
10 TABLET ORAL DAILY
Status: DISCONTINUED | OUTPATIENT
Start: 2024-10-20 | End: 2024-10-22 | Stop reason: HOSPADM

## 2024-10-19 RX ORDER — ONDANSETRON 4 MG/1
4 TABLET, ORALLY DISINTEGRATING ORAL EVERY 8 HOURS PRN
Status: DISCONTINUED | OUTPATIENT
Start: 2024-10-19 | End: 2024-10-22 | Stop reason: HOSPADM

## 2024-10-19 RX ORDER — ACETAMINOPHEN 325 MG/1
650 TABLET ORAL EVERY 6 HOURS PRN
Status: DISCONTINUED | OUTPATIENT
Start: 2024-10-19 | End: 2024-10-22 | Stop reason: HOSPADM

## 2024-10-19 RX ORDER — AMLODIPINE BESYLATE 10 MG/1
10 TABLET ORAL DAILY
COMMUNITY

## 2024-10-19 RX ORDER — TAMSULOSIN HYDROCHLORIDE 0.4 MG/1
0.4 CAPSULE ORAL DAILY
Status: DISCONTINUED | OUTPATIENT
Start: 2024-10-19 | End: 2024-10-22 | Stop reason: HOSPADM

## 2024-10-19 RX ORDER — AMLODIPINE BESYLATE 10 MG/1
10 TABLET ORAL DAILY
Status: DISCONTINUED | OUTPATIENT
Start: 2024-10-19 | End: 2024-10-19

## 2024-10-19 RX ORDER — ASPIRIN 81 MG/1
81 TABLET ORAL DAILY
COMMUNITY

## 2024-10-19 RX ORDER — PANTOPRAZOLE SODIUM 40 MG/1
40 TABLET, DELAYED RELEASE ORAL DAILY
Status: DISCONTINUED | OUTPATIENT
Start: 2024-10-19 | End: 2024-10-22 | Stop reason: HOSPADM

## 2024-10-19 RX ORDER — CLOPIDOGREL BISULFATE 75 MG/1
75 TABLET ORAL DAILY
Status: DISCONTINUED | OUTPATIENT
Start: 2024-10-19 | End: 2024-10-22 | Stop reason: HOSPADM

## 2024-10-19 RX ORDER — ACETAMINOPHEN 650 MG/1
650 SUPPOSITORY RECTAL EVERY 6 HOURS PRN
Status: DISCONTINUED | OUTPATIENT
Start: 2024-10-19 | End: 2024-10-22 | Stop reason: HOSPADM

## 2024-10-19 RX ORDER — TAMSULOSIN HYDROCHLORIDE 0.4 MG/1
0.4 CAPSULE ORAL DAILY
COMMUNITY

## 2024-10-19 RX ORDER — FENTANYL CITRATE 50 UG/ML
INJECTION, SOLUTION INTRAMUSCULAR; INTRAVENOUS
Status: COMPLETED
Start: 2024-10-19 | End: 2024-10-19

## 2024-10-19 RX ORDER — LISINOPRIL 10 MG/1
20 TABLET ORAL DAILY
Status: DISCONTINUED | OUTPATIENT
Start: 2024-10-19 | End: 2024-10-19

## 2024-10-19 RX ORDER — METOPROLOL SUCCINATE 100 MG/1
100 TABLET, EXTENDED RELEASE ORAL DAILY
Status: ON HOLD | COMMUNITY
End: 2024-10-22 | Stop reason: HOSPADM

## 2024-10-19 RX ORDER — ASPIRIN 81 MG/1
81 TABLET ORAL DAILY
Status: DISCONTINUED | OUTPATIENT
Start: 2024-10-19 | End: 2024-10-22 | Stop reason: HOSPADM

## 2024-10-19 RX ORDER — ISOSORBIDE MONONITRATE 30 MG/1
30 TABLET, EXTENDED RELEASE ORAL DAILY
COMMUNITY

## 2024-10-19 RX ORDER — POTASSIUM CHLORIDE 7.45 MG/ML
10 INJECTION INTRAVENOUS PRN
Status: DISCONTINUED | OUTPATIENT
Start: 2024-10-19 | End: 2024-10-22 | Stop reason: HOSPADM

## 2024-10-19 RX ORDER — FUROSEMIDE 40 MG/1
40 TABLET ORAL DAILY
COMMUNITY

## 2024-10-19 RX ORDER — M-VIT,TX,IRON,MINS/CALC/FOLIC 27MG-0.4MG
1 TABLET ORAL DAILY
COMMUNITY

## 2024-10-19 RX ORDER — MAGNESIUM SULFATE IN WATER 40 MG/ML
2000 INJECTION, SOLUTION INTRAVENOUS PRN
Status: DISCONTINUED | OUTPATIENT
Start: 2024-10-19 | End: 2024-10-22 | Stop reason: HOSPADM

## 2024-10-19 RX ORDER — SODIUM CHLORIDE 0.9 % (FLUSH) 0.9 %
5-40 SYRINGE (ML) INJECTION PRN
Status: DISCONTINUED | OUTPATIENT
Start: 2024-10-19 | End: 2024-10-22 | Stop reason: HOSPADM

## 2024-10-19 RX ORDER — GABAPENTIN 600 MG/1
600 TABLET ORAL 2 TIMES DAILY
Status: ON HOLD | COMMUNITY
End: 2024-10-22 | Stop reason: HOSPADM

## 2024-10-19 RX ORDER — PANTOPRAZOLE SODIUM 40 MG/1
40 TABLET, DELAYED RELEASE ORAL DAILY
COMMUNITY

## 2024-10-19 RX ORDER — POLYETHYLENE GLYCOL 3350 17 G/17G
17 POWDER, FOR SOLUTION ORAL DAILY PRN
Status: DISCONTINUED | OUTPATIENT
Start: 2024-10-19 | End: 2024-10-22 | Stop reason: HOSPADM

## 2024-10-19 RX ORDER — LISINOPRIL 20 MG/1
20 TABLET ORAL DAILY
Status: DISCONTINUED | OUTPATIENT
Start: 2024-10-20 | End: 2024-10-22 | Stop reason: HOSPADM

## 2024-10-19 RX ORDER — ONDANSETRON 2 MG/ML
4 INJECTION INTRAMUSCULAR; INTRAVENOUS EVERY 6 HOURS PRN
Status: DISCONTINUED | OUTPATIENT
Start: 2024-10-19 | End: 2024-10-22 | Stop reason: HOSPADM

## 2024-10-19 RX ORDER — ENOXAPARIN SODIUM 100 MG/ML
40 INJECTION SUBCUTANEOUS DAILY
Status: DISCONTINUED | OUTPATIENT
Start: 2024-10-19 | End: 2024-10-22 | Stop reason: HOSPADM

## 2024-10-19 RX ORDER — ISOSORBIDE MONONITRATE 30 MG/1
30 TABLET, EXTENDED RELEASE ORAL DAILY
Status: DISCONTINUED | OUTPATIENT
Start: 2024-10-19 | End: 2024-10-19

## 2024-10-19 RX ORDER — SIMVASTATIN 20 MG
20 TABLET ORAL NIGHTLY
COMMUNITY

## 2024-10-19 RX ADMIN — ASPIRIN 81 MG: 81 TABLET, COATED ORAL at 13:59

## 2024-10-19 RX ADMIN — GABAPENTIN 300 MG: 600 TABLET, FILM COATED ORAL at 13:58

## 2024-10-19 RX ADMIN — PANTOPRAZOLE SODIUM 40 MG: 40 TABLET, DELAYED RELEASE ORAL at 13:58

## 2024-10-19 RX ADMIN — FUROSEMIDE 40 MG: 10 INJECTION, SOLUTION INTRAMUSCULAR; INTRAVENOUS at 10:37

## 2024-10-19 RX ADMIN — IOPAMIDOL 75 ML: 755 INJECTION, SOLUTION INTRAVENOUS at 06:50

## 2024-10-19 RX ADMIN — FENTANYL CITRATE 25 MCG: 50 INJECTION, SOLUTION INTRAMUSCULAR; INTRAVENOUS at 04:17

## 2024-10-19 RX ADMIN — ENOXAPARIN SODIUM 40 MG: 100 INJECTION SUBCUTANEOUS at 13:58

## 2024-10-19 RX ADMIN — SODIUM CHLORIDE, PRESERVATIVE FREE 10 ML: 5 INJECTION INTRAVENOUS at 21:23

## 2024-10-19 RX ADMIN — SERTRALINE HYDROCHLORIDE 25 MG: 50 TABLET ORAL at 13:58

## 2024-10-19 RX ADMIN — TAMSULOSIN HYDROCHLORIDE 0.4 MG: 0.4 CAPSULE ORAL at 13:59

## 2024-10-19 RX ADMIN — ATORVASTATIN CALCIUM 20 MG: 20 TABLET, FILM COATED ORAL at 13:58

## 2024-10-19 RX ADMIN — CLOPIDOGREL BISULFATE 75 MG: 75 TABLET ORAL at 13:58

## 2024-10-19 RX ADMIN — GABAPENTIN 300 MG: 600 TABLET, FILM COATED ORAL at 21:23

## 2024-10-19 ASSESSMENT — PAIN SCALES - GENERAL
PAINLEVEL_OUTOF10: 5
PAINLEVEL_OUTOF10: 5
PAINLEVEL_OUTOF10: 3

## 2024-10-19 ASSESSMENT — LIFESTYLE VARIABLES: HOW OFTEN DO YOU HAVE A DRINK CONTAINING ALCOHOL: NEVER

## 2024-10-19 ASSESSMENT — PAIN - FUNCTIONAL ASSESSMENT: PAIN_FUNCTIONAL_ASSESSMENT: 0-10

## 2024-10-19 NOTE — H&P
[facility]     Department of Internal Medicine - Staff Internal Medicine Teaching Service          ADMISSION NOTE/HISTORY AND PHYSICAL EXAMINATION   Date: 10/19/2024  Patient Name: Dr Destiney Alexander  Date of admission: 10/19/2024  3:53 AM  YOB: 1940  PCP: Briana Fuentes MD  History Obtained From:  patient    CHIEF COMPLAINT     Chief complaint:   Chief Complaint   Patient presents with    Fall       HISTORY OF PRESENTING ILLNESS     The patient is a pleasant 84 y.o. male with a past medical history    CAD with prior CABG with last cardiac cath on 8/5/2023.  Ischemic cardiomyopathy with a EF of 27% with Medtronic pacemaker/AICD in place  PAF  Duodenal ulcer in 2023  PVD  Hypertension  Prostate cancer status post radiation 3 to 4 years ago  Bilateral carotid artery stenosis      Presents status post fall.  Patient states that he was walking around his house in the middle of the night with all lights off and was walking to his chair.  He states that he misjudged where the chair was causing him to fall forward hitting his head.  Patient denies any LOC, shortness of breath, dizziness, chest pain or palpitation.    In the ER, patient came in as a trauma.  CT head ordered showed right frontal hematoma and generalized cortical atrophy.  CT C-spine and x-ray pelvis were unremarkable.  Chest x-ray did show pulmonary vascular congestion and patient underwent further workup based on chest x-ray findings.  While in the ED patient also required oxygen desatted down to 85% and was placed on 3 L.  Internal medicine was consulted for new management of oxygenation as patient does not wear oxygen at home. EKG showed paced rhythm. initial troponin elevated at 25 repeat 18.  Initial VBG showed pCO2 of 77.9 with bicarb of 37.9.  Repeat VBG showed pCO2 of 72.9.  CT PE shows no evidence of pulmonary embolism. Vitamin D, albumin TSH, T4 vitamin B12 within normal limit. Rapid COVID-negative.

## 2024-10-19 NOTE — ED NOTES
25mcg fentanyl given IV  
Blood sent by Phleb  
C-Spine cleared by Dr. Garcia  
CT completed  
Medtronic ORACIO interpreted. [employer] on the phone with Dr. Hannah at this time  
Patient asking for his gabapentin. It is not ordered to start until 2100 tonight. Admitting team perfect served regarding this.  
Patient given urinal. Patient resting comfortably and in no acute distress. Respirations even and nonlabored. Patient denies any needs at this time. Bed in lowest position. Call light within reach. Will continue to monitor.  
Patient resting comfortably and in no acute distress. Respirations even and nonlabored. Patient denies any needs at this time. Bed in lowest position. Call light within reach. Will continue to monitor.  
Patient transported from ED 23 to floor via stretcher by Galo DE LA CRUZ  
Patient was satting 99% on 2L NC. Not on any home oxygen. Writer attempted to take the patient off of the 2L NC oxygen, patient desatted to 87% on RA. Patient placed back on 2L NC with improvement to 97%. Resident aware. Patient resting comfortably and in no acute distress. Respirations even and nonlabored. Patient denies any needs at this time. Bed in lowest position. Call light within reach. Will continue to monitor.  
Patient's medications verified and entered into the chart. Admitting team perfect served to order them.  
Pt placed on 3LNC  
Pt placed on 3LNC, Dr. Levi notified.  
Pt presents to ED via M18 with c/o head injury after fall. Pt states he was walking in the dark, attempted to sit in chair but missed and fell, hit his head on a table. Mechanical fall. EMS states he takes blood thinners. Upon arrival, pt is alert and oriented. Pt reports lower back pain. Pt has hematoma above R brow, abrasion to right knee. No uncontrolled bleeding. Pt denies LOC.   
Pt removed from O2.  
Pt to CT  
Pt to CT  
The following labs were labeled with appropriate pt sticker and tubed to lab:     [] Blue     [] Lavender   [] on ice  [] Green/yellow  [] Green/black [] on ice  [] Grey  [] on ice  [] Yellow  [] Red  [] Pink  [] Type/ Screen  [] ABG  [] VBG    [] COVID-19 swab    [] Rapid  [] PCR  [] Flu swab  [x] Peds Viral Panel     [] Urine Sample  [] Fecal Sample  [] Pelvic Cultures  [] Blood Cultures  [] X 2  [] STREP Cultures  [] Wound Cultures  
The following labs were labeled with appropriate pt sticker and tubed to lab:     [] Blue     [] Lavender   [] on ice  [x] Green/yellow  [] Green/black [] on ice  [] Grey  [] on ice  [] Yellow  [] Red  [] Pink  [] Type/ Screen  [] ABG  [] VBG    [] COVID-19 swab    [] Rapid  [] PCR  [] Flu swab  [] Peds Viral Panel     [] Urine Sample  [] Fecal Sample  [] Pelvic Cultures  [] Blood Cultures  [] X 2  [] STREP Cultures  [] Wound Cultures  
The following labs were labeled with appropriate pt sticker and tubed to lab:     [] Blue     [] Lavender   [] on ice  [x] Green/yellow  [] Green/black [] on ice  [] Grey  [] on ice  [] Yellow  [] Red  [] Pink  [] Type/ Screen  [] ABG  [] VBG    [] COVID-19 swab    [] Rapid  [] PCR  [] Flu swab  [] Peds Viral Panel     [x] Urine Sample  [] Fecal Sample  [] Pelvic Cultures  [] Blood Cultures  [] X 2  [] STREP Cultures  [] Wound Cultures  
Xray at bedside  
because of falls (Syncope, seizure, or loss of consciousness): Yes, Age > 70: Yes, Altered Mental Status, Intoxication with alcohol or substance confusion (Disorientation, impaired judgment, poor safety awaremess, or inability to follow instructions): No, Impaired Mobility: Ambulates or transfers with assistive devices or assistance; Unable to ambulate or transer.: No, Nursing Judgement: Yes    Diagnosis:  DISPOSITION Admitted 10/19/2024 08:51:28 AM   Final diagnoses:   Fall, initial encounter   Hematoma of frontal scalp, initial encounter   Hypoxia   Elevated troponin   Congestive heart failure, unspecified HF chronicity, unspecified heart failure type (HCC)        Consults:  IP CONSULT TO CARDIOLOGY  IP CONSULT TO INTERNAL MEDICINE     Treatment Team:   Treatment Team:   Jacque Lawrence MD Sullivan, Rachael C, MD Wilkins, JEANCARLOS Pena Sinan, DO Avasthi, Deepti, MD    Treatment:  ED Course as of 10/19/24 1153   Sat Oct 19, 2024   0508 CT HEAD WO CONTRAST [AS]   0537 pCO2, Mario(!): 77.9 [AS]   0541 1. No acute intracranial abnormality.  2. Right frontal scalp hematoma without underlying calvarial fracture.  3. Generalized cortical atrophy, with chronic small vessel ischemic changes.   [AS]   0542 CT CERVICAL SPINE WO CONTRAST [AS]   0543 1. No evidence of acute traumatic injury of the cervical spine.  2. Multilevel moderate to severe degenerative disc disease and  mild-to-moderate facet osteoarthritis.   [AS]   0543 XR CHEST PORTABLE [AS]   0543 1. Cardiomegaly with minimal pulmonary vascular congestion.  2. No evidence of pneumonia or confluent atelectasis in the lungs.  No  pleural effusion demonstrated.  3. On this AP view, in the visualized portions of bony thorax, there is no  definite fracture visualized.   [AS]   0543 XR PELVIS (1-2 VIEWS) [AS]   0543 1. No evidence of acute fracture or dislocation on the AP view of the pelvis  including AP view of both hip joints.  2. Intact left hip

## 2024-10-19 NOTE — PLAN OF CARE
Senior note    This patient is a 84-year-old male with past medical history of ischemic cardiomyopathy status post AICD 2019, EF 20 to 25%, CAD status post CABG most recent stenting in June 2023  Presented to the ED as a trauma alert, patient was apparently trying to get to his chair at around 3 AM this morning and missed it and fell forwards and hit his face  Patient denies any loss of consciousness, denied any dizziness, reports that he miscalculated the distance to chair and there were no lights in his room  ROS negative except for shortness of breathing, patient reports shortness of breathing from the past 4 weeks, denied any cough denied any sputum production,    ED course-initial vitals on admission stable, patient was initially on room air but subsequently desaturated into 85-87 requiring supplemental oxygen  Initial BMP with sodium 137, potassium 4, chloride 97, bicarb 32, BUN 13, creatinine 0.9, anion gap 8  Troponins 25<18, TSH 1.1, CBC within normal limits, INR 1, he tested negative for COVID  Blood gas showing pH 7.316, pCO2 72.4, bicarb 37  Patient underwent pan scan, x-ray pelvis with no acute fracture, CT C-spine no injuries, moderate to severe degenerative disc disease, CT head no acute abnormalities, showing right frontal scalp hematoma without underlying calvarial fracture  CT PE was obtained showing no PE, lungs with bibasilar atelectasis, calcified pleural plaque on left hemidiaphragm    On examination, patient was tachypneic while talking he was on 2 L of oxygen, had bibasilar crackles, no pedal edema,     PMH: prostate cancer s/p radiation in 2020    Assessment:  Acute hypoxic on chronic hypercapnic respiratory failure - likely secondary to chf exacerbation   Initial vbg with respiratory acidosis, reviewed previous labs, seems like chronic acidosis   -Patient is not a non-smoker, worked in IT in past  -IV Lasix 40 IV bid  , strict input output monitoring, will switch to po tomorrow

## 2024-10-19 NOTE — CONSULTS
[facility]    Consult / H&P               Today's Date: 10/19/2024  Patient Name: Dr Cabello Xxcalebhi  Date of admission: 10/19/2024  3:53 AM  Patient's age: 84 y.o., 1940  Admission Dx: Acute hypoxic respiratory failure [J96.01]    Requesting Physician: Jacque Lawrence MD    Cardiac Evaluation Reason:  Shortness of Breath    History Obtained From: patient and chart review     History of Present Illness:    84 y.o. male with a past medical history     CAD with prior CABG with last cardiac cath on 8/5/2023.  Ischemic cardiomyopathy with a EF of 27% with Medtronic pacemaker/AICD in place  PAF  Duodenal ulcer in 2023  PVD  Hypertension  Prostate cancer status post radiation 3 to 4 years ago  Bilateral carotid artery stenosis        Presents status post fall.  Patient states that he was walking around his house in the middle of the night with all lights off and was walking to his chair.  He states that he misjudged where the chair was causing him to fall forward hitting his head.  Patient denies any LOC, shortness of breath, dizziness, chest pain or palpitation.     In the ER, patient came in as a trauma.  CT head ordered showed right frontal hematoma and generalized cortical atrophy.  CT C-spine and x-ray pelvis were unremarkable.  Chest x-ray did show pulmonary vascular congestion and patient underwent further workup based on chest x-ray findings.  While in the ED patient also required oxygen desatted down to 85% and was placed on 3 L.  Internal medicine was consulted for new management of oxygenation as patient does not wear oxygen at home. EKG showed paced rhythm. initial troponin elevated at 25 repeat 18.  Initial VBG showed pCO2 of 77.9 with bicarb of 37.9.  Repeat VBG showed pCO2 of 72.9.  CT PE shows no evidence of pulmonary embolism. Vitamin D, albumin TSH, T4 vitamin B12 within normal limit. Rapid COVID-negative.       Patient does state for the last several weeks he has had an increase in

## 2024-10-20 LAB
ANION GAP SERPL CALCULATED.3IONS-SCNC: 10 MMOL/L (ref 9–16)
BASOPHILS # BLD: 0 K/UL (ref 0–0.2)
BASOPHILS NFR BLD: 0 % (ref 0–2)
BODY TEMPERATURE: 37
BUN SERPL-MCNC: 12 MG/DL (ref 8–23)
CALCIUM SERPL-MCNC: 8.2 MG/DL (ref 8.6–10.4)
CHLORIDE SERPL-SCNC: 97 MMOL/L (ref 98–107)
CO2 SERPL-SCNC: 33 MMOL/L (ref 20–31)
COHGB MFR BLD: 2.2 % (ref 0–5)
CREAT SERPL-MCNC: 0.8 MG/DL (ref 0.7–1.2)
EOSINOPHIL # BLD: 0.15 K/UL (ref 0–0.44)
EOSINOPHILS RELATIVE PERCENT: 3 % (ref 1–4)
ERYTHROCYTE [DISTWIDTH] IN BLOOD BY AUTOMATED COUNT: 13.4 % (ref 11.8–14.4)
EST. AVERAGE GLUCOSE BLD GHB EST-MCNC: 126 MG/DL
FIO2 ON VENT: ABNORMAL %
GFR, ESTIMATED: 87 ML/MIN/1.73M2
GLUCOSE SERPL-MCNC: 102 MG/DL (ref 74–99)
HBA1C MFR BLD: 6 % (ref 4–6)
HCO3 VENOUS: 35.5 MMOL/L (ref 24–30)
HCT VFR BLD AUTO: 38.9 % (ref 40.7–50.3)
HGB BLD-MCNC: 12.6 G/DL (ref 13–17)
IMM GRANULOCYTES # BLD AUTO: 0 K/UL (ref 0–0.3)
IMM GRANULOCYTES NFR BLD: 0 %
LYMPHOCYTES NFR BLD: 0.55 K/UL (ref 1.1–3.7)
LYMPHOCYTES RELATIVE PERCENT: 11 % (ref 24–43)
MCH RBC QN AUTO: 28.6 PG (ref 25.2–33.5)
MCHC RBC AUTO-ENTMCNC: 32.4 G/DL (ref 28.4–34.8)
MCV RBC AUTO: 88.2 FL (ref 82.6–102.9)
MONOCYTES NFR BLD: 0.5 K/UL (ref 0.1–1.2)
MONOCYTES NFR BLD: 10 % (ref 3–12)
MORPHOLOGY: NORMAL
NEUTROPHILS NFR BLD: 76 % (ref 36–65)
NEUTS SEG NFR BLD: 3.8 K/UL (ref 1.5–8.1)
NRBC BLD-RTO: 0 PER 100 WBC
O2 SAT, VEN: 89.7 % (ref 60–85)
PCO2 VENOUS: 63.4 MM HG (ref 39–55)
PH VENOUS: 7.37 (ref 7.32–7.42)
PLATELET # BLD AUTO: 153 K/UL (ref 138–453)
PMV BLD AUTO: 10.7 FL (ref 8.1–13.5)
PO2 VENOUS: 58.8 MM HG (ref 30–50)
POSITIVE BASE EXCESS, VEN: 8.3 MMOL/L (ref 0–2)
POTASSIUM SERPL-SCNC: 3.6 MMOL/L (ref 3.7–5.3)
RBC # BLD AUTO: 4.41 M/UL (ref 4.21–5.77)
SODIUM SERPL-SCNC: 140 MMOL/L (ref 136–145)
WBC OTHER # BLD: 5 K/UL (ref 3.5–11.3)

## 2024-10-20 PROCEDURE — 6370000000 HC RX 637 (ALT 250 FOR IP)

## 2024-10-20 PROCEDURE — 2700000000 HC OXYGEN THERAPY PER DAY

## 2024-10-20 PROCEDURE — 36415 COLL VENOUS BLD VENIPUNCTURE: CPT

## 2024-10-20 PROCEDURE — 97535 SELF CARE MNGMENT TRAINING: CPT

## 2024-10-20 PROCEDURE — 6370000000 HC RX 637 (ALT 250 FOR IP): Performed by: SURGERY

## 2024-10-20 PROCEDURE — 82805 BLOOD GASES W/O2 SATURATION: CPT

## 2024-10-20 PROCEDURE — 6360000002 HC RX W HCPCS

## 2024-10-20 PROCEDURE — 94640 AIRWAY INHALATION TREATMENT: CPT

## 2024-10-20 PROCEDURE — 97166 OT EVAL MOD COMPLEX 45 MIN: CPT

## 2024-10-20 PROCEDURE — 2060000000 HC ICU INTERMEDIATE R&B

## 2024-10-20 PROCEDURE — 2580000003 HC RX 258

## 2024-10-20 PROCEDURE — 99233 SBSQ HOSP IP/OBS HIGH 50: CPT | Performed by: INTERNAL MEDICINE

## 2024-10-20 PROCEDURE — 94761 N-INVAS EAR/PLS OXIMETRY MLT: CPT

## 2024-10-20 PROCEDURE — 6370000000 HC RX 637 (ALT 250 FOR IP): Performed by: INTERNAL MEDICINE

## 2024-10-20 PROCEDURE — 85025 COMPLETE CBC W/AUTO DIFF WBC: CPT

## 2024-10-20 PROCEDURE — 80048 BASIC METABOLIC PNL TOTAL CA: CPT

## 2024-10-20 RX ORDER — METOPROLOL SUCCINATE 25 MG/1
25 TABLET, EXTENDED RELEASE ORAL NIGHTLY
Status: DISCONTINUED | OUTPATIENT
Start: 2024-10-20 | End: 2024-10-22 | Stop reason: HOSPADM

## 2024-10-20 RX ORDER — POTASSIUM CHLORIDE 1500 MG/1
20 TABLET, EXTENDED RELEASE ORAL 2 TIMES DAILY WITH MEALS
Status: DISCONTINUED | OUTPATIENT
Start: 2024-10-20 | End: 2024-10-22 | Stop reason: HOSPADM

## 2024-10-20 RX ORDER — FUROSEMIDE 10 MG/ML
40 INJECTION INTRAMUSCULAR; INTRAVENOUS ONCE
Status: DISCONTINUED | OUTPATIENT
Start: 2024-10-20 | End: 2024-10-20

## 2024-10-20 RX ORDER — IPRATROPIUM BROMIDE AND ALBUTEROL SULFATE 2.5; .5 MG/3ML; MG/3ML
1 SOLUTION RESPIRATORY (INHALATION)
Status: DISCONTINUED | OUTPATIENT
Start: 2024-10-20 | End: 2024-10-20

## 2024-10-20 RX ORDER — FUROSEMIDE 10 MG/ML
40 INJECTION INTRAMUSCULAR; INTRAVENOUS 2 TIMES DAILY
Status: DISCONTINUED | OUTPATIENT
Start: 2024-10-20 | End: 2024-10-21

## 2024-10-20 RX ORDER — POTASSIUM CHLORIDE 1500 MG/1
40 TABLET, EXTENDED RELEASE ORAL ONCE
Status: COMPLETED | OUTPATIENT
Start: 2024-10-20 | End: 2024-10-20

## 2024-10-20 RX ORDER — IPRATROPIUM BROMIDE AND ALBUTEROL SULFATE 2.5; .5 MG/3ML; MG/3ML
1 SOLUTION RESPIRATORY (INHALATION)
Status: DISCONTINUED | OUTPATIENT
Start: 2024-10-20 | End: 2024-10-22 | Stop reason: HOSPADM

## 2024-10-20 RX ADMIN — METOPROLOL SUCCINATE 25 MG: 25 TABLET, EXTENDED RELEASE ORAL at 20:39

## 2024-10-20 RX ADMIN — POTASSIUM CHLORIDE 40 MEQ: 1500 TABLET, EXTENDED RELEASE ORAL at 12:09

## 2024-10-20 RX ADMIN — GABAPENTIN 300 MG: 600 TABLET, FILM COATED ORAL at 08:12

## 2024-10-20 RX ADMIN — GABAPENTIN 300 MG: 600 TABLET, FILM COATED ORAL at 20:39

## 2024-10-20 RX ADMIN — SODIUM CHLORIDE, PRESERVATIVE FREE 10 ML: 5 INJECTION INTRAVENOUS at 20:39

## 2024-10-20 RX ADMIN — FUROSEMIDE 40 MG: 10 INJECTION, SOLUTION INTRAMUSCULAR; INTRAVENOUS at 08:12

## 2024-10-20 RX ADMIN — PANTOPRAZOLE SODIUM 40 MG: 40 TABLET, DELAYED RELEASE ORAL at 08:12

## 2024-10-20 RX ADMIN — IPRATROPIUM BROMIDE AND ALBUTEROL SULFATE 1 DOSE: .5; 2.5 SOLUTION RESPIRATORY (INHALATION) at 08:01

## 2024-10-20 RX ADMIN — SODIUM CHLORIDE, PRESERVATIVE FREE 10 ML: 5 INJECTION INTRAVENOUS at 08:13

## 2024-10-20 RX ADMIN — IPRATROPIUM BROMIDE AND ALBUTEROL SULFATE 1 DOSE: .5; 2.5 SOLUTION RESPIRATORY (INHALATION) at 19:57

## 2024-10-20 RX ADMIN — ENOXAPARIN SODIUM 40 MG: 100 INJECTION SUBCUTANEOUS at 08:12

## 2024-10-20 RX ADMIN — POTASSIUM CHLORIDE 20 MEQ: 1500 TABLET, EXTENDED RELEASE ORAL at 16:18

## 2024-10-20 RX ADMIN — ASPIRIN 81 MG: 81 TABLET, COATED ORAL at 08:12

## 2024-10-20 RX ADMIN — TAMSULOSIN HYDROCHLORIDE 0.4 MG: 0.4 CAPSULE ORAL at 08:12

## 2024-10-20 RX ADMIN — ATORVASTATIN CALCIUM 20 MG: 20 TABLET, FILM COATED ORAL at 08:12

## 2024-10-20 RX ADMIN — CLOPIDOGREL BISULFATE 75 MG: 75 TABLET ORAL at 08:12

## 2024-10-20 RX ADMIN — SERTRALINE HYDROCHLORIDE 25 MG: 50 TABLET ORAL at 08:12

## 2024-10-20 ASSESSMENT — PAIN SCALES - GENERAL
PAINLEVEL_OUTOF10: 4
PAINLEVEL_OUTOF10: 0
PAINLEVEL_OUTOF10: 0

## 2024-10-20 ASSESSMENT — PAIN DESCRIPTION - LOCATION: LOCATION: HEAD

## 2024-10-20 ASSESSMENT — PAIN DESCRIPTION - PAIN TYPE: TYPE: ACUTE PAIN

## 2024-10-20 ASSESSMENT — PAIN DESCRIPTION - ORIENTATION: ORIENTATION: MID;RIGHT

## 2024-10-20 ASSESSMENT — PAIN DESCRIPTION - DESCRIPTORS: DESCRIPTORS: ACHING

## 2024-10-20 NOTE — CARE COORDINATION
Case Management Assessment  Initial Evaluation    Date/Time of Evaluation: 10/20/2024 10:16 AM  Assessment Completed by: RAY HERNÁNDEZ    If patient is discharged prior to next notation, then this note serves as note for discharge by case management.    Patient Name: Galo Ruiz                   YOB: 1940  Diagnosis: Hypoxia [R09.02]  Elevated troponin [R79.89]  Fall, initial encounter [W19.XXXA]  Hematoma of frontal scalp, initial encounter [S00.03XA]  Congestive heart failure, unspecified HF chronicity, unspecified heart failure type (HCC) [I50.9]  Acute hypoxic respiratory failure [J96.01]                   Date / Time: 10/19/2024  3:53 AM    Patient Admission Status: Inpatient   Readmission Risk (Low < 19, Mod (19-27), High > 27): Readmission Risk Score: 11    Current PCP: Briana Fuentes MD  PCP verified by CM? (P) Yes    Chart Reviewed: Yes      History Provided by: (P) Patient  Patient Orientation: (P) Alert and Oriented, Person, Place, Situation    Patient Cognition: (P) Alert    Hospitalization in the last 30 days (Readmission):  No    If yes, Readmission Assessment in CM Navigator will be completed.    Advance Directives:      Code Status: Full Code   Patient's Primary Decision Maker is: (P) Legal Next of Kin      Discharge Planning:    Patient lives with: (P) Spouse/Significant Other Type of Home: (P) House  Primary Care Giver: (P) Self  Patient Support Systems include: (P) Spouse/Significant Other, Family Members   Current Financial resources: (P) Medicare  Current community resources: (P) None  Current services prior to admission: (P) Durable Medical Equipment, Other (Comment) (going to [facility] for balance and coordination through PCP.)            Current DME: (P) Walker            Type of Home Care services:  (P) None    ADLS  Prior functional level: (P) Independent in ADLs/IADLs  Current functional level: (P) Independent in ADLs/IADLs    PT AM-PAC:   /24  OT AM-PAC:

## 2024-10-20 NOTE — PLAN OF CARE
Problem: Chronic Conditions and Co-morbidities  Goal: Patient's chronic conditions and co-morbidity symptoms are monitored and maintained or improved  Outcome: Progressing     Problem: Pain  Goal: Verbalizes/displays adequate comfort level or baseline comfort level  Outcome: Progressing     Problem: ABCDS Injury Assessment  Goal: Absence of physical injury  Outcome: Progressing     Problem: Safety - Adult  Goal: Free from fall injury  Outcome: Progressing

## 2024-10-20 NOTE — FLOWSHEET NOTE
Orthostatics        10/20/24 1052   Vital Signs   Pulse 72   Respirations 24   /85   MAP (Calculated) 96   MAP (mmHg) 97   Orthostatic B/P and Pulse? Yes   Blood Pressure Lying 117/85   Pulse Lying 72 PER MINUTE   Blood Pressure Sitting 115/65   Pulse Sitting 88 PER MINUTE   Blood Pressure Standing 115/59   Pulse Standing 95 PER MINUTE

## 2024-10-20 NOTE — PLAN OF CARE
Problem: Chronic Conditions and Co-morbidities  Goal: Patient's chronic conditions and co-morbidity symptoms are monitored and maintained or improved  10/20/2024 1012 by Renetta Ruiz RN  Outcome: Progressing  10/20/2024 0444 by Sarah Santiago RN  Outcome: Progressing     Problem: Pain  Goal: Verbalizes/displays adequate comfort level or baseline comfort level  10/20/2024 1012 by Renetta Ruiz RN  Outcome: Progressing  10/20/2024 0444 by Sarah Santiago RN  Outcome: Progressing     Problem: ABCDS Injury Assessment  Goal: Absence of physical injury  10/20/2024 1012 by Renetta Ruiz RN  Outcome: Progressing  10/20/2024 0444 by Sarah Santiago RN  Outcome: Progressing     Problem: Safety - Adult  Goal: Free from fall injury  10/20/2024 1012 by Renetta Ruiz RN  Outcome: Progressing  10/20/2024 0444 by Sarah Santiago RN  Outcome: Progressing

## 2024-10-21 ENCOUNTER — APPOINTMENT (OUTPATIENT)
Age: 84
DRG: 291 | End: 2024-10-21
Payer: MEDICARE

## 2024-10-21 ENCOUNTER — APPOINTMENT (OUTPATIENT)
Dept: NUCLEAR MEDICINE | Age: 84
DRG: 291 | End: 2024-10-21
Payer: MEDICARE

## 2024-10-21 PROBLEM — R09.02 HYPOXIA: Status: ACTIVE | Noted: 2024-10-21

## 2024-10-21 PROBLEM — S00.03XA HEMATOMA OF FRONTAL SCALP: Status: ACTIVE | Noted: 2024-10-21

## 2024-10-21 LAB
ANION GAP SERPL CALCULATED.3IONS-SCNC: 8 MMOL/L (ref 9–16)
BASOPHILS # BLD: 0.04 K/UL (ref 0–0.2)
BASOPHILS NFR BLD: 1 % (ref 0–2)
BUN SERPL-MCNC: 11 MG/DL (ref 8–23)
CALCIUM SERPL-MCNC: 8.2 MG/DL (ref 8.6–10.4)
CHLORIDE SERPL-SCNC: 100 MMOL/L (ref 98–107)
CO2 SERPL-SCNC: 29 MMOL/L (ref 20–31)
CREAT SERPL-MCNC: 0.7 MG/DL (ref 0.7–1.2)
ECHO AO ASC DIAM: 3.1 CM
ECHO AO ASCENDING AORTA INDEX: 1.62 CM/M2
ECHO AO ROOT DIAM: 3 CM
ECHO AO ROOT INDEX: 1.57 CM/M2
ECHO AR MAX VEL PISA: 2.1 M/S
ECHO AV AREA PEAK VELOCITY: 3.8 CM2
ECHO AV AREA VTI: 2.7 CM2
ECHO AV AREA/BSA PEAK VELOCITY: 2 CM2/M2
ECHO AV AREA/BSA VTI: 1.4 CM2/M2
ECHO AV MEAN GRADIENT: 3 MMHG
ECHO AV MEAN VELOCITY: 0.8 M/S
ECHO AV PEAK GRADIENT: 7 MMHG
ECHO AV PEAK VELOCITY: 1.3 M/S
ECHO AV REGURGITANT PHT: 919 MS
ECHO AV VELOCITY RATIO: 0.92
ECHO AV VTI: 26.3 CM
ECHO BSA: 1.94 M2
ECHO BSA: 1.94 M2
ECHO LA DIAMETER INDEX: 2.09 CM/M2
ECHO LA DIAMETER: 4 CM
ECHO LA TO AORTIC ROOT RATIO: 1.33
ECHO LV E' LATERAL VELOCITY: 11.3 CM/S
ECHO LV E' SEPTAL VELOCITY: 5.7 CM/S
ECHO LV EDV A2C: 71 ML
ECHO LV EDV A4C: 109 ML
ECHO LV EDV INDEX A4C: 57 ML/M2
ECHO LV EDV NDEX A2C: 37 ML/M2
ECHO LV EF PHYSICIAN: 30 %
ECHO LV EJECTION FRACTION A2C: 42 %
ECHO LV EJECTION FRACTION A4C: 33 %
ECHO LV EJECTION FRACTION BIPLANE: 40 % (ref 55–100)
ECHO LV ESV A2C: 41 ML
ECHO LV ESV A4C: 73 ML
ECHO LV ESV INDEX A2C: 21 ML/M2
ECHO LV ESV INDEX A4C: 38 ML/M2
ECHO LV FRACTIONAL SHORTENING: 17 % (ref 28–44)
ECHO LV INTERNAL DIMENSION DIASTOLE INDEX: 2.72 CM/M2
ECHO LV INTERNAL DIMENSION DIASTOLIC: 5.2 CM (ref 4.2–5.9)
ECHO LV INTERNAL DIMENSION SYSTOLIC INDEX: 2.25 CM/M2
ECHO LV INTERNAL DIMENSION SYSTOLIC: 4.3 CM
ECHO LV IVSD: 1.1 CM (ref 0.6–1)
ECHO LV MASS 2D: 220.8 G (ref 88–224)
ECHO LV MASS INDEX 2D: 115.6 G/M2 (ref 49–115)
ECHO LV POSTERIOR WALL DIASTOLIC: 1.1 CM (ref 0.6–1)
ECHO LV RELATIVE WALL THICKNESS RATIO: 0.42
ECHO LVOT AREA: 4.2 CM2
ECHO LVOT AV VTI INDEX: 0.65
ECHO LVOT DIAM: 2.3 CM
ECHO LVOT MEAN GRADIENT: 2 MMHG
ECHO LVOT PEAK GRADIENT: 6 MMHG
ECHO LVOT PEAK VELOCITY: 1.2 M/S
ECHO LVOT STROKE VOLUME INDEX: 37 ML/M2
ECHO LVOT SV: 70.6 ML
ECHO LVOT VTI: 17 CM
ECHO MV A VELOCITY: 0.28 M/S
ECHO MV AREA VTI: 2.2 CM2
ECHO MV E DECELERATION TIME (DT): 170 MS
ECHO MV E VELOCITY: 1.5 M/S
ECHO MV E/A RATIO: 5.36
ECHO MV E/E' LATERAL: 13.27
ECHO MV E/E' RATIO (AVERAGED): 19.8
ECHO MV E/E' SEPTAL: 26.32
ECHO MV LVOT VTI INDEX: 1.85
ECHO MV MAX VELOCITY: 1.4 M/S
ECHO MV MEAN GRADIENT: 3 MMHG
ECHO MV MEAN VELOCITY: 0.8 M/S
ECHO MV PEAK GRADIENT: 8 MMHG
ECHO MV VTI: 31.5 CM
ECHO RV FREE WALL PEAK S': 8.4 CM/S
ECHO RV TAPSE: 1.5 CM (ref 1.7–?)
ECHO TV REGURGITANT MAX VELOCITY: 1 M/S
ECHO TV REGURGITANT PEAK GRADIENT: 4 MMHG
EKG ATRIAL RATE: 77 BPM
EKG P AXIS: 82 DEGREES
EKG P-R INTERVAL: 256 MS
EKG Q-T INTERVAL: 472 MS
EKG QRS DURATION: 156 MS
EKG QTC CALCULATION (BAZETT): 534 MS
EKG R AXIS: 109 DEGREES
EKG T AXIS: 69 DEGREES
EKG VENTRICULAR RATE: 77 BPM
EOSINOPHIL # BLD: 0.09 K/UL (ref 0–0.44)
EOSINOPHILS RELATIVE PERCENT: 2 % (ref 1–4)
ERYTHROCYTE [DISTWIDTH] IN BLOOD BY AUTOMATED COUNT: 13.5 % (ref 11.8–14.4)
GFR, ESTIMATED: >90 ML/MIN/1.73M2
GLUCOSE SERPL-MCNC: 108 MG/DL (ref 74–99)
HCT VFR BLD AUTO: 38.4 % (ref 40.7–50.3)
HGB BLD-MCNC: 12.4 G/DL (ref 13–17)
IMM GRANULOCYTES # BLD AUTO: 0.04 K/UL (ref 0–0.3)
IMM GRANULOCYTES NFR BLD: 1 %
LYMPHOCYTES NFR BLD: 0.66 K/UL (ref 1.1–3.7)
LYMPHOCYTES RELATIVE PERCENT: 15 % (ref 24–43)
MCH RBC QN AUTO: 28.2 PG (ref 25.2–33.5)
MCHC RBC AUTO-ENTMCNC: 32.3 G/DL (ref 28.4–34.8)
MCV RBC AUTO: 87.3 FL (ref 82.6–102.9)
MONOCYTES NFR BLD: 0.53 K/UL (ref 0.1–1.2)
MONOCYTES NFR BLD: 12 % (ref 3–12)
MORPHOLOGY: NORMAL
NEUTROPHILS NFR BLD: 69 % (ref 36–65)
NEUTS SEG NFR BLD: 3.04 K/UL (ref 1.5–8.1)
NRBC BLD-RTO: 0 PER 100 WBC
NUC STRESS EJECTION FRACTION: 31 %
PLATELET # BLD AUTO: 151 K/UL (ref 138–453)
PMV BLD AUTO: 11 FL (ref 8.1–13.5)
POTASSIUM SERPL-SCNC: 4.2 MMOL/L (ref 3.7–5.3)
RBC # BLD AUTO: 4.4 M/UL (ref 4.21–5.77)
SODIUM SERPL-SCNC: 137 MMOL/L (ref 136–145)
STRESS BASELINE DIAS BP: 83 MMHG
STRESS BASELINE HR: 77 BPM
STRESS BASELINE SYS BP: 157 MMHG
STRESS ESTIMATED WORKLOAD: 1 METS
STRESS PEAK DIAS BP: 83 MMHG
STRESS PEAK SYS BP: 157 MMHG
STRESS PERCENT HR ACHIEVED: 67 %
STRESS POST PEAK HR: 91 BPM
STRESS RATE PRESSURE PRODUCT: NORMAL BPM*MMHG
STRESS TARGET HR: 135 BPM
TID: 0.96
WBC OTHER # BLD: 4.4 K/UL (ref 3.5–11.3)

## 2024-10-21 PROCEDURE — 6370000000 HC RX 637 (ALT 250 FOR IP)

## 2024-10-21 PROCEDURE — 3430000000 HC RX DIAGNOSTIC RADIOPHARMACEUTICAL: Performed by: SURGERY

## 2024-10-21 PROCEDURE — 2580000003 HC RX 258

## 2024-10-21 PROCEDURE — 80048 BASIC METABOLIC PNL TOTAL CA: CPT

## 2024-10-21 PROCEDURE — 93018 CV STRESS TEST I&R ONLY: CPT | Performed by: INTERNAL MEDICINE

## 2024-10-21 PROCEDURE — 94640 AIRWAY INHALATION TREATMENT: CPT

## 2024-10-21 PROCEDURE — 6370000000 HC RX 637 (ALT 250 FOR IP): Performed by: SURGERY

## 2024-10-21 PROCEDURE — 6360000002 HC RX W HCPCS

## 2024-10-21 PROCEDURE — 85025 COMPLETE CBC W/AUTO DIFF WBC: CPT

## 2024-10-21 PROCEDURE — 6370000000 HC RX 637 (ALT 250 FOR IP): Performed by: INTERNAL MEDICINE

## 2024-10-21 PROCEDURE — A9500 TC99M SESTAMIBI: HCPCS | Performed by: SURGERY

## 2024-10-21 PROCEDURE — 93010 ELECTROCARDIOGRAM REPORT: CPT | Performed by: INTERNAL MEDICINE

## 2024-10-21 PROCEDURE — 99233 SBSQ HOSP IP/OBS HIGH 50: CPT | Performed by: NURSE PRACTITIONER

## 2024-10-21 PROCEDURE — 6360000002 HC RX W HCPCS: Performed by: SURGERY

## 2024-10-21 PROCEDURE — 93306 TTE W/DOPPLER COMPLETE: CPT

## 2024-10-21 PROCEDURE — 93017 CV STRESS TEST TRACING ONLY: CPT

## 2024-10-21 PROCEDURE — 97535 SELF CARE MNGMENT TRAINING: CPT

## 2024-10-21 PROCEDURE — 99232 SBSQ HOSP IP/OBS MODERATE 35: CPT | Performed by: INTERNAL MEDICINE

## 2024-10-21 PROCEDURE — 78452 HT MUSCLE IMAGE SPECT MULT: CPT

## 2024-10-21 PROCEDURE — 2580000003 HC RX 258: Performed by: SURGERY

## 2024-10-21 PROCEDURE — 93306 TTE W/DOPPLER COMPLETE: CPT | Performed by: INTERNAL MEDICINE

## 2024-10-21 PROCEDURE — 2060000000 HC ICU INTERMEDIATE R&B

## 2024-10-21 PROCEDURE — 36415 COLL VENOUS BLD VENIPUNCTURE: CPT

## 2024-10-21 RX ORDER — METOPROLOL TARTRATE 1 MG/ML
5 INJECTION, SOLUTION INTRAVENOUS EVERY 5 MIN PRN
Status: DISCONTINUED | OUTPATIENT
Start: 2024-10-21 | End: 2024-10-21

## 2024-10-21 RX ORDER — SODIUM CHLORIDE 0.9 % (FLUSH) 0.9 %
5-40 SYRINGE (ML) INJECTION PRN
Status: DISCONTINUED | OUTPATIENT
Start: 2024-10-21 | End: 2024-10-21

## 2024-10-21 RX ORDER — SODIUM CHLORIDE 0.9 % (FLUSH) 0.9 %
10 SYRINGE (ML) INJECTION PRN
Status: DISCONTINUED | OUTPATIENT
Start: 2024-10-21 | End: 2024-10-22 | Stop reason: HOSPADM

## 2024-10-21 RX ORDER — NITROGLYCERIN 0.4 MG/1
0.4 TABLET SUBLINGUAL EVERY 5 MIN PRN
Status: DISCONTINUED | OUTPATIENT
Start: 2024-10-21 | End: 2024-10-21

## 2024-10-21 RX ORDER — REGADENOSON 0.08 MG/ML
0.4 INJECTION, SOLUTION INTRAVENOUS
Status: COMPLETED | OUTPATIENT
Start: 2024-10-21 | End: 2024-10-21

## 2024-10-21 RX ORDER — TETRAKIS(2-METHOXYISOBUTYLISOCYANIDE)COPPER(I) TETRAFLUOROBORATE 1 MG/ML
13.1 INJECTION, POWDER, LYOPHILIZED, FOR SOLUTION INTRAVENOUS
Status: COMPLETED | OUTPATIENT
Start: 2024-10-21 | End: 2024-10-21

## 2024-10-21 RX ORDER — FUROSEMIDE 10 MG/ML
40 INJECTION INTRAMUSCULAR; INTRAVENOUS DAILY
Status: DISCONTINUED | OUTPATIENT
Start: 2024-10-21 | End: 2024-10-22

## 2024-10-21 RX ORDER — ALBUTEROL SULFATE 90 UG/1
2 INHALANT RESPIRATORY (INHALATION) PRN
Status: DISCONTINUED | OUTPATIENT
Start: 2024-10-21 | End: 2024-10-21

## 2024-10-21 RX ORDER — TETRAKIS(2-METHOXYISOBUTYLISOCYANIDE)COPPER(I) TETRAFLUOROBORATE 1 MG/ML
32 INJECTION, POWDER, LYOPHILIZED, FOR SOLUTION INTRAVENOUS
Status: COMPLETED | OUTPATIENT
Start: 2024-10-21 | End: 2024-10-21

## 2024-10-21 RX ORDER — SODIUM CHLORIDE 9 MG/ML
500 INJECTION, SOLUTION INTRAVENOUS CONTINUOUS PRN
Status: DISCONTINUED | OUTPATIENT
Start: 2024-10-21 | End: 2024-10-21

## 2024-10-21 RX ORDER — ATROPINE SULFATE 0.1 MG/ML
0.5 INJECTION INTRAVENOUS EVERY 5 MIN PRN
Status: DISCONTINUED | OUTPATIENT
Start: 2024-10-21 | End: 2024-10-21

## 2024-10-21 RX ORDER — AMINOPHYLLINE 25 MG/ML
50 INJECTION, SOLUTION INTRAVENOUS PRN
Status: DISCONTINUED | OUTPATIENT
Start: 2024-10-21 | End: 2024-10-21

## 2024-10-21 RX ADMIN — GABAPENTIN 300 MG: 600 TABLET, FILM COATED ORAL at 08:00

## 2024-10-21 RX ADMIN — ACETAMINOPHEN 650 MG: 325 TABLET ORAL at 20:12

## 2024-10-21 RX ADMIN — SODIUM CHLORIDE, PRESERVATIVE FREE 10 ML: 5 INJECTION INTRAVENOUS at 10:26

## 2024-10-21 RX ADMIN — POTASSIUM CHLORIDE 20 MEQ: 1500 TABLET, EXTENDED RELEASE ORAL at 17:29

## 2024-10-21 RX ADMIN — GABAPENTIN 300 MG: 600 TABLET, FILM COATED ORAL at 20:12

## 2024-10-21 RX ADMIN — TAMSULOSIN HYDROCHLORIDE 0.4 MG: 0.4 CAPSULE ORAL at 13:27

## 2024-10-21 RX ADMIN — PANTOPRAZOLE SODIUM 40 MG: 40 TABLET, DELAYED RELEASE ORAL at 13:26

## 2024-10-21 RX ADMIN — METOPROLOL SUCCINATE 25 MG: 25 TABLET, EXTENDED RELEASE ORAL at 20:12

## 2024-10-21 RX ADMIN — REGADENOSON 0.4 MG: 0.08 INJECTION, SOLUTION INTRAVENOUS at 10:25

## 2024-10-21 RX ADMIN — CLOPIDOGREL BISULFATE 75 MG: 75 TABLET ORAL at 13:26

## 2024-10-21 RX ADMIN — SODIUM CHLORIDE, PRESERVATIVE FREE 10 ML: 5 INJECTION INTRAVENOUS at 07:54

## 2024-10-21 RX ADMIN — ATORVASTATIN CALCIUM 20 MG: 20 TABLET, FILM COATED ORAL at 13:26

## 2024-10-21 RX ADMIN — SODIUM CHLORIDE, PRESERVATIVE FREE 10 ML: 5 INJECTION INTRAVENOUS at 09:49

## 2024-10-21 RX ADMIN — TETRAKIS(2-METHOXYISOBUTYLISOCYANIDE)COPPER(I) TETRAFLUOROBORATE 13.1 MILLICURIE: 1 INJECTION, POWDER, LYOPHILIZED, FOR SOLUTION INTRAVENOUS at 07:58

## 2024-10-21 RX ADMIN — SODIUM CHLORIDE, PRESERVATIVE FREE 10 ML: 5 INJECTION INTRAVENOUS at 10:27

## 2024-10-21 RX ADMIN — SODIUM CHLORIDE, PRESERVATIVE FREE 10 ML: 5 INJECTION INTRAVENOUS at 07:58

## 2024-10-21 RX ADMIN — IPRATROPIUM BROMIDE AND ALBUTEROL SULFATE 1 DOSE: .5; 2.5 SOLUTION RESPIRATORY (INHALATION) at 20:38

## 2024-10-21 RX ADMIN — FUROSEMIDE 40 MG: 10 INJECTION, SOLUTION INTRAMUSCULAR; INTRAVENOUS at 13:27

## 2024-10-21 RX ADMIN — SERTRALINE HYDROCHLORIDE 25 MG: 50 TABLET ORAL at 13:26

## 2024-10-21 RX ADMIN — LISINOPRIL 20 MG: 20 TABLET ORAL at 13:26

## 2024-10-21 RX ADMIN — ASPIRIN 81 MG: 81 TABLET, COATED ORAL at 13:26

## 2024-10-21 RX ADMIN — TETRAKIS(2-METHOXYISOBUTYLISOCYANIDE)COPPER(I) TETRAFLUOROBORATE 32 MILLICURIE: 1 INJECTION, POWDER, LYOPHILIZED, FOR SOLUTION INTRAVENOUS at 10:27

## 2024-10-21 RX ADMIN — ENOXAPARIN SODIUM 40 MG: 100 INJECTION SUBCUTANEOUS at 13:27

## 2024-10-21 RX ADMIN — SODIUM CHLORIDE, PRESERVATIVE FREE 10 ML: 5 INJECTION INTRAVENOUS at 20:13

## 2024-10-21 ASSESSMENT — PAIN DESCRIPTION - DESCRIPTORS: DESCRIPTORS: ACHING

## 2024-10-21 ASSESSMENT — PAIN DESCRIPTION - LOCATION: LOCATION: HEAD

## 2024-10-21 ASSESSMENT — PAIN SCALES - GENERAL
PAINLEVEL_OUTOF10: 0
PAINLEVEL_OUTOF10: 3
PAINLEVEL_OUTOF10: 0
PAINLEVEL_OUTOF10: 0

## 2024-10-21 ASSESSMENT — PAIN DESCRIPTION - ORIENTATION: ORIENTATION: MID;RIGHT

## 2024-10-21 ASSESSMENT — ENCOUNTER SYMPTOMS
NAUSEA: 0
COUGH: 0
VOMITING: 0
BACK PAIN: 0
WHEEZING: 0
DIARRHEA: 0
ABDOMINAL DISTENTION: 0
ABDOMINAL PAIN: 0
CONSTIPATION: 0
SHORTNESS OF BREATH: 0
CHEST TIGHTNESS: 0

## 2024-10-21 NOTE — PLAN OF CARE
Problem: Chronic Conditions and Co-morbidities  Goal: Patient's chronic conditions and co-morbidity symptoms are monitored and maintained or improved  10/20/2024 2212 by Elvin Jc RN  Outcome: Progressing  10/20/2024 1012 by Renetta Ruiz RN  Outcome: Progressing     Problem: Pain  Goal: Verbalizes/displays adequate comfort level or baseline comfort level  10/20/2024 2212 by Elvin Jc RN  Outcome: Progressing  10/20/2024 1012 by Renetta Ruiz RN  Outcome: Progressing     Problem: ABCDS Injury Assessment  Goal: Absence of physical injury  10/20/2024 2212 by Elvin Jc RN  Outcome: Progressing  10/20/2024 1012 by Renetta Ruiz RN  Outcome: Progressing     Problem: Safety - Adult  Goal: Free from fall injury  10/20/2024 2212 by Elvni Jc RN  Outcome: Progressing  10/20/2024 1012 by Renetta Ruiz RN  Outcome: Progressing

## 2024-10-21 NOTE — CARE COORDINATION
Transitional planning: Met w/ patient about discharge plan and plans to return home independent and go to outpatient therapy at [facility]. States wife can transport home.

## 2024-10-21 NOTE — PLAN OF CARE
Problem: Respiratory - Adult  Goal: Achieves optimal ventilation and oxygenation  Outcome: Progressing  Flowsheets (Taken 10/21/2024 0412)  Achieves optimal ventilation and oxygenation:   Assess for changes in respiratory status   Position to facilitate oxygenation and minimize respiratory effort   Assess the need for suctioning and aspirate as needed   Respiratory therapy support as indicated   Initiate smoking cessation protocol as indicated   Encourage broncho-pulmonary hygiene including cough, deep breathe, incentive spirometry   Assess and instruct to report shortness of breath or any respiratory difficulty   Oxygen supplementation based on oxygen saturation or arterial blood gases   Assess for changes in mentation and behavior

## 2024-10-21 NOTE — PLAN OF CARE
Problem: Chronic Conditions and Co-morbidities  Goal: Patient's chronic conditions and co-morbidity symptoms are monitored and maintained or improved  Outcome: Progressing     Problem: Pain  Goal: Verbalizes/displays adequate comfort level or baseline comfort level  Outcome: Progressing     Problem: ABCDS Injury Assessment  Goal: Absence of physical injury  Outcome: Progressing     Problem: Safety - Adult  Goal: Free from fall injury  Outcome: Progressing     Problem: Respiratory - Adult  Goal: Achieves optimal ventilation and oxygenation  10/21/2024 1705 by Tahira Herrera RN  Outcome: Progressing  10/21/2024 0416 by Paty Macias RCP  Outcome: Progressing  Flowsheets (Taken 10/21/2024 0416)  Achieves optimal ventilation and oxygenation:   Assess for changes in respiratory status   Position to facilitate oxygenation and minimize respiratory effort   Assess the need for suctioning and aspirate as needed   Respiratory therapy support as indicated   Initiate smoking cessation protocol as indicated   Encourage broncho-pulmonary hygiene including cough, deep breathe, incentive spirometry   Assess and instruct to report shortness of breath or any respiratory difficulty   Oxygen supplementation based on oxygen saturation or arterial blood gases   Assess for changes in mentation and behavior     Problem: Cardiovascular - Adult  Goal: Maintains optimal cardiac output and hemodynamic stability  Outcome: Progressing  Goal: Absence of cardiac dysrhythmias or at baseline  Outcome: Progressing

## 2024-10-22 VITALS
HEIGHT: 67 IN | OXYGEN SATURATION: 95 % | RESPIRATION RATE: 16 BRPM | DIASTOLIC BLOOD PRESSURE: 71 MMHG | HEART RATE: 83 BPM | BODY MASS INDEX: 27.61 KG/M2 | WEIGHT: 175.93 LBS | TEMPERATURE: 99 F | SYSTOLIC BLOOD PRESSURE: 126 MMHG

## 2024-10-22 LAB
ANION GAP SERPL CALCULATED.3IONS-SCNC: 9 MMOL/L (ref 9–16)
BASOPHILS # BLD: 0 K/UL (ref 0–0.2)
BASOPHILS NFR BLD: 0 % (ref 0–2)
BUN SERPL-MCNC: 14 MG/DL (ref 8–23)
CALCIUM SERPL-MCNC: 8.2 MG/DL (ref 8.6–10.4)
CHLORIDE SERPL-SCNC: 100 MMOL/L (ref 98–107)
CO2 SERPL-SCNC: 26 MMOL/L (ref 20–31)
CREAT SERPL-MCNC: 0.8 MG/DL (ref 0.7–1.2)
EOSINOPHIL # BLD: 0.18 K/UL (ref 0–0.44)
EOSINOPHILS RELATIVE PERCENT: 4 % (ref 1–4)
ERYTHROCYTE [DISTWIDTH] IN BLOOD BY AUTOMATED COUNT: 13.8 % (ref 11.8–14.4)
GFR, ESTIMATED: 87 ML/MIN/1.73M2
GLUCOSE SERPL-MCNC: 111 MG/DL (ref 74–99)
HCT VFR BLD AUTO: 40.3 % (ref 40.7–50.3)
HGB BLD-MCNC: 12.6 G/DL (ref 13–17)
IMM GRANULOCYTES # BLD AUTO: 0 K/UL (ref 0–0.3)
IMM GRANULOCYTES NFR BLD: 0 %
LYMPHOCYTES NFR BLD: 0.63 K/UL (ref 1.1–3.7)
LYMPHOCYTES RELATIVE PERCENT: 14 % (ref 24–43)
MCH RBC QN AUTO: 28.8 PG (ref 25.2–33.5)
MCHC RBC AUTO-ENTMCNC: 31.3 G/DL (ref 28.4–34.8)
MCV RBC AUTO: 92 FL (ref 82.6–102.9)
MONOCYTES NFR BLD: 0.45 K/UL (ref 0.1–1.2)
MONOCYTES NFR BLD: 10 % (ref 3–12)
MORPHOLOGY: NORMAL
NEUTROPHILS NFR BLD: 72 % (ref 36–65)
NEUTS SEG NFR BLD: 3.24 K/UL (ref 1.5–8.1)
NRBC BLD-RTO: 0 PER 100 WBC
PLATELET # BLD AUTO: ABNORMAL K/UL (ref 138–453)
PLATELET, FLUORESCENCE: 38 K/UL (ref 138–453)
PLATELETS.RETICULATED NFR BLD AUTO: 2.5 % (ref 1.1–10.3)
POTASSIUM SERPL-SCNC: 4.6 MMOL/L (ref 3.7–5.3)
RBC # BLD AUTO: 4.38 M/UL (ref 4.21–5.77)
SODIUM SERPL-SCNC: 135 MMOL/L (ref 136–145)
WBC OTHER # BLD: 4.5 K/UL (ref 3.5–11.3)

## 2024-10-22 PROCEDURE — 85025 COMPLETE CBC W/AUTO DIFF WBC: CPT

## 2024-10-22 PROCEDURE — 6360000002 HC RX W HCPCS

## 2024-10-22 PROCEDURE — 6370000000 HC RX 637 (ALT 250 FOR IP): Performed by: SURGERY

## 2024-10-22 PROCEDURE — 80048 BASIC METABOLIC PNL TOTAL CA: CPT

## 2024-10-22 PROCEDURE — 85055 RETICULATED PLATELET ASSAY: CPT

## 2024-10-22 PROCEDURE — 99233 SBSQ HOSP IP/OBS HIGH 50: CPT | Performed by: NURSE PRACTITIONER

## 2024-10-22 PROCEDURE — 36415 COLL VENOUS BLD VENIPUNCTURE: CPT

## 2024-10-22 PROCEDURE — 99238 HOSP IP/OBS DSCHRG MGMT 30/<: CPT | Performed by: INTERNAL MEDICINE

## 2024-10-22 PROCEDURE — 2700000000 HC OXYGEN THERAPY PER DAY

## 2024-10-22 PROCEDURE — 6370000000 HC RX 637 (ALT 250 FOR IP)

## 2024-10-22 PROCEDURE — 2580000003 HC RX 258

## 2024-10-22 PROCEDURE — 94640 AIRWAY INHALATION TREATMENT: CPT

## 2024-10-22 PROCEDURE — 94761 N-INVAS EAR/PLS OXIMETRY MLT: CPT

## 2024-10-22 PROCEDURE — 6370000000 HC RX 637 (ALT 250 FOR IP): Performed by: INTERNAL MEDICINE

## 2024-10-22 RX ORDER — FLUTICASONE PROPIONATE 50 MCG
2 SPRAY, SUSPENSION (ML) NASAL DAILY
Status: DISCONTINUED | OUTPATIENT
Start: 2024-10-22 | End: 2024-10-22 | Stop reason: HOSPADM

## 2024-10-22 RX ORDER — FUROSEMIDE 40 MG/1
40 TABLET ORAL DAILY
Status: DISCONTINUED | OUTPATIENT
Start: 2024-10-22 | End: 2024-10-22 | Stop reason: HOSPADM

## 2024-10-22 RX ORDER — METOPROLOL SUCCINATE 25 MG/1
25 TABLET, EXTENDED RELEASE ORAL NIGHTLY
Qty: 30 TABLET | Refills: 3 | Status: SHIPPED | OUTPATIENT
Start: 2024-10-22

## 2024-10-22 RX ADMIN — CLOPIDOGREL BISULFATE 75 MG: 75 TABLET ORAL at 09:37

## 2024-10-22 RX ADMIN — ENOXAPARIN SODIUM 40 MG: 100 INJECTION SUBCUTANEOUS at 09:36

## 2024-10-22 RX ADMIN — GABAPENTIN 300 MG: 600 TABLET, FILM COATED ORAL at 09:37

## 2024-10-22 RX ADMIN — SERTRALINE HYDROCHLORIDE 25 MG: 50 TABLET ORAL at 09:37

## 2024-10-22 RX ADMIN — AMLODIPINE BESYLATE 10 MG: 10 TABLET ORAL at 09:37

## 2024-10-22 RX ADMIN — POTASSIUM CHLORIDE 20 MEQ: 1500 TABLET, EXTENDED RELEASE ORAL at 09:37

## 2024-10-22 RX ADMIN — IPRATROPIUM BROMIDE AND ALBUTEROL SULFATE 1 DOSE: .5; 2.5 SOLUTION RESPIRATORY (INHALATION) at 09:40

## 2024-10-22 RX ADMIN — PANTOPRAZOLE SODIUM 40 MG: 40 TABLET, DELAYED RELEASE ORAL at 09:37

## 2024-10-22 RX ADMIN — LISINOPRIL 20 MG: 20 TABLET ORAL at 09:37

## 2024-10-22 RX ADMIN — ATORVASTATIN CALCIUM 20 MG: 20 TABLET, FILM COATED ORAL at 09:37

## 2024-10-22 RX ADMIN — ISOSORBIDE MONONITRATE 30 MG: 30 TABLET, EXTENDED RELEASE ORAL at 09:37

## 2024-10-22 RX ADMIN — SODIUM CHLORIDE, PRESERVATIVE FREE 10 ML: 5 INJECTION INTRAVENOUS at 09:42

## 2024-10-22 RX ADMIN — ASPIRIN 81 MG: 81 TABLET, COATED ORAL at 09:36

## 2024-10-22 RX ADMIN — TAMSULOSIN HYDROCHLORIDE 0.4 MG: 0.4 CAPSULE ORAL at 09:37

## 2024-10-22 RX ADMIN — FLUTICASONE PROPIONATE 2 SPRAY: 50 SPRAY, METERED NASAL at 10:15

## 2024-10-22 RX ADMIN — FUROSEMIDE 40 MG: 40 TABLET ORAL at 09:36

## 2024-10-22 ASSESSMENT — ENCOUNTER SYMPTOMS
VOMITING: 0
CONSTIPATION: 0
CHEST TIGHTNESS: 0
DIARRHEA: 0
NAUSEA: 0
WHEEZING: 0
COUGH: 0
SHORTNESS OF BREATH: 0
BACK PAIN: 0
ABDOMINAL DISTENTION: 0
ABDOMINAL PAIN: 0

## 2024-10-22 ASSESSMENT — PAIN SCALES - GENERAL: PAINLEVEL_OUTOF10: 0

## 2024-10-22 NOTE — PLAN OF CARE
Problem: Respiratory - Adult  Goal: Achieves optimal ventilation and oxygenation  10/22/2024 0943 by Mario Suero RCP  Outcome: Progressing  Flowsheets (Taken 10/22/2024 0943)  Achieves optimal ventilation and oxygenation:   Assess for changes in respiratory status   Position to facilitate oxygenation and minimize respiratory effort   Assess the need for suctioning and aspirate as needed   Respiratory therapy support as indicated   Assess and instruct to report shortness of breath or any respiratory difficulty   Oxygen supplementation based on oxygen saturation or arterial blood gases   Assess for changes in mentation and behavior   Encourage broncho-pulmonary hygiene including cough, deep breathe, incentive spirometry

## 2024-10-22 NOTE — PLAN OF CARE
Problem: Chronic Conditions and Co-morbidities  Goal: Patient's chronic conditions and co-morbidity symptoms are monitored and maintained or improved  Outcome: Adequate for Discharge     Problem: Pain  Goal: Verbalizes/displays adequate comfort level or baseline comfort level  Outcome: Adequate for Discharge     Problem: ABCDS Injury Assessment  Goal: Absence of physical injury  Outcome: Adequate for Discharge     Problem: Safety - Adult  Goal: Free from fall injury  Outcome: Adequate for Discharge     Problem: Respiratory - Adult  Goal: Achieves optimal ventilation and oxygenation  10/22/2024 1414 by Elsi Hansen RN  Outcome: Adequate for Discharge  10/22/2024 0943 by Mario Suero RCP  Outcome: Progressing  Flowsheets (Taken 10/22/2024 0943)  Achieves optimal ventilation and oxygenation:   Assess for changes in respiratory status   Position to facilitate oxygenation and minimize respiratory effort   Assess the need for suctioning and aspirate as needed   Respiratory therapy support as indicated   Assess and instruct to report shortness of breath or any respiratory difficulty   Oxygen supplementation based on oxygen saturation or arterial blood gases   Assess for changes in mentation and behavior   Encourage broncho-pulmonary hygiene including cough, deep breathe, incentive spirometry     Problem: Cardiovascular - Adult  Goal: Maintains optimal cardiac output and hemodynamic stability  Outcome: Adequate for Discharge  Goal: Absence of cardiac dysrhythmias or at baseline  Outcome: Adequate for Discharge     Problem: Skin/Tissue Integrity - Adult  Goal: Skin integrity remains intact  Outcome: Adequate for Discharge  Goal: Incisions, wounds, or drain sites healing without S/S of infection  Outcome: Adequate for Discharge     Problem: Musculoskeletal - Adult  Goal: Return mobility to safest level of function  Outcome: Adequate for Discharge  Goal: Return ADL status to a safe level of function  Outcome: Adequate

## 2024-10-22 NOTE — FLOWSHEET NOTE
Patient's IV removed. Patient given discharge paperwork and went over paperwork with patient. Patient left with all belongings and oxygen.

## 2024-10-22 NOTE — DISCHARGE INSTRUCTIONS
Please follow-up with your PCP within 1 week as advised.  Please follow-up with Dr. De Paz as soon as you can get an appointment.  Please take your medications as mentioned in the medication list) after visit summary.  If you develop any severe chest pain, palpitations, dizziness, shortness of breath, any other severe symptoms please call 911 or visit the nearest emergency medicine department.

## 2024-10-22 NOTE — CARE COORDINATION
Transitional planning: Met w/ patient about discharge plan and plans to return home independent. States mother can transport home.

## 2024-10-22 NOTE — PROGRESS NOTES
[facility]  Internal Medicine Teaching Residency Program  Inpatient Daily Progress Note  ______________________________________________________________________________    Patient: Galo Ruiz  YOB: 1940   MRN:[id number]    Acct: [id number]     Room: 0444/0444-01  Admit date: 10/19/2024  Today's date: 10/20/24  Number of days in the hospital: 1    SUBJECTIVE   Admitting Diagnosis: Acute hypoxic respiratory failure  CC: SOB    - Pt examined at bedside. Chart & results reviewed.   Pt resting comfortably with no acute events overnights.   -Currently on 2L      Review of Systems   Constitutional:  Negative for chills, diaphoresis, fatigue and fever.   Respiratory:  Negative for cough, chest tightness, shortness of breath and wheezing.    Cardiovascular:  Negative for chest pain, palpitations and leg swelling.   Gastrointestinal:  Negative for abdominal distention, abdominal pain, constipation, diarrhea, nausea and vomiting.   Genitourinary:  Negative for dysuria, flank pain, hematuria and urgency.   Musculoskeletal:  Negative for arthralgias, back pain and myalgias.   Neurological:  Negative for dizziness, seizures, syncope, weakness, numbness and headaches.   All other systems reviewed and are negative.        BRIEF HISTORY     The patient is a pleasant 84 y.o. male with a past medical history     CAD with prior CABG with last cardiac cath on 8/5/2023.  Ischemic cardiomyopathy with a EF of 27% with [employer] pacemaker/AICD in place  PAF  Duodenal ulcer in 2023  PVD  Hypertension  Prostate cancer status post radiation 3 to 4 years ago  Bilateral carotid artery stenosis        Presents status post fall.  Patient states that he was walking around his house in the middle of the night with all lights off and was walking to his chair.  He states that he misjudged where the chair was causing him to fall forward hitting his head.  Patient denies any LOC, shortness of 
    [facility]  Internal Medicine Teaching Residency Program  Inpatient Daily Progress Note  ______________________________________________________________________________    Patient: Galo Ruiz  YOB: 1940   MRN:[id number]    Acct: [id number]     Room: 0444/0444-01  Admit date: 10/19/2024  Today's date: 10/22/24  Number of days in the hospital: 3    SUBJECTIVE   Admitting Diagnosis: Acute hypoxic respiratory failure  CC: SOB    - Pt examined at bedside. Chart & results reviewed.   -Pt resting comfortably with no acute events overnights.   -Currently on 2L      Review of Systems   Constitutional:  Negative for chills, diaphoresis, fatigue and fever.   Respiratory:  Negative for cough, chest tightness, shortness of breath and wheezing.    Cardiovascular:  Negative for chest pain, palpitations and leg swelling.   Gastrointestinal:  Negative for abdominal distention, abdominal pain, constipation, diarrhea, nausea and vomiting.   Genitourinary:  Negative for dysuria, flank pain, hematuria and urgency.   Musculoskeletal:  Negative for arthralgias, back pain and myalgias.   Neurological:  Negative for dizziness, seizures, syncope, weakness, numbness and headaches.   All other systems reviewed and are negative.        BRIEF HISTORY     The patient is a pleasant 84 y.o. male with a past medical history     CAD with prior CABG with last cardiac cath on 8/5/2023.  Ischemic cardiomyopathy with a EF of 27% with Medtronic pacemaker/AICD in place  PAF  Duodenal ulcer in 2023  PVD  Hypertension  Prostate cancer status post radiation 3 to 4 years ago  Bilateral carotid artery stenosis        Presents status post fall.  Patient states that he was walking around his house in the middle of the night with all lights off and was walking to his chair.  He states that he misjudged where the chair was causing him to fall forward hitting his head.  Patient denies any LOC, shortness of 
  C- Spine Evaluation for Spine Clearance:    Pt is a 84 y.o. male who was admitted on 10/19 s/p fall from standing height.   Pt w/ complaints of mild headache .      C-Spine precautions of C-collar with spinal neutrality maintained since arrival with current exam directed at further evaluation of spine for clearance purposes.    Pt chart and current images reviewed.  CT C-Spine negative for acute fracture, subluxation, or traumatic injury.  Patient does not have a distracting injury, is not acutely intoxicated and is alert, oriented and fully able to participate in exam.      Pt denies c-spine pain while resting in c-collar.  C-collar removed w/ c-spine neutrality maintained.  Pt denies midline pain with palpation of spinous processes and axial loading.  Pt demonstrated full flexion, extension, and SB ROM without complaints of pain.        TLS precautions of supine position maintained since arrival.  Pt denies midline pain with palpation of spinous processes.     C-spine is considered cleared w/out need for further imaging, evaluation, or continuation of c-collar.  TLS considered clear w/out need for further imagine, evaluation, or continuation of supine bedrest precautions.    Electronically signed by Shelley Garcia MD on 10/19/2024 at 5:24 AM  
  [facility]  Progress Note                   Date:   10/19/2024  Patient name: Galo Ruiz  Date of admission:  10/19/2024  3:53 AM  MRN:   [id number]  YOB: 1940  PCP: Briana Fuentes MD    Reason for Admission: Hypoxia [R09.02]  Elevated troponin [R79.89]  Fall, initial encounter [W19.XXXA]  Hematoma of frontal scalp, initial encounter [S00.03XA]  Congestive heart failure, unspecified HF chronicity, unspecified heart failure type (HCC) [I50.9]  Acute hypoxic respiratory failure [J96.01]    Subjective:       Clinical Changes /Abnormalities:Patient seen and examined. Denies chest pain or shortness of breath. Tele/vitals/labs reviewed .     Review of Systems    Medications:   Scheduled Meds:   sodium chloride flush  5-40 mL IntraVENous 2 times per day    enoxaparin  40 mg SubCUTAneous Daily    aspirin  81 mg Oral Daily    clopidogrel  75 mg Oral Daily    pantoprazole  40 mg Oral Daily    sertraline  25 mg Oral Daily    tamsulosin  0.4 mg Oral Daily    [Held by provider] amLODIPine  10 mg Oral Daily    atorvastatin  20 mg Oral Daily    [Held by provider] isosorbide mononitrate  30 mg Oral Daily    [Held by provider] lisinopril  20 mg Oral Daily    gabapentin  300 mg Oral BID     Continuous Infusions:   sodium chloride       CBC:   Recent Labs     10/19/24  0411   WBC 5.1   HGB 13.6        BMP:    Recent Labs     10/19/24  0411      K 4.0   CL 97*   CO2 32*   BUN 13   CREATININE 0.9   GLUCOSE 118*     Hepatic:  Recent Labs     10/19/24  0747   AST 22   ALT 12   BILITOT 0.3   ALKPHOS 68     Troponin:   Recent Labs     10/19/24  0633 10/19/24  0747 10/19/24  1221   TROPHS 25* 18 19     BNP: No results for input(s): \"BNP\" in the last 72 hours.  Lipids: No results for input(s): \"CHOL\", \"HDL\" in the last 72 hours.    Invalid input(s): \"LDLCALCU\"  INR:   Recent Labs     10/19/24  0411   INR 1.0       Objective:   Vitals: /65   Pulse 72   Temp 97.8 °F (36.6 °C) (Oral)  
  [facility]  Progress Note                   Date:   10/21/2024  Patient name: Galo Ruiz  Date of admission:  10/19/2024  3:53 AM  MRN:   [id number]  YOB: 1940  PCP: Briana Fuentes MD    Reason for Admission: Hypoxia [R09.02]  Elevated troponin [R79.89]  Fall, initial encounter [W19.XXXA]  Hematoma of frontal scalp, initial encounter [S00.03XA]  Congestive heart failure, unspecified HF chronicity, unspecified heart failure type (HCC) [I50.9]  Acute hypoxic respiratory failure [J96.01]    Subjective:       Clinical Changes /Abnormalities:Patient seen and examined in stress lab. Denies chest pain or shortness of breath. Tele/vitals/labs reviewes.     Review of Systems    Medications:   Scheduled Meds:   furosemide  40 mg IntraVENous Daily    ipratropium 0.5 mg-albuterol 2.5 mg  1 Dose Inhalation BID RT    potassium chloride  20 mEq Oral BID WC    metoprolol succinate  25 mg Oral Nightly    sodium chloride flush  5-40 mL IntraVENous 2 times per day    enoxaparin  40 mg SubCUTAneous Daily    aspirin  81 mg Oral Daily    clopidogrel  75 mg Oral Daily    pantoprazole  40 mg Oral Daily    sertraline  25 mg Oral Daily    tamsulosin  0.4 mg Oral Daily    [Held by provider] amLODIPine  10 mg Oral Daily    atorvastatin  20 mg Oral Daily    [Held by provider] isosorbide mononitrate  30 mg Oral Daily    lisinopril  20 mg Oral Daily    gabapentin  300 mg Oral BID     Continuous Infusions:   sodium chloride      sodium chloride       CBC:   Recent Labs     10/19/24  0411 10/20/24  0727 10/21/24  0729   WBC 5.1 5.0 4.4   HGB 13.6 12.6* 12.4*    153 151     BMP:    Recent Labs     10/19/24  0411 10/20/24  0727 10/21/24  0729    140 137   K 4.0 3.6* 4.2   CL 97* 97* 100   CO2 32* 33* 29   BUN 13 12 11   CREATININE 0.9 0.8 0.7   GLUCOSE 118* 102* 108*     Hepatic:  Recent Labs     10/19/24  0747   AST 22   ALT 12   BILITOT 0.3   ALKPHOS 68     Troponin:   Recent Labs     
  [facility]  Progress Note                   Date:   10/22/2024  Patient name: Galo Ruiz  Date of admission:  10/19/2024  3:53 AM  MRN:   [id number]  YOB: 1940  PCP: Briana Fuentes MD    Reason for Admission: Hypoxia [R09.02]  Elevated troponin [R79.89]  Fall, initial encounter [W19.XXXA]  Hematoma of frontal scalp, initial encounter [S00.03XA]  Congestive heart failure, unspecified HF chronicity, unspecified heart failure type (HCC) [I50.9]  Acute hypoxic respiratory failure [J96.01]    Subjective:       Clinical Changes /Abnormalities: Pt seen and examined in the room.  Patient resting in bed. Pt denies any current CP or sob.  Labs, vitals and tele reviewed.     Medications:   Scheduled Meds:   furosemide  40 mg Oral Daily    fluticasone  2 spray Each Nostril Daily    ipratropium 0.5 mg-albuterol 2.5 mg  1 Dose Inhalation BID RT    potassium chloride  20 mEq Oral BID WC    metoprolol succinate  25 mg Oral Nightly    sodium chloride flush  5-40 mL IntraVENous 2 times per day    enoxaparin  40 mg SubCUTAneous Daily    aspirin  81 mg Oral Daily    clopidogrel  75 mg Oral Daily    pantoprazole  40 mg Oral Daily    sertraline  25 mg Oral Daily    tamsulosin  0.4 mg Oral Daily    amLODIPine  10 mg Oral Daily    atorvastatin  20 mg Oral Daily    isosorbide mononitrate  30 mg Oral Daily    lisinopril  20 mg Oral Daily    gabapentin  300 mg Oral BID     Continuous Infusions:   sodium chloride       CBC:   Recent Labs     10/20/24  0727 10/21/24  0729 10/22/24  0816   WBC 5.0 4.4 4.5   HGB 12.6* 12.4* 12.6*    151 See Reflexed IPF Result     BMP:    Recent Labs     10/20/24  0727 10/21/24  0729 10/22/24  0816    137 135*   K 3.6* 4.2 4.6   CL 97* 100 100   CO2 33* 29 26   BUN 12 11 14   CREATININE 0.8 0.7 0.8   GLUCOSE 102* 108* 111*     Hepatic:  No results for input(s): \"AST\", \"ALT\", \"BILITOT\", \"ALKPHOS\" in the last 72 hours.    Invalid input(s): \"ALB\"    Troponin: 
  [facility]     Emergency/Trauma Note    PATIENT NAME: Dr Cabello Xxsatnam    Shift date: 10/19/2024  Shift day: Saturday  Shift # 3    Room # TRAUMA A/TRAUMAA   Name: Galo Ruiz          Age: 84 y.o.  Gender: male          Episcopalian: Jehovah's witness   Place of Religion: unknown    Trauma/Incident type: Adult Trauma Priority  Admit Date & Time: 10/19/2024  3:53 AM  TRAUMA NAME: DR GRACE PACHECO    ADVANCE DIRECTIVES IN CHART?  No    NAME OF DECISION MAKER: next of kin    RELATIONSHIP OF DECISION MAKER TO PATIENT: wife    PATIENT/EVENT DESCRIPTION:  Galo Ruiz is a 84 y.o. male who arrived via EMS after a fall at home. Per EMS patient experienced no loss of consciousness.  Writer was unable to assess patient as he was taken in for CT scan.        SPIRITUAL ASSESSMENT-INTERVENTION-OUTCOME:   assisted with patient identification and communicated information to Registration, HUC and Triage.  As Patient's wife is also elderly and per EMS unable to arrive at this time,  was able to reach Patient's wife Nafisa ([phone]) to make her aware that Patient arrived to [facility].  Spouse expressed appreciation for the notification and indicated she will contact hospital in the a.m. for an update however requested she be notified should anything significant occur. This information, along with name and contact information of spouse was provided to Pt doctor.       PATIENT BELONGINGS:  With patient    ANY BELONGINGS OF SIGNIFICANT VALUE NOTED:  Unkn.    REGISTRATION STAFF NOTIFIED?  Yes      WHAT IS YOUR SPIRITUAL CARE PLAN FOR THIS PATIENT?:   Follow up as needed or requested.     Electronically signed by MARII MAI,[place] Resident, on 10/19/2024 at 4:26 AM.  [facility]  [phone]    10/19/24 0423   Encounter Summary   Encounter Overview/Reason Crisis   Service Provided For Patient   Referral/Consult From Multi-disciplinary team   Support System Spouse   Last 
Congestive Heart Failure Education note:      Discussed 2000mg/day sodium restricted diet; patient verbalized understanding.    Moderate daily exercise encouraged as tolerated. Discussed rest breaks as needed; patient verbalized understanding.    Patient instructed to weigh self at the same time of each day, using same clothes and same scale; reinforced teaching to monitor for 3-5 lb weight increase over 1-2 days notify physician if charge noted.  Patient verbalized understanding.    Patient instructed to limit fluid intake to 2 liters per day.  Patient verbalized understanding.    Signs and symptoms of CHF discussed with patient, such as feeling more tired than normal, feeling short of breath, coughing that increases when you lie down, sudden weight gain, swelling of your feet, legs or belly.  Patient verbalized understanding to notify physician office if these symptoms occur.    Compliance with plan of care and further disease process causes discussed with patient, patient encouraged to keep all follow up appointments.  Patient verbalized understanding.  
Home Oxygen Evaluation    Home Oxygen Evaluation completed.    Patient is on 2 liters per minute via nasal cannula.  Resting SpO2 = 95%  Resting SpO2 on room air = 86%    Nocturnal Oximetry with patient on room air is recommended is SpO2 is between 89% and 95% (requires additional order).    Mario Suero RCP  12:23 PM  
Occupational Therapy  Facility/Department: [address] ONC/MED SURG   Daily Treatment Note  Patient Name: Galo Ruiz        MRN: [id number]    : 1940    Date of Service: 10/21/2024    Discharge Recommendations  Discharge Recommendations: Patient would benefit from continued therapy after discharge  OT Equipment Recommendations  ADL Assistive Devices: Reacher;Sock-Aid Hard;Long-handled Shoe Horn;Long-handled Sponge    Assessment  Performance deficits / Impairments: Decreased functional mobility ;Decreased ADL status;Decreased safe awareness;Decreased endurance;Decreased balance  Assessment: Pt would benefit from continued acute care and post acute care OT to address above listed deficits.  Prognosis: Good  Activity Tolerance  Activity Tolerance: Patient Tolerated treatment well  Safety Devices  Type of Devices: Gait belt;Patient at risk for falls;Left in bed;Call light within reach;Nurse notified;Bed alarm in place    Restrictions/Precautions  Restrictions/Precautions  Restrictions/Precautions: General Precautions;Fall Risk;Up as Tolerated  Position Activity Restriction  Other position/activity restrictions: maintain SPO2 92-94%, 2L O2    Subjective  Pain Rating: Denies pain    Objective  Orientation  Overall Orientation Status: Within Functional Limits  Orientation Level: Oriented X4  Cognition  Overall Cognitive Status: WFL    Activities of Daily Living  Feeding: Stand by assistance;Setup;Increased time to complete (assist to open some containers)  Grooming: Increased time to complete;Setup;Minimal assistance;Verbal cueing (Min A-wash hair, SBA-dry/comb hair, brush teeth, wash face)  UE Bathing: Stand by assistance;Increased time to complete;Setup;Verbal cueing (assist to wash back)  LE Bathing: Minimal assistance;Increased time to complete;Verbal cueing;Setup (assist to wash feet)  UE Dressing: Increased time to complete;Stand by assistance;Setup;Verbal cueing (assist to doff/don gown)  LE Dressing: Moderate 
Patient was evaluated today for the diagnosis of CHF.  I entered a DME order for home oxygen at 2 lpm because the diagnosis and testing require the patient to have supplemental oxygen.  Condition will improve or be benefited by oxygen use.  The patient is able to perform good mobility in a home setting and therefore does require the use of a portable oxygen system.  The need for this equipment was discussed with the patient and he understands and is in agreement.    Vannesa Galarza MD  Internal Medicine Resident, PGY-1  [place], [place]  10/22/2024,1:39 PM      
Physical Therapy        Physical Therapy Cancel Note      DATE: 10/21/2024    NAME: Galo Ruiz  MRN: [id number]   : 1940      Patient not seen this date for Physical Therapy due to:    Testing: pt at echo/stress test, check back when available      Electronically signed by Nafisa Anderson PT on 10/21/2024 at 11:12 AM    
chloride flush, 5-40 mL, PRN  sodium chloride, , PRN  potassium chloride, 40 mEq, PRN   Or  potassium alternative oral replacement, 40 mEq, PRN   Or  potassium chloride, 10 mEq, PRN  magnesium sulfate, 2,000 mg, PRN  ondansetron, 4 mg, Q8H PRN   Or  ondansetron, 4 mg, Q6H PRN  polyethylene glycol, 17 g, Daily PRN  acetaminophen, 650 mg, Q6H PRN   Or  acetaminophen, 650 mg, Q6H PRN        Diagnostic Labs:  CBC:   Recent Labs     10/19/24  0411 10/20/24  0727 10/21/24  0729   WBC 5.1 5.0 4.4   RBC 4.81 4.41 4.40   HGB 13.6 12.6* 12.4*   HCT 42.3 38.9* 38.4*   MCV 87.9 88.2 87.3   RDW 13.6 13.4 13.5    153 151     BMP:   Recent Labs     10/19/24  0411 10/20/24  0727 10/21/24  0729    140 137   K 4.0 3.6* 4.2   CL 97* 97* 100   CO2 32* 33* 29   BUN 13 12 11   CREATININE 0.9 0.8 0.7     BNP: No results for input(s): \"BNP\" in the last 72 hours.  PT/INR:   Recent Labs     10/19/24  0411   PROTIME 12.9   INR 1.0     APTT:   Recent Labs     10/19/24  0411   APTT 29.7     CARDIAC ENZYMES: No results for input(s): \"CKMB\", \"CKMBINDEX\", \"TROPONINI\" in the last 72 hours.    Invalid input(s): \"CKTOTAL;3\"  FASTING LIPID PANEL:No results found for: \"CHOL\", \"HDL\", \"TRIG\"  LIVER PROFILE:   Recent Labs     10/19/24  0747   AST 22   ALT 12   BILIDIR 0.1   BILITOT 0.3   ALKPHOS 68      MICROBIOLOGY: No results found for: \"CULTURE\"    Imaging:    XR PELVIS (1-2 VIEWS)    Result Date: 10/19/2024  1. No evidence of acute fracture or dislocation on the AP view of the pelvis including AP view of both hip joints. 2. Intact left hip arthroplasty.     XR CHEST PORTABLE    Result Date: 10/19/2024  1. Cardiomegaly with minimal pulmonary vascular congestion. 2. No evidence of pneumonia or confluent atelectasis in the lungs.  No pleural effusion demonstrated. 3. On this AP view, in the visualized portions of bony thorax, there is no definite fracture visualized.     CT CERVICAL SPINE WO CONTRAST    Result Date: 10/19/2024  1. No 
CK    Minutes  OT Individual Minutes  Time In: 1040  Time Out: 1124  Minutes: 44  Time Code Minutes   Timed Code Treatment Minutes: 38 Minutes    Electronically signed by JAVIER Hardy on 10/20/24 at 12:23 PM EDT

## 2024-10-22 NOTE — PLAN OF CARE
Problem: Respiratory - Adult  Goal: Achieves optimal ventilation and oxygenation  10/21/2024 2041 by Armida Grider RCP  Flowsheets (Taken 10/21/2024 2041)  Achieves optimal ventilation and oxygenation:   Assess for changes in respiratory status   Assess for changes in mentation and behavior   Position to facilitate oxygenation and minimize respiratory effort   Oxygen supplementation based on oxygen saturation or arterial blood gases   Initiate smoking cessation protocol as indicated   Encourage broncho-pulmonary hygiene including cough, deep breathe, incentive spirometry   Assess the need for suctioning and aspirate as needed   Assess and instruct to report shortness of breath or any respiratory difficulty   Respiratory therapy support as indicated

## 2024-10-24 NOTE — DISCHARGE SUMMARY
Clinic  [address] [place] [address]  [phone]  Call in 1 week(s)        Patient Instructions: Please follow-up with your PCP within 1 week as advised.  Please follow-up with Dr. De Paz as soon as you can get an appointment.  Please take your medications as mentioned in the medication list) after visit summary.  If you develop any severe chest pain, palpitations, dizziness, shortness of breath, any other severe symptoms please call 911 or visit the nearest emergency medicine department.    Follow up labs: None  Follow up imaging: None    Note that over 30 minutes was spent in preparing discharge papers, discussing discharge with patient, medication review, etc.      Vannesa Galarza MD, MD  Internal Medicine Resident, PGY-1  [facility],  [place], [place].  10/23/2024, 8:05 PM

## 2024-10-25 NOTE — ED PROVIDER NOTES
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  Patient denies LOC or anticoagulation therapy use.    PAST MEDICAL / SURGICAL / SOCIAL / FAMILY HISTORY      has no past medical history on file.       has no past surgical history on file.      Social History     Socioeconomic History    Marital status:      Spouse name: Not on file    Number of children: Not on file    Years of education: Not on file    Highest education level: Not on file   Occupational History    Not on file   Tobacco Use    Smoking status: Not on file    Smokeless tobacco: Not on file   Substance and Sexual Activity    Alcohol use: Not on file    Drug use: Not on file    Sexual activity: Not on file   Other Topics Concern    Not on file   Social History Narrative    Not on file     Social Determinants of Health     Financial Resource Strain: Not on file   Food Insecurity: Not on file   Transportation Needs: Not on file   Physical Activity: Not on file   Stress: Not on file   Social Connections: Not on file   Intimate Partner Violence: Not on file   Housing Stability: Not on file       No family history on file.    Allergies:  Percocet [oxycodone-acetaminophen]    Home Medications:  Prior to Admission medications    Not on File         REVIEW OF SYSTEMS       Review of Systems   Constitutional:  Negative for chills and fever.   HENT:          Right frontal scalp hematoma   Respiratory:  Negative for cough and shortness of breath.  
     [facility]   Emergency Department  Faculty Attestation       I performed a history and physical examination of the patient and discussed management with the resident. I reviewed the resident’s note and agree with the documented findings including all diagnostic interpretations and plan of care. Any areas of disagreement are noted on the chart. I was personally present for the key portions of any procedures. I have documented in the chart those procedures where I was not present during the key portions. I have reviewed the emergency nurses triage note. I agree with the chief complaint, past medical history, past surgical history, allergies, medications, social and family history as documented unless otherwise noted below.  For Physician Assistant/ Nurse Practitioner cases/documentation I have personally evaluated this patient and have completed at least one if not all key elements of the E/M (history, physical exam, and MDM). Additional findings are as noted.    Patient Name: Galo Ruiz  MRN: [id number]  : 1940  Primary Care Physician: Briana Fuentes MD    Date of evaluationa: 10/19/2024   Note Started: 6:53 AM EDT    Pertinent Comments     Chief Complaint:   Chief Complaint   Patient presents with    Fall        Initial vitals: (If not listed, please see nursing documentation)  ED Triage Vitals   BP Systolic BP Percentile Diastolic BP Percentile Temp Temp Source Pulse Respirations SpO2   10/19/24 0408 -- -- 10/19/24 0411 10/19/24 1523 10/19/24 0407 10/19/24 0407 10/19/24 0407   (!) 133/96   97.6 °F (36.4 °C) Oral 80 20 93 %      Height Weight - Scale         10/19/24 0408 10/19/24 0408         1.702 m (5' 7\") 79.4 kg (175 lb)              HPI/PE/Impression:  This is a 84 y.o. male who presents to the Emergency Department after fall at home.  Struck his head.  Patient is on Plavix.  Denies any loss of consciousness.  On examination patient has a scalp hematoma.  But is awake.  
 [facility]  FACULTY HANDOFF       Handoff taken on the following patient from prior Attending Physician: Dr. Castillo  Pt Name: Dr Cabello Xxcalebhi  PCP:  Briana Fuentes MD  7:34 AM EDT    Attestation  I was available and discussed any additional care issues that arose and coordinated the management plans with the resident(s) caring for the patient during my duty period. Any areas of disagreement with resident's documentation of care or procedures are noted on the chart. I was personally present for the key portions of any/all procedures during my duty period. I have documented in the chart those procedures where I was not present during the key portions.         CHIEF COMPLAINT       Chief Complaint   Patient presents with    Fall         CURRENT MEDICATIONS     Previous Medications  Previous Medications    No medications on file       Encounter Medications  Orders Placed This Encounter   Medications    fentaNYL (SUBLIMAZE) injection 25 mcg    fentaNYL (SUBLIMAZE) 100 MCG/2ML injection     Akshat Benavides: cabinet override    iopamidol (ISOVUE-370) 76 % injection 75 mL       ALLERGIES     is allergic to percocet [oxycodone-acetaminophen].      RECENT VITALS:   Temp: 97.6 °F (36.4 °C),  Pulse: 72, Respirations: 26, BP: (!) 155/78    RADIOLOGY:   CT CHEST PULMONARY EMBOLISM W CONTRAST   Final Result   Cardiomegaly with bibasilar atelectasis.      No evidence for acute chronic pulmonary embolism.         XR KNEE RIGHT (1-2 VIEWS)   Preliminary Result   No evidence of fracture or osseous malalignment at the right knee.         CT HEAD WO CONTRAST   Final Result   1. No acute intracranial abnormality.   2. Right frontal scalp hematoma without underlying calvarial fracture.   3. Generalized cortical atrophy, with chronic small vessel ischemic changes.         CT CERVICAL SPINE WO CONTRAST   Preliminary Result   1. No evidence of acute traumatic injury of the cervical spine.   2. Multilevel 
10/19/2024 4:19 am TECHNIQUE: CT of the head was performed without the administration of intravenous contrast. Automated exposure control, iterative reconstruction, and/or weight based adjustment of the mA/kV was utilized to reduce the radiation dose to as low as reasonably achievable. COMPARISON: None. HISTORY: ORDERING SYSTEM PROVIDED HISTORY: trauma TECHNOLOGIST PROVIDED HISTORY: trauma Decision Support Exception - unselect if not a suspected or confirmed emergency medical condition->Emergency Medical Condition (MA) FINDINGS: BRAIN/VENTRICLES: There is no acute intracranial hemorrhage, mass effect or midline shift.  No abnormal extra-axial fluid collection.  The gray-white differentiation is maintained without evidence of an acute infarct.  There is no evidence of hydrocephalus. Patchy white matter low attenuation is identified within the periventricular and subcortical white matter. Generalized cortical atrophy. Intracranial atherosclerosis. ORBITS: The visualized portion of the orbits demonstrate no acute abnormality.  Intra-ocular lens implants noted. SINUSES: No traumatic hemorrhage seen within the paranasal sinuses or mastoid air cells.  Middle ear cavities appear clear.  Cerumen noted within the external auditory canals. SOFT TISSUES/SKULL:  There is a right frontal scalp hematoma without underlying calvarial fracture.     1. No acute intracranial abnormality. 2. Right frontal scalp hematoma without underlying calvarial fracture. 3. Generalized cortical atrophy, with chronic small vessel ischemic changes.     XR KNEE RIGHT (1-2 VIEWS)    No evidence of fracture or osseous malalignment at the right knee.     CT CERVICAL SPINE WO CONTRAST    1. No evidence of acute traumatic injury of the cervical spine. 2. Multilevel moderate to severe degenerative disc disease and mild-to-moderate facet osteoarthritis.     XR CHEST PORTABLE    1. Cardiomegaly with minimal pulmonary vascular congestion. 2. No evidence of
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
## 2024-11-18 PROBLEM — R79.89 ELEVATED TROPONIN: Status: RESOLVED | Noted: 2024-10-19 | Resolved: 2024-11-18

## 2024-12-26 ENCOUNTER — HOSPITAL ENCOUNTER (INPATIENT)
Age: 84
LOS: 3 days | Discharge: HOME | End: 2024-12-30
Attending: STUDENT IN AN ORGANIZED HEALTH CARE EDUCATION/TRAINING PROGRAM | Admitting: STUDENT IN AN ORGANIZED HEALTH CARE EDUCATION/TRAINING PROGRAM
Payer: MEDICARE

## 2024-12-26 ENCOUNTER — APPOINTMENT (OUTPATIENT)
Dept: GENERAL RADIOLOGY | Age: 84
End: 2024-12-26
Payer: MEDICARE

## 2024-12-26 DIAGNOSIS — R07.9 CHEST PAIN, UNSPECIFIED TYPE: Primary | ICD-10-CM

## 2024-12-26 DIAGNOSIS — N17.9 AKI (ACUTE KIDNEY INJURY) (HCC): ICD-10-CM

## 2024-12-26 DIAGNOSIS — I77.9 AORTIC DISEASE (HCC): ICD-10-CM

## 2024-12-26 LAB
ALBUMIN SERPL-MCNC: 3.8 G/DL (ref 3.5–5.2)
ALBUMIN/GLOB SERPL: 1.7 {RATIO} (ref 1–2.5)
ALP SERPL-CCNC: 80 U/L (ref 40–129)
ALT SERPL-CCNC: 10 U/L (ref 10–50)
ANION GAP SERPL CALCULATED.3IONS-SCNC: 11 MMOL/L (ref 9–16)
AST SERPL-CCNC: 19 U/L (ref 10–50)
BASOPHILS # BLD: 0 K/UL (ref 0–0.2)
BASOPHILS NFR BLD: 0 % (ref 0–2)
BILIRUB SERPL-MCNC: 0.2 MG/DL (ref 0–1.2)
BNP SERPL-MCNC: 1505 PG/ML (ref 0–450)
BUN SERPL-MCNC: 16 MG/DL (ref 8–23)
CALCIUM SERPL-MCNC: 8.2 MG/DL (ref 8.6–10.4)
CHLORIDE SERPL-SCNC: 99 MMOL/L (ref 98–107)
CO2 SERPL-SCNC: 31 MMOL/L (ref 20–31)
CREAT SERPL-MCNC: 0.9 MG/DL (ref 0.7–1.2)
EOSINOPHIL # BLD: 0.09 K/UL (ref 0–0.44)
EOSINOPHILS RELATIVE PERCENT: 2 % (ref 1–4)
ERYTHROCYTE [DISTWIDTH] IN BLOOD BY AUTOMATED COUNT: 13.5 % (ref 11.8–14.4)
GFR, ESTIMATED: 84 ML/MIN/1.73M2
GLUCOSE SERPL-MCNC: 151 MG/DL (ref 74–99)
HCT VFR BLD AUTO: 35.9 % (ref 40.7–50.3)
HGB BLD-MCNC: 11.9 G/DL (ref 13–17)
IMM GRANULOCYTES # BLD AUTO: 0 K/UL (ref 0–0.3)
IMM GRANULOCYTES NFR BLD: 0 %
LIPASE SERPL-CCNC: 49 U/L (ref 13–60)
LYMPHOCYTES NFR BLD: 0.59 K/UL (ref 1.1–3.7)
LYMPHOCYTES RELATIVE PERCENT: 13 % (ref 24–43)
MCH RBC QN AUTO: 28.4 PG (ref 25.2–33.5)
MCHC RBC AUTO-ENTMCNC: 33.1 G/DL (ref 28.4–34.8)
MCV RBC AUTO: 85.7 FL (ref 82.6–102.9)
MONOCYTES NFR BLD: 0.45 K/UL (ref 0.1–1.2)
MONOCYTES NFR BLD: 10 % (ref 3–12)
MORPHOLOGY: NORMAL
NEUTROPHILS NFR BLD: 75 % (ref 36–65)
NEUTS SEG NFR BLD: 3.37 K/UL (ref 1.5–8.1)
NRBC BLD-RTO: 0 PER 100 WBC
PLATELET # BLD AUTO: ABNORMAL K/UL (ref 138–453)
PLATELET, FLUORESCENCE: ABNORMAL K/UL (ref 138–453)
POTASSIUM SERPL-SCNC: 3.4 MMOL/L (ref 3.7–5.3)
PROT SERPL-MCNC: 6.1 G/DL (ref 6.6–8.7)
RBC # BLD AUTO: 4.19 M/UL (ref 4.21–5.77)
SODIUM SERPL-SCNC: 141 MMOL/L (ref 136–145)
TROPONIN I SERPL HS-MCNC: 22 NG/L (ref 0–22)
TROPONIN I SERPL HS-MCNC: 23 NG/L (ref 0–22)
WBC OTHER # BLD: 4.5 K/UL (ref 3.5–11.3)

## 2024-12-26 PROCEDURE — G0378 HOSPITAL OBSERVATION PER HR: HCPCS

## 2024-12-26 PROCEDURE — 80053 COMPREHEN METABOLIC PANEL: CPT

## 2024-12-26 PROCEDURE — 93005 ELECTROCARDIOGRAM TRACING: CPT | Performed by: EMERGENCY MEDICINE

## 2024-12-26 PROCEDURE — 85055 RETICULATED PLATELET ASSAY: CPT

## 2024-12-26 PROCEDURE — 2500000003 HC RX 250 WO HCPCS: Performed by: EMERGENCY MEDICINE

## 2024-12-26 PROCEDURE — 71046 X-RAY EXAM CHEST 2 VIEWS: CPT

## 2024-12-26 PROCEDURE — 83690 ASSAY OF LIPASE: CPT

## 2024-12-26 PROCEDURE — 6370000000 HC RX 637 (ALT 250 FOR IP)

## 2024-12-26 PROCEDURE — 6370000000 HC RX 637 (ALT 250 FOR IP): Performed by: EMERGENCY MEDICINE

## 2024-12-26 PROCEDURE — 99285 EMERGENCY DEPT VISIT HI MDM: CPT

## 2024-12-26 PROCEDURE — 83880 ASSAY OF NATRIURETIC PEPTIDE: CPT

## 2024-12-26 PROCEDURE — 85025 COMPLETE CBC W/AUTO DIFF WBC: CPT

## 2024-12-26 PROCEDURE — 84484 ASSAY OF TROPONIN QUANT: CPT

## 2024-12-26 RX ORDER — SODIUM CHLORIDE 0.9 % (FLUSH) 0.9 %
5-40 SYRINGE (ML) INJECTION PRN
Status: DISCONTINUED | OUTPATIENT
Start: 2024-12-26 | End: 2024-12-30 | Stop reason: HOSPADM

## 2024-12-26 RX ORDER — SODIUM CHLORIDE 9 MG/ML
INJECTION, SOLUTION INTRAVENOUS PRN
Status: DISCONTINUED | OUTPATIENT
Start: 2024-12-26 | End: 2024-12-30 | Stop reason: HOSPADM

## 2024-12-26 RX ORDER — ASPIRIN 81 MG/1
81 TABLET, CHEWABLE ORAL DAILY
Status: DISCONTINUED | OUTPATIENT
Start: 2024-12-27 | End: 2024-12-30 | Stop reason: HOSPADM

## 2024-12-26 RX ORDER — POLYETHYLENE GLYCOL 3350 17 G/17G
17 POWDER, FOR SOLUTION ORAL DAILY PRN
Status: DISCONTINUED | OUTPATIENT
Start: 2024-12-26 | End: 2024-12-30 | Stop reason: HOSPADM

## 2024-12-26 RX ORDER — MAGNESIUM SULFATE IN WATER 40 MG/ML
2000 INJECTION, SOLUTION INTRAVENOUS PRN
Status: DISCONTINUED | OUTPATIENT
Start: 2024-12-26 | End: 2024-12-30 | Stop reason: HOSPADM

## 2024-12-26 RX ORDER — AMLODIPINE BESYLATE 10 MG/1
10 TABLET ORAL DAILY
Status: DISCONTINUED | OUTPATIENT
Start: 2024-12-26 | End: 2024-12-28

## 2024-12-26 RX ORDER — ALBUTEROL SULFATE 90 UG/1
2 INHALANT RESPIRATORY (INHALATION) EVERY 6 HOURS PRN
Status: DISCONTINUED | OUTPATIENT
Start: 2024-12-26 | End: 2024-12-30 | Stop reason: HOSPADM

## 2024-12-26 RX ORDER — TAMSULOSIN HYDROCHLORIDE 0.4 MG/1
0.4 CAPSULE ORAL DAILY
Status: DISCONTINUED | OUTPATIENT
Start: 2024-12-26 | End: 2024-12-30 | Stop reason: HOSPADM

## 2024-12-26 RX ORDER — SODIUM CHLORIDE 0.9 % (FLUSH) 0.9 %
5-40 SYRINGE (ML) INJECTION EVERY 12 HOURS SCHEDULED
Status: DISCONTINUED | OUTPATIENT
Start: 2024-12-26 | End: 2024-12-30 | Stop reason: HOSPADM

## 2024-12-26 RX ORDER — GABAPENTIN 300 MG/1
300 CAPSULE ORAL NIGHTLY
Status: DISCONTINUED | OUTPATIENT
Start: 2024-12-26 | End: 2024-12-29

## 2024-12-26 RX ORDER — ACETAMINOPHEN 650 MG/1
650 SUPPOSITORY RECTAL EVERY 6 HOURS PRN
Status: DISCONTINUED | OUTPATIENT
Start: 2024-12-26 | End: 2024-12-30 | Stop reason: HOSPADM

## 2024-12-26 RX ORDER — LISINOPRIL 20 MG/1
20 TABLET ORAL DAILY
Status: DISCONTINUED | OUTPATIENT
Start: 2024-12-26 | End: 2024-12-30 | Stop reason: HOSPADM

## 2024-12-26 RX ORDER — CLOPIDOGREL BISULFATE 75 MG/1
75 TABLET ORAL DAILY
Status: DISCONTINUED | OUTPATIENT
Start: 2024-12-26 | End: 2024-12-30 | Stop reason: HOSPADM

## 2024-12-26 RX ORDER — ATORVASTATIN CALCIUM 20 MG/1
10 TABLET, FILM COATED ORAL DAILY
Status: DISCONTINUED | OUTPATIENT
Start: 2024-12-26 | End: 2024-12-30 | Stop reason: HOSPADM

## 2024-12-26 RX ORDER — POTASSIUM CHLORIDE 1500 MG/1
40 TABLET, EXTENDED RELEASE ORAL PRN
Status: DISCONTINUED | OUTPATIENT
Start: 2024-12-26 | End: 2024-12-30 | Stop reason: HOSPADM

## 2024-12-26 RX ORDER — METOPROLOL SUCCINATE 25 MG/1
25 TABLET, EXTENDED RELEASE ORAL
Status: DISCONTINUED | OUTPATIENT
Start: 2024-12-26 | End: 2024-12-30 | Stop reason: HOSPADM

## 2024-12-26 RX ORDER — ONDANSETRON 2 MG/ML
4 INJECTION INTRAMUSCULAR; INTRAVENOUS EVERY 6 HOURS PRN
Status: DISCONTINUED | OUTPATIENT
Start: 2024-12-26 | End: 2024-12-30 | Stop reason: HOSPADM

## 2024-12-26 RX ORDER — POTASSIUM CHLORIDE 7.45 MG/ML
10 INJECTION INTRAVENOUS PRN
Status: DISCONTINUED | OUTPATIENT
Start: 2024-12-26 | End: 2024-12-30 | Stop reason: HOSPADM

## 2024-12-26 RX ORDER — ENOXAPARIN SODIUM 100 MG/ML
40 INJECTION SUBCUTANEOUS DAILY
Status: DISCONTINUED | OUTPATIENT
Start: 2024-12-26 | End: 2024-12-29

## 2024-12-26 RX ORDER — FUROSEMIDE 20 MG/1
40 TABLET ORAL DAILY
Status: DISCONTINUED | OUTPATIENT
Start: 2024-12-27 | End: 2024-12-27

## 2024-12-26 RX ORDER — ISOSORBIDE MONONITRATE 30 MG/1
30 TABLET, EXTENDED RELEASE ORAL DAILY
Status: DISCONTINUED | OUTPATIENT
Start: 2024-12-27 | End: 2024-12-27

## 2024-12-26 RX ORDER — ACETAMINOPHEN 325 MG/1
650 TABLET ORAL EVERY 6 HOURS PRN
Status: DISCONTINUED | OUTPATIENT
Start: 2024-12-26 | End: 2024-12-30 | Stop reason: HOSPADM

## 2024-12-26 RX ORDER — PANTOPRAZOLE SODIUM 40 MG/1
40 TABLET, DELAYED RELEASE ORAL
Status: DISCONTINUED | OUTPATIENT
Start: 2024-12-26 | End: 2024-12-30 | Stop reason: HOSPADM

## 2024-12-26 RX ORDER — ONDANSETRON 4 MG/1
4 TABLET, ORALLY DISINTEGRATING ORAL EVERY 8 HOURS PRN
Status: DISCONTINUED | OUTPATIENT
Start: 2024-12-26 | End: 2024-12-30 | Stop reason: HOSPADM

## 2024-12-26 RX ADMIN — ATORVASTATIN CALCIUM 10 MG: 20 TABLET, FILM COATED ORAL at 12:48

## 2024-12-26 RX ADMIN — AMLODIPINE BESYLATE 10 MG: 10 TABLET ORAL at 12:48

## 2024-12-26 RX ADMIN — LISINOPRIL 20 MG: 20 TABLET ORAL at 12:48

## 2024-12-26 RX ADMIN — GABAPENTIN 300 MG: 300 CAPSULE ORAL at 21:00

## 2024-12-26 RX ADMIN — METOPROLOL SUCCINATE 25 MG: 25 TABLET, EXTENDED RELEASE ORAL at 20:13

## 2024-12-26 RX ADMIN — SODIUM CHLORIDE, PRESERVATIVE FREE 10 ML: 5 INJECTION INTRAVENOUS at 12:18

## 2024-12-26 RX ADMIN — CLOPIDOGREL BISULFATE 75 MG: 75 TABLET ORAL at 12:48

## 2024-12-26 RX ADMIN — SODIUM CHLORIDE, PRESERVATIVE FREE 10 ML: 5 INJECTION INTRAVENOUS at 20:13

## 2024-12-26 RX ADMIN — TAMSULOSIN HYDROCHLORIDE 0.4 MG: 0.4 CAPSULE ORAL at 12:48

## 2024-12-26 RX ADMIN — POTASSIUM CHLORIDE 40 MEQ: 1500 TABLET, EXTENDED RELEASE ORAL at 20:13

## 2024-12-26 ASSESSMENT — PAIN SCALES - GENERAL
PAINLEVEL_OUTOF10: 3
PAINLEVEL_OUTOF10: 3

## 2024-12-26 ASSESSMENT — PAIN DESCRIPTION - LOCATION
LOCATION: CHEST
LOCATION: CHEST

## 2024-12-26 ASSESSMENT — PAIN - FUNCTIONAL ASSESSMENT: PAIN_FUNCTIONAL_ASSESSMENT: 0-10

## 2024-12-26 ASSESSMENT — HEART SCORE: ECG: NON-SPECIFC REPOLARIZATION DISTURBANCE/LBTB/PM

## 2024-12-26 NOTE — CARE COORDINATION
Case Management Assessment  Initial Evaluation    Date/Time of Evaluation: 12/26/2024 11:17 AM  Assessment Completed by: BOO HARRIS RN    If patient is discharged prior to next notation, then this note serves as note for discharge by case management.    Patient Name: Galo Ruiz                   YOB: 1940  Diagnosis: Chest pain [R07.9]                   Date / Time: 12/26/2024  8:20 AM    Patient Admission Status: Observation   Readmission Risk (Low < 19, Mod (19-27), High > 27): Readmission Risk Score: 9.5    Current PCP: Briana Fuentes MD  PCP verified by CM? Yes    Chart Reviewed: Yes      History Provided by: Patient  Patient Orientation: Alert and Oriented    Patient Cognition: Alert    Hospitalization in the last 30 days (Readmission):  No    If yes, Readmission Assessment in  Navigator will be completed.    Advance Directives:      Code Status: Full Code   Patient's Primary Decision Maker is: Legal Next of Kin      Discharge Planning:    Patient lives with: Spouse/Significant Other Type of Home: House  Primary Care Giver: Self  Patient Support Systems include: Spouse/Significant Other   Current Financial resources: Medicare  Current community resources: None  Current services prior to admission: Durable Medical Equipment (private helper 3-4 days/wk)            Current DME: Walker            Type of Home Care services:   (private helper 3-4 days/wk)    ADLS  Prior functional level: Assistance with the following:, Housework  Current functional level: Assistance with the following:, Housework    PT AM-PAC:   /24  OT AM-PAC:   /24    Family can provide assistance at DC: Yes  Would you like Case Management to discuss the discharge plan with any other family members/significant others, and if so, who? Yes (family)  Plans to Return to Present Housing: Yes  Other Identified Issues/Barriers to RETURNING to current housing: none  Potential Assistance needed at discharge: N/A

## 2024-12-26 NOTE — ED NOTES
Pt presented to ED via M3 from home for the complaint of mid sternal chest pain that radiates to left arm. Pt has extensive cardiac history. Pt took 1 nitro which resolved pain. Pt was given 324mg asa by ems. Pt alert and oriented x 4. RR even and non labored. P has medtronic pacemaker.

## 2024-12-26 NOTE — PROGRESS NOTES
[facility]  CDU / OBSERVATION ENCOUNTER  ATTENDING NOTE       I performed a history and physical examination of the patient and discussed management with the resident or midlevel provider. I reviewed the resident or midlevel provider's note and agree with the documented findings and plan of care. Any areas of disagreement are noted on the chart. I was personally present for the key portions of any procedures. I have documented in the chart those procedures where I was not present during the key portions. I have reviewed the nurses notes. I agree with the chief complaint, past medical history, past surgical history, allergies, medications, social and family history as documented unless otherwise noted below.    The Family history, social history, and ROS are effectively unchanged since admission unless noted elsewhere in the chart.    This patient ws placed in the observation unit for reevaluation for possible admission to the hospital    Patient with chest pain, on Left side of chest, previous CABG, and previous stent. [place] sOB, and brought into the ER. Accompanied by significant other, had stress test done 10/21/2024 that showed Findings consistent with infarct of the anterior and apical walls.  No reversible perfusion defect to suggest reversible ischemia. Moderate left ventricular enlargement.  Septal and apical wall hypokinesis. Left ventricular ejection fraction of 31% .    Patient with known to have ischemic cardiomyopathy, and AICD in place which was interrogated in ED.   Last cath was in 1/2023 which did have PCI of the Lcx at this time.  Pending cardiology consultation at this time for further management of his chest pain. Trops 23-->22.       Deborah De Anda  Attending Emergency  Physician

## 2024-12-26 NOTE — H&P
following components:    Troponin, High Sensitivity 23 (*)     All other components within normal limits   BRAIN NATRIURETIC PEPTIDE - Abnormal; Notable for the following components:    NT Pro-BNP 1,505 (*)     All other components within normal limits   LIPASE   TROPONIN         CDU IMPRESSION / PLAN      Galo Ruiz is a 84 y.o. male who presents with chest pain.  Patient has a previous cardiac history, including coronary artery disease with stent placement, congestive heart failure, AICD placement, coronary artery bypass graft.     Cardiology consultation: Recommendations appreciated  Continue home medications and pain control  Home meds ordered  Monitor vitals, labs, and imaging  DISPO: pending consults and clinical improvement    CONSULTS:    IP CONSULT TO CARDIOLOGY    PROCEDURES:  Not indicated       PATIENT REFERRED TO:    No follow-up provider specified.    --  Sharif Rich MD   Observation Physician    This dictation was generated by voice recognition computer software.  Although all attempts are made to edit the dictation for accuracy, there may be errors in the transcription that are not intended.

## 2024-12-26 NOTE — ED PROVIDER NOTES
[facility] EMERGENCY DEPARTMENT  Emergency Department Encounter  Emergency Medicine Resident     Pt Name:Galo Ruiz  MRN: [id number]  Birthdate 1940  Date of evaluation: 12/26/24  PCP:  Briana Fuentes MD  Note Started: 8:52 AM EST      CHIEF COMPLAINT       Chief Complaint   Patient presents with    Chest Pain     Took 1 nitro, now feeling better. Ems gave 324mg ASA. Pt has cardiac history        HISTORY OF PRESENT ILLNESS  (Location/Symptom, Timing/Onset, Context/Setting, Quality, Duration, Modifying Factors, Severity.)      Galo Ruiz is a 84 y.o. male who presents with chest pain, diaphoresis and shortness of breath.  Patient states this started while he was sitting on his recliner today.  Pain radiated up into his neck and his left arm.  He states this felt like when he had his previous heart attack.  He does have a history of CHF, CAD with stent placement in 2023 and previous stenting as well.  He had a stress test that was determined High-Risk in October 2024.  He has an AICD in place.  He had improvement with aspirin and nitro prior to arrival.  He is on aspirin and Plavix and has been compliant with his medications.  He denies any new leg swelling.  Patient does not wear oxygen at home.  He was brought in on 3 L nasal cannula.  This was able to be removed and he was satting between 92 and 97%.    PAST MEDICAL / SURGICAL / SOCIAL / FAMILY HISTORY      has a past medical history of Acid reflux, Anxiety, Arthritis, BPH (benign prostatic hypertrophy), CAD (coronary artery disease), CAD (coronary artery disease), Cancer ([facility]), Cardiomyopathy ([facility]), Carotid stenosis, Cataracts, both eyes, CHF (congestive heart failure) ([facility]), Dry skin, Elevated PSA, Examination of participant in clinical trial, HTN (hypertension), Hyperlipidemia, Hypertension, Left ventricular dysfunction, Macular degeneration, Myocardial infarction ([facility]), Neuropathy, Pacemaker, PAF (paroxysmal atrial fibrillation) ([facility]),    ciprofloxacin (CILOXAN) 0.3 % ophthalmic solution  3/18/24   Wisam Basilio MD   albuterol sulfate HFA (PROVENTIL;VENTOLIN;PROAIR) 108 (90 Base) MCG/ACT inhaler  1/19/24   Wisam Basilio MD   ciclopirox (PENLAC) 8 % solution Apply topically nightly. Remove once weekly with alcohol or nail polish remover.  Patient not taking: Reported on 10/28/2024 1/31/24   Steffanie Walter DPM   Cholecalciferol (VITAMIN D3) 50 MCG (2000 UT) CAPS  10/25/23   Wisam Basilio MD   isosorbide mononitrate (IMDUR) 30 MG extended release tablet Take 1 tablet by mouth daily    Wisam Basilio MD   nitroGLYCERIN (NITROSTAT) 0.4 MG SL tablet Place 1 tablet under the tongue as needed for Chest pain up to max of 3 total doses. If no relief after 1 dose, call 911.  Patient not taking: Reported on 10/28/2024 8/30/23   Erich Kwon MD   lisinopril (PRINIVIL;ZESTRIL) 20 MG tablet  6/8/23   Wisam Basilio MD   Multiple Vitamin (MULTIVITAMIN) TABS  6/8/23   Wisam Basilio MD   ofloxacin (OCUFLOX) 0.3 % solution  6/2/23   Wisam Basilio MD   tamsulosin (FLOMAX) 0.4 MG capsule  6/8/23   Wisam Basilio MD   fluorouracil (EFUDEX) 5 % cream  6/16/23   Wisam Basilio MD   pantoprazole (PROTONIX) 40 MG tablet Take 1 tablet by mouth 2 times daily (before meals) 1/5/23   Marely Lewis MD   ammonium lactate (LAC-HYDRIN) 12 % cream Apply topically as needed.  Patient not taking: Reported on 10/28/2024 11/6/17   Reza Walter DPM   fluticasone (FLONASE) 50 MCG/ACT nasal spray 1 spray by Nasal route daily as needed    Wisam Basilio MD   sertraline (ZOLOFT) 25 MG tablet Take 1 tablet by mouth daily    Wisam Basilio MD   furosemide (LASIX) 40 MG tablet Take 1 tablet by mouth daily    Wisam Basilio MD   alfuzosin (UROXATRAL) 10 MG SR tablet Take 1 tablet by mouth daily    Wisam Basilio MD   metoprolol (TOPROL-XL) 100 MG XL tablet Take 1 tablet by mouth

## 2024-12-26 NOTE — ED PROVIDER NOTES
[facility]     Emergency Department     Faculty Attestation    I performed a history and physical examination of the patient and discussed management with the resident. I have reviewed and agree with the resident’s findings including all diagnostic interpretations, and treatment plans as written at the time of my review. Any areas of disagreement are noted on the chart. I was personally present for the key portions of any procedures. I have documented in the chart those procedures where I was not present during the key portions. For Physician Assistant/ Nurse Practitioner cases/documentation I have personally evaluated this patient and have completed at least one if not all key elements of the E/M (history, physical exam, and MDM). Additional findings are as noted.    PtName: Galo Ruiz  MRN: [id number]  Birthdate 1940  Date of evaluation: 12/26/24  Note Started: 8:29 AM EST    Primary Care Physician: Briana Fuentes MD    Brief HPI:  84-year-old male history of ischemic cardiomyopathy status post AICD/pacemaker presents emergency department with chest pain.  Symptoms started this morning while in bed rating to his left arm.  Upon EMS arrival the patient was given nitro and aspirin.  He reports relief of his symptoms and arrival to the emergency department.    Pertinent Physical Exam Findings:  Vitals:    12/26/24 0824   BP: 135/71   Pulse: 82   Resp: 18   Temp: 98.5 °F (36.9 °C)   SpO2: 94%   Appears well, resting comfortably, no acute distress, respirations are even and unlabored.    Medical Decision Making: Patient is a 84 y.o. male presenting to the emergency department with chest pain. The chart was reviewed for pertinent history relating to the chief complaint.  The patient appears well at this time, vitals are stable, he is currently asymptomatic.  The patient has a concerning history of present illness and previous cardiac history.  Initial twelve-lead  EKG reveals atrial paced rhythm with left bundle branch block pattern, Sgarbossa negative.      All results, including labs (if ordered), imaging (if ordered), and EKGs (if ordered) were independently interpreted by me.  See radiologist report for additional details on imaging studies.      (Please note that portions of this note were completed with a voice recognition program.  Efforts were made to edit the dictations but occasionally words are mis-transcribed.)    Abhishek Paz DO   Attending Emergency Medicine Physician         Abhishek Paz DO  12/26/24 0951

## 2024-12-26 NOTE — ED NOTES
ED to inpatient nurses report      Chief Complaint:  Chief Complaint   Patient presents with    Chest Pain     Took 1 nitro, now feeling better. Ems gave 324mg ASA. Pt has cardiac history      Present to ED from: home    MOA:     LOC: alert and orientated to name, place, date  Mobility: Independent  Oxygen Baseline: RA    Current needs required: RA   Pending ED orders: none  Present condition: stable    Why did the patient come to the ED? Midsternal chest pain.  What is the plan? Cards consult  Any procedures or intervention occur? Cardiac work up.   Any safety concerns?? no    Mental Status:  Level of Consciousness: Alert (0)    Psych Assessment:   Psychosocial  Psychosocial (WDL): Within Defined Limits  Vital signs   Vitals:    12/26/24 0824 12/26/24 0845 12/26/24 1015   BP: 135/71 117/87 (!) 141/68   Pulse: 82 74 71   Resp: 18 14 13   Temp: 98.5 °F (36.9 °C)     TempSrc: Oral     SpO2: 94% 97% 97%   Weight: 77.6 kg (171 lb)          Vitals:  Patient Vitals for the past 24 hrs:   BP Temp Temp src Pulse Resp SpO2 Weight   12/26/24 1015 (!) 141/68 -- -- 71 13 97 % --   12/26/24 0845 117/87 -- -- 74 14 97 % --   12/26/24 0824 135/71 98.5 °F (36.9 °C) Oral 82 18 94 % 77.6 kg (171 lb)      Visit Vitals  BP (!) 141/68   Pulse 71   Temp 98.5 °F (36.9 °C) (Oral)   Resp 13   Wt 77.6 kg (171 lb)   SpO2 97%   BMI 26.78 kg/m²        LDAs:   Peripheral IV 12/26/24 Left Forearm (Active)       Ambulatory Status:  Presents to emergency department  because of falls (Syncope, seizure, or loss of consciousness): No, Age > 70: Yes, Altered Mental Status, Intoxication with alcohol or substance confusion (Disorientation, impaired judgment, poor safety awaremess, or inability to follow instructions): No, Impaired Mobility: Ambulates or transfers with assistive devices or assistance; Unable to ambulate or transer.: Yes, Nursing Judgement: Yes    Diagnosis:  DISPOSITION Decision To Admit 12/26/2024 10:32:10 AM   Final diagnoses:   Chest  SIMVASTATIN (ZOCOR) 20 MG TABLET    Take 1 tablet by mouth nightly    TAMSULOSIN (FLOMAX) 0.4 MG CAPSULE         No orders of the defined types were placed in this encounter.      SURGICAL HISTORY       Past Surgical History:   Procedure Laterality Date    CARDIAC CATHETERIZATION      several/ stents X 3/ angioplasty    CARDIAC DEFIBRILLATOR PLACEMENT  2013    Up graded PPM to ICD    CARDIAC SURGERY  1997    double bypass 1997    CATARACT REMOVAL      CHOLECYSTECTOMY      CHOLECYSTECTOMY, LAPAROSCOPIC  03/03/2014    [facility]    COLONOSCOPY      CORONARY ANGIOPLASTY WITH STENT PLACEMENT  01/03/2023    CORONARY ARTERY BYPASS GRAFT      ESOPHAGOGASTRODUODENOSCOPY  12/29/2022    CONTROL HEMORRHAGE    EYE SURGERY Bilateral     cataracts    HIP ARTHROPLASTY Left 12/26/2022    HIP HEMIARTHROPLASTY,  DEPUY    HIP SURGERY Left 12/26/2022    HIP HEMIARTHROPLASTY,  DEPUY performed by Ace Liz DO at [facility] OR    PACEMAKER INSERTION  1995, 2001, 2008, 2013, 7/1/2019 7/1/2019 is AICD    PACEMAKER INSERTION      PACEMAKER PLACEMENT      pacer/ AICD/ Newest 7-1-19,medtronic aicd last check 8/20/19. pacemaker x5.    PROSTATE BIOPSY  09/24/2019    as local    PROSTATE BIOPSY N/A 09/24/2019    PROSTATE BIOPSY WITH ULTRASOUND (OFFICE NOTIFIED DEPT) performed by Mauro Mistry MD at [facility] OR    TOTAL HIP ARTHROPLASTY Left     UPPER GASTROINTESTINAL ENDOSCOPY N/A 12/29/2022    EGD CONTROL HEMORRHAGE performed by Jack Pinto MD at [facility] OR       PAST MEDICAL HISTORY       Past Medical History:   Diagnosis Date    Acid reflux     resolved since s/p shona    Anxiety     Arthritis     back/ fingers    BPH (benign prostatic hypertrophy)     CAD (coronary artery disease)     Dr. Curtis/ Dr. hernandez/ [facility]    CAD (coronary artery disease)     Cancer (HCC)     face, ear, scalp and arms     Cardiomyopathy (HCC)     Carotid stenosis     bilat    Cataracts, both eyes     CHF (congestive heart failure) (HCC)     Dry skin     Elevated PSA

## 2024-12-27 ENCOUNTER — APPOINTMENT (OUTPATIENT)
Dept: GENERAL RADIOLOGY | Age: 84
End: 2024-12-27
Payer: MEDICARE

## 2024-12-27 PROBLEM — I25.5 ISCHEMIC CARDIOMYOPATHY: Status: RESOLVED | Noted: 2023-09-01 | Resolved: 2024-12-27

## 2024-12-27 PROBLEM — I77.9 AORTIC DISEASE (HCC): Status: ACTIVE | Noted: 2024-12-27

## 2024-12-27 PROBLEM — Z95.810 ICD (IMPLANTABLE CARDIOVERTER-DEFIBRILLATOR) IN PLACE: Status: RESOLVED | Noted: 2023-09-01 | Resolved: 2024-12-27

## 2024-12-27 PROBLEM — N40.0 BPH (BENIGN PROSTATIC HYPERPLASIA): Status: RESOLVED | Noted: 2024-10-19 | Resolved: 2024-12-27

## 2024-12-27 PROBLEM — I25.10 CAD (CORONARY ARTERY DISEASE): Status: RESOLVED | Noted: 2024-10-19 | Resolved: 2024-12-27

## 2024-12-27 LAB
ANION GAP SERPL CALCULATED.3IONS-SCNC: 11 MMOL/L (ref 9–16)
BASOPHILS # BLD: 0 K/UL (ref 0–0.2)
BASOPHILS NFR BLD: 0 % (ref 0–2)
BNP SERPL-MCNC: 6278 PG/ML (ref 0–450)
BUN SERPL-MCNC: 9 MG/DL (ref 8–23)
CALCIUM SERPL-MCNC: 8.9 MG/DL (ref 8.6–10.4)
CHLORIDE SERPL-SCNC: 98 MMOL/L (ref 98–107)
CO2 SERPL-SCNC: 26 MMOL/L (ref 20–31)
CREAT SERPL-MCNC: 0.6 MG/DL (ref 0.7–1.2)
D DIMER PPP FEU-MCNC: 1.9 UG/ML FEU (ref 0–0.57)
EKG ATRIAL RATE: 85 BPM
EKG P AXIS: 118 DEGREES
EKG P-R INTERVAL: 190 MS
EKG Q-T INTERVAL: 432 MS
EKG QRS DURATION: 156 MS
EKG QTC CALCULATION (BAZETT): 514 MS
EKG R AXIS: -14 DEGREES
EKG T AXIS: 126 DEGREES
EKG VENTRICULAR RATE: 85 BPM
EOSINOPHIL # BLD: 0.05 K/UL (ref 0–0.44)
EOSINOPHILS RELATIVE PERCENT: 1 % (ref 1–4)
ERYTHROCYTE [DISTWIDTH] IN BLOOD BY AUTOMATED COUNT: 13.6 % (ref 11.8–14.4)
GFR, ESTIMATED: >90 ML/MIN/1.73M2
GLUCOSE SERPL-MCNC: 107 MG/DL (ref 74–99)
HCT VFR BLD AUTO: 44.5 % (ref 40.7–50.3)
HGB BLD-MCNC: 14.4 G/DL (ref 13–17)
IMM GRANULOCYTES # BLD AUTO: 0 K/UL (ref 0–0.3)
IMM GRANULOCYTES NFR BLD: 0 %
LYMPHOCYTES NFR BLD: 0.46 K/UL (ref 1.1–3.7)
LYMPHOCYTES RELATIVE PERCENT: 9 % (ref 24–43)
MAGNESIUM SERPL-MCNC: 2.3 MG/DL (ref 1.6–2.4)
MCH RBC QN AUTO: 28.3 PG (ref 25.2–33.5)
MCHC RBC AUTO-ENTMCNC: 32.4 G/DL (ref 28.4–34.8)
MCV RBC AUTO: 87.6 FL (ref 82.6–102.9)
MONOCYTES NFR BLD: 0.46 K/UL (ref 0.1–1.2)
MONOCYTES NFR BLD: 9 % (ref 3–12)
MORPHOLOGY: NORMAL
NEUTROPHILS NFR BLD: 81 % (ref 36–65)
NEUTS SEG NFR BLD: 4.13 K/UL (ref 1.5–8.1)
NRBC BLD-RTO: 0 PER 100 WBC
PLATELET # BLD AUTO: 160 K/UL (ref 138–453)
PMV BLD AUTO: 10.8 FL (ref 8.1–13.5)
POTASSIUM SERPL-SCNC: 4.4 MMOL/L (ref 3.7–5.3)
RBC # BLD AUTO: 5.08 M/UL (ref 4.21–5.77)
SODIUM SERPL-SCNC: 135 MMOL/L (ref 136–145)
WBC OTHER # BLD: 5.1 K/UL (ref 3.5–11.3)

## 2024-12-27 PROCEDURE — C1892 INTRO/SHEATH,FIXED,PEEL-AWAY: HCPCS | Performed by: STUDENT IN AN ORGANIZED HEALTH CARE EDUCATION/TRAINING PROGRAM

## 2024-12-27 PROCEDURE — 51798 US URINE CAPACITY MEASURE: CPT

## 2024-12-27 PROCEDURE — 6360000002 HC RX W HCPCS: Performed by: STUDENT IN AN ORGANIZED HEALTH CARE EDUCATION/TRAINING PROGRAM

## 2024-12-27 PROCEDURE — 93005 ELECTROCARDIOGRAM TRACING: CPT | Performed by: EMERGENCY MEDICINE

## 2024-12-27 PROCEDURE — 4A023N8 MEASUREMENT OF CARDIAC SAMPLING AND PRESSURE, BILATERAL, PERCUTANEOUS APPROACH: ICD-10-PCS | Performed by: STUDENT IN AN ORGANIZED HEALTH CARE EDUCATION/TRAINING PROGRAM

## 2024-12-27 PROCEDURE — 80048 BASIC METABOLIC PNL TOTAL CA: CPT

## 2024-12-27 PROCEDURE — 6360000004 HC RX CONTRAST MEDICATION: Performed by: STUDENT IN AN ORGANIZED HEALTH CARE EDUCATION/TRAINING PROGRAM

## 2024-12-27 PROCEDURE — B2111ZZ FLUOROSCOPY OF MULTIPLE CORONARY ARTERIES USING LOW OSMOLAR CONTRAST: ICD-10-PCS | Performed by: STUDENT IN AN ORGANIZED HEALTH CARE EDUCATION/TRAINING PROGRAM

## 2024-12-27 PROCEDURE — C1769 GUIDE WIRE: HCPCS | Performed by: STUDENT IN AN ORGANIZED HEALTH CARE EDUCATION/TRAINING PROGRAM

## 2024-12-27 PROCEDURE — 6370000000 HC RX 637 (ALT 250 FOR IP): Performed by: STUDENT IN AN ORGANIZED HEALTH CARE EDUCATION/TRAINING PROGRAM

## 2024-12-27 PROCEDURE — 36415 COLL VENOUS BLD VENIPUNCTURE: CPT

## 2024-12-27 PROCEDURE — 99152 MOD SED SAME PHYS/QHP 5/>YRS: CPT | Performed by: STUDENT IN AN ORGANIZED HEALTH CARE EDUCATION/TRAINING PROGRAM

## 2024-12-27 PROCEDURE — 2709999900 HC NON-CHARGEABLE SUPPLY: Performed by: STUDENT IN AN ORGANIZED HEALTH CARE EDUCATION/TRAINING PROGRAM

## 2024-12-27 PROCEDURE — C1760 CLOSURE DEV, VASC: HCPCS | Performed by: STUDENT IN AN ORGANIZED HEALTH CARE EDUCATION/TRAINING PROGRAM

## 2024-12-27 PROCEDURE — 7100000011 HC PHASE II RECOVERY - ADDTL 15 MIN: Performed by: STUDENT IN AN ORGANIZED HEALTH CARE EDUCATION/TRAINING PROGRAM

## 2024-12-27 PROCEDURE — 2500000003 HC RX 250 WO HCPCS: Performed by: STUDENT IN AN ORGANIZED HEALTH CARE EDUCATION/TRAINING PROGRAM

## 2024-12-27 PROCEDURE — 1200000000 HC SEMI PRIVATE

## 2024-12-27 PROCEDURE — 99223 1ST HOSP IP/OBS HIGH 75: CPT | Performed by: STUDENT IN AN ORGANIZED HEALTH CARE EDUCATION/TRAINING PROGRAM

## 2024-12-27 PROCEDURE — 83735 ASSAY OF MAGNESIUM: CPT

## 2024-12-27 PROCEDURE — 74018 RADEX ABDOMEN 1 VIEW: CPT

## 2024-12-27 PROCEDURE — 85025 COMPLETE CBC W/AUTO DIFF WBC: CPT

## 2024-12-27 PROCEDURE — 93457 R HRT ART/GRFT ANGIO: CPT | Performed by: STUDENT IN AN ORGANIZED HEALTH CARE EDUCATION/TRAINING PROGRAM

## 2024-12-27 PROCEDURE — 85379 FIBRIN DEGRADATION QUANT: CPT

## 2024-12-27 PROCEDURE — 6370000000 HC RX 637 (ALT 250 FOR IP): Performed by: EMERGENCY MEDICINE

## 2024-12-27 PROCEDURE — 7100000010 HC PHASE II RECOVERY - FIRST 15 MIN: Performed by: STUDENT IN AN ORGANIZED HEALTH CARE EDUCATION/TRAINING PROGRAM

## 2024-12-27 PROCEDURE — 83880 ASSAY OF NATRIURETIC PEPTIDE: CPT

## 2024-12-27 PROCEDURE — C1894 INTRO/SHEATH, NON-LASER: HCPCS | Performed by: STUDENT IN AN ORGANIZED HEALTH CARE EDUCATION/TRAINING PROGRAM

## 2024-12-27 PROCEDURE — 6370000000 HC RX 637 (ALT 250 FOR IP)

## 2024-12-27 PROCEDURE — 99222 1ST HOSP IP/OBS MODERATE 55: CPT | Performed by: STUDENT IN AN ORGANIZED HEALTH CARE EDUCATION/TRAINING PROGRAM

## 2024-12-27 PROCEDURE — 99153 MOD SED SAME PHYS/QHP EA: CPT | Performed by: STUDENT IN AN ORGANIZED HEALTH CARE EDUCATION/TRAINING PROGRAM

## 2024-12-27 PROCEDURE — B2131ZZ FLUOROSCOPY OF MULTIPLE CORONARY ARTERY BYPASS GRAFTS USING LOW OSMOLAR CONTRAST: ICD-10-PCS | Performed by: STUDENT IN AN ORGANIZED HEALTH CARE EDUCATION/TRAINING PROGRAM

## 2024-12-27 PROCEDURE — 71045 X-RAY EXAM CHEST 1 VIEW: CPT

## 2024-12-27 PROCEDURE — 93461 R&L HRT ART/VENTRICLE ANGIO: CPT | Performed by: STUDENT IN AN ORGANIZED HEALTH CARE EDUCATION/TRAINING PROGRAM

## 2024-12-27 PROCEDURE — 2500000003 HC RX 250 WO HCPCS: Performed by: EMERGENCY MEDICINE

## 2024-12-27 RX ORDER — SODIUM CHLORIDE 0.9 % (FLUSH) 0.9 %
5-40 SYRINGE (ML) INJECTION PRN
Status: DISCONTINUED | OUTPATIENT
Start: 2024-12-27 | End: 2024-12-30 | Stop reason: HOSPADM

## 2024-12-27 RX ORDER — FENTANYL CITRATE 50 UG/ML
INJECTION, SOLUTION INTRAMUSCULAR; INTRAVENOUS PRN
Status: DISCONTINUED | OUTPATIENT
Start: 2024-12-27 | End: 2024-12-27 | Stop reason: HOSPADM

## 2024-12-27 RX ORDER — IOPAMIDOL 755 MG/ML
INJECTION, SOLUTION INTRAVASCULAR PRN
Status: DISCONTINUED | OUTPATIENT
Start: 2024-12-27 | End: 2024-12-27 | Stop reason: HOSPADM

## 2024-12-27 RX ORDER — SODIUM CHLORIDE 9 MG/ML
INJECTION, SOLUTION INTRAVENOUS PRN
Status: DISCONTINUED | OUTPATIENT
Start: 2024-12-27 | End: 2024-12-30 | Stop reason: HOSPADM

## 2024-12-27 RX ORDER — MIDAZOLAM HYDROCHLORIDE 1 MG/ML
INJECTION, SOLUTION INTRAMUSCULAR; INTRAVENOUS PRN
Status: DISCONTINUED | OUTPATIENT
Start: 2024-12-27 | End: 2024-12-27 | Stop reason: HOSPADM

## 2024-12-27 RX ORDER — ISOSORBIDE MONONITRATE 60 MG/1
60 TABLET, EXTENDED RELEASE ORAL DAILY
Status: DISCONTINUED | OUTPATIENT
Start: 2024-12-27 | End: 2024-12-30 | Stop reason: HOSPADM

## 2024-12-27 RX ORDER — FUROSEMIDE 10 MG/ML
40 INJECTION INTRAMUSCULAR; INTRAVENOUS 2 TIMES DAILY
Status: DISCONTINUED | OUTPATIENT
Start: 2024-12-27 | End: 2024-12-30 | Stop reason: HOSPADM

## 2024-12-27 RX ORDER — SODIUM CHLORIDE 0.9 % (FLUSH) 0.9 %
5-40 SYRINGE (ML) INJECTION EVERY 12 HOURS SCHEDULED
Status: DISCONTINUED | OUTPATIENT
Start: 2024-12-27 | End: 2024-12-30 | Stop reason: HOSPADM

## 2024-12-27 RX ORDER — FUROSEMIDE 10 MG/ML
40 INJECTION INTRAMUSCULAR; INTRAVENOUS 2 TIMES DAILY
Status: DISCONTINUED | OUTPATIENT
Start: 2024-12-27 | End: 2024-12-27

## 2024-12-27 RX ADMIN — FUROSEMIDE 40 MG: 10 INJECTION, SOLUTION INTRAMUSCULAR; INTRAVENOUS at 18:49

## 2024-12-27 RX ADMIN — SODIUM CHLORIDE, PRESERVATIVE FREE 10 ML: 5 INJECTION INTRAVENOUS at 11:00

## 2024-12-27 RX ADMIN — SODIUM CHLORIDE, PRESERVATIVE FREE 10 ML: 5 INJECTION INTRAVENOUS at 21:09

## 2024-12-27 RX ADMIN — SODIUM CHLORIDE, PRESERVATIVE FREE 10 ML: 5 INJECTION INTRAVENOUS at 21:50

## 2024-12-27 RX ADMIN — GABAPENTIN 300 MG: 300 CAPSULE ORAL at 21:08

## 2024-12-27 RX ADMIN — METOPROLOL SUCCINATE 25 MG: 25 TABLET, EXTENDED RELEASE ORAL at 21:09

## 2024-12-27 RX ADMIN — SERTRALINE HYDROCHLORIDE 25 MG: 50 TABLET ORAL at 21:08

## 2024-12-27 RX ADMIN — PANTOPRAZOLE SODIUM 40 MG: 40 TABLET, DELAYED RELEASE ORAL at 18:46

## 2024-12-27 ASSESSMENT — PAIN SCALES - GENERAL: PAINLEVEL_OUTOF10: 0

## 2024-12-27 NOTE — PROGRESS NOTES
Patient returned to PCC room 9 post  procedure.  Assessment & VS obtained. Restrictions/Education reviewed with patient. Post procedure pathway initiated. Pt without complications.  Groin site soft, dry & no hematoma noted.  RN at bedside. Supine position with BLE straight. Pulses obtained and documented. Will continue to monitor.

## 2024-12-27 NOTE — CONSULTS
of participant in clinical trial, HTN (hypertension), Hyperlipidemia, Hypertension, Left ventricular dysfunction, Macular degeneration, Myocardial infarction (HCC), Neuropathy, Pacemaker, PAF (paroxysmal atrial fibrillation) (HCC), Panic attacks, Prostate cancer (HCC), PVD (peripheral vascular disease) (HCC), Sinus problem, Snores, Vasovagal near syncope, and Wears glasses.    Past Surgical History:   has a past surgical history that includes Pacemaker insertion (1995, 2001, 2008, 2013, 7/1/2019); Cholecystectomy, laparoscopic (03/03/2014); eye surgery (Bilateral); Cardiac defibrillator placement (2013); Cardiac surgery (1997); Cardiac catheterization; Colonoscopy; Prostate Biopsy (09/24/2019); Prostate biopsy (N/A, 09/24/2019); pacemaker placement; Cataract removal; Hip Arthroplasty (Left, 12/26/2022); hip surgery (Left, 12/26/2022); Esophagogastroduodenoscopy (12/29/2022); Upper gastrointestinal endoscopy (N/A, 12/29/2022); Coronary angioplasty with stent (01/03/2023); Cholecystectomy; Total hip arthroplasty (Left); Pacemaker insertion; and Coronary artery bypass graft.     Home Medications:    Prior to Admission medications    Medication Sig Start Date End Date Taking? Authorizing Provider   gabapentin (NEURONTIN) 300 MG capsule Take 1 capsule by mouth in the morning and at bedtime.   Yes Wisam Basilio MD   metoprolol succinate (TOPROL XL) 25 MG extended release tablet Take 1 tablet by mouth at bedtime 10/22/24  Yes Jane Celis MD   aspirin 81 MG EC tablet Take 1 tablet by mouth daily   Yes Wisam Basilio MD   simvastatin (ZOCOR) 20 MG tablet Take 1 tablet by mouth nightly   Yes Wisam Basilio MD   amLODIPine (NORVASC) 10 MG tablet Take 1 tablet by mouth daily   Yes Wisam Basilio MD   clopidogrel (PLAVIX) 75 MG tablet Take 1 tablet by mouth daily   Yes Wisam Basilio MD   ciprofloxacin (CILOXAN) 0.3 % ophthalmic solution  3/18/24  Yes Provider  input(s): \"APTT\" in the last 72 hours.  CARDIAC ENZYMES:No results for input(s): \"CKTOTAL\", \"CKMB\", \"CKMBINDEX\", \"TROPONINI\" in the last 72 hours.    DATA:    EKG:   Atrial paced with premature supraventricular complexes.  Normal rate.  Left bundle branch block(old)    ECHO:   reviewed. 10/21/2024   Left Ventricle: Severely reduced left ventricular systolic function. EF by visual approximation is 30%. Left ventricle size is normal. Increased wall thickness. Septal thickening.Findings consistent with mild concentric hypertrophy. Global hypokinesis present, with akinesis/aneurysmal anteroseptum and inferoseptum. Abnormal diastolic function.    Right Ventricle: Reduced systolic function. TAPSE is abnormal. TAPSE is 1.5 cm.    Aortic Valve: Trileaflet valve. Moderately calcified left cusp. Trace to mild regurgitation.    Mitral Valve: Mild regurgitation.    Tricuspid Valve: No regurgitation.    Left Atrium: Left atrium is dilated.    Image quality is adequate. Technically difficult study due to patient's heart rhythm.  Stress Test:   reviewed. 10/21/2024  Stress Combined Conclusion: The study is positive for myocardial infarction and is negative for myocardial ischemia. Findings suggest a high risk of cardiac events.    Stress Function: Left ventricular function post-stress is abnormal. Post-stress ejection fraction is 31%. The stress end diastolic cavity size is moderately enlarged. The stress end systolic cavity size is moderately enlarged.    Perfusion Comments: Prone images were not obtained. LV perfusion is abnormal. There is no evidence of inducible ischemia.    Perfusion Defect: There is a moderate severity left ventricular stress perfusion defect present in the anterior and apex segment(s) that is fixed. The defect appears to be infarction.    Perfusion Conclusion: There is no evidence of transient ischemic dilation (TID). TID ratio is 0.96.    ECG: The stress ECG was not diagnostic due to intraventricular

## 2024-12-27 NOTE — PROGRESS NOTES
OBS/CDU   Progress NOTE      Patients PCP is:  Briana Fuentes MD        SUBJECTIVE      Patient denies any active chest pain, feels short of breath, unable to complete sentences on rest.  He denies nausea, vomiting, dizziness, focal weakness.  He feels hungry as he has not eaten anything because of n.p.o. status to undergo cardiac evaluation and testing.    PHYSICAL EXAM      General: NAD, AO X 3  Heent: EOMI, PERRL  Neck: SUPPLE, NO JVD  Cardiovascular: RRR, S1S2  Pulmonary: CTAB, NO SOB  Abdomen: SOFT, NTTP, ND, +BS  Extremities: +2/4 PULSES DISTAL, NO SWELLING  Neuro / Psych: NO NUMBNESS OR TINGLING, MENTATION AT BASELINE    PERTINENT TEST /EXAMS      I have reviewed all available laboratory results.    MEDICATIONS CURRENT   sodium chloride flush 0.9 % injection 5-40 mL, 2 times per day  sodium chloride flush 0.9 % injection 5-40 mL, PRN  0.9 % sodium chloride infusion, PRN  potassium chloride (KLOR-CON M) extended release tablet 40 mEq, PRN   Or  potassium bicarb-citric acid (EFFER-K) effervescent tablet 40 mEq, PRN   Or  potassium chloride 10 mEq/100 mL IVPB (Peripheral Line), PRN  magnesium sulfate 2000 mg in 50 mL IVPB premix, PRN  enoxaparin (LOVENOX) injection 40 mg, Daily  ondansetron (ZOFRAN-ODT) disintegrating tablet 4 mg, Q8H PRN   Or  ondansetron (ZOFRAN) injection 4 mg, Q6H PRN  polyethylene glycol (GLYCOLAX) packet 17 g, Daily PRN  acetaminophen (TYLENOL) tablet 650 mg, Q6H PRN   Or  acetaminophen (TYLENOL) suppository 650 mg, Q6H PRN  aspirin chewable tablet 81 mg, Daily  isosorbide mononitrate (IMDUR) extended release tablet 30 mg, Daily  albuterol sulfate HFA (PROVENTIL;VENTOLIN;PROAIR) 108 (90 Base) MCG/ACT inhaler 2 puff, Q6H PRN  lisinopril (PRINIVIL;ZESTRIL) tablet 20 mg, Daily  atorvastatin (LIPITOR) tablet 10 mg, Daily  metoprolol succinate (TOPROL XL) extended release tablet 25 mg, QHS  amLODIPine (NORVASC) tablet 10 mg, Daily  furosemide (LASIX) tablet 40 mg, Daily  tamsulosin (FLOMAX)

## 2024-12-27 NOTE — H&P
[facility]  Office: [phone]  Daniel Orosco DO, Aly Crenshaw DO, Gary Herr DO, Karlos Brannon DO, Shanda Cortes MD, Micki Duke MD, Law Hickman MD, Marely Lewis MD,  Baldo Morocho MD, Ruben Mistry MD, Remy Cevallos MD,  Patrica Rossi DO, Tad Castrejon MD, Jorge L Joseph MD, Damon Orosco DO, Vivian Wright MD,  Reza Barth DO, Nasreen Parker MD, Jacque Lawrence MD, Christine Avalos MD, Tiffani Prater MD,  Aries Latham MD, Mars Heaton MD, Paige Garcia MD, Laurie Webb MD, Ham Tan MD, Ava Renee MD, Bernard Bautista DO, Pacheco Gomez MD, Shirley Waterhouse, CNP,  Mikaela Rodriguez, CNP, Bernard De Leon, CNP,  Idania Villegas, KATHRYN, Britney Betts, CNP, Dahiana Inman, CNP, Anastasiya Cordova, CNP, Daina Lawrence, CNP, Yumiko Cage PAVladC, NICOLA MontalvoC, Rajani Dan, CNP, Jeffrey Brown, CNP,  Jaci Bhatti, CNP, Nafisa Bolton, CNP, Melody Adam, CNP,  Mariama Caban, CNP, Marielos Vasquez, CNP         [facility]   IN-PATIENT SERVICE   [facility]    HISTORY AND PHYSICAL EXAMINATION            Date:   12/27/2024  Patient name:  Galo Ruiz  Date of admission:  12/26/2024  8:20 AM  MRN:   [id number]  Account:  [id number]  YOB: 1940  PCP:    Briana Fuentes MD  Room:   OBS 21/21-1  Code Status:    Full Code    Chief Complaint:     Chief Complaint   Patient presents with    Chest Pain     Took 1 nitro, now feeling better. Ems gave 324mg ASA. Pt has cardiac history      History Obtained From:     patient, electronic medical record    History of Present Illness:     This is an 84-year-old male with a significant past medical history of hypertension, hyperlipidemia, CAD status post bypass surgery, HFrEF with an ejection fraction of 30% per echocardiogram on 10/21/2024 who initially presented to the emergency department with a chief complaint of chest pain.  Patient states roughly 6:10 AM yesterday he developed sudden  12/27/2024 Yes    Ischemic cardiomyopathy with implantable cardioverter-defibrillator (ICD) 12/27/2024 Yes    Hypertension 12/27/2024 Yes    Anxiety 12/27/2024 Yes    Acute on chronic combined systolic and diastolic heart failure (HCC) 12/27/2024 Yes    S/P CABG (coronary artery bypass graft) 12/27/2024 Yes     Plan:     Patient status inpatient in the Med/Surge    Chest pain.  Cardiology consulted.  Patient is status post cardiac catheterization 12/27/2024.  Results pending.  Remains on aspirin 81 mg daily, Lipitor 10 mg daily, Plavix 75 mg daily, Toprol-XL 25 mg nightly.    Acute on chronic systolic congestive heart failure.  Concern for worsening pulmonary edema based on examination. Obtain chest x-ray.  Agree with Lasix 40 mg IV twice daily.  Check proBNP.  Continue Toprol-XL 25 mg nightly, lisinopril 20 mg daily.     Abdominal pain with recent constipation.  Obtain KUB.  Start bowel regimen.    CAD status post previous PCI and CABG with ischemic cardiomyopathy and AICD in place.  Continue aspirin 81 mg daily, Lipitor 10 mg daily, Plavix 75 mg daily, Imdur 60 mg daily, lisinopril 20 mg daily, Toprol-XL 25 mg nightly.    Hypertension.  Continue Norvasc 10 mg daily, lisinopril 20 mg daily, Toprol-XL 25 mg nightly.    Hyperlipidemia.  Continue Lipitor 10 mg daily.    BPH.  Continue Flomax 0.4 mg daily.  Obtain bladder scans as urinary retention could be contributing to abdominal discomfort and ultimately shortness of breath.    Anxiety.  Resume Zoloft 25 mg daily.    DVT prophylaxis: Lovenox.  GI prophylaxis: Protonix.    Discharge planning: Pending cardiac catheterization findings as well as improvement in respiratory status and Soni management.    Consultations:   IP CONSULT TO CARDIOLOGY  IP CONSULT TO VASCULAR ACCESS TEAM    Patient is admitted as inpatient status because of co-morbidities listed above, severity of signs and symptoms as outlined, requirement for current medical therapies and most importantly

## 2024-12-27 NOTE — PROGRESS NOTES
During med pass, the pt stated that he will not be taking medications without being able to eat. Pt states he gets abdominal pain and upset stomach when taking medications on an empty stomach. Will hold medications, inform resident, and give medications when pt is agreeable.

## 2024-12-27 NOTE — PLAN OF CARE
Problem: Discharge Planning  Goal: Discharge to home or other facility with appropriate resources  12/26/2024 2112 by Sonja Condon RN  Outcome: Progressing  12/26/2024 1650 by Alexia Sanchez RN  Outcome: Progressing     Problem: Pain  Goal: Verbalizes/displays adequate comfort level or baseline comfort level  12/26/2024 2112 by Sonja Condon RN  Outcome: Progressing  12/26/2024 1650 by Alexia Sanchez RN  Outcome: Progressing     Problem: Safety - Adult  Goal: Free from fall injury  12/26/2024 2112 by Sonja Condon RN  Outcome: Progressing  12/26/2024 1650 by Alexia Sanchez RN  Outcome: Progressing     Problem: ABCDS Injury Assessment  Goal: Absence of physical injury  12/26/2024 2112 by Sonja Condon RN  Outcome: Progressing  12/26/2024 1650 by Alexia Sanchez RN  Outcome: Progressing

## 2024-12-27 NOTE — PROGRESS NOTES
[facility]  CDU / OBSERVATION ENCOUNTER  ATTENDING NOTE       I performed a history and physical examination of the patient and discussed management with the resident or midlevel provider. I reviewed the resident or midlevel provider's note and agree with the documented findings and plan of care. Any areas of disagreement are noted on the chart. I was personally present for the key portions of any procedures. I have documented in the chart those procedures where I was not present during the key portions. I have reviewed the nurses notes. I agree with the chief complaint, past medical history, past surgical history, allergies, medications, social and family history as documented unless otherwise noted below.    The Family history, social history, and ROS are effectively unchanged since admission unless noted elsewhere in the chart.    This patient ws placed in the observation unit for reevaluation for possible admission to the hospital    Patient seems more short of breath this morning, cardiology did eval the patient, and plan for heart cath. Given worsening shortness of breath, may need diuresis and further management, given worsening shortness of breath May need transfer to medicine serve, patient in cath at this time, taken shortly after my evaluation. Will re-eval after cath    Deborah De Anda  Attending Emergency  Physician

## 2024-12-28 PROCEDURE — 6360000002 HC RX W HCPCS: Performed by: STUDENT IN AN ORGANIZED HEALTH CARE EDUCATION/TRAINING PROGRAM

## 2024-12-28 PROCEDURE — 99232 SBSQ HOSP IP/OBS MODERATE 35: CPT | Performed by: STUDENT IN AN ORGANIZED HEALTH CARE EDUCATION/TRAINING PROGRAM

## 2024-12-28 PROCEDURE — 6370000000 HC RX 637 (ALT 250 FOR IP): Performed by: EMERGENCY MEDICINE

## 2024-12-28 PROCEDURE — 2580000003 HC RX 258

## 2024-12-28 PROCEDURE — 6370000000 HC RX 637 (ALT 250 FOR IP)

## 2024-12-28 PROCEDURE — 1200000000 HC SEMI PRIVATE

## 2024-12-28 PROCEDURE — 6370000000 HC RX 637 (ALT 250 FOR IP): Performed by: STUDENT IN AN ORGANIZED HEALTH CARE EDUCATION/TRAINING PROGRAM

## 2024-12-28 PROCEDURE — 2500000003 HC RX 250 WO HCPCS: Performed by: STUDENT IN AN ORGANIZED HEALTH CARE EDUCATION/TRAINING PROGRAM

## 2024-12-28 PROCEDURE — 2500000003 HC RX 250 WO HCPCS: Performed by: EMERGENCY MEDICINE

## 2024-12-28 RX ORDER — SODIUM CHLORIDE, SODIUM LACTATE, POTASSIUM CHLORIDE, AND CALCIUM CHLORIDE .6; .31; .03; .02 G/100ML; G/100ML; G/100ML; G/100ML
250 INJECTION, SOLUTION INTRAVENOUS ONCE
Status: COMPLETED | OUTPATIENT
Start: 2024-12-28 | End: 2024-12-28

## 2024-12-28 RX ORDER — ISOSORBIDE MONONITRATE 60 MG/1
60 TABLET, EXTENDED RELEASE ORAL DAILY
Qty: 30 TABLET | Refills: 3 | Status: SHIPPED | OUTPATIENT
Start: 2024-12-29

## 2024-12-28 RX ORDER — MIDODRINE HYDROCHLORIDE 5 MG/1
5 TABLET ORAL 3 TIMES DAILY PRN
Status: DISCONTINUED | OUTPATIENT
Start: 2024-12-28 | End: 2024-12-30 | Stop reason: HOSPADM

## 2024-12-28 RX ADMIN — TAMSULOSIN HYDROCHLORIDE 0.4 MG: 0.4 CAPSULE ORAL at 08:18

## 2024-12-28 RX ADMIN — PANTOPRAZOLE SODIUM 40 MG: 40 TABLET, DELAYED RELEASE ORAL at 08:18

## 2024-12-28 RX ADMIN — SERTRALINE HYDROCHLORIDE 25 MG: 50 TABLET ORAL at 08:18

## 2024-12-28 RX ADMIN — PANTOPRAZOLE SODIUM 40 MG: 40 TABLET, DELAYED RELEASE ORAL at 15:43

## 2024-12-28 RX ADMIN — FUROSEMIDE 40 MG: 10 INJECTION, SOLUTION INTRAMUSCULAR; INTRAVENOUS at 08:18

## 2024-12-28 RX ADMIN — SODIUM CHLORIDE, PRESERVATIVE FREE 10 ML: 5 INJECTION INTRAVENOUS at 08:26

## 2024-12-28 RX ADMIN — ASPIRIN 81 MG: 81 TABLET, CHEWABLE ORAL at 08:17

## 2024-12-28 RX ADMIN — ISOSORBIDE MONONITRATE 60 MG: 60 TABLET, EXTENDED RELEASE ORAL at 08:21

## 2024-12-28 RX ADMIN — MIDODRINE HYDROCHLORIDE 5 MG: 5 TABLET ORAL at 15:43

## 2024-12-28 RX ADMIN — SODIUM CHLORIDE, PRESERVATIVE FREE 10 ML: 5 INJECTION INTRAVENOUS at 08:24

## 2024-12-28 RX ADMIN — SODIUM CHLORIDE, POTASSIUM CHLORIDE, SODIUM LACTATE AND CALCIUM CHLORIDE 250 ML: 600; 310; 30; 20 INJECTION, SOLUTION INTRAVENOUS at 11:56

## 2024-12-28 RX ADMIN — CLOPIDOGREL BISULFATE 75 MG: 75 TABLET ORAL at 08:18

## 2024-12-28 RX ADMIN — GABAPENTIN 300 MG: 300 CAPSULE ORAL at 21:07

## 2024-12-28 RX ADMIN — ATORVASTATIN CALCIUM 10 MG: 20 TABLET, FILM COATED ORAL at 08:18

## 2024-12-28 RX ADMIN — LISINOPRIL 20 MG: 20 TABLET ORAL at 08:18

## 2024-12-28 RX ADMIN — AMLODIPINE BESYLATE 10 MG: 10 TABLET ORAL at 08:18

## 2024-12-28 RX ADMIN — MIDODRINE HYDROCHLORIDE 5 MG: 5 TABLET ORAL at 12:55

## 2024-12-28 RX ADMIN — SODIUM CHLORIDE, PRESERVATIVE FREE 10 ML: 5 INJECTION INTRAVENOUS at 20:09

## 2024-12-28 ASSESSMENT — PAIN SCALES - GENERAL
PAINLEVEL_OUTOF10: 0
PAINLEVEL_OUTOF10: 0

## 2024-12-28 NOTE — PROGRESS NOTES
Left ventricle size is normal. Increased wall thickness. Septal thickening.Findings consistent with mild concentric hypertrophy. Global hypokinesis present, with akinesis/aneurysmal anteroseptum and inferoseptum. Abnormal diastolic function.    Right Ventricle: Reduced systolic function. TAPSE is abnormal. TAPSE is 1.5 cm.    Aortic Valve: Trileaflet valve. Moderately calcified left cusp. Trace to mild regurgitation.    Mitral Valve: Mild regurgitation.    Tricuspid Valve: No regurgitation.    Left Atrium: Left atrium is dilated.    Image quality is adequate. Technically difficult study due to patient's heart rhythm.  Stress Test:   reviewed. 10/21/2024  Stress Combined Conclusion: The study is positive for myocardial infarction and is negative for myocardial ischemia. Findings suggest a high risk of cardiac events.    Stress Function: Left ventricular function post-stress is abnormal. Post-stress ejection fraction is 31%. The stress end diastolic cavity size is moderately enlarged. The stress end systolic cavity size is moderately enlarged.    Perfusion Comments: Prone images were not obtained. LV perfusion is abnormal. There is no evidence of inducible ischemia.    Perfusion Defect: There is a moderate severity left ventricular stress perfusion defect present in the anterior and apex segment(s) that is fixed. The defect appears to be infarction.    Perfusion Conclusion: There is no evidence of transient ischemic dilation (TID). TID ratio is 0.96.    ECG: The stress ECG was not diagnostic due to intraventricular conduction delay and paced rhythm.    Stress Test: A pharmacological stress test was performed using regadenoson (Lexiscan). PO caffeine given as a reversal agent. The patient reported no symptoms during the stress test.    Resting ECG: Question underlying A-fib with V pacing.  Versus nonspecific interventricular conduction delay    Stress ECG: The stress ECG was not diagnostic due to intraventricular  Duodenal ulcer     S/P coronary artery stent placement     Abnormal stress test     GI bleed     Ventricular fibrillation     Acute hypoxic respiratory failure     Ischemic cardiomyopathy with implantable cardioverter-defibrillator (ICD)     H/O: duodenal ulcer     Hypertension     Anxiety     Traumatic hematoma of forehead     Acute on chronic combined systolic and diastolic heart failure (HCC)     S/P CABG (coronary artery bypass graft)     Congestive heart failure (HCC)     Hematoma of frontal scalp     Hypoxia     Aortic disease (HCC)      Plan of Treatment:   Acute  on chronic CHF  On IV lasix.  On RA.  No SOb.  Pt resting comfortably.  Will recheck CxR.  Consider PO lasix     CAD with PCI And CABG s/p cardiac cath.  Stable.  Cotninue ASA, statin, plavix, imdur and BB   AICD in situ  HTN stable.  Continue ACE/BB     Electronically signed by RAVI ROJAS CNP on 12/28/2024 at 2:20 PM  [facility]  [phone]

## 2024-12-28 NOTE — PROGRESS NOTES
[facility]  Office: [phone]  Daniel Orosco DO, Aly Crenshaw DO, Gary Herr DO, Karlos Brannon DO, Shanda Cortes MD, Micki Duke MD, Law Hickman MD, Marely Lewis MD,  Baldo Morocho MD, Ruben Mistry MD, Remy Cevallos MD,  Patrica Rossi DO, Tad Castrejon MD, Jorge L Joseph MD, Damon Orosco DO, Vivian Wright MD,  Reza Barth DO, Nasreen Parker MD, Jacque Lawrence MD, Christine Avalos MD, Tiffani Prater MD,  Aries Latham MD, Mars Heaton MD, Paige Garcia MD, Laurie Webb MD, Ham Tan MD, Ava Renee MD, Bernard Bautista DO, Pacheco Gomez MD, Patrica Garza MD, Mohsin Reza, MD, Shirley Waterhouse, CNP,  Mikaela Rodriguez CNP, Bernard De Leon, CNP,  Idania Villegas, KATHRYN, Britney Betts, CNP, Dahiana Inman, CNP, Anastasiya Cordova, CNP, Daina Lawrence, CNP, Yumiko Cage, PA-C, Yanet Pollack PA-C, Rajani Dan, CNP, Jeffrey Brown, CNP,  Jaci Bhatti, CNP, Nafisa Bolton, CNP, Melody Adam, CNP,  Mariama Caban, DENNYS, Marielos Vasquez, CNP         [facility]   IN-PATIENT SERVICE   [facility]    Progress Note    12/28/2024    1:43 PM    Name:   Galo Ruiz  MRN:     [id number]     Acct:      [id number]   Room:   0349/0349-01   Day:  1  Admit Date:  12/26/2024  8:20 AM    PCP:   Briana Fuentes MD  Code Status:  Full Code    Subjective:     C/C:   Chief Complaint   Patient presents with    Chest Pain     Took 1 nitro, now feeling better. Ems gave 324mg ASA. Pt has cardiac history      Interval History Status: not changed.     Patient seen and examined at bedside this morning.  No acute events overnight.  Had some lightheadedness and dizziness when standing out of bed.  Did have some hypotension.  Cardiology evaluated and discontinued his Norvasc acute failure bolus of fluid.  Patient denies any chest pain or shortness of breath.    Brief History:     This is an 84-year-old male with a significant past medical history of  [phone] [phone]       Radiology:  XR CHEST PORTABLE    Result Date: 12/27/2024  1. Cardiomegaly with mild interstitial edema.  This is slightly worse compared to the previous evaluation. 2. Nonobstructive bowel gas pattern.     XR ABDOMEN (KUB) (SINGLE AP VIEW)    Result Date: 12/27/2024  1. Cardiomegaly with mild interstitial edema.  This is slightly worse compared to the previous evaluation. 2. Nonobstructive bowel gas pattern.     XR CHEST (2 VW)    Result Date: 12/26/2024  Stable chest with no acute parenchymal abnormality demonstrated.       Physical Examination:        General appearance:  alert, cooperative and no distress  Mental Status:  oriented to person, place and time and normal affect  Lungs:  clear to auscultation bilaterally, normal effort  Heart:  regular rate and rhythm, no murmur  Abdomen:  soft, nontender, nondistended, normal bowel sounds, no masses, hepatomegaly, splenomegaly  Extremities:  no edema, redness, tenderness in the calves  Skin:  no gross lesions, rashes, induration    Assessment:        Hospital Problems             Last Modified POA    * (Principal) Chest pain 12/26/2024 Yes    S/P coronary artery stent placement 12/27/2024 Yes    CAD (coronary artery disease) 12/27/2024 Yes    BPH (benign prostatic hyperplasia) 12/27/2024 Yes    Hyperlipemia 12/27/2024 Yes    Ischemic cardiomyopathy with implantable cardioverter-defibrillator (ICD) 12/27/2024 Yes    Hypertension 12/27/2024 Yes    Anxiety 12/27/2024 Yes    Acute on chronic combined systolic and diastolic heart failure (HCC) 12/27/2024 Yes    S/P CABG (coronary artery bypass graft) 12/27/2024 Yes    Aortic disease (HCC) 12/27/2024 Yes       Plan:        Chest pain  Cardiology following   Patient is status post cardiac catheterization 12/27/2024  Catheter showed stable known CAD with patent LIMA to LAD, no evidence of aortic valve stenosis or significant pulmonary hypertension  Continue ASA, Lipitor, Plavix and

## 2024-12-29 ENCOUNTER — APPOINTMENT (OUTPATIENT)
Dept: GENERAL RADIOLOGY | Age: 84
End: 2024-12-29
Payer: MEDICARE

## 2024-12-29 LAB
ANION GAP SERPL CALCULATED.3IONS-SCNC: 13 MMOL/L (ref 9–16)
BASOPHILS # BLD: 0 K/UL (ref 0–0.2)
BASOPHILS NFR BLD: 0 % (ref 0–2)
BUN SERPL-MCNC: 35 MG/DL (ref 8–23)
CALCIUM SERPL-MCNC: 8.2 MG/DL (ref 8.6–10.4)
CHLORIDE SERPL-SCNC: 98 MMOL/L (ref 98–107)
CO2 SERPL-SCNC: 23 MMOL/L (ref 20–31)
CREAT SERPL-MCNC: 2.1 MG/DL (ref 0.7–1.2)
EKG ATRIAL RATE: 71 BPM
EKG P AXIS: -15 DEGREES
EKG P-R INTERVAL: 194 MS
EKG Q-T INTERVAL: 456 MS
EKG QRS DURATION: 156 MS
EKG QTC CALCULATION (BAZETT): 495 MS
EKG R AXIS: -30 DEGREES
EKG T AXIS: 121 DEGREES
EKG VENTRICULAR RATE: 71 BPM
EOSINOPHIL # BLD: 0.12 K/UL (ref 0–0.44)
EOSINOPHILS RELATIVE PERCENT: 2 % (ref 1–4)
ERYTHROCYTE [DISTWIDTH] IN BLOOD BY AUTOMATED COUNT: 14.2 % (ref 11.8–14.4)
GFR, ESTIMATED: 30 ML/MIN/1.73M2
GLUCOSE SERPL-MCNC: 128 MG/DL (ref 74–99)
HCT VFR BLD AUTO: 42.6 % (ref 40.7–50.3)
HGB BLD-MCNC: 12.9 G/DL (ref 13–17)
IMM GRANULOCYTES # BLD AUTO: 0 K/UL (ref 0–0.3)
IMM GRANULOCYTES NFR BLD: 0 %
LYMPHOCYTES NFR BLD: 0.56 K/UL (ref 1.1–3.7)
LYMPHOCYTES RELATIVE PERCENT: 9 % (ref 24–43)
MCH RBC QN AUTO: 28.4 PG (ref 25.2–33.5)
MCHC RBC AUTO-ENTMCNC: 30.3 G/DL (ref 28.4–34.8)
MCV RBC AUTO: 93.8 FL (ref 82.6–102.9)
MONOCYTES NFR BLD: 0.99 K/UL (ref 0.1–1.2)
MONOCYTES NFR BLD: 16 % (ref 3–12)
MORPHOLOGY: NORMAL
NEUTROPHILS NFR BLD: 73 % (ref 36–65)
NEUTS SEG NFR BLD: 4.53 K/UL (ref 1.5–8.1)
NRBC BLD-RTO: 0 PER 100 WBC
PLATELET # BLD AUTO: 157 K/UL (ref 138–453)
PMV BLD AUTO: 11.2 FL (ref 8.1–13.5)
POTASSIUM SERPL-SCNC: 4 MMOL/L (ref 3.7–5.3)
RBC # BLD AUTO: 4.54 M/UL (ref 4.21–5.77)
SODIUM SERPL-SCNC: 134 MMOL/L (ref 136–145)
WBC OTHER # BLD: 6.2 K/UL (ref 3.5–11.3)

## 2024-12-29 PROCEDURE — 6370000000 HC RX 637 (ALT 250 FOR IP): Performed by: EMERGENCY MEDICINE

## 2024-12-29 PROCEDURE — 99233 SBSQ HOSP IP/OBS HIGH 50: CPT | Performed by: NURSE PRACTITIONER

## 2024-12-29 PROCEDURE — 6370000000 HC RX 637 (ALT 250 FOR IP): Performed by: STUDENT IN AN ORGANIZED HEALTH CARE EDUCATION/TRAINING PROGRAM

## 2024-12-29 PROCEDURE — 6360000002 HC RX W HCPCS: Performed by: STUDENT IN AN ORGANIZED HEALTH CARE EDUCATION/TRAINING PROGRAM

## 2024-12-29 PROCEDURE — 99232 SBSQ HOSP IP/OBS MODERATE 35: CPT | Performed by: STUDENT IN AN ORGANIZED HEALTH CARE EDUCATION/TRAINING PROGRAM

## 2024-12-29 PROCEDURE — 2500000003 HC RX 250 WO HCPCS: Performed by: EMERGENCY MEDICINE

## 2024-12-29 PROCEDURE — 36415 COLL VENOUS BLD VENIPUNCTURE: CPT

## 2024-12-29 PROCEDURE — 2500000003 HC RX 250 WO HCPCS: Performed by: STUDENT IN AN ORGANIZED HEALTH CARE EDUCATION/TRAINING PROGRAM

## 2024-12-29 PROCEDURE — 93010 ELECTROCARDIOGRAM REPORT: CPT | Performed by: INTERNAL MEDICINE

## 2024-12-29 PROCEDURE — 1200000000 HC SEMI PRIVATE

## 2024-12-29 PROCEDURE — 71045 X-RAY EXAM CHEST 1 VIEW: CPT

## 2024-12-29 PROCEDURE — 6370000000 HC RX 637 (ALT 250 FOR IP): Performed by: NURSE PRACTITIONER

## 2024-12-29 PROCEDURE — 80048 BASIC METABOLIC PNL TOTAL CA: CPT

## 2024-12-29 PROCEDURE — 85025 COMPLETE CBC W/AUTO DIFF WBC: CPT

## 2024-12-29 RX ORDER — HEPARIN SODIUM 5000 [USP'U]/ML
5000 INJECTION, SOLUTION INTRAVENOUS; SUBCUTANEOUS EVERY 8 HOURS SCHEDULED
Status: DISCONTINUED | OUTPATIENT
Start: 2024-12-29 | End: 2024-12-30 | Stop reason: HOSPADM

## 2024-12-29 RX ORDER — GABAPENTIN 300 MG/1
300 CAPSULE ORAL 2 TIMES DAILY
Status: DISCONTINUED | OUTPATIENT
Start: 2024-12-29 | End: 2024-12-30 | Stop reason: HOSPADM

## 2024-12-29 RX ADMIN — BENZOCAINE AND MENTHOL 1 LOZENGE: 15; 3.6 LOZENGE ORAL at 23:02

## 2024-12-29 RX ADMIN — PANTOPRAZOLE SODIUM 40 MG: 40 TABLET, DELAYED RELEASE ORAL at 17:43

## 2024-12-29 RX ADMIN — FUROSEMIDE 40 MG: 10 INJECTION, SOLUTION INTRAMUSCULAR; INTRAVENOUS at 09:27

## 2024-12-29 RX ADMIN — SODIUM CHLORIDE, PRESERVATIVE FREE 10 ML: 5 INJECTION INTRAVENOUS at 21:07

## 2024-12-29 RX ADMIN — SERTRALINE HYDROCHLORIDE 25 MG: 50 TABLET ORAL at 09:27

## 2024-12-29 RX ADMIN — PANTOPRAZOLE SODIUM 40 MG: 40 TABLET, DELAYED RELEASE ORAL at 05:53

## 2024-12-29 RX ADMIN — METOPROLOL SUCCINATE 25 MG: 25 TABLET, EXTENDED RELEASE ORAL at 21:33

## 2024-12-29 RX ADMIN — LISINOPRIL 20 MG: 20 TABLET ORAL at 11:02

## 2024-12-29 RX ADMIN — GABAPENTIN 300 MG: 300 CAPSULE ORAL at 21:33

## 2024-12-29 RX ADMIN — SODIUM CHLORIDE, PRESERVATIVE FREE 10 ML: 5 INJECTION INTRAVENOUS at 09:27

## 2024-12-29 RX ADMIN — SODIUM CHLORIDE, PRESERVATIVE FREE 10 ML: 5 INJECTION INTRAVENOUS at 09:28

## 2024-12-29 RX ADMIN — ATORVASTATIN CALCIUM 10 MG: 20 TABLET, FILM COATED ORAL at 09:26

## 2024-12-29 RX ADMIN — CLOPIDOGREL BISULFATE 75 MG: 75 TABLET ORAL at 09:28

## 2024-12-29 RX ADMIN — ASPIRIN 81 MG: 81 TABLET, CHEWABLE ORAL at 09:26

## 2024-12-29 RX ADMIN — TAMSULOSIN HYDROCHLORIDE 0.4 MG: 0.4 CAPSULE ORAL at 09:26

## 2024-12-29 ASSESSMENT — PAIN SCALES - GENERAL
PAINLEVEL_OUTOF10: 0
PAINLEVEL_OUTOF10: 0

## 2024-12-29 NOTE — PROGRESS NOTES
[facility]  Office: [phone]  Daniel Orosco DO, Aly Crenshaw DO, Gary Herr DO, Karlos Brannon DO, Shanda Cortes MD, Micki Duke MD, Law Hickman MD, Marely Lewis MD,  Baldo Morocho MD, Ruben Mistry MD, Remy Cevallos MD,  Patrica Rossi DO, Tad Castrejon MD, Jorge L Joseph MD, Damon Orosco DO, Vivian Wright MD,  Reza Barth DO, Nasreen Parker MD, Jacque Lawrence MD, Christine Avalos MD, Tiffani Prater MD,  Aries Latham MD, Mars Heaton MD, Paige Garcia MD, Laurie Webb MD, Ham Tan MD, Ava Renee MD, Bernard Bautista DO, Pacheco Gomze MD, Patrica Garza MD, Mohsin Reza, MD, Shirley Waterhouse, CNP,  Mikaela Rodriguez CNP, Bernard De Leon, CNP,  Idania Villegas, KATHRYN, Britney Betts, CNP, Dahiana Inman, CNP, Anastasiya Cordova, CNP, Daina Lawrence, CNP, Yumiko Cage, PA-C, Yanet Pollack PA-C, Rajani Dan, CNP, Jeffrey Brown, CNP,  Jaci Bhatti, CNP, Nafisa Bolton, CNP, Melody Adam, CNP,  Mariama Caban, DENNYS, Marielos Vasquez, CNP         [facility]   IN-PATIENT SERVICE   [facility]    Progress Note    12/29/2024    10:56 AM    Name:   Galo Ruiz  MRN:     [id number]     Acct:      [id number]   Room:   0349/0349-01  IP Day:  2  Admit Date:  12/26/2024  8:20 AM    PCP:   Briana Fuentes MD  Code Status:  Full Code    Subjective:     C/C:   Chief Complaint   Patient presents with    Chest Pain     Took 1 nitro, now feeling better. Ems gave 324mg ASA. Pt has cardiac history      Interval History Status: not changed.     Patient seen and examined at bedside this am. Patient denies any CP, SOB, HA, fever or chills. Remains on room air. Cardiology evaluated and ordered CXR. Patient continues on lasix     Brief History:     This is an 84-year-old male with a significant past medical history of hypertension, hyperlipidemia, CAD status post bypass surgery, HFrEF with an ejection fraction of 30% per echocardiogram on 10/21/2024

## 2024-12-29 NOTE — PLAN OF CARE
Problem: Discharge Planning  Goal: Discharge to home or other facility with appropriate resources  Outcome: Progressing     Problem: Pain  Goal: Verbalizes/displays adequate comfort level or baseline comfort level  Outcome: Progressing     Problem: ABCDS Injury Assessment  Goal: Absence of physical injury  Outcome: Progressing  Flowsheets (Taken 12/28/2024 2104)  Absence of Physical Injury: Implement safety measures based on patient assessment     Problem: Skin/Tissue Integrity  Goal: Absence of new skin breakdown  Description: 1.  Monitor for areas of redness and/or skin breakdown  2.  Assess vascular access sites hourly  3.  Every 4-6 hours minimum:  Change oxygen saturation probe site  4.  Every 4-6 hours:  If on nasal continuous positive airway pressure, respiratory therapy assess nares and determine need for appliance change or resting period.  Outcome: Progressing     Problem: Safety - Adult  Goal: Free from fall injury  Outcome: Progressing  Flowsheets (Taken 12/28/2024 2104)  Free From Fall Injury:   Instruct family/caregiver on patient safety   Based on caregiver fall risk screen, instruct family/caregiver to ask for assistance with transferring infant if caregiver noted to have fall risk factors

## 2024-12-30 VITALS
RESPIRATION RATE: 18 BRPM | HEART RATE: 71 BPM | HEIGHT: 67 IN | SYSTOLIC BLOOD PRESSURE: 146 MMHG | BODY MASS INDEX: 27.23 KG/M2 | TEMPERATURE: 98.1 F | OXYGEN SATURATION: 94 % | WEIGHT: 173.5 LBS | DIASTOLIC BLOOD PRESSURE: 79 MMHG

## 2024-12-30 LAB
ANION GAP SERPL CALCULATED.3IONS-SCNC: 10 MMOL/L (ref 9–16)
BNP SERPL-MCNC: 1710 PG/ML (ref 0–450)
BUN SERPL-MCNC: 35 MG/DL (ref 8–23)
CALCIUM SERPL-MCNC: 8.4 MG/DL (ref 8.6–10.4)
CHLORIDE SERPL-SCNC: 99 MMOL/L (ref 98–107)
CO2 SERPL-SCNC: 27 MMOL/L (ref 20–31)
CREAT SERPL-MCNC: 1.8 MG/DL (ref 0.7–1.2)
GFR, ESTIMATED: 36 ML/MIN/1.73M2
GLUCOSE SERPL-MCNC: 104 MG/DL (ref 74–99)
POTASSIUM SERPL-SCNC: 3.9 MMOL/L (ref 3.7–5.3)
SODIUM SERPL-SCNC: 136 MMOL/L (ref 136–145)

## 2024-12-30 PROCEDURE — 6370000000 HC RX 637 (ALT 250 FOR IP): Performed by: STUDENT IN AN ORGANIZED HEALTH CARE EDUCATION/TRAINING PROGRAM

## 2024-12-30 PROCEDURE — 6370000000 HC RX 637 (ALT 250 FOR IP)

## 2024-12-30 PROCEDURE — 36415 COLL VENOUS BLD VENIPUNCTURE: CPT

## 2024-12-30 PROCEDURE — 83880 ASSAY OF NATRIURETIC PEPTIDE: CPT

## 2024-12-30 PROCEDURE — 99239 HOSP IP/OBS DSCHRG MGMT >30: CPT | Performed by: STUDENT IN AN ORGANIZED HEALTH CARE EDUCATION/TRAINING PROGRAM

## 2024-12-30 PROCEDURE — 2500000003 HC RX 250 WO HCPCS: Performed by: STUDENT IN AN ORGANIZED HEALTH CARE EDUCATION/TRAINING PROGRAM

## 2024-12-30 PROCEDURE — 80048 BASIC METABOLIC PNL TOTAL CA: CPT

## 2024-12-30 PROCEDURE — 6370000000 HC RX 637 (ALT 250 FOR IP): Performed by: EMERGENCY MEDICINE

## 2024-12-30 RX ADMIN — CLOPIDOGREL BISULFATE 75 MG: 75 TABLET ORAL at 09:12

## 2024-12-30 RX ADMIN — GABAPENTIN 300 MG: 300 CAPSULE ORAL at 09:12

## 2024-12-30 RX ADMIN — ISOSORBIDE MONONITRATE 60 MG: 60 TABLET, EXTENDED RELEASE ORAL at 09:12

## 2024-12-30 RX ADMIN — SERTRALINE HYDROCHLORIDE 25 MG: 50 TABLET ORAL at 09:12

## 2024-12-30 RX ADMIN — SODIUM CHLORIDE, PRESERVATIVE FREE 10 ML: 5 INJECTION INTRAVENOUS at 09:12

## 2024-12-30 RX ADMIN — ASPIRIN 81 MG: 81 TABLET, CHEWABLE ORAL at 09:12

## 2024-12-30 RX ADMIN — PANTOPRAZOLE SODIUM 40 MG: 40 TABLET, DELAYED RELEASE ORAL at 09:11

## 2024-12-30 RX ADMIN — ATORVASTATIN CALCIUM 10 MG: 20 TABLET, FILM COATED ORAL at 09:11

## 2024-12-30 NOTE — PLAN OF CARE
Problem: Discharge Planning  Goal: Discharge to home or other facility with appropriate resources  12/30/2024 1209 by Tahira Frank RN  Outcome: Completed  12/30/2024 0329 by Cayetano Callaway RN  Outcome: Progressing     Problem: Pain  Goal: Verbalizes/displays adequate comfort level or baseline comfort level  12/30/2024 1209 by Taihra Frank RN  Outcome: Completed  12/30/2024 0329 by Cayetano Callaway RN  Outcome: Progressing     Problem: Safety - Adult  Goal: Free from fall injury  12/30/2024 1209 by Tahira Frank RN  Outcome: Completed  12/30/2024 0329 by Cayetano Callaway RN  Outcome: Progressing  Flowsheets (Taken 12/29/2024 1946)  Free From Fall Injury:   Instruct family/caregiver on patient safety   Based on caregiver fall risk screen, instruct family/caregiver to ask for assistance with transferring infant if caregiver noted to have fall risk factors     Problem: ABCDS Injury Assessment  Goal: Absence of physical injury  12/30/2024 1209 by Tahira Frank RN  Outcome: Completed  12/30/2024 0329 by Cayetano Callaway RN  Outcome: Progressing     Problem: Skin/Tissue Integrity  Goal: Absence of new skin breakdown  Description: 1.  Monitor for areas of redness and/or skin breakdown  2.  Assess vascular access sites hourly  3.  Every 4-6 hours minimum:  Change oxygen saturation probe site  4.  Every 4-6 hours:  If on nasal continuous positive airway pressure, respiratory therapy assess nares and determine need for appliance change or resting period.  12/30/2024 1209 by Tahira Frank RN  Outcome: Completed  12/30/2024 0329 by Cayetano Callaway RN  Outcome: Progressing     Problem: Chronic Conditions and Co-morbidities  Goal: Patient's chronic conditions and co-morbidity symptoms are monitored and maintained or improved  12/30/2024 1209 by Tahira Frank RN  Outcome: Completed  12/30/2024 0329 by Cayetano Callaway

## 2024-12-30 NOTE — DISCHARGE SUMMARY
[facility]  Office: [phone]  Daniel Orosco DO, Aly rCenshaw DO, Gary Herr DO, Karlos Brannon DO, Shanda Cortes MD, Micki Duke MD, Law Hickman MD, Marely Lewis MD,  Baldo Morocho MD, Rbuen Mistry MD, Remy Cevallos MD,  Patrica Rossi DO, Tad Castrejon MD, Jorge L Joseph MD, Damon Orosco DO, Vivian Wright MD,  Reza Barth DO, Nasreen Parker MD, Jacque Lawrence MD, Christine Avalos MD, Tiffani Prater MD,  Aries Latham MD, Mars Heaton MD, Paige Garcia MD, Laurie Webb MD, Ham Tan MD, Ava Renee MD, Bernard Bautista DO, Pacheco Gomez MD, Patrica Garza MD, Mohsin Reza, MD, Shirley Waterhouse, CNP,  Mikaela Rodriguez CNP, Bernard De Leon, CNP,  Idania Villegas, KATHRYN, Britney Betts, CNP, Dahiana Inman, CNP, Anastasiya Cordova, CNP, Daina Lawrence, CNP, Yumiko Cage, PA-C, Yanet Pollack PA-C, Rajani Dan, CNP, Jeffrey Brown, CNP,  Jaci Bhatti, CNP, Nafisa Bolton, CNP, Melody Adam, CNP,  Mariama Caban, CNP, Marielos Vasquez, CNP         [facility]   IN-PATIENT SERVICE   [facility]    Discharge Summary     Patient ID: Galo Ruiz  :  1940   MRN: [id number]     ACCOUNT:  [id number]   Patient's PCP: Briana Fuentes MD  Admit Date: 2024   Discharge Date: 2024     Length of Stay: 3  Code Status:  Full Code  Admitting Physician: Ava Renee MD  Discharge Physician: Ava Renee MD     Active Discharge Diagnoses:     Hospital Problem Lists:  Principal Problem:    Chest pain  Active Problems:    S/P coronary artery stent placement    CAD (coronary artery disease)    BPH (benign prostatic hyperplasia)    Hyperlipemia    Ischemic cardiomyopathy with implantable cardioverter-defibrillator (ICD)    Hypertension    Anxiety    Acute on chronic combined systolic and diastolic heart failure (HCC)    S/P CABG (coronary artery bypass graft)    Aortic disease (HCC)  Resolved Problems:    ICD (implantable

## 2024-12-30 NOTE — CARE COORDINATION
Discharge Report    [facility]  Clinical Case Management Department  Written by: Reina Gonzalez RN    Patient Name: Galo Ruiz  Attending Provider: Ava Renee MD  Admit Date: 2024  8:20 AM  MRN: [id number]  Account: [id number]                     : 1940  Discharge Date:       Disposition: home    Reina Gonzalez RN

## 2024-12-30 NOTE — PROGRESS NOTES
CLINICAL PHARMACY NOTE: MEDS TO BEDS    Total # of Prescriptions Filled: 1   The following medications were delivered to the patient:  ISOSORBIDE ER 60MG     Additional Documentation:

## 2024-12-30 NOTE — PLAN OF CARE
Problem: Discharge Planning  Goal: Discharge to home or other facility with appropriate resources  Outcome: Progressing     Problem: Pain  Goal: Verbalizes/displays adequate comfort level or baseline comfort level  Outcome: Progressing     Problem: Safety - Adult  Goal: Free from fall injury  Outcome: Progressing  Flowsheets (Taken 12/29/2024 1946)  Free From Fall Injury:   Instruct family/caregiver on patient safety   Based on caregiver fall risk screen, instruct family/caregiver to ask for assistance with transferring infant if caregiver noted to have fall risk factors     Problem: ABCDS Injury Assessment  Goal: Absence of physical injury  Outcome: Progressing     Problem: Skin/Tissue Integrity  Goal: Absence of new skin breakdown  Description: 1.  Monitor for areas of redness and/or skin breakdown  2.  Assess vascular access sites hourly  3.  Every 4-6 hours minimum:  Change oxygen saturation probe site  4.  Every 4-6 hours:  If on nasal continuous positive airway pressure, respiratory therapy assess nares and determine need for appliance change or resting period.  Outcome: Progressing     Problem: Chronic Conditions and Co-morbidities  Goal: Patient's chronic conditions and co-morbidity symptoms are monitored and maintained or improved  Outcome: Progressing

## 2025-01-06 ENCOUNTER — OFFICE VISIT (OUTPATIENT)
Dept: PODIATRY | Age: 85
End: 2025-01-06
Payer: MEDICARE

## 2025-01-06 VITALS — BODY MASS INDEX: 27.15 KG/M2 | WEIGHT: 173 LBS | HEIGHT: 67 IN

## 2025-01-06 DIAGNOSIS — L85.3 XEROSIS CUTIS: ICD-10-CM

## 2025-01-06 DIAGNOSIS — I73.9 PERIPHERAL VASCULAR DISORDER (HCC): ICD-10-CM

## 2025-01-06 DIAGNOSIS — M79.671 PAIN IN BOTH FEET: ICD-10-CM

## 2025-01-06 DIAGNOSIS — G60.9 IDIOPATHIC PERIPHERAL NEUROPATHY: ICD-10-CM

## 2025-01-06 DIAGNOSIS — B35.1 DERMATOPHYTOSIS OF NAIL: Primary | ICD-10-CM

## 2025-01-06 DIAGNOSIS — M79.672 PAIN IN BOTH FEET: ICD-10-CM

## 2025-01-06 DIAGNOSIS — L60.0 INGROWN NAIL: ICD-10-CM

## 2025-01-06 PROCEDURE — 1125F AMNT PAIN NOTED PAIN PRSNT: CPT | Performed by: PODIATRIST

## 2025-01-06 PROCEDURE — 99213 OFFICE O/P EST LOW 20 MIN: CPT | Performed by: PODIATRIST

## 2025-01-06 PROCEDURE — 11721 DEBRIDE NAIL 6 OR MORE: CPT | Performed by: PODIATRIST

## 2025-01-06 PROCEDURE — 1159F MED LIST DOCD IN RCRD: CPT | Performed by: PODIATRIST

## 2025-01-06 PROCEDURE — 1123F ACP DISCUSS/DSCN MKR DOCD: CPT | Performed by: PODIATRIST

## 2025-01-06 RX ORDER — GABAPENTIN 600 MG/1
TABLET ORAL
COMMUNITY
Start: 2024-12-31

## 2025-01-06 NOTE — PROGRESS NOTES
[facility] PODIATRY [address]  [address]  [facility] [address]  Dept: [phone]  Dept Fax: [phone]     PAIN PROGRESS NOTE  Date of patient's visit: 1/6/2025  Patient's Name:  Galo Ruiz YOB: 1940            Patient Care Team:  Briana Fuentes MD as PCP - General (Family Medicine)  Reza Walter DPM as Physician (Podiatry)  Mauro Mistry MD as Consulting Physician (Urology)  Steffanie Walter DPM as Physician (Podiatry)  Sawyer Pascal MD as Consulting Physician (Radiation Oncology)  Briana Fuentes MD      Chief Complaint   Patient presents with    Nail Problem     Toenail trim    Foot Pain     Bilateral foot       Subjective:   This Galo Ruiz comes to clinic for foot and nail care.  Pt currently has complaint of thickened, painful, elongated nails that he/she cannot manage by themselves.  Pt. Relates pain to nails with shoe gear.  Pt's primary care physician is Briana Fuentes MD last seen 8/23/24.    Pt has a new complaint of increased dry skin.    Past Medical History:   Diagnosis Date    Acid reflux     resolved since s/p shona    Anxiety     Arthritis     back/ fingers    BPH (benign prostatic hypertrophy)     CAD (coronary artery disease)     Dr. Curtis/ Dr. hernandez/ [facility]    CAD (coronary artery disease)     Cancer (HCC)     face, ear, scalp and arms     Cardiomyopathy (HCC)     Carotid stenosis     bilat    Cataracts, both eyes     CHF (congestive heart failure) ([facility])     Dry skin     Elevated PSA     Examination of participant in clinical trial 4/27/39    For the life of the medtronic device    HTN (hypertension)     Hyperlipidemia     pt denies 11-27-19    Hypertension     PCP Dr. Briana Fuentes/ last seen 9-2019    Left ventricular dysfunction     Macular degeneration     left    Myocardial infarction (HCC) 11/17/88, 1/2013    Neuropathy     Pacemaker 1995-2013    X 5    PAF (paroxysmal atrial

## 2025-03-25 RX ORDER — GABAPENTIN 600 MG/1
600 TABLET ORAL 2 TIMES DAILY
Qty: 180 TABLET | Refills: 2 | Status: SHIPPED | OUTPATIENT
Start: 2025-03-25 | End: 2025-12-20

## 2025-04-07 ENCOUNTER — OFFICE VISIT (OUTPATIENT)
Dept: PODIATRY | Age: 85
End: 2025-04-07
Payer: MEDICARE

## 2025-04-07 VITALS — HEIGHT: 67 IN | WEIGHT: 173 LBS | BODY MASS INDEX: 27.15 KG/M2

## 2025-04-07 DIAGNOSIS — L85.3 XEROSIS CUTIS: ICD-10-CM

## 2025-04-07 DIAGNOSIS — L60.0 INGROWN NAIL: ICD-10-CM

## 2025-04-07 DIAGNOSIS — M79.672 PAIN IN BOTH FEET: ICD-10-CM

## 2025-04-07 DIAGNOSIS — B35.1 DERMATOPHYTOSIS OF NAIL: Primary | ICD-10-CM

## 2025-04-07 DIAGNOSIS — I73.9 PERIPHERAL VASCULAR DISORDER: ICD-10-CM

## 2025-04-07 DIAGNOSIS — G60.9 IDIOPATHIC PERIPHERAL NEUROPATHY: ICD-10-CM

## 2025-04-07 DIAGNOSIS — M79.671 PAIN IN BOTH FEET: ICD-10-CM

## 2025-04-07 PROCEDURE — 1159F MED LIST DOCD IN RCRD: CPT | Performed by: PODIATRIST

## 2025-04-07 PROCEDURE — 1125F AMNT PAIN NOTED PAIN PRSNT: CPT | Performed by: PODIATRIST

## 2025-04-07 PROCEDURE — 99213 OFFICE O/P EST LOW 20 MIN: CPT | Performed by: PODIATRIST

## 2025-04-07 PROCEDURE — 1123F ACP DISCUSS/DSCN MKR DOCD: CPT | Performed by: PODIATRIST

## 2025-04-07 PROCEDURE — 11721 DEBRIDE NAIL 6 OR MORE: CPT | Performed by: PODIATRIST

## 2025-04-21 NOTE — PROGRESS NOTES
[facility] PODIATRY [address]  [address]  [facility] [address]  Dept: [phone]  Dept Fax: [phone]     PAIN PROGRESS NOTE  Date of patient's visit: 4/21/2025  Patient's Name:  Galo Ruiz YOB: 1940            Patient Care Team:  Briana Fuentes MD as PCP - General (Family Medicine)  Reza Walter DPM as Physician (Podiatry)  Mauro Mistry MD as Consulting Physician (Urology)  Steffanie Walter DPM as Physician (Podiatry)  Sawyer Pascal MD as Consulting Physician (Radiation Oncology)  Briana Fuentes MD      Chief Complaint   Patient presents with    Nail Problem     Toenail trim    Foot Pain     Bilateral foot       Subjective:   This Galo Ruiz comes to clinic for foot and nail care.  Pt currently has complaint of thickened, painful, elongated nails that he/she cannot manage by themselves.  Pt. Relates pain to nails with shoe gear.  Pt's primary care physician is Briana Fuentes MD last seen 8/23/24.    Pt has a new complaint of increased dry skin.    Past Medical History:   Diagnosis Date    Acid reflux     resolved since s/p shona    Anxiety     Arthritis     back/ fingers    BPH (benign prostatic hypertrophy)     CAD (coronary artery disease)     Dr. Curtis/ Dr. hernandez/ [facility]    CAD (coronary artery disease)     Cancer (HCC)     face, ear, scalp and arms     Cardiomyopathy     Carotid stenosis     bilat    Cataracts, both eyes     CHF (congestive heart failure) ([facility])     Dry skin     Elevated PSA     Examination of participant in clinical trial 4/27/39    For the life of the medtronic device    HTN (hypertension)     Hyperlipidemia     pt denies 11-27-19    Hypertension     PCP Dr. Briana Fuentes/ last seen 9-2019    Left ventricular dysfunction     Macular degeneration     left    Myocardial infarction (HCC) 11/17/88, 1/2013    Neuropathy     Pacemaker 1995-2013    X 5    PAF (paroxysmal atrial

## 2025-07-14 ENCOUNTER — OFFICE VISIT (OUTPATIENT)
Dept: PODIATRY | Age: 85
End: 2025-07-14
Payer: MEDICARE

## 2025-07-14 VITALS — WEIGHT: 173 LBS | BODY MASS INDEX: 27.15 KG/M2 | HEIGHT: 67 IN

## 2025-07-14 DIAGNOSIS — L85.3 XEROSIS CUTIS: ICD-10-CM

## 2025-07-14 DIAGNOSIS — G60.9 IDIOPATHIC PERIPHERAL NEUROPATHY: ICD-10-CM

## 2025-07-14 DIAGNOSIS — M79.671 PAIN IN BOTH FEET: ICD-10-CM

## 2025-07-14 DIAGNOSIS — M79.672 PAIN IN BOTH FEET: ICD-10-CM

## 2025-07-14 DIAGNOSIS — L60.0 INGROWN NAIL: ICD-10-CM

## 2025-07-14 DIAGNOSIS — I73.9 PERIPHERAL VASCULAR DISORDER: ICD-10-CM

## 2025-07-14 DIAGNOSIS — B35.1 DERMATOPHYTOSIS OF NAIL: Primary | ICD-10-CM

## 2025-07-14 PROCEDURE — 99213 OFFICE O/P EST LOW 20 MIN: CPT | Performed by: PODIATRIST

## 2025-07-14 PROCEDURE — 11721 DEBRIDE NAIL 6 OR MORE: CPT | Performed by: PODIATRIST

## 2025-07-14 PROCEDURE — 1159F MED LIST DOCD IN RCRD: CPT | Performed by: PODIATRIST

## 2025-07-14 PROCEDURE — 1123F ACP DISCUSS/DSCN MKR DOCD: CPT | Performed by: PODIATRIST

## 2025-07-14 PROCEDURE — 1126F AMNT PAIN NOTED NONE PRSNT: CPT | Performed by: PODIATRIST

## 2025-07-21 NOTE — PROGRESS NOTES
[facility] PODIATRY [address]  [address]  [facility] [address]  Dept: [phone]  Dept Fax: [phone]     PAIN PROGRESS NOTE  Date of patient's visit: 7/21/2025  Patient's Name:  Galo Ruiz YOB: 1940            Patient Care Team:  Briana Fuentes MD as PCP - General (Family Medicine)  Reza Walter DPM as Physician (Podiatry)  Mauro Mistry MD as Consulting Physician (Urology)  Steffanie Walter DPM as Physician (Podiatry)  Sawyer Pascal MD as Consulting Physician (Radiation Oncology)  Briana Fuentes MD      Chief Complaint   Patient presents with    Nail Problem     Toe nail trim    Foot Pain     Bilateral foot       Subjective:   This Galo Ruiz comes to clinic for foot and nail care.  Pt currently has complaint of thickened, painful, elongated nails that he/she cannot manage by themselves.  Pt. Relates pain to nails with shoe gear.  Pt's primary care physician is Briana Fuentes MD last seen 8/23/24.    Pt has a new complaint of increased dry skin.    Past Medical History:   Diagnosis Date    Acid reflux     resolved since s/p shona    Anxiety     Arthritis     back/ fingers    BPH (benign prostatic hypertrophy)     CAD (coronary artery disease)     Dr. Curtis/ Dr. hernandez/ [facility]    CAD (coronary artery disease)     Cancer (HCC)     face, ear, scalp and arms     Cardiomyopathy (HCC)     Carotid stenosis     bilat    Cataracts, both eyes     CHF (congestive heart failure) ([facility])     Dry skin     Elevated PSA     Examination of participant in clinical trial 4/27/39    For the life of the medtronic device    HTN (hypertension)     Hyperlipidemia     pt denies 11-27-19    Hypertension     PCP Dr. Briana Fuentes/ last seen 9-2019    Left ventricular dysfunction     Macular degeneration     left    Myocardial infarction (HCC) 11/17/88, 1/2013    Neuropathy     Pacemaker 1995-2013    X 5    PAF (paroxysmal atrial

## (undated) DEVICE — GLOVE ORANGE PI 8 1/2   MSG9085

## (undated) DEVICE — SUTURE PDS II SZ 0 L27IN ABSRB VLT L36MM CT-1 1/2 CIR Z340H

## (undated) DEVICE — GLOVE ORANGE PI 7 1/2   MSG9075

## (undated) DEVICE — PERCLOSE PROGLIDE™ SUTURE-MEDIATED CLOSURE SYSTEM: Brand: PERCLOSE PROGLIDE™

## (undated) DEVICE — SOLUTION IV IRRIG POUR BRL 0.9% SODIUM CHL 2F7124

## (undated) DEVICE — INTRODUCER SHTH 6FR L11CM 0.038IN STD SIDEPRT EXTN 3 W

## (undated) DEVICE — SUTURE ABSRB BRAID COAT UD CP NO 2 27IN VCRL J195H

## (undated) DEVICE — ANGIOGRAPHIC CATHETER: Brand: EXPO™

## (undated) DEVICE — 3M™ IOBAN™ 2 ANTIMICROBIAL INCISE DRAPE 6651EZ: Brand: IOBAN™ 2

## (undated) DEVICE — CATHETER PULM ART L110CM DIA6FR THRMDIL DBL LUMN FLO SWN GZ

## (undated) DEVICE — GUIDEWIRE 35/260/FC/PTFE/3J: Brand: GUIDEWIRE

## (undated) DEVICE — GOWN,AURORA,NONREINFORCED,LARGE: Brand: MEDLINE

## (undated) DEVICE — ZIPPERED TOGA, 2X LARGE: Brand: FLYTE

## (undated) DEVICE — CATHETER ANGIO IM 0.056 INX6 FRX100 CM EXPO

## (undated) DEVICE — ANGIO-SEAL VIP VASCULAR CLOSURE DEVICE: Brand: ANGIO-SEAL

## (undated) DEVICE — NEPTUNE E-SEP 165MM SUCTION SLEEVE: Brand: NEPTUNE E-SEP

## (undated) DEVICE — KIT HEMOSTASIS DEVICE: Brand: MEDLINE INDUSTRIES, INC.

## (undated) DEVICE — DRAPE,REIN 53X77,STERILE: Brand: MEDLINE

## (undated) DEVICE — STRYKER PERFORMANCE SERIES SAGITTAL BLADE: Brand: STRYKER PERFORMANCE SERIES

## (undated) DEVICE — APPLICATOR MEDICATED 26 CC SOLUTION HI LT ORNG CHLORAPREP

## (undated) DEVICE — SOLUTION IRRIG 3000ML 0.9% SOD CHL USP UROMATIC PLAS CONT

## (undated) DEVICE — OPTIFOAM GENTLE AG,POST-OP STRIP,3.5X14: Brand: MEDLINE

## (undated) DEVICE — BLADE CLIPPER GEN PURP NS

## (undated) DEVICE — MONOPTY® DISPOSABLE CORE BIOPSY INSTRUMENT, 22MM PENETRATION DEPTH, 18G X 20CM: Brand: MONOPTY

## (undated) DEVICE — GLIDESHEATH SLENDER STAINLESS STEEL KIT: Brand: GLIDESHEATH SLENDER

## (undated) DEVICE — SUTURE STRATAFIX SPRL SZ 2-0 L9IN ABSRB VLT MH L36MM 1/2 SXPD2B408

## (undated) DEVICE — INTENDED FOR TISSUE SEPARATION, AND OTHER PROCEDURES THAT REQUIRE A SHARP SURGICAL BLADE TO PUNCTURE OR CUT.: Brand: BARD-PARKER ® CARBON RIB-BACK BLADES

## (undated) DEVICE — STOCKINETTE,IMPERVIOUS,12X48,STERILE: Brand: MEDLINE

## (undated) DEVICE — 3M™ IOBAN™ 2 ANTIMICROBIAL INCISE DRAPE 6650EZ: Brand: IOBAN™ 2

## (undated) DEVICE — BANDAGE,GAUZE,BULKEE II,4.5"X4.1YD,STRL: Brand: MEDLINE

## (undated) DEVICE — DEV COMPR RADL PRELUDESYNCEZ 30ML 32CM

## (undated) DEVICE — ELECTRODE PT RET AD L9FT HI MOIST COND ADH HYDRGEL CORDED

## (undated) DEVICE — SUTURE MCRYL SZ 2-0 L27IN ABSRB UD SH L26MM TAPERPOINT NDL Y417H

## (undated) DEVICE — CATH DIAG EXPO .052 PIG145 6F 110CM

## (undated) DEVICE — MITT SURG PREP L ADH DISPOSABLE

## (undated) DEVICE — CATH F6INF TL JR 4 100CM: Brand: INFINITI

## (undated) DEVICE — GW EMR FIX EXCHG J STD .035 3MM 260CM

## (undated) DEVICE — DRAPE,HIP,W/POUCHES,STERILE: Brand: MEDLINE

## (undated) DEVICE — Device

## (undated) DEVICE — TOTAL TRAY, 16FR 10ML SIL FOLEY, URN: Brand: MEDLINE

## (undated) DEVICE — TRAY SURG CUST CRD CATH TOLEDO

## (undated) DEVICE — GW INQWIRE FC PTFE STD J/1.5 .035 260

## (undated) DEVICE — GLOVE ORANGE PI 8   MSG9080

## (undated) DEVICE — SVMMC ORTH SPL DRP PK

## (undated) DEVICE — NEEDLE BX ASPIR SPNL TIPCM MRK AND NDL STP 22GAX20CM

## (undated) DEVICE — GLOVE SURG SZ 6 THK91MIL LTX FREE SYN POLYISOPRENE ANTI

## (undated) DEVICE — CATH DIAG EXPO .056 IMT 6F 100CM

## (undated) DEVICE — SYRINGE KIT,PACKAGED,,150FT,MK 7(ANGIO-ARTERION, 150ML SYR KIT W/QFT,MC)(60729385): Brand: MEDRAD® MARK 7 ARTERION DISPOSABLE SYRINGE 150 ML WITH QUICK FILL TUBE

## (undated) DEVICE — SYSTEM FLD COLL W/ FLX DRP SUPP AND DISP BG

## (undated) DEVICE — CLIP LIG L235CM RESOL 360 BX/20

## (undated) DEVICE — SPONGE LAP W18XL18IN WHT COT 4 PLY FLD STRUNG RADPQ DISP ST

## (undated) DEVICE — BIPOLAR ELECTROHEMOSTASIS CATHETER: Brand: INJECTION GOLD PROBE

## (undated) DEVICE — KIT MICRO INTRO 4FR STIFF 40CM NIGHTENALL TUNGSTEN 7SMT

## (undated) DEVICE — HANDPIECE SET WITH COAXIAL HIGH FLOW TIP AND SUCTION TUBE: Brand: INTERPULSE

## (undated) DEVICE — SYRINGE, LUER LOCK, 10ML: Brand: MEDLINE

## (undated) DEVICE — ZIPPERED TOGA, X-LARGE: Brand: FLYTE

## (undated) DEVICE — SUTURE STRATAFIX SPRL SZ 1 L14IN ABSRB VLT L48CM CTX 1/2 SXPD2B405

## (undated) DEVICE — GUIDE CATHETER: Brand: MACH1™

## (undated) DEVICE — SUTURE ETHBND EXCEL SZ 5 L30IN NONABSORBABLE GRN L48MM CCS MB47G